# Patient Record
Sex: MALE | Race: WHITE | NOT HISPANIC OR LATINO | Employment: OTHER | URBAN - METROPOLITAN AREA
[De-identification: names, ages, dates, MRNs, and addresses within clinical notes are randomized per-mention and may not be internally consistent; named-entity substitution may affect disease eponyms.]

---

## 2015-05-29 LAB
CHOLEST SERPL-MSCNC: 245 MG/DL (ref 0–200)
HDLC SERPL-MCNC: 55 MG/DL
LDLC SERPL CALC-MCNC: 145 MG/DL
TRIGL SERPL-MCNC: 227 MG/DL

## 2017-01-09 ENCOUNTER — TELEPHONE (OUTPATIENT)
Dept: UROLOGY | Facility: CLINIC | Age: 77
End: 2017-01-09

## 2017-01-09 NOTE — TELEPHONE ENCOUNTER
----- Message from Jace Culver MD sent at 1/8/2017 10:13 PM CST -----  Records we received are just med list and list of medical problems - need clinical records and lab/urine results. (scheuled 1/11/17)    But - phone notes indicate he was going to be scheduled with Dr Constantino in [facility]. If he has been seen by Dr Constantino he should follow up with him.

## 2017-01-09 NOTE — TELEPHONE ENCOUNTER
Spoke with Estella with Dr Jose Nolan who states that there were no labs , or imaging only and office note from 2016. States she will fax it over . Fax number provided

## 2017-01-09 NOTE — TELEPHONE ENCOUNTER
----- Message from Jace Culver MD sent at 1/8/2017 10:13 PM CST -----  Records we received are just med list and list of medical problems - need clinical records and lab/urine results. (scheuled 1/11/17)    But - phone notes indicate he was going to be scheduled with Dr Constantion in [facility]. If he has been seen by Dr Constantino he should follow up with him.

## 2017-01-11 ENCOUNTER — OFFICE VISIT (OUTPATIENT)
Dept: UROLOGY | Facility: CLINIC | Age: 77
End: 2017-01-11
Payer: MEDICARE

## 2017-01-11 VITALS
HEIGHT: 67 IN | BODY MASS INDEX: 34.53 KG/M2 | SYSTOLIC BLOOD PRESSURE: 162 MMHG | HEART RATE: 56 BPM | WEIGHT: 220 LBS | DIASTOLIC BLOOD PRESSURE: 79 MMHG

## 2017-01-11 DIAGNOSIS — Z85.46 HISTORY OF PROSTATE CANCER: ICD-10-CM

## 2017-01-11 DIAGNOSIS — R35.0 URINARY FREQUENCY: Primary | ICD-10-CM

## 2017-01-11 LAB
BILIRUB SERPL-MCNC: NORMAL MG/DL
BLOOD URINE, POC: NORMAL
COLOR, POC UA: NORMAL
GLUCOSE UR QL STRIP: 50
KETONES UR QL STRIP: NORMAL
LEUKOCYTE ESTERASE URINE, POC: NORMAL
NITRITE, POC UA: NORMAL
PH, POC UA: 5
PROTEIN, POC: 30
SPECIFIC GRAVITY, POC UA: 1.01
UROBILINOGEN, POC UA: NORMAL

## 2017-01-11 PROCEDURE — 1160F RVW MEDS BY RX/DR IN RCRD: CPT | Mod: S$GLB,,, | Performed by: UROLOGY

## 2017-01-11 PROCEDURE — 81002 URINALYSIS NONAUTO W/O SCOPE: CPT | Mod: S$GLB,,, | Performed by: UROLOGY

## 2017-01-11 PROCEDURE — 1159F MED LIST DOCD IN RCRD: CPT | Mod: S$GLB,,, | Performed by: UROLOGY

## 2017-01-11 PROCEDURE — 1126F AMNT PAIN NOTED NONE PRSNT: CPT | Mod: S$GLB,,, | Performed by: UROLOGY

## 2017-01-11 PROCEDURE — 99203 OFFICE O/P NEW LOW 30 MIN: CPT | Mod: 25,S$GLB,, | Performed by: UROLOGY

## 2017-01-11 PROCEDURE — 1157F ADVNC CARE PLAN IN RCRD: CPT | Mod: S$GLB,,, | Performed by: UROLOGY

## 2017-01-11 PROCEDURE — 99999 PR PBB SHADOW E&M-EST. PATIENT-LVL III: CPT | Mod: PBBFAC,,, | Performed by: UROLOGY

## 2017-01-11 RX ORDER — TAMSULOSIN HYDROCHLORIDE 0.4 MG/1
CAPSULE ORAL
COMMUNITY
Start: 2016-11-30 | End: 2017-09-01

## 2017-01-11 RX ORDER — AMOXICILLIN AND CLAVULANATE POTASSIUM 875; 125 MG/1; MG/1
TABLET, FILM COATED ORAL
COMMUNITY
Start: 2017-01-03 | End: 2017-09-01

## 2017-01-11 RX ORDER — PANTOPRAZOLE SODIUM 20 MG/1
20 TABLET, DELAYED RELEASE ORAL DAILY
COMMUNITY
End: 2017-09-01

## 2017-01-11 RX ORDER — NEBIVOLOL 5 MG/1
10 TABLET ORAL DAILY
COMMUNITY
End: 2017-09-01

## 2017-01-11 NOTE — LETTER
January 11, 2017      Jose Nolan MD  [address] [place] [address]           [facility]  [address]  [place] [address]  Phone: [phone]          Patient: Lucho Delong   MR Number: [id number]   YOB: 1940   Date of Visit: 1/11/2017       Dear Dr. Jose Nolan:    Thank you for referring Lucho Delong to me for evaluation. Attached you will find relevant portions of my assessment and plan of care.    If you have questions, please do not hesitate to call me. I look forward to following Lucho Delong along with you.    Sincerely,    Jace Culver MD    Enclosure  CC:  No Recipients    If you would like to receive this communication electronically, please contact [email or web address] or [phone] to request more information on [employer] Link access.    For providers and/or their staff who would like to refer a patient to [facility], please contact us through our one-stop-shop provider referral line, [facility], at [phone].    If you feel you have received this communication in error or would no longer like to receive these types of communications, please e-mail [email or web address]

## 2017-01-11 NOTE — PROGRESS NOTES
[facility]:    Lucho Delong is a 76 y.o. male Referred by Jose Nolan MD for urinary frequency    Saw Dr Nolan 11/30/16 and complained of frequent urination and feeling of being unable to fully empty his bladder. He was started on Flomax.    DTF 3-4x. Q2hour NTF  No hesitancy. No intermittency. Mild PV dribble.  Often has to return right back to the bathroom within minutes.  No hematuria, no dysuria.  Notices increased frequency if he has a soda.  2 cups of coffe in the morning. Water and juice throughout the day. Holland Hansen 2-3 evenings per week.  Does drink all the way until bedtime - keeps a glass of water by the bed.    Prostate cancer 20 years ago.  Open RRP by Dr Mckenzie in [place], [place]. Has been continent since surgery, also had good erections following.  PSA <0.1 in 2005 and 2006.    Has noticed right testicle size changes from blueberry to golf ball. Noticed it within last year. Nonpainful.      HLD, HTN, gout, GERD, CKD3    Past Medical History   Diagnosis Date    Allergic reaction     Bursitis of hip     Chronic back pain     Chronic kidney disease, stage 3     Foot pain     Gastroesophageal reflux disease     Gout     Hyperlipemia     Hypertension     Joint pain     Pain in toe     Testicle pain        Past Surgical History   Procedure Laterality Date    Colonoscopy      Prostate surgery  1997       Family History   Problem Relation Age of Onset    Cancer Father 86     colon       Social History     Social History    Marital status:      Spouse name: N/A    Number of children: N/A    Years of education: N/A     Occupational History    Not on file.     Social History Main Topics    Smoking status: Never Smoker    Smokeless tobacco: Never Used    Alcohol use 2.0 oz/week     4 Standard drinks or equivalent per week      Comment: bourbon or beer    Drug use: No    Sexual activity: Not on file     Other Topics Concern    Not on  file     Social History Narrative       Review of patient's allergies indicates:   Allergen Reactions    Tramadol Itching and Swelling     Began medication, two doses into this treatment patient experiences tongue swelling and severe itching. Patient MD advised to not take this medication anymore       Medications Reviewed: see MAR    ROS:    As noted above in HPI otherwise negative x10 systems reviewed.    PHYSICAL EXAM:    Vitals:    01/11/17 0853   BP: (!) 162/79   Pulse: (!) 56     Body mass index is 34.46 kg/(m^2).    General: Alert, cooperative, no distress, appears stated age  Head: Normocephalic, without obvious abnormality, atraumatic  Neck: no masses, no thyromegaly, no lymphadenopathy  Eyes: PERRL, conjunctiva/corneas clear  Lungs: Respirations unlabored, normal effort, no accessory muscle use  CV: Warm and well perfused extremities  Abdomen: Soft, non-tender, no CVA tenderness, no hepatosplenomegaly, no hernia, well healed low midline surgical scar  Penis: phallus normal, well cared for, no plaques or lesions.   Scrotum: no cysts, no lesions, no rash, no clinical hydrocele, no hernia  Epididymes: normal, nontender, symmetrical, no masses,  prominent R epididymis with cys   Testes: normal, both descended, no masses, R testis with lower lie.   Urethra: palpably normal with orthotopic meatus of normal size    RAMÓN: normal sphincter tone, no masses, no hemorrhoids, empty fossa, no masses  Extremities: Extremities normal, atraumatic, no cyanosis or edema  Skin: Normal color, texture, and turgor, no rashes or lesions  Psych: Appropriate, well oriented, normal affect, normal mood  Neuro: Non-focal      Lab Results   Component Value Date    PSA <0.1 04/11/2006    PSA <0.1 07/27/2005       LABS:    Recent Results (from the past 336 hour(s))   POCT URINE DIPSTICK WITHOUT MICROSCOPE    Collection Time: 01/11/17  9:40 AM   Result Value Ref Range    Color, UA yellow clear     Spec Grav, UA 1.015     pH, UA 5     WBC,  UA nerg     Nitrite, UA neg     Protein, UA 30     Glucose, UA 50     Ketones, UA neg     Urobilinogen, UA neg     Bilirubin, UA neg     Blood, UA neg          Assessment/Diagnosis:    1. Urinary frequency  POCT URINE DIPSTICK WITHOUT MICROSCOPE   2. History of prostate cancer         Plans:  We primarily discussed behavioral modifications for his bothersome frequency, which is mostly a problem at night. Have advised him to limit fluid 2 hours before bedtime.  We also discussed limiting bladder irritants such as caffeine, soda, tea, alcohol, spicy food.  He has very minimal obstructive LUTS controlled with Flomax.  I will have him return to clinic in 6 months at which time we'll reassess his urinary frequency and nocturia.  If these are unchanged with behavioral modifications will consider cystoscopy given his history of prostate cancer and open prostatectomy at that time will evaluate for bladder neck contracture or other such pathology.  30 minutes spent in this encounter, over half in counseling.     If still in 6 mos with behav changes consider cysto

## 2017-01-11 NOTE — MR AVS SNAPSHOT
[place] [facility] - Urology   [address]  [place] [address]  Phone: [phone]                  Lucho Delong   2017 9:00 AM   Office Visit    Description:  Male : 1940   Provider:  Jace Culver MD   Department:  [place] [facility]           Reason for Visit     Urinary Frequency     Groin Swelling           Diagnoses this Visit        Comments    Urinary frequency    -  Primary            To Do List           Goals (5 Years of Data)     None      Follow-Up and Disposition     Return in about 6 months (around 2017).      [facility]     [facility] -  Assistance  Registered nurses in the [facility] provide clinical advisement, health education, appointment booking, and other advisory services.  Call for this free service at [phone].             Medications           Message regarding Medications     Verify the changes and/or additions to your medication regime listed below are the same as discussed with your clinician today.  If any of these changes or additions are incorrect, please notify your healthcare provider.        STOP taking these medications     allopurinol (ZYLOPRIM) 300 MG tablet     amlodipine (NORVASC) 10 MG tablet     multivitamin capsule Take 1 capsule by mouth once daily.           Verify that the below list of medications is an accurate representation of the medications you are currently taking.  If none reported, the list may be blank. If incorrect, please contact your healthcare provider. Carry this list with you in case of emergency.           Current Medications     amoxicillin-clavulanate 875-125mg (AUGMENTIN) 875-125 mg per tablet     CRESTOR 20 mg tablet     hydrocodone-acetaminophen 5-325mg (NORCO) 5-325 mg per tablet Take 1 tablet by mouth every 8 (eight) hours as needed for Pain.    losartan (COZAAR) 100 MG tablet     naproxen sodium (ANAPROX) 220 MG tablet Take 220 mg by mouth 3 (three)  "times daily with meals.    nebivolol (BYSTOLIC) 5 MG Tab Take 10 mg by mouth once daily.    omeprazole (PRILOSEC) 40 MG capsule     pantoprazole (PROTONIX) 20 MG tablet Take 20 mg by mouth once daily.    predniSONE (DELTASONE) 20 MG tablet     tamsulosin (FLOMAX) 0.4 mg Cp24            Clinical Reference Information           Vital Signs - Last Recorded  Most recent update: 1/11/2017  9:40 AM by Jeanne Sears MA    BP Pulse Ht Wt BMI    (!) 162/79 (BP Location: Right arm, Patient Position: Sitting, BP Method: Automatic) (!) 56 5' 7" (1.702 m) 99.8 kg (220 lb) 34.46 kg/m2      Blood Pressure          Most Recent Value    BP  (!)  162/79      Allergies as of 1/11/2017     Tramadol      Immunizations Administered on Date of Encounter - 1/11/2017     None      Orders Placed During Today's Visit      Normal Orders This Visit    POCT URINE DIPSTICK WITHOUT MICROSCOPE          1/11/2017  9:41 AM - Jeanne Sears MA      Component Results     Component    Color, UA    yellow clear    Spec Grav, UA    1.015    pH, UA    5    WBC, UA    nerg    Nitrite, UA    neg    Protein, UA    30    Glucose, UA    50    Ketones, UA    neg    Urobilinogen, UA    neg    Bilirubin, UA    neg    Blood, UA    neg      "

## 2017-03-15 ENCOUNTER — TELEPHONE (OUTPATIENT)
Dept: ORTHOPEDIC SURGERY | Facility: CLINIC | Age: 77
End: 2017-03-15

## 2017-03-15 NOTE — TELEPHONE ENCOUNTER
----- Message from Jami Laughlin sent at 3/15/2017  9:30 AM CDT -----  Patient is having trouble with his back. He stated that his lower and upper lumbar is problematic. He does not have a referral but will get one from his PCP. Please call with availability. Please call patient back at [phone].

## 2017-08-15 ENCOUNTER — TELEPHONE (OUTPATIENT)
Dept: ELECTROPHYSIOLOGY | Facility: CLINIC | Age: 77
End: 2017-08-15

## 2017-08-15 DIAGNOSIS — I48.0 PAF (PAROXYSMAL ATRIAL FIBRILLATION): Primary | ICD-10-CM

## 2017-08-15 NOTE — TELEPHONE ENCOUNTER
I called patient and left him V/Message ,  due to he has an appointment to see  9-1-17.   I need to request all His Medical Records for his a-Fib.

## 2017-08-22 ENCOUNTER — TELEPHONE (OUTPATIENT)
Dept: ELECTROPHYSIOLOGY | Facility: CLINIC | Age: 77
End: 2017-08-22

## 2017-08-22 NOTE — TELEPHONE ENCOUNTER
I returned pt call, and we spoke about getting more of his medical records from  [phone].  From  1 year ago today.

## 2017-09-01 ENCOUNTER — INITIAL CONSULT (OUTPATIENT)
Dept: ELECTROPHYSIOLOGY | Facility: CLINIC | Age: 77
End: 2017-09-01
Payer: MEDICARE

## 2017-09-01 ENCOUNTER — HOSPITAL ENCOUNTER (OUTPATIENT)
Dept: CARDIOLOGY | Facility: CLINIC | Age: 77
Discharge: HOME | End: 2017-09-01
Payer: MEDICARE

## 2017-09-01 VITALS
HEIGHT: 67 IN | WEIGHT: 202 LBS | HEART RATE: 83 BPM | DIASTOLIC BLOOD PRESSURE: 67 MMHG | SYSTOLIC BLOOD PRESSURE: 133 MMHG | BODY MASS INDEX: 31.71 KG/M2

## 2017-09-01 DIAGNOSIS — I48.19 PERSISTENT ATRIAL FIBRILLATION: ICD-10-CM

## 2017-09-01 DIAGNOSIS — I48.0 PAF (PAROXYSMAL ATRIAL FIBRILLATION): ICD-10-CM

## 2017-09-01 DIAGNOSIS — I48.19 PERSISTENT ATRIAL FIBRILLATION: Primary | ICD-10-CM

## 2017-09-01 DIAGNOSIS — E78.5 HYPERLIPIDEMIA, UNSPECIFIED HYPERLIPIDEMIA TYPE: ICD-10-CM

## 2017-09-01 DIAGNOSIS — I10 ESSENTIAL HYPERTENSION: ICD-10-CM

## 2017-09-01 PROCEDURE — 1159F MED LIST DOCD IN RCRD: CPT | Mod: S$GLB,,, | Performed by: INTERNAL MEDICINE

## 2017-09-01 PROCEDURE — 99205 OFFICE O/P NEW HI 60 MIN: CPT | Mod: S$GLB,,, | Performed by: INTERNAL MEDICINE

## 2017-09-01 PROCEDURE — 99999 PR PBB SHADOW E&M-EST. PATIENT-LVL III: CPT | Mod: PBBFAC,,, | Performed by: INTERNAL MEDICINE

## 2017-09-01 PROCEDURE — 3008F BODY MASS INDEX DOCD: CPT | Mod: S$GLB,,, | Performed by: INTERNAL MEDICINE

## 2017-09-01 PROCEDURE — 93000 ELECTROCARDIOGRAM COMPLETE: CPT | Mod: S$GLB,,, | Performed by: INTERNAL MEDICINE

## 2017-09-01 PROCEDURE — 1126F AMNT PAIN NOTED NONE PRSNT: CPT | Mod: S$GLB,,, | Performed by: INTERNAL MEDICINE

## 2017-09-01 RX ORDER — METOPROLOL SUCCINATE 50 MG/1
50 TABLET, EXTENDED RELEASE ORAL NIGHTLY
COMMUNITY
Start: 2017-08-24 | End: 2017-11-29

## 2017-09-01 RX ORDER — DILTIAZEM HYDROCHLORIDE 240 MG/1
240 CAPSULE, COATED, EXTENDED RELEASE ORAL DAILY
Status: ON HOLD | COMMUNITY
Start: 2017-07-01 | End: 2017-10-04

## 2017-09-01 NOTE — PROGRESS NOTES
Subjective:    Patient ID:  Lucho Delong is a 76 y.o. male who presents for evaluation of Atrial Fibrillation      HPI   76 y.o. M  CKD (ClCr 27)  HTN on meds  HL on meds    New AF dx 7/17, after pt developed COLON x 10 days.  Rx eliquis and dilt, which pt didn't tolerate. Changed to xarelto, which he tolerates but with bruising.    ECGs:  12/2016: SR, 7/17: AF    echo 12/16: 60%. mild LAE  ETT 12/16: no ischemia    My interpretation of today's ECG is AF with HR 83 bpm    Review of Systems   Constitution: Positive for malaise/fatigue. Negative for weakness.   HENT: Negative.  Negative for ear pain and tinnitus.    Eyes: Negative for blurred vision.   Cardiovascular: Positive for dyspnea on exertion. Negative for chest pain, near-syncope, palpitations and syncope.   Respiratory: Negative.  Negative for shortness of breath.    Endocrine: Negative.  Negative for polyuria.   Hematologic/Lymphatic: Does not bruise/bleed easily.   Skin: Negative.  Negative for rash.   Musculoskeletal: Negative.  Negative for joint pain and muscle weakness.   Gastrointestinal: Negative.  Negative for abdominal pain and change in bowel habit.   Genitourinary: Negative for frequency.   Neurological: Negative.  Negative for dizziness.   Psychiatric/Behavioral: Negative.  Negative for depression. The patient is not nervous/anxious.    Allergic/Immunologic: Negative for environmental allergies.        Objective:    Physical Exam   Constitutional: He is oriented to person, place, and time. He appears well-developed and well-nourished.   HENT:   Head: Normocephalic and atraumatic.   Eyes: Conjunctivae, EOM and lids are normal. No scleral icterus.   Neck: Normal range of motion. No JVD present. No tracheal deviation present. No thyromegaly present.   Cardiovascular: Normal rate, normal heart sounds and intact distal pulses.  An irregularly irregular rhythm present.  No extrasystoles are present. PMI is not displaced.  Exam reveals no gallop  and no friction rub.    No murmur heard.  Pulses:       Radial pulses are 2+ on the right side, and 2+ on the left side.   Pulmonary/Chest: Effort normal and breath sounds normal. No accessory muscle usage. No tachypnea. No respiratory distress. He has no wheezes. He has no rales.   Abdominal: Soft. Bowel sounds are normal. He exhibits no distension. There is no hepatosplenomegaly. There is no tenderness.   Musculoskeletal: Normal range of motion. He exhibits no edema.   Neurological: He is alert and oriented to person, place, and time. He has normal reflexes. He exhibits normal muscle tone.   Skin: Skin is warm and dry. No rash noted.   Psychiatric: He has a normal mood and affect. His behavior is normal.   Nursing note and vitals reviewed.        Assessment:       1. Persistent atrial fibrillation    2. Essential hypertension    3. Hyperlipidemia, unspecified hyperlipidemia type    Likely PUSHPA     Plan:       Discussed AF and its basic pathophysiology, including its health implications and treatment options (rate vs. rhythm control, meds vs. procedural/device treatment) as appropriate for the patient.  Discussed options for anticoagulation, including ASA vs coumadin vs dabigatran/rivaroxaban/apixaban (novel anticoagulants). Discussed risks and benefits of each, including dosing, side effects, risk (including no specific reversal agent for some NOACs), and cost. Answered all questions. This process took about 15 minutes.    Continue xarelto, but decrease dose to 15 given CKD  HYACINTH/DCCV next week, and place 30-day monitor with auto.    Pt's wife says he stops breathing while sleeping.  Discussed that in the presence of uncontrolled/incompletely treated obstructive sleep apnea, AF is very difficult to treat. Need to optimize PUSHPA treatment in order to optimize attempts at AF treatment.  Sleep consult re: PUSHPA; CPAP prn.

## 2017-09-01 NOTE — PROGRESS NOTES
CARDIOVERSION EDUCATION CHECK LIST    9-01-17  Blood work  PRE - PROCEDURE LABS HAVE BEEN ORDERED FOR YOU @ [facility]  BE SURE TO ARRIVE AT YOUR SCHEDULED TIME FOR THIS LAB WORK!   (YOU DO NOT HAVE TO FAST FOR THIS LABWORK!!!!)     9-06-17 @ 8 AM-   Cardioversion  REPORT TO CARDIOLOGY WAITING ROOM ON 3RD FLOOR OF HOSPITAL (DO NOT REPORT TO CLINIC)  Directions for Reporting to Cardiology Waiting Area in the Hospital  If you park in the Parking Garage:  Take elevators to the 2nd floor  Walk up ramp and turn right by Gold Elevators  Take elevator to the 3rd floor  Upon exiting the elevator, turn away from the clinic areas  Walk long cook around to front of hospital to area with windows overlooking [facility]  Check in at Reception Desk  OR  If family is dropping you off:  Have them drop you off at the front of the Hospital  (Near the ER, where all the flags are hung).  Take the E elevators to the 3rd floor.  Check in at the Reception Desk in the waiting room.        DO NOT EAT OR DRINK ANYTHING AFTER: 12 MN ON THE NIGHT BEFORE YOUR PROCEDURE    MEDICATIONS:  YOU MAY TAKE YOUR USUAL MORNING MEDICATIONS WITH A SIP OF WATER    YOU WILL BE GOING HOME THE SAME DAY AS YOUR PROCEDURE  YOU WILL NEED SOMEONE TO DRIVE YOU HOME AFTER YOUR PROCEDURE     YOUR PAIN DURING YOUR PROCEDURE WILL BE MANAGED BY THE ANESTHESIA TEAM      THE ABOVE INSTRUCTIONS WERE GIVEN TO THE PATIENT VERBALLY AND THEY VERBALIZED UNDERSTANDING. THEY DO NOT REQUIRE ANY SPECIAL NEEDS AND DO NOT HAVE ANY LEARNING BARRIERS.     Any need to reschedule or cancel procedures, or any questions regarding your procedure should be addressed directly with the Arrhythmia Department Nurses at the following phone number: [phone]

## 2017-09-05 ENCOUNTER — TELEPHONE (OUTPATIENT)
Dept: CARDIOLOGY | Facility: CLINIC | Age: 77
End: 2017-09-05

## 2017-09-05 NOTE — TELEPHONE ENCOUNTER
Patient called about HYACINTH scheduled on *9/6/17. Pre-procedural questions asked.  Denies swallowing issues and esophageal issues, other than gerd. Denies previous sedation/anesthesia problems. States does have sleep apnea. Has dentures.  Denies recent trauma/surgery/radiation therapy to head/neck/airway. States is able to move neck without difficulty. HYACINTH described to patient. Instructed NPO past midnight and to have a designated  to drive patient home after the procedures due to sedation being given. Instructed to report to  [facility] at 0800 am.   Verbalizes understanding. Questions answered.

## 2017-09-06 ENCOUNTER — HOSPITAL ENCOUNTER (OUTPATIENT)
Facility: HOSPITAL | Age: 77
Discharge: HOME | End: 2017-09-06
Attending: INTERNAL MEDICINE | Admitting: INTERNAL MEDICINE
Payer: MEDICARE

## 2017-09-06 ENCOUNTER — ANESTHESIA EVENT (OUTPATIENT)
Dept: MEDSURG UNIT | Facility: HOSPITAL | Age: 77
End: 2017-09-06
Payer: MEDICARE

## 2017-09-06 ENCOUNTER — HOSPITAL ENCOUNTER (OUTPATIENT)
Dept: CARDIOLOGY | Facility: CLINIC | Age: 77
Discharge: HOME | End: 2017-09-06
Attending: INTERNAL MEDICINE | Admitting: INTERNAL MEDICINE
Payer: MEDICARE

## 2017-09-06 ENCOUNTER — SURGERY (OUTPATIENT)
Age: 77
End: 2017-09-06

## 2017-09-06 ENCOUNTER — ANESTHESIA (OUTPATIENT)
Dept: MEDSURG UNIT | Facility: HOSPITAL | Age: 77
End: 2017-09-06
Payer: MEDICARE

## 2017-09-06 VITALS
RESPIRATION RATE: 18 BRPM | BODY MASS INDEX: 31.39 KG/M2 | WEIGHT: 200 LBS | DIASTOLIC BLOOD PRESSURE: 80 MMHG | SYSTOLIC BLOOD PRESSURE: 138 MMHG | TEMPERATURE: 97 F | HEART RATE: 68 BPM | HEIGHT: 67 IN | OXYGEN SATURATION: 98 %

## 2017-09-06 DIAGNOSIS — I48.91 ATRIAL FIBRILLATION: ICD-10-CM

## 2017-09-06 DIAGNOSIS — I48.19 PERSISTENT ATRIAL FIBRILLATION: Primary | ICD-10-CM

## 2017-09-06 LAB
DIASTOLIC DYSFUNCTION: NO
MITRAL VALVE MOBILITY: NORMAL
MITRAL VALVE REGURGITATION: NORMAL
RETIRED EF AND QEF - SEE NOTES: 55 (ref 55–65)

## 2017-09-06 PROCEDURE — 63600175 PHARM REV CODE 636 W HCPCS: Performed by: NURSE ANESTHETIST, CERTIFIED REGISTERED

## 2017-09-06 PROCEDURE — D9220A PRA ANESTHESIA: Mod: ANES,,, | Performed by: ANESTHESIOLOGY

## 2017-09-06 PROCEDURE — D9220A PRA ANESTHESIA: Mod: CRNA,,, | Performed by: NURSE ANESTHETIST, CERTIFIED REGISTERED

## 2017-09-06 PROCEDURE — 37000009 HC ANESTHESIA EA ADD 15 MINS: Performed by: INTERNAL MEDICINE

## 2017-09-06 PROCEDURE — 93320 DOPPLER ECHO COMPLETE: CPT | Mod: 26,,, | Performed by: INTERNAL MEDICINE

## 2017-09-06 PROCEDURE — 37000008 HC ANESTHESIA 1ST 15 MINUTES: Performed by: INTERNAL MEDICINE

## 2017-09-06 PROCEDURE — 93005 ELECTROCARDIOGRAM TRACING: CPT | Mod: 59

## 2017-09-06 PROCEDURE — 93325 DOPPLER ECHO COLOR FLOW MAPG: CPT | Mod: 26,,, | Performed by: INTERNAL MEDICINE

## 2017-09-06 PROCEDURE — 25000003 PHARM REV CODE 250

## 2017-09-06 PROCEDURE — 93320 DOPPLER ECHO COMPLETE: CPT

## 2017-09-06 PROCEDURE — 93010 ELECTROCARDIOGRAM REPORT: CPT | Mod: 76,,, | Performed by: INTERNAL MEDICINE

## 2017-09-06 PROCEDURE — 93010 ELECTROCARDIOGRAM REPORT: CPT | Mod: ,,, | Performed by: INTERNAL MEDICINE

## 2017-09-06 PROCEDURE — 25000003 PHARM REV CODE 250: Performed by: NURSE PRACTITIONER

## 2017-09-06 PROCEDURE — 93312 ECHO TRANSESOPHAGEAL: CPT | Mod: 26,,, | Performed by: INTERNAL MEDICINE

## 2017-09-06 PROCEDURE — 27100006 CARDIOVERSION (DCCV)

## 2017-09-06 PROCEDURE — 92960 CARDIOVERSION ELECTRIC EXT: CPT | Mod: ,,, | Performed by: INTERNAL MEDICINE

## 2017-09-06 PROCEDURE — 93325 DOPPLER ECHO COLOR FLOW MAPG: CPT

## 2017-09-06 RX ORDER — AMIODARONE HYDROCHLORIDE 200 MG/1
200 TABLET ORAL DAILY
Qty: 30 TABLET | Refills: 3 | Status: ON HOLD | OUTPATIENT
Start: 2017-10-07 | End: 2017-10-04

## 2017-09-06 RX ORDER — HYDROMORPHONE HYDROCHLORIDE 1 MG/ML
0.2 INJECTION, SOLUTION INTRAMUSCULAR; INTRAVENOUS; SUBCUTANEOUS EVERY 5 MIN PRN
Status: DISCONTINUED | OUTPATIENT
Start: 2017-09-06 | End: 2017-09-06 | Stop reason: HOSPADM

## 2017-09-06 RX ORDER — SODIUM CHLORIDE 9 MG/ML
INJECTION, SOLUTION INTRAVENOUS CONTINUOUS
Status: DISCONTINUED | OUTPATIENT
Start: 2017-09-06 | End: 2017-09-06 | Stop reason: HOSPADM

## 2017-09-06 RX ORDER — AMIODARONE HYDROCHLORIDE 200 MG/1
TABLET ORAL
Qty: 42 TABLET | Refills: 0 | Status: ON HOLD | OUTPATIENT
Start: 2017-09-06 | End: 2017-10-04 | Stop reason: HOSPADM

## 2017-09-06 RX ORDER — ONDANSETRON 2 MG/ML
4 INJECTION INTRAMUSCULAR; INTRAVENOUS DAILY PRN
Status: DISCONTINUED | OUTPATIENT
Start: 2017-09-06 | End: 2017-09-06 | Stop reason: HOSPADM

## 2017-09-06 RX ORDER — SODIUM CHLORIDE 0.9 % (FLUSH) 0.9 %
3 SYRINGE (ML) INJECTION
Status: DISCONTINUED | OUTPATIENT
Start: 2017-09-06 | End: 2017-09-06 | Stop reason: HOSPADM

## 2017-09-06 RX ORDER — PROPOFOL 10 MG/ML
VIAL (ML) INTRAVENOUS
Status: DISCONTINUED | OUTPATIENT
Start: 2017-09-06 | End: 2017-09-06

## 2017-09-06 RX ORDER — AMIODARONE HYDROCHLORIDE 200 MG/1
TABLET ORAL
Qty: 90 TABLET | Refills: 3 | Status: SHIPPED | OUTPATIENT
Start: 2017-09-06 | End: 2017-09-06 | Stop reason: DRUGHIGH

## 2017-09-06 RX ORDER — DIPHENHYDRAMINE HYDROCHLORIDE 50 MG/ML
25 INJECTION INTRAMUSCULAR; INTRAVENOUS EVERY 6 HOURS PRN
Status: DISCONTINUED | OUTPATIENT
Start: 2017-09-06 | End: 2017-09-06 | Stop reason: HOSPADM

## 2017-09-06 RX ADMIN — PROPOFOL 20 MG: 10 INJECTION, EMULSION INTRAVENOUS at 10:09

## 2017-09-06 RX ADMIN — PROPOFOL 50 MG: 10 INJECTION, EMULSION INTRAVENOUS at 10:09

## 2017-09-06 RX ADMIN — SODIUM CHLORIDE 1000 ML: 0.9 INJECTION, SOLUTION INTRAVENOUS at 07:09

## 2017-09-06 NOTE — PLAN OF CARE
Plan of care and periop routine discussed with patient. Pt verbalized understanding. All questions answered. VSS. Respirations even and unlabored. Pt reports pain is tolerable. Family has been updated. Will continue to monitor.

## 2017-09-06 NOTE — PLAN OF CARE
Problem: Patient Care Overview  Goal: Plan of Care Review  Outcome: Ongoing (interventions implemented as appropriate)  Received report from CRISTINA Montejo. Patient s/p HYACINTH and attempted cardioversion, AAOx3. VSS, no c/o pain or discomfort at this time, resp even and unlabored. Post procedure protocol reviewed with patient and patient's family. Understanding verbalized. Family members at bedside. Nurse call bell within reach. Will continue to monitor per post procedure protocol.

## 2017-09-06 NOTE — DISCHARGE SUMMARY
[facility]  Discharge Summary      Admit Date: 9/6/2017    Discharge Date and Time: 9/6/2017  1:25 PM    Attending Physician: Carlos Costello MD     Reason for Admission: Diagnosed with persistent Afib (on Xarelto) since 7/17 here today for HYACINTH+DCCV.    Procedures Performed: Procedure(s) (LRB):  CARDIOVERSION (N/A)  TRANSESOPHAGEAL ECHOCARDIOGRAM (HYACINTH) (N/A)    Hospital Course  Lucho Delong 76 y.o.  man with PMHx of CKD, HTN and Hyperlipidemia, diagnosed with persistent Afib (on Xarelto) since 7/17 s/p  HYACINTH+DCCV on 9/6/17.  Pt. Converted to Sinus rhythm following DCCV, but had early conversion back to AF post procedure. Pt. Responsive and answers questions. Denies pain.  Up out of bed.  No SOB, no CP, no syncope/presyncope. Given ERAF, plan for amiodarone po with loading dose as prescribed.  After 2 weeks of loading dose  Amiodarone, plan to bring patient back for  Repeat HYACINTH/Cardioversion.    Discharge plans/instructions discussed with patient and spouse  who verbalized understanding  and agreement of plans of care. No further questions or concerns voiced at this time.     Consults: none    Significant Diagnostic Studies: Cardiac Graphics: ECG 9/6/17 at 1107: AF. Ventricular rate 63 bpm    Final Diagnoses:    Principal Problem: Persistent atrial fibrillation   Secondary Diagnoses:   Active Hospital Problems    Diagnosis  POA    *Persistent atrial fibrillation [I48.1]  Yes      Resolved Hospital Problems    Diagnosis Date Resolved POA   No resolved problems to display.     Discharged Condition: good    Disposition: Home or Self Care    Follow Up/Patient Instructions:     Medications:  Reconciled Home Medications:   Discharge Medication List as of 9/6/2017 12:59 PM      START taking these medications    Details   amiodarone (PACERONE) 200 MG Tab Take 400 mg twice daily x 2 weeks; then take 400 mg daily x 2 weeks; then take 200 mg daily thereafter, Normal         CONTINUE these  medications which have NOT CHANGED    Details   CRESTOR 20 mg tablet 20 mg once daily. , Starting Wed 4/8/2015, Historical Med      diltiaZEM (CARDIZEM CD) 240 MG 24 hr capsule Take 240 mg by mouth once daily., Starting Sat 7/1/2017, Historical Med      losartan (COZAAR) 100 MG tablet 100 mg once daily. , Starting Wed 5/20/2015, Historical Med      metoprolol succinate (TOPROL-XL) 50 MG 24 hr tablet Take 50 mg by mouth once daily., Starting Thu 8/24/2017, Historical Med      mirabegron (MYRBETRIQ) 25 mg Tb24 ER tablet Take 25 mg by mouth once daily., Starting Mon 8/28/2017, Historical Med      predniSONE (DELTASONE) 20 MG tablet 20 mg once daily. , Starting Wed 5/20/2015, Historical Med      rivaroxaban (XARELTO) 15 mg Tab Take 1 tablet (15 mg total) by mouth daily with dinner or evening meal., Starting Fri 9/1/2017, Normal             Discharge Procedure Orders  Diet general   Scheduling Instructions: cardiac     Activity as tolerated     Call MD for:  persistent dizziness, light-headedness, or visual disturbances     Call MD for:  persistent nausea and vomiting or diarrhea     Call MD for:  temperature >100.4     Call MD for:  severe uncontrolled pain     Call MD for:  redness, tenderness, or signs of infection (pain, swelling, redness, odor or green/yellow discharge around incision site)     Call MD for:  difficulty breathing or increased cough     Call MD for:  severe persistent headache     Call MD for:  worsening rash     Call MD for:  increased confusion or weakness       Follow-up Information     Carlos Costello MD In 2 weeks.    Specialties:  Cardiology, Electrophysiology  Why:  EP clinic will coordinate with patient and family in 2 weeks for repeat HYACINTH/cardioversion.    Contact information:  [address] [facility] [id number] [phone]                   THO Alatorre, APRN, FNP-BC  Nurse Practitioner  Cardiology- EP

## 2017-09-06 NOTE — H&P
TRANSESOPHAGEAL ECHOCARDIOGRAPHY   PRE-PROCEDURE NOTE    09/06/2017    HPI:     Lucho Delong is a 76 y.o. man with PMHx of CKD, HTN and Hyperlipidemia diagnosed with persistent Afib (on Xarelto) since 7/17 here today for HYACINTH+DCCV.    Dysphagia or odynophagia:  No  Liver Disease, esophageal disease, or known varices:  No  Upper GI Bleeding: No  Snoring:  No  Sleep Apnea:  No  Prior neck surgery or radiation:  No  History of anesthetic difficulties:  No  Family history of anesthetic difficulties:  No  Last oral intake:  12 hours ago  Able to move neck in all directions:  Yes      Meds:     Scheduled Meds:   PRN Meds:  Continuous Infusions:   sodium chloride 0.9%         Physical Exam:     Vitals:          Constitutional: NAD, conversant  HEENT:   Sclera anicteric, Uvula midline, EOMI, OP clear  Neck:               No JVD, moves to all direction without any limitations  CV:               RRR, no murmurs / rubs / gallops, normal S1/S2  Pulm:               CTAB, no wheezes, rales, or ronchi  GI:               Abdomen soft, NTND, +BS  Extremities:              No LE edema, warm with palpable pulses  Neuro:   AAOX3, no focal motor deficits    Mallampati:II  ASA:II      Labs:     Recent Results (from the past 336 hour(s))   CBC auto differential    Collection Time: 09/01/17  2:45 PM   Result Value Ref Range    WBC 9.16 3.90 - 12.70 K/uL    Hemoglobin 11.4 (L) 14.0 - 18.0 g/dL    Hematocrit 33.6 (L) 40.0 - 54.0 %    Platelets 258 150 - 350 K/uL       Recent Results (from the past 336 hour(s))   Basic metabolic panel    Collection Time: 09/01/17  2:45 PM   Result Value Ref Range    Sodium 142 136 - 145 mmol/L    Potassium 4.8 3.5 - 5.1 mmol/L    Chloride 106 95 - 110 mmol/L    CO2 25 23 - 29 mmol/L    BUN, Bld 40 (H) 8 - 23 mg/dL    Creatinine 2.2 (H) 0.5 - 1.4 mg/dL    Calcium 9.5 8.7 - 10.5 mg/dL    Anion Gap 11 8 - 16 mmol/L       CrCl cannot be calculated (Unknown ideal weight.).    INR:  1.1  Imaging:      [id number]  Afrib          Assessment & Plan:     PLAN:  1. HYACINTH for evaluation of CANDELARIO thrombus followed by YVONNEV.    -The risks, benefits & alternatives of the procedure were explained to the patient.    -The risks of transesophageal echo include but are not limited to:  Dental trauma, esophageal trauma/perforation, bleeding, laryngospasm/brochospasm, aspiration, sore throat/hoarseness, & dislodgement of the endotracheal tube/nasogastric tube (where applicable).    -The risks of moderate sedation include hypotension, respiratory depression, arrhythmias, bronchospasm, & death.    -Informed consent was obtained & the patient is agreeable to proceed with the procedure.    I will discuss with the attending physician. Attending addendum is to follow.      Attending addendum to follow     Kelechi Carias MD  Cardiology Fellow  Pager: [phone]

## 2017-09-06 NOTE — PLAN OF CARE
Problem: Patient Care Overview  Goal: Plan of Care Review  Outcome: Outcome(s) achieved Date Met: 09/06/17  Patient discharged per MD orders. Instructions given on medications, wound care, activity, when to call MD, and follow up appointments. Pt verbalized understanding. Patient AAOx3, VSS, no c/o pain or discomfort at this time. Telemetry and PIV removed. Patient and family refused transport, ambulated off unit.

## 2017-09-06 NOTE — TRANSFER OF CARE
"Anesthesia Transfer of Care Note    Patient: Lucho Delong    Procedure(s) Performed: Procedure(s) (LRB):  CARDIOVERSION (N/A)  TRANSESOPHAGEAL ECHOCARDIOGRAM (HYACINTH) (N/A)    Patient location: PACU    Anesthesia Type: general    Transport from OR: Transported from OR on room air with adequate spontaneous ventilation    Post pain: adequate analgesia    Post assessment: no apparent anesthetic complications and tolerated procedure well    Post vital signs: stable    Level of consciousness: awake, alert and oriented    Nausea/Vomiting: no nausea/vomiting    Complications: none    Transfer of care protocol was followed      Last vitals:   Visit Vitals  /78 (BP Location: Left arm, Patient Position: Lying)   Pulse 62   Temp 36.2 °C (97.1 °F) (Oral)   Resp 18   Ht 5' 7" (1.702 m)   Wt 90.7 kg (200 lb)   SpO2 97%   BMI 31.32 kg/m²     "

## 2017-09-06 NOTE — ANESTHESIA PREPROCEDURE EVALUATION
09/06/2017  Lucho Delong is a 76 y.o., male.    Anesthesia Evaluation    I have reviewed the Patient Summary Reports.    I have reviewed the Nursing Notes.   I have reviewed the Medications.     Review of Systems  Anesthesia Hx:  No previous Anesthesia  History of prior surgery of interest to airway management or planning: Previous anesthesia: General Denies Family Hx of Anesthesia complications.   Denies Personal Hx of Anesthesia complications.   Cardiovascular:   Exercise tolerance: good Hypertension Dysrhythmias atrial fibrillation    Renal/:   Chronic Renal Disease, CRI    Hepatic/GI:   GERD        Physical Exam  General:  Well nourished    Airway/Jaw/Neck:  Airway Findings: Mouth Opening: Normal Tongue: Normal  General Airway Assessment: Adult  Mallampati: II  Improves to II with phonation.  TM Distance: Normal, at least 6 cm  Jaw/Neck Findings:  Neck ROM: Normal ROM      Dental:  Dental Findings: In tact   Chest/Lungs:  Chest/Lungs Findings: Clear to auscultation, Normal Respiratory Rate     Heart/Vascular:  Heart Findings: Rate: Normal  Rhythm: Regular Rhythm  Sounds: Normal        Mental Status:  Mental Status Findings:  Cooperative, Alert and Oriented         Anesthesia Plan  Type of Anesthesia, risks & benefits discussed:  Anesthesia Type:  general  Patient's Preference:   Intra-op Monitoring Plan: standard ASA monitors  Intra-op Monitoring Plan Comments:   Post Op Pain Control Plan: multimodal analgesia  Post Op Pain Control Plan Comments:   Induction:   IV  Beta Blocker:  Patient is on a Beta-Blocker and has received one dose within the past 24 hours (No further documentation required).       Informed Consent: Patient understands risks and agrees with Anesthesia plan.  Questions answered. Anesthesia consent signed with patient.  ASA Score: 3     Day of Surgery Review of History &  Physical:    H&P update referred to the provider.         Ready For Surgery From Anesthesia Perspective.

## 2017-09-15 ENCOUNTER — TELEPHONE (OUTPATIENT)
Dept: ELECTROPHYSIOLOGY | Facility: CLINIC | Age: 77
End: 2017-09-15

## 2017-09-15 NOTE — TELEPHONE ENCOUNTER
----- Message from Kaitlin Apple MA sent at 9/15/2017  2:16 PM CDT -----  Contact: patient   Pt stated on amiodrone. Had a DCCv  9-6-17.  C/o  Tiredness , Some weakness. B/P is ok. No SoB.  He thinks it may be due to the Amiodarone.   Please call at number provided.  Thank You.  ----- Message -----  From: Salome Rubio  Sent: 9/15/2017   8:05 AM  To: Kaitlin Apple MA    Please call pt at [phone]. Has a medical questions and asked for you only. Dr Costello pt    Thank you

## 2017-09-15 NOTE — TELEPHONE ENCOUNTER
Returned Pt's call. Pt stated that he is tired and SOB. He was started on amiodarone and is back in afib which is prob causing his symptoms. Advised I would call Monday to set up DCCV. Pt voiced udnerstanding.

## 2017-09-20 ENCOUNTER — CLINICAL SUPPORT (OUTPATIENT)
Dept: ELECTROPHYSIOLOGY | Facility: CLINIC | Age: 77
End: 2017-09-20
Payer: MEDICARE

## 2017-09-20 ENCOUNTER — TELEPHONE (OUTPATIENT)
Dept: ELECTROPHYSIOLOGY | Facility: CLINIC | Age: 77
End: 2017-09-20

## 2017-09-20 ENCOUNTER — HOSPITAL ENCOUNTER (OUTPATIENT)
Dept: CARDIOLOGY | Facility: CLINIC | Age: 77
Discharge: HOME | End: 2017-09-20
Payer: MEDICARE

## 2017-09-20 DIAGNOSIS — I48.0 PAF (PAROXYSMAL ATRIAL FIBRILLATION): ICD-10-CM

## 2017-09-20 DIAGNOSIS — I48.19 PERSISTENT ATRIAL FIBRILLATION: ICD-10-CM

## 2017-09-20 PROCEDURE — 93000 ELECTROCARDIOGRAM COMPLETE: CPT | Mod: S$GLB,,, | Performed by: INTERNAL MEDICINE

## 2017-09-20 NOTE — TELEPHONE ENCOUNTER
Pt came to the lobby today because of complaints of nausea and dizziness on amiodarone. Pt was on a loading dose at 400mg BID. He saw a PCP which adjusted it to 200 mg BID. He has been on that dose for 6 days and still complaining of dizziness.  Advised I would speak with Dr Griffith and see if we needed to push his DCCV back. Pt voiced understanding.     Spoke with Dr Costello. Updated on Pt's symptoms. Pt's amiodarone decreased to 200 mg/qd and DCCV pushed back 2 weeks.      Called Pt to inform of Dr Costello's recommendations. He will decrease dose to 200 mg/qd and procedure scheduled for 10/4/17.

## 2017-09-27 ENCOUNTER — TELEPHONE (OUTPATIENT)
Dept: CARDIOLOGY | Facility: CLINIC | Age: 77
End: 2017-09-27

## 2017-09-27 DIAGNOSIS — I48.19 PERSISTENT ATRIAL FIBRILLATION: Primary | ICD-10-CM

## 2017-09-27 NOTE — PROGRESS NOTES
CARDIOVERSION EDUCATION CHECK LIST    10-2-17 -Labs  PRE - PROCEDURE LABS HAVE BEEN ORDERED FOR YOU @ [facility]  Orders have been faxed and an extra copy of the orders is included in this packet.  (YOU DO NOT HAVE TO FAST FOR THIS LABWORK!!!!)    10-4-17 @ 8 AM  REPORT TO CARDIOLOGY WAITING ROOM ON 3RD FLOOR OF HOSPITAL (DO NOT REPORT TO CLINIC)  Directions for Reporting to Cardiology Waiting Area in the Hospital  If you park in the Parking Garage:  Take elevators to the 2nd floor  Walk up ramp and turn right by Gold Elevators  Take elevator to the 3rd floor  Upon exiting the elevator, turn away from the clinic areas  Walk long cook around to front of hospital to area with windows overlooking [facility]  Check in at Reception Desk  OR  If family is dropping you off:  Have them drop you off at the front of the Hospital  (Near the ER, where all the flags are hung).  Take the E elevators to the 3rd floor.  Check in at the Reception Desk in the waiting room.    DO NOT EAT OR DRINK ANYTHING AFTER: 12 MN ON THE NIGHT BEFORE YOUR PROCEDURE    MEDICATIONS:  YOU MAY TAKE YOUR USUAL MORNING MEDICATIONS WITH A SIP OF WATER    YOU WILL BE GOING HOME THE SAME DAY AS YOUR PROCEDURE  YOU WILL NEED SOMEONE TO DRIVE YOU HOME AFTER YOUR PROCEDURE     YOUR PAIN DURING YOUR PROCEDURE WILL BE MANAGED BY THE ANESTHESIA TEAM      THE ABOVE INSTRUCTIONS WERE GIVEN TO THE PATIENT VERBALLY AND THEY VERBALIZED UNDERSTANDING. THEY DO NOT REQUIRE ANY SPECIAL NEEDS AND DO NOT HAVE ANY LEARNING BARRIERS.     Any need to reschedule or cancel procedures, or any questions regarding your procedure should be addressed directly with the Arrhythmia Department Nurses at the following phone number: [phone]

## 2017-09-27 NOTE — TELEPHONE ENCOUNTER
Patient called about HYACINTH scheduled on 10/4/17 . Pre-procedural questions asked.  Denies swallowing issues and esophageal issues. Denies previous sedation/anesthesia problems. States does not  have sleep apnea. Has dentures.  Denies recent trauma/surgery/radiation therapy to head/neck/airway. States is able to move neck without difficulty. HYACINTH described to patient. Instructed NPO past midnight and to have a designated  to drive patient home after the procedures due to sedation being given. Instructed to report to  [facility] at 0800 am.   Verbalizes understanding. Questions answered.

## 2017-10-02 ENCOUNTER — TELEPHONE (OUTPATIENT)
Dept: ELECTROPHYSIOLOGY | Facility: CLINIC | Age: 77
End: 2017-10-02

## 2017-10-02 DIAGNOSIS — I48.19 PERSISTENT ATRIAL FIBRILLATION: Primary | ICD-10-CM

## 2017-10-02 NOTE — TELEPHONE ENCOUNTER
----- Message from Conchis Gary MA sent at 10/2/2017  9:15 AM CDT -----  Contact: patient called  Please call the patient at home he would like to talk to the nurse about schedule his lab at the [facility].. Thank you.

## 2017-10-02 NOTE — TELEPHONE ENCOUNTER
Returned Pt's call. Pt wanted his lab work switched to [facility]. Appt switched to have lab work done here.

## 2017-10-03 ENCOUNTER — TELEPHONE (OUTPATIENT)
Dept: ELECTROPHYSIOLOGY | Facility: CLINIC | Age: 77
End: 2017-10-03

## 2017-10-03 ENCOUNTER — LAB VISIT (OUTPATIENT)
Dept: LAB | Facility: HOSPITAL | Age: 77
End: 2017-10-03
Attending: INTERNAL MEDICINE
Payer: MEDICARE

## 2017-10-03 DIAGNOSIS — I48.19 PERSISTENT ATRIAL FIBRILLATION: ICD-10-CM

## 2017-10-03 LAB
ANION GAP SERPL CALC-SCNC: 13 MMOL/L
APTT BLDCRRT: 25 SEC
BASOPHILS # BLD AUTO: 0.01 K/UL
BASOPHILS NFR BLD: 0.1 %
BUN SERPL-MCNC: 42 MG/DL
CALCIUM SERPL-MCNC: 9.3 MG/DL
CHLORIDE SERPL-SCNC: 107 MMOL/L
CO2 SERPL-SCNC: 24 MMOL/L
CREAT SERPL-MCNC: 2.7 MG/DL
DIFFERENTIAL METHOD: ABNORMAL
EOSINOPHIL # BLD AUTO: 0 K/UL
EOSINOPHIL NFR BLD: 0.3 %
ERYTHROCYTE [DISTWIDTH] IN BLOOD BY AUTOMATED COUNT: 12.9 %
EST. GFR  (AFRICAN AMERICAN): 25.1 ML/MIN/1.73 M^2
EST. GFR  (NON AFRICAN AMERICAN): 21.8 ML/MIN/1.73 M^2
GLUCOSE SERPL-MCNC: 149 MG/DL
HCT VFR BLD AUTO: 32.5 %
HGB BLD-MCNC: 11 G/DL
INR PPP: 1.2
LYMPHOCYTES # BLD AUTO: 1.6 K/UL
LYMPHOCYTES NFR BLD: 15.9 %
MCH RBC QN AUTO: 29.3 PG
MCHC RBC AUTO-ENTMCNC: 33.8 G/DL
MCV RBC AUTO: 87 FL
MONOCYTES # BLD AUTO: 1 K/UL
MONOCYTES NFR BLD: 9.4 %
NEUTROPHILS # BLD AUTO: 7.6 K/UL
NEUTROPHILS NFR BLD: 74.1 %
PLATELET # BLD AUTO: 223 K/UL
PMV BLD AUTO: 9.7 FL
POTASSIUM SERPL-SCNC: 4.7 MMOL/L
PROTHROMBIN TIME: 12.5 SEC
RBC # BLD AUTO: 3.75 M/UL
SODIUM SERPL-SCNC: 144 MMOL/L
WBC # BLD AUTO: 10.29 K/UL

## 2017-10-03 PROCEDURE — 85025 COMPLETE CBC W/AUTO DIFF WBC: CPT

## 2017-10-03 PROCEDURE — 85610 PROTHROMBIN TIME: CPT

## 2017-10-03 PROCEDURE — 80048 BASIC METABOLIC PNL TOTAL CA: CPT

## 2017-10-03 PROCEDURE — 36415 COLL VENOUS BLD VENIPUNCTURE: CPT

## 2017-10-03 PROCEDURE — 85730 THROMBOPLASTIN TIME PARTIAL: CPT

## 2017-10-04 ENCOUNTER — CLINICAL SUPPORT (OUTPATIENT)
Dept: ELECTROPHYSIOLOGY | Facility: CLINIC | Age: 77
End: 2017-10-04
Payer: MEDICARE

## 2017-10-04 ENCOUNTER — HOSPITAL ENCOUNTER (OUTPATIENT)
Facility: HOSPITAL | Age: 77
Discharge: HOME | End: 2017-10-04
Attending: INTERNAL MEDICINE | Admitting: INTERNAL MEDICINE
Payer: MEDICARE

## 2017-10-04 VITALS
OXYGEN SATURATION: 96 % | WEIGHT: 197 LBS | HEART RATE: 76 BPM | DIASTOLIC BLOOD PRESSURE: 63 MMHG | RESPIRATION RATE: 18 BRPM | HEIGHT: 67 IN | TEMPERATURE: 98 F | SYSTOLIC BLOOD PRESSURE: 123 MMHG | BODY MASS INDEX: 30.92 KG/M2

## 2017-10-04 DIAGNOSIS — I48.92 ATRIAL FLUTTER, UNSPECIFIED TYPE: ICD-10-CM

## 2017-10-04 DIAGNOSIS — I48.3 TYPICAL ATRIAL FLUTTER: Primary | ICD-10-CM

## 2017-10-04 DIAGNOSIS — I48.19 PERSISTENT ATRIAL FIBRILLATION: ICD-10-CM

## 2017-10-04 DIAGNOSIS — I48.91 ATRIAL FIBRILLATION: ICD-10-CM

## 2017-10-04 DIAGNOSIS — I48.19 PERSISTENT ATRIAL FIBRILLATION: Primary | ICD-10-CM

## 2017-10-04 PROCEDURE — 25000003 PHARM REV CODE 250: Performed by: NURSE PRACTITIONER

## 2017-10-04 PROCEDURE — 99220 PR INITIAL OBSERVATION CARE,LEVL III: CPT | Mod: ,,, | Performed by: INTERNAL MEDICINE

## 2017-10-04 PROCEDURE — 93005 ELECTROCARDIOGRAM TRACING: CPT

## 2017-10-04 PROCEDURE — 93010 ELECTROCARDIOGRAM REPORT: CPT | Mod: ,,, | Performed by: INTERNAL MEDICINE

## 2017-10-04 RX ORDER — FUROSEMIDE 20 MG/1
20 TABLET ORAL 2 TIMES DAILY
Status: ON HOLD | COMMUNITY
End: 2017-10-18

## 2017-10-04 RX ORDER — AMIODARONE HYDROCHLORIDE 200 MG/1
200 TABLET ORAL DAILY
Qty: 30 TABLET | Refills: 6 | Status: SHIPPED | OUTPATIENT
Start: 2017-10-07 | End: 2017-10-04

## 2017-10-04 RX ORDER — AMIODARONE HYDROCHLORIDE 200 MG/1
200 TABLET ORAL DAILY
Qty: 30 TABLET | Refills: 6 | Status: SHIPPED | OUTPATIENT
Start: 2017-10-04 | End: 2018-01-10 | Stop reason: SDUPTHER

## 2017-10-04 RX ORDER — SODIUM CHLORIDE 9 MG/ML
INJECTION, SOLUTION INTRAVENOUS CONTINUOUS
Status: DISCONTINUED | OUTPATIENT
Start: 2017-10-04 | End: 2017-10-04 | Stop reason: HOSPADM

## 2017-10-04 RX ORDER — DILTIAZEM HYDROCHLORIDE 180 MG/1
180 CAPSULE, COATED, EXTENDED RELEASE ORAL DAILY
Qty: 30 CAPSULE | Refills: 6 | Status: SHIPPED | OUTPATIENT
Start: 2017-10-04 | End: 2017-11-29

## 2017-10-04 RX ADMIN — SODIUM CHLORIDE 1000 ML: 0.9 INJECTION, SOLUTION INTRAVENOUS at 07:10

## 2017-10-04 NOTE — PLAN OF CARE
Problem: Cardiac Rhythm Management Device (Adult)  Goal: Signs and Symptoms of Listed Potential Problems Will be Absent, Minimized or Managed (Cardiac Rhythm Management Device)  Signs and symptoms of listed potential problems will be absent, minimized or managed by discharge/transition of care (reference Cardiac Rhythm Management Device (Adult) CPG).  Outcome: Ongoing (interventions implemented as appropriate)  Admit assessment done. Plan of care and safety prec initiated. Will continue to monitor.

## 2017-10-04 NOTE — DISCHARGE SUMMARY
"[facility]  Cardiac Electrophysiology  Discharge Summary      Patient Name: Lucho Delong  MRN: [id number]  Admission Date: 10/4/2017  Hospital Length of Stay: 0 days  Discharge Date and Time:  10/04/2017 9:01 AM  Attending Physician: Carlos Costello MD    Discharging Provider: Yesika Nina NP  Primary Care Physician: Jose Nolan MD    HPI: Lucho Delong 76 y.o. presents today for HYACINTH/DCCV for AFL. He reports that he has not taken amiodarone x > 1 week.  He reports 1000% compliance with  xarelto as well as his other medications.     Procedure(s) (LRB):  CARDIOVERSION (N/A)  TRANSESOPHAGEAL ECHOCARDIOGRAM (HYACINTH) (N/A)     Hospital Course:  Lucho Delong 76 y.o. presents today for HYACINTH/DCCV for AFL. He reports that he has not taken amiodarone x > 1 week.  He is taking his xarelto as well as his other medications.  He reports that when taking his amiodarone, he felt badly and couldn't get out of bed. He was nauseous.  Discussed patients reports regarding amiodarone with Dr. Costello who canceled DCCV for today. Plan:  1. Pt. Will take amiodarone 200 mg daily starting today  2. Decrease diltiazem from 240 mg to 180 mg daily  3. Continue xarelto daily without fail  4. Call office and speak with [place] if there are any issues regarding adherence to medication regime.   5. Return in 2 weeks (if taking amiodarone) for HYACINTH/DCCV"      Significant Diagnostic Studies: Labs:   BMP:   Recent Labs  Lab 10/03/17  1225   *      K 4.7      CO2 24   BUN 42*   CREATININE 2.7*   CALCIUM 9.3       Pending Diagnostic Studies:     Procedure Component Value Units Date/Time    Transesophageal echo [[id number]]     Order Status:  Sent Lab Status:  No result           Final Active Diagnoses:    Diagnosis Date Noted POA    PRINCIPAL PROBLEM:  Atrial flutter [I48.92] 10/04/2017 Yes    Atrial fibrillation [I48.91] 09/06/2017 Yes      Problems Resolved During this Admission:    " Diagnosis Date Noted Date Resolved POA     No new Assessment & Plan notes have been filed under this hospital service since the last note was generated.  Service: Arrhythmia      Discharged Condition: good    Disposition: Home or Self Care    Follow Up:  Follow-up Information     Carlos Costello MD In 2 weeks.    Specialties:  Cardiology, Electrophysiology  Why:  EP clinic to coordinate HYACINTH/DCCV in 2 weeks..    Contact information:  [address]  [place] [id number] [phone]                 Patient Instructions:     Diet Cardiac     Call MD for:  temperature >100.4     Call MD for:  persistent nausea and vomiting or diarrhea     Call MD for:  severe uncontrolled pain     Call MD for:  redness, tenderness, or signs of infection (pain, swelling, redness, odor or green/yellow discharge around incision site)     Call MD for:  difficulty breathing or increased cough     Call MD for:  severe persistent headache     Call MD for:  worsening rash     Call MD for:  persistent dizziness, light-headedness, or visual disturbances     Call MD for:  increased confusion or weakness     Call MD for:   Order Comments: For any concerns     Activity as tolerated       Medications:  Reconciled Home Medications:   Current Discharge Medication List      CONTINUE these medications which have CHANGED    Details   amiodarone (PACERONE) 200 MG Tab Take 1 tablet (200 mg total) by mouth once daily.  Qty: 30 tablet, Refills: 6    Associated Diagnoses: Persistent atrial fibrillation      diltiaZEM (CARDIZEM CD) 180 MG 24 hr capsule Take 1 capsule (180 mg total) by mouth once daily.  Qty: 30 capsule, Refills: 6         CONTINUE these medications which have NOT CHANGED    Details   furosemide (LASIX) 20 MG tablet Take 20 mg by mouth 2 (two) times daily.      losartan (COZAAR) 100 MG tablet 100 mg once daily.       metoprolol succinate (TOPROL-XL) 50 MG 24 hr tablet Take 50 mg by mouth once daily.      rivaroxaban (XARELTO) 15 mg Tab  Take 1 tablet (15 mg total) by mouth daily with dinner or evening meal.  Qty: 90 tablet, Refills: 3      CRESTOR 20 mg tablet 20 mg once daily.       mirabegron (MYRBETRIQ) 25 mg Tb24 ER tablet Take 25 mg by mouth once daily.      predniSONE (DELTASONE) 20 MG tablet 20 mg once daily.              Time spent on the discharge of patient: 30 minutes discussing medication compliance, importance of taking all medications as prescribed.  Instructed patient to please call office if there are issues taking medications.     Yesika Nina NP  Cardiac Electrophysiology  [facility]

## 2017-10-04 NOTE — PROGRESS NOTES
Lucho Delong 76 y.o. presents today for HYACINTH/DCCV for AFL. He reports that he has not taken amiodarone x > 1 week.  He is taking his xarelto as well as his other medications.  He reports that when taking his amiodarone, he felt badly and couldn't get out of bed. He was nauseous.  Discussed patients reports regarding amiodarone with Dr. Costello who canceled DCCV for today.     Plan:  1. Pt. Will take amiodarone 200 mg daily starting today  2. Decrease diltiazem from 240 mg to 180 mg daily  3. Continue xarelto daily without fail  4. Call office and speak with [place] if there are any issues regarding adherence to medication regime.   5. Return in 2 weeks (if taking amiodarone) for HYACINTH/DCCV      THO Alatorre, APRN, FNP-BC  Nurse Practitioner  Cardiac Electrophysiology    Attending:  CORETTA Costello MD

## 2017-10-04 NOTE — PLAN OF CARE
Patient discharged per MD orders. Instructions given on medications, wound care, activity, signs of infection, when to call MD, and follow up appointments. Pt verbalized understanding. Patient and family refused transport, ambulated off unit.

## 2017-10-04 NOTE — PROGRESS NOTES
Spoke to Freddy Monzon via telephone regarding bump in SCr  2.7 ( 10/2017)  from 2.2 ( 09/2017). Pt to hold lasix for 1 day, and then  resumed lasix 20 mg once daily instead of BID. Pt with close nephrology follow up in 1 week. Continue other plans per DC summary.   The patient voices understanding and all questions have been answered. No further questions/concerns voiced at this time.          MIRYAM Medina-BC  Cardiology Electrophysiology  NP   [facility]    Attending: MD Carlos Costello

## 2017-10-06 ENCOUNTER — TELEPHONE (OUTPATIENT)
Dept: ELECTROPHYSIOLOGY | Facility: CLINIC | Age: 77
End: 2017-10-06

## 2017-10-06 NOTE — TELEPHONE ENCOUNTER
I called patient ,and no answer, I left V/M   Letting him know that most recent lab was faxed to [facility] [phone].

## 2017-10-06 NOTE — TELEPHONE ENCOUNTER
----- Message from Bita Hermosillo RN sent at 10/6/2017  2:36 PM CDT -----  Contact: pt      ----- Message -----  From: Connie Pineda  Sent: 10/6/2017  10:00 AM  To: Trang DUNHAM Staff    Dr. Costello requested pt to see his Kidney doctor at [facility] and he needs you to fax his recent lab results to [phone].  Pls call pt at [phone] when this has been done.    Thank you

## 2017-10-09 ENCOUNTER — TELEPHONE (OUTPATIENT)
Dept: ELECTROPHYSIOLOGY | Facility: CLINIC | Age: 77
End: 2017-10-09

## 2017-10-09 ENCOUNTER — DOCUMENTATION ONLY (OUTPATIENT)
Dept: ELECTROPHYSIOLOGY | Facility: CLINIC | Age: 77
End: 2017-10-09

## 2017-10-09 NOTE — TELEPHONE ENCOUNTER
Spoke with Pt this morning. He is tolerating amiodarone at 200 mg/qd. Scheduled his DCCV for 10/18/17.

## 2017-10-09 NOTE — PROGRESS NOTES
CARDIOVERSION EDUCATION CHECK LIST     10-17-17 -Labs  PRE - PROCEDURE LABS HAVE BEEN ORDERED FOR YOU @ [facility]  (YOU DO NOT HAVE TO FAST FOR THIS LABWORK!!!!)     10-18-17 arrive @ 8 AM  REPORT TO CARDIOLOGY WAITING ROOM ON 3RD FLOOR OF HOSPITAL (DO NOT REPORT TO CLINIC)  Directions for Reporting to Cardiology Waiting Area in the Hospital  If you park in the Parking Garage:  Take elevators to the 2nd floor  Walk up ramp and turn right by Gold Elevators  Take elevator to the 3rd floor  Upon exiting the elevator, turn away from the clinic areas  Walk long cook around to front of hospital to area with windows overlooking [facility]  Check in at Reception Desk  OR  If family is dropping you off:  Have them drop you off at the front of the Hospital  (Near the ER, where all the flags are hung).  Take the E elevators to the 3rd floor.  Check in at the Reception Desk in the waiting room.     DO NOT EAT OR DRINK ANYTHING AFTER: 12 MIDNIGHT ON THE NIGHT BEFORE YOUR PROCEDURE     MEDICATIONS:  YOU MAY TAKE YOUR USUAL MORNING MEDICATIONS WITH A SIP OF WATER     YOU WILL BE GOING HOME THE SAME DAY AS YOUR PROCEDURE  YOU WILL NEED SOMEONE TO DRIVE YOU HOME AFTER YOUR PROCEDURE      YOUR PAIN DURING YOUR PROCEDURE WILL BE MANAGED BY THE ANESTHESIA TEAM        THE ABOVE INSTRUCTIONS WERE GIVEN TO THE PATIENT VERBALLY AND THEY VERBALIZED UNDERSTANDING. THEY DO NOT REQUIRE ANY SPECIAL NEEDS AND DO NOT HAVE ANY LEARNING BARRIERS.      Any need to reschedule or cancel procedures, or any questions regarding your procedure should be addressed directly with the Arrhythmia Department Nurses at the following phone number: [phone]

## 2017-10-13 ENCOUNTER — DOCUMENTATION ONLY (OUTPATIENT)
Dept: ELECTROPHYSIOLOGY | Facility: CLINIC | Age: 77
End: 2017-10-13

## 2017-10-13 NOTE — PROGRESS NOTES
Received a call from Marie with [employer] monitoring company. Patient was unable to be reached to assist in set up cardiac event monitor. Patient will be given 3 full business days before cancelling patients cardiac monitor.

## 2017-10-17 ENCOUNTER — LAB VISIT (OUTPATIENT)
Dept: LAB | Facility: HOSPITAL | Age: 77
End: 2017-10-17
Attending: INTERNAL MEDICINE
Payer: MEDICARE

## 2017-10-17 DIAGNOSIS — I48.19 PERSISTENT ATRIAL FIBRILLATION: Primary | ICD-10-CM

## 2017-10-17 DIAGNOSIS — I48.19 PERSISTENT ATRIAL FIBRILLATION: ICD-10-CM

## 2017-10-17 LAB
ANION GAP SERPL CALC-SCNC: 11 MMOL/L
APTT BLDCRRT: 29.5 SEC
BASOPHILS # BLD AUTO: 0.03 K/UL
BASOPHILS NFR BLD: 0.3 %
BUN SERPL-MCNC: 41 MG/DL
CALCIUM SERPL-MCNC: 9.4 MG/DL
CHLORIDE SERPL-SCNC: 104 MMOL/L
CO2 SERPL-SCNC: 24 MMOL/L
CREAT SERPL-MCNC: 3 MG/DL
DIFFERENTIAL METHOD: ABNORMAL
EOSINOPHIL # BLD AUTO: 0.1 K/UL
EOSINOPHIL NFR BLD: 1.1 %
ERYTHROCYTE [DISTWIDTH] IN BLOOD BY AUTOMATED COUNT: 12.7 %
EST. GFR  (AFRICAN AMERICAN): 22.1 ML/MIN/1.73 M^2
EST. GFR  (NON AFRICAN AMERICAN): 19.1 ML/MIN/1.73 M^2
GLUCOSE SERPL-MCNC: 164 MG/DL
HCT VFR BLD AUTO: 33.4 %
HGB BLD-MCNC: 11.2 G/DL
IMM GRANULOCYTES # BLD AUTO: 0.04 K/UL
IMM GRANULOCYTES NFR BLD AUTO: 0.4 %
INR PPP: 1.2
LYMPHOCYTES # BLD AUTO: 1.7 K/UL
LYMPHOCYTES NFR BLD: 18.3 %
MCH RBC QN AUTO: 29.2 PG
MCHC RBC AUTO-ENTMCNC: 33.5 G/DL
MCV RBC AUTO: 87 FL
MONOCYTES # BLD AUTO: 1.2 K/UL
MONOCYTES NFR BLD: 13.2 %
NEUTROPHILS # BLD AUTO: 6.2 K/UL
NEUTROPHILS NFR BLD: 66.7 %
NRBC BLD-RTO: 0 /100 WBC
PLATELET # BLD AUTO: 214 K/UL
PMV BLD AUTO: 10.6 FL
POTASSIUM SERPL-SCNC: 4.5 MMOL/L
PROTHROMBIN TIME: 12.6 SEC
RBC # BLD AUTO: 3.83 M/UL
SODIUM SERPL-SCNC: 139 MMOL/L
WBC # BLD AUTO: 9.34 K/UL

## 2017-10-17 PROCEDURE — 80048 BASIC METABOLIC PNL TOTAL CA: CPT

## 2017-10-17 PROCEDURE — 85730 THROMBOPLASTIN TIME PARTIAL: CPT

## 2017-10-17 PROCEDURE — 85025 COMPLETE CBC W/AUTO DIFF WBC: CPT

## 2017-10-17 PROCEDURE — 36415 COLL VENOUS BLD VENIPUNCTURE: CPT

## 2017-10-17 PROCEDURE — 85610 PROTHROMBIN TIME: CPT

## 2017-10-18 ENCOUNTER — HOSPITAL ENCOUNTER (OUTPATIENT)
Facility: HOSPITAL | Age: 77
Discharge: HOME | End: 2017-10-18
Attending: INTERNAL MEDICINE | Admitting: INTERNAL MEDICINE
Payer: MEDICARE

## 2017-10-18 ENCOUNTER — ANESTHESIA EVENT (OUTPATIENT)
Dept: MEDSURG UNIT | Facility: HOSPITAL | Age: 77
End: 2017-10-18
Payer: MEDICARE

## 2017-10-18 ENCOUNTER — ANESTHESIA (OUTPATIENT)
Dept: MEDSURG UNIT | Facility: HOSPITAL | Age: 77
End: 2017-10-18
Payer: MEDICARE

## 2017-10-18 ENCOUNTER — HOSPITAL ENCOUNTER (OUTPATIENT)
Dept: CARDIOLOGY | Facility: CLINIC | Age: 77
Discharge: HOME | End: 2017-10-18
Attending: INTERNAL MEDICINE | Admitting: INTERNAL MEDICINE
Payer: MEDICARE

## 2017-10-18 VITALS
HEART RATE: 70 BPM | RESPIRATION RATE: 16 BRPM | OXYGEN SATURATION: 100 % | WEIGHT: 187 LBS | SYSTOLIC BLOOD PRESSURE: 128 MMHG | DIASTOLIC BLOOD PRESSURE: 62 MMHG | TEMPERATURE: 97 F | BODY MASS INDEX: 29.35 KG/M2 | HEIGHT: 67 IN

## 2017-10-18 DIAGNOSIS — I10 ESSENTIAL HYPERTENSION: ICD-10-CM

## 2017-10-18 DIAGNOSIS — I48.92 ATRIAL FLUTTER: Primary | ICD-10-CM

## 2017-10-18 DIAGNOSIS — I48.91 ATRIAL FIBRILLATION: ICD-10-CM

## 2017-10-18 DIAGNOSIS — I48.19 PERSISTENT ATRIAL FIBRILLATION: ICD-10-CM

## 2017-10-18 LAB
AORTIC ATHEROMA: YES
DIASTOLIC DYSFUNCTION: NO
MITRAL VALVE MOBILITY: NORMAL
MITRAL VALVE REGURGITATION: ABNORMAL
RETIRED EF AND QEF - SEE NOTES: 55 (ref 55–65)
TRICUSPID VALVE REGURGITATION: ABNORMAL

## 2017-10-18 PROCEDURE — 93005 ELECTROCARDIOGRAM TRACING: CPT

## 2017-10-18 PROCEDURE — 93320 DOPPLER ECHO COMPLETE: CPT | Mod: 26,,, | Performed by: INTERNAL MEDICINE

## 2017-10-18 PROCEDURE — D9220A PRA ANESTHESIA: Mod: ANES,,, | Performed by: ANESTHESIOLOGY

## 2017-10-18 PROCEDURE — 25000003 PHARM REV CODE 250: Performed by: NURSE PRACTITIONER

## 2017-10-18 PROCEDURE — 25000003 PHARM REV CODE 250: Performed by: NURSE ANESTHETIST, CERTIFIED REGISTERED

## 2017-10-18 PROCEDURE — 93010 ELECTROCARDIOGRAM REPORT: CPT | Mod: 59,,, | Performed by: INTERNAL MEDICINE

## 2017-10-18 PROCEDURE — 27100006 CARDIOVERSION (DCCV)

## 2017-10-18 PROCEDURE — 93312 ECHO TRANSESOPHAGEAL: CPT | Mod: 26,,, | Performed by: INTERNAL MEDICINE

## 2017-10-18 PROCEDURE — 37000009 HC ANESTHESIA EA ADD 15 MINS: Performed by: INTERNAL MEDICINE

## 2017-10-18 PROCEDURE — 93320 DOPPLER ECHO COMPLETE: CPT

## 2017-10-18 PROCEDURE — 63600175 PHARM REV CODE 636 W HCPCS: Performed by: NURSE ANESTHETIST, CERTIFIED REGISTERED

## 2017-10-18 PROCEDURE — 92960 CARDIOVERSION ELECTRIC EXT: CPT | Mod: ,,, | Performed by: INTERNAL MEDICINE

## 2017-10-18 PROCEDURE — 37000008 HC ANESTHESIA 1ST 15 MINUTES: Performed by: INTERNAL MEDICINE

## 2017-10-18 PROCEDURE — D9220A PRA ANESTHESIA: Mod: CRNA,,, | Performed by: NURSE ANESTHETIST, CERTIFIED REGISTERED

## 2017-10-18 PROCEDURE — 25000003 PHARM REV CODE 250

## 2017-10-18 PROCEDURE — 93325 DOPPLER ECHO COLOR FLOW MAPG: CPT

## 2017-10-18 PROCEDURE — 93010 ELECTROCARDIOGRAM REPORT: CPT | Mod: 76,,, | Performed by: INTERNAL MEDICINE

## 2017-10-18 PROCEDURE — 93325 DOPPLER ECHO COLOR FLOW MAPG: CPT | Mod: 26,,, | Performed by: INTERNAL MEDICINE

## 2017-10-18 RX ORDER — LIDOCAINE HYDROCHLORIDE 20 MG/ML
SOLUTION OROPHARYNGEAL
Status: DISCONTINUED | OUTPATIENT
Start: 2017-10-18 | End: 2017-10-18

## 2017-10-18 RX ORDER — HYDROMORPHONE HYDROCHLORIDE 1 MG/ML
0.2 INJECTION, SOLUTION INTRAMUSCULAR; INTRAVENOUS; SUBCUTANEOUS EVERY 5 MIN PRN
Status: DISCONTINUED | OUTPATIENT
Start: 2017-10-18 | End: 2017-10-18 | Stop reason: HOSPADM

## 2017-10-18 RX ORDER — PROPOFOL 10 MG/ML
VIAL (ML) INTRAVENOUS CONTINUOUS PRN
Status: DISCONTINUED | OUTPATIENT
Start: 2017-10-18 | End: 2017-10-18

## 2017-10-18 RX ORDER — FUROSEMIDE 20 MG/1
20 TABLET ORAL 2 TIMES DAILY
Start: 2017-10-18 | End: 2019-04-30

## 2017-10-18 RX ORDER — SODIUM CHLORIDE 9 MG/ML
INJECTION, SOLUTION INTRAVENOUS CONTINUOUS
Status: DISCONTINUED | OUTPATIENT
Start: 2017-10-18 | End: 2017-10-18 | Stop reason: HOSPADM

## 2017-10-18 RX ORDER — SODIUM CHLORIDE 0.9 % (FLUSH) 0.9 %
3 SYRINGE (ML) INJECTION
Status: DISCONTINUED | OUTPATIENT
Start: 2017-10-18 | End: 2017-10-18 | Stop reason: HOSPADM

## 2017-10-18 RX ORDER — LIDOCAINE HCL/PF 100 MG/5ML
SYRINGE (ML) INTRAVENOUS
Status: DISCONTINUED | OUTPATIENT
Start: 2017-10-18 | End: 2017-10-18

## 2017-10-18 RX ORDER — PROPOFOL 10 MG/ML
VIAL (ML) INTRAVENOUS
Status: DISCONTINUED | OUTPATIENT
Start: 2017-10-18 | End: 2017-10-18

## 2017-10-18 RX ORDER — SILVER SULFADIAZINE 10 G/1000G
CREAM TOPICAL 2 TIMES DAILY PRN
Status: DISCONTINUED | OUTPATIENT
Start: 2017-10-18 | End: 2017-10-18 | Stop reason: HOSPADM

## 2017-10-18 RX ADMIN — PROPOFOL 50 MG: 10 INJECTION, EMULSION INTRAVENOUS at 10:10

## 2017-10-18 RX ADMIN — LIDOCAINE HYDROCHLORIDE 75 MG: 20 INJECTION, SOLUTION INTRAVENOUS at 10:10

## 2017-10-18 RX ADMIN — PROPOFOL 150 MCG/KG/MIN: 10 INJECTION, EMULSION INTRAVENOUS at 10:10

## 2017-10-18 RX ADMIN — SODIUM CHLORIDE 1000 ML: 0.9 INJECTION, SOLUTION INTRAVENOUS at 07:10

## 2017-10-18 RX ADMIN — LIDOCAINE HYDROCHLORIDE 15 ML: 20 SOLUTION OROPHARYNGEAL at 10:10

## 2017-10-18 NOTE — ANESTHESIA PREPROCEDURE EVALUATION
10/18/2017  Lucho Delong is a 76 y.o., male with afib here for HYACINTH/cardioversion    Anesthesia Evaluation    I have reviewed the Patient Summary Reports.    I have reviewed the Nursing Notes.   I have reviewed the Medications.     Review of Systems  Anesthesia Hx:  No problems with previous Anesthesia    Cardiovascular:   Hypertension    Pulmonary:  Pulmonary Normal    Renal/:   Chronic Renal Disease, CRI    Hepatic/GI:   GERD    Neurological:  Neurology Normal    Endocrine:  Endocrine Normal        Physical Exam  General:  Obesity    Airway/Jaw/Neck:  Airway Findings: Mouth Opening: Normal Tongue: Normal  General Airway Assessment: Adult  Mallampati: II       Chest/Lungs:  Chest/Lungs Findings: Clear to auscultation, Normal Respiratory Rate     Heart/Vascular:  Heart Findings: Rate: Normal             Anesthesia Plan  Type of Anesthesia, risks & benefits discussed:  Anesthesia Type:  general, MAC  Patient's Preference:   Intra-op Monitoring Plan: standard ASA monitors  Intra-op Monitoring Plan Comments:   Post Op Pain Control Plan: IV/PO Opioids PRN  Post Op Pain Control Plan Comments:   Induction:    Beta Blocker:  Patient is on a Beta-Blocker and has received one dose within the past 24 hours (No further documentation required).       Informed Consent: Patient understands risks and agrees with Anesthesia plan.  Questions answered. Anesthesia consent signed with patient.  ASA Score: 3     Day of Surgery Review of History & Physical:            Ready For Surgery From Anesthesia Perspective.     Patient Active Problem List   Diagnosis    Persistent atrial fibrillation    HTN (hypertension)    HLD (hyperlipidemia)    Atrial fibrillation    Atrial flutter

## 2017-10-18 NOTE — TRANSFER OF CARE
"Anesthesia Transfer of Care Note    Patient: Lucho Delong    Procedure(s) Performed: Procedure(s) (LRB):  CARDIOVERSION (N/A)  TRANSESOPHAGEAL ECHOCARDIOGRAM (HYACINTH) (N/A)    Patient location: Cath Lab    Anesthesia Type: general and MAC    Transport from OR: Transported from OR on room air with adequate spontaneous ventilation    Post pain: adequate analgesia    Post assessment: no apparent anesthetic complications and tolerated procedure well    Post vital signs: stable    Level of consciousness: awake    Nausea/Vomiting: no nausea/vomiting    Complications: none    Transfer of care protocol was followed      Last vitals:   Visit Vitals  BP (!) 88/52   Pulse 63   Temp 36.3 °C (97.3 °F) (Axillary)   Resp 16   Ht 5' 7" (1.702 m)   Wt 84.8 kg (187 lb)   SpO2 96%   BMI 29.29 kg/m²     "

## 2017-10-18 NOTE — DISCHARGE SUMMARY
[facility]  Cardiac Electrophysiology  Discharge Summary      Patient Name: Lucho Delong  MRN: [id number]  Admission Date: 10/18/2017  Hospital Length of Stay: 0 days  Discharge Date and Time:  10/18/2017 12:06 PM  Attending Physician: Lance Alejandro MD   Discharging Provider: Yesika Nina NP  Primary Care Physician: Jose Nolan MD    HPI: Lucho Delong is a 76 y.o. 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 He reports significant COLON and decreased exercise tolerance. He presented here on 10/4/17 for elective HYACINTH/DCCV however he had not taken his amiodarone so the HYACINTH/DCCV was cancelled at the time and rescheduled for today. Denies any missed doses of xarelto in the last month.     Procedure(s) (LRB):  CARDIOVERSION (N/A)  TRANSESOPHAGEAL ECHOCARDIOGRAM (HYACINTH) (N/A)     Hospital Course: s/p HYACINTH/DCCV. HYACINTH: No CANDELARIO thrombus; successful cardioversion. Of note, patient's BUN/Creatinine has been increasing in the last month per our labs. His weight has also decreased from 90 kgs to 84.8 kgs in the last month as well.  He has been prescribed lasix 20 mg BID by his nephrologist, Dr. Lyudmila Jeffery for LE swelling.  Labs and clinical exam point to a fluid volume deficit.  Instructed to hold lasix x 5 days at which time we will repeat a BMP. We will send a letter to Dr. Jeffery informing her of same and requesting her input. Instructions provided to patient and wife who verbalized understanding.     Consults: Anesthesia    Significant Diagnostic Studies: Labs:   BMP:   Recent Labs  Lab 10/17/17  1318   *      K 4.5      CO2 24   BUN 41*   CREATININE 3.0*   CALCIUM 9.4       Final Active Diagnoses:    Diagnosis Date Noted POA    PRINCIPAL PROBLEM:  Atrial flutter [I48.92] 10/04/2017 Yes    Atrial fibrillation [I48.91] 09/06/2017 Yes      Problems Resolved  During this Admission:    Diagnosis Date Noted Date Resolved POA     Discharged Condition: good    Disposition: Home or Self Care    Follow Up:  Follow-up Information     Lance Alejandro MD In 4 weeks.    Specialties:  Electrophysiology, Cardiovascular Disease  Why:  Return on Monday 10/23/17 for EKG and BMP; F/u in 4 weeks with Flavia  Contact information:  [id number]  [facility] [id number]  [phone]             Schedule an appointment as soon as possible for a visit with Nephrology.    Why:  f/u for decreased kidney function               Patient Instructions:     Basic metabolic panel   Standing Status: Future  Standing Exp. Date: 10/26/17     Diet general   Order Comments: Cardiac     Activity as tolerated     Call MD for:  temperature >100.4     Call MD for:  persistent nausea and vomiting     Call MD for:  severe uncontrolled pain     Call MD for:  difficulty breathing, headache or visual disturbances     Call MD for:  redness, tenderness, or signs of infection (pain, swelling, redness, odor or green/yellow discharge around incision site)     Call MD for:  hives     Call MD for:  persistent dizziness or light-headedness     Call MD for:  extreme fatigue     Call MD for:   Order Comments: Any concerns regarding procedure     No dressing needed     Medications:  Reconciled Home Medications:   Current Discharge Medication List      CONTINUE these medications which have CHANGED    Details   furosemide (LASIX) 20 MG tablet Take 1 tablet (20 mg total) by mouth 2 (two) times daily. Hold x 5 days post cardioversion.         CONTINUE these medications which have NOT CHANGED    Details   amiodarone (PACERONE) 200 MG Tab Take 1 tablet (200 mg total) by mouth once daily.  Qty: 30 tablet, Refills: 6    Associated Diagnoses: Persistent atrial fibrillation      CRESTOR 20 mg tablet 20 mg once daily.       diltiaZEM (CARDIZEM CD) 180 MG 24 hr capsule Take 1 capsule (180 mg total) by mouth once daily.  Qty: 30  capsule, Refills: 6      losartan (COZAAR) 100 MG tablet 100 mg once daily.       metoprolol succinate (TOPROL-XL) 50 MG 24 hr tablet Take 50 mg by mouth every evening.       rivaroxaban (XARELTO) 15 mg Tab Take 1 tablet (15 mg total) by mouth daily with dinner or evening meal.  Qty: 90 tablet, Refills: 3      mirabegron (MYRBETRIQ) 25 mg Tb24 ER tablet Take 25 mg by mouth once daily.      predniSONE (DELTASONE) 20 MG tablet 20 mg daily as needed.            Yesika Nina NP  Cardiac Electrophysiology     Attending: CINDY Alejandro MD    cc: Lyudmila Jeffery, Nephrology ([place])

## 2017-10-18 NOTE — NURSING TRANSFER
Nursing Transfer Note      10/18/2017     Transfer To: short stay    Transfer via stretcher    Transfer with nurse    Transported by rn    Medicines sent: see epic    Chart send with patient: Yes    Notified: nurse    Patient reassessed at: see epic (date, time)

## 2017-10-18 NOTE — ANESTHESIA POSTPROCEDURE EVALUATION
"Anesthesia Post Evaluation    Patient: Lucho Delong    Procedure(s) Performed: Procedure(s) (LRB):  CARDIOVERSION (N/A)  TRANSESOPHAGEAL ECHOCARDIOGRAM (HYACINTH) (N/A)    Final Anesthesia Type: general  Patient location during evaluation: PACU  Patient participation: Yes- Able to Participate  Level of consciousness: awake and alert  Post-procedure vital signs: reviewed and stable  Pain management: adequate  Airway patency: patent  PONV status at discharge: No PONV  Anesthetic complications: no      Cardiovascular status: blood pressure returned to baseline  Respiratory status: unassisted  Hydration status: euvolemic  Follow-up not needed.        Visit Vitals  /60 (BP Location: Right arm, Patient Position: Lying)   Pulse 66   Temp 36.3 °C (97.3 °F) (Axillary)   Resp 16   Ht 5' 7" (1.702 m)   Wt 84.8 kg (187 lb)   SpO2 100%   BMI 29.29 kg/m²       Pain/Ck Score: Pain Assessment Performed: Yes (10/18/2017 11:23 AM)  Presence of Pain: denies (10/18/2017 11:23 AM)  Ck Score: 10 (10/18/2017 11:01 AM)      "

## 2017-10-18 NOTE — H&P
TRANSESOPHAGEAL ECHOCARDIOGRAPHY   PRE-PROCEDURE NOTE    10/18/2017    HPI:     Lucho Delong is a 76 y.o. 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 He reports significant COLON and decrease exercise tolerance. He presented here on 10/4/17 for elective HYACINTH/DCCV however he had not taken his amiodarone so the HYACINTH/DCCV was cancelled at the time and rescheduled for today. Denies any doses of xarelto in the last month.       Dysphagia or odynophagia:  No  Liver Disease, esophageal disease, or known varices:  No  Upper GI Bleeding: No  Snoring:  Yes  Sleep Apnea:  possible  Prior neck surgery or radiation:  No  History of anesthetic difficulties:  No  Family history of anesthetic difficulties:  No  Last oral intake:  12 hours ago  Able to move neck in all directions:  Yes      Meds:   Amiodarone 200 mg, crestor 20 mg, diltiazem 180 mg QD, lasix 20 BID, losartan 100 mg QD, toprol XL 50 mg QD, mirabegron 25 mg QD, prednisone 20 mg QD, xarelto 15 mg with dinner       Scheduled Meds:   PRN Meds:  Continuous Infusions:   sodium chloride 0.9%         Physical Exam:     Vitals:  Temp:  [98.5 °F (36.9 °C)]   Pulse:  [70]   Resp:  [18]   BP: (115)/(60-62)   SpO2:  [98 %]        Constitutional: NAD, conversant  HEENT:   Sclera anicteric, Uvula midline, EOMI, OP clear  Neck:               No JVD, moves to all direction without any limitations  CV:               RRR, no murmurs / rubs / gallops, normal S1/S2  Pulm:               CTAB, no wheezes, rales, or ronchi  GI:               Abdomen soft, NTND, +BS  Extremities:              No LE edema, warm with palpable pulses  Neuro:   AAOX3, no focal motor deficits    Mallampati: 2  ASA: 2      Labs:     Recent Results (from the past 336 hour(s))   CBC auto differential    Collection Time: 10/17/17  1:18 PM   Result Value Ref Range    WBC 9.34 3.90 - 12.70 K/uL     Hemoglobin 11.2 (L) 14.0 - 18.0 g/dL    Hematocrit 33.4 (L) 40.0 - 54.0 %    Platelets 214 150 - 350 K/uL       Recent Results (from the past 336 hour(s))   Basic metabolic panel    Collection Time: 10/17/17  1:18 PM   Result Value Ref Range    Sodium 139 136 - 145 mmol/L    Potassium 4.5 3.5 - 5.1 mmol/L    Chloride 104 95 - 110 mmol/L    CO2 24 23 - 29 mmol/L    BUN, Bld 41 (H) 8 - 23 mg/dL    Creatinine 3.0 (H) 0.5 - 1.4 mg/dL    Calcium 9.4 8.7 - 10.5 mg/dL    Anion Gap 11 8 - 16 mmol/L       Estimated Creatinine Clearance: 21.5 mL/min (based on SCr of 3 mg/dL (H)).    INR: N/A. On xarelto.    Imaging:  HYACINTH (9/6/17):  1 - Normal left ventricular systolic function (EF 55-60%).     2 - No visualized thrombus in the left atrium.     3 - Left atrial appendage is bilobed with no visualized thrombus.     4 - Normal left ventricular diastolic function.     5 - Trivial mitral regurgitation.   Left Atrial Appendage:  CANDELARIO is bilobed measuring 1.4 cm at the mouth and 1.7 cm in length.  There is no visualized thrombus.  CANDELARIO emptying velocity is 0.2m/sec.        EKG: 10/18/2017  AFL, rate 69, 4:1 AV conduction        Assessment & Plan:     PLAN:  1. HYACINTH for evaluation of CANDELARIO thrombus prior to DCCV for AFL    -The risks, benefits & alternatives of the procedure were explained to the patient.    -The risks of transesophageal echo include but are not limited to:  Dental trauma, esophageal trauma/perforation, bleeding, laryngospasm/brochospasm, aspiration, sore throat/hoarseness, & dislodgement of the endotracheal tube/nasogastric tube (where applicable).    -The risks of moderate sedation include hypotension, respiratory depression, arrhythmias, bronchospasm, & death.    -Informed consent was obtained & the patient is agreeable to proceed with the procedure.    I will discuss with the attending physician. Attending addendum is to follow.      Attending addendum to follow     Yariel Beltran MD  Cardiology Fellow  Pager:  [phone]  10/18/2017 8:40 AM

## 2017-10-18 NOTE — PROGRESS NOTES
Patient admitted to recovery see [facility] for complete assessment pacu bcg's maintained safety measures verified patient instructed on pain scale and patient verbalized understanding. ekg done and in patient's chart. Called patient's wife and updated on patient location with the permission of patient.

## 2017-10-18 NOTE — NURSING
Pt is AAOx3 and in no apparent distress.  Provided a copy of discharge instructions.  Teaching performed.  Pt verbalized understanding and denied any questions.  PIV d/c catheter tip intact.  2x2 applied and no active bleeding noted.  Pt refused wheelchair and is walking out with wife for discharge home.

## 2017-10-20 NOTE — TELEPHONE ENCOUNTER
"Subjective   Vince Olivera is a 54 y.o. male.   Chief Complaint   Patient presents with   • Cough     yellow sputum   • Chills   • Fever     Cough   This is a new problem. The current episode started yesterday. The problem has been rapidly worsening. The cough is productive of sputum. Associated symptoms include chills, a fever, nasal congestion and wheezing. Pertinent negatives include no chest pain, ear pain, headaches, hemoptysis, myalgias, rash, rhinorrhea, sore throat, shortness of breath or sweats. The symptoms are aggravated by lying down. He has tried nothing for the symptoms. The treatment provided no relief.    Had left carotid endarterectomy on 10/17. Also states he has not yet taken his BP medications this afternoon.    The following portions of the patient's history were reviewed and updated as appropriate: allergies, current medications, past family history, past medical history, past social history, past surgical history and problem list.  /87 (BP Location: Left arm, Patient Position: Sitting)  Pulse 77  Temp 99.4 °F (37.4 °C) (Oral)   Resp 20  Ht 67.5\" (171.5 cm)  Wt 191 lb 6.4 oz (86.8 kg)  SpO2 96%  BMI 29.54 kg/m2  Review of Systems   Constitutional: Positive for chills and fever. Negative for fatigue.   HENT: Negative for congestion, ear pain, rhinorrhea and sore throat.    Respiratory: Positive for cough and wheezing. Negative for hemoptysis and shortness of breath.    Cardiovascular: Negative for chest pain, palpitations and leg swelling.   Gastrointestinal: Negative for abdominal pain, diarrhea, nausea and vomiting.   Genitourinary: Negative for dysuria.   Musculoskeletal: Positive for neck pain. Negative for back pain and myalgias.   Skin: Negative for rash and wound.   Neurological: Negative for dizziness, light-headedness and headaches.   Psychiatric/Behavioral: Negative for sleep disturbance. The patient is not nervous/anxious.        Objective   Physical Exam " ----- Message from Conchis Gary MA sent at 8/22/2017  2:16 PM CDT -----  Contact: patient called  Sheela the patient would like to talk to you about his appointment. Please call the patient at home. Thank you.     Constitutional: He is oriented to person, place, and time. He appears well-developed and well-nourished.   HENT:   Head: Normocephalic and atraumatic.   Mouth/Throat: Oropharynx is clear and moist.   Bilateral cerumen noted   Cardiovascular: Normal rate, regular rhythm and normal heart sounds.    Pulmonary/Chest: Effort normal.   Wheezing and rhonchi noted throughout   Abdominal: Soft. Bowel sounds are normal.   Musculoskeletal: Normal range of motion.   Neurological: He is alert and oriented to person, place, and time.   Skin: Skin is warm and dry.   Psychiatric: He has a normal mood and affect. His behavior is normal.       Assessment/Plan   Vince was seen today for cough, chills and fever.    Diagnoses and all orders for this visit:    Status post left carotid endarterectomy    Acute bronchitis, unspecified organism  -     doxycycline (MONODOX) 100 MG capsule; Take 1 capsule by mouth 2 (Two) Times a Day for 10 days.      Will treat with doxycycline. Administration and SE discussed.  Continue supportive measures. Rest. Let us know if symptoms do not rapidly improve. Instructed to take Medications as soon as he returns home. He also thinks he got Pneumovax and influenza vaccine while hospitalized, but is unsure. Will check on this. Follow up if needed.

## 2017-10-23 ENCOUNTER — TELEPHONE (OUTPATIENT)
Dept: ELECTROPHYSIOLOGY | Facility: CLINIC | Age: 77
End: 2017-10-23

## 2017-10-23 NOTE — TELEPHONE ENCOUNTER
Returned Pt's call. Rheumatologist would like to put Pt on prednisone 5mg/qd. Wants an okay to start?? Also he is wanting your opinion of a Nephrologist he can see at [facility]??  Advised Pt I would speak with Dr Costello and return a call to him in the morning. Pt voiced understanding.

## 2017-10-23 NOTE — TELEPHONE ENCOUNTER
----- Message from Danielle Almaraz sent at 10/23/2017  4:11 PM CDT -----  Contact: pt  Pt would like to talk to you about medicine that another doctor wants to put him on.    Thanks

## 2017-10-24 NOTE — TELEPHONE ENCOUNTER
Spoke with Dr Costello and it is okay for Pt to start prednisone. He also stated he could see any nephrologist at the [facility].    Called Pt to gibe Dr Costello's recommendations. No answer. Left detailed message.

## 2017-10-27 ENCOUNTER — TELEPHONE (OUTPATIENT)
Dept: ELECTROPHYSIOLOGY | Facility: CLINIC | Age: 77
End: 2017-10-27

## 2017-10-27 NOTE — TELEPHONE ENCOUNTER
I spoke to patient , he stated he was going to do post DCCv- EKG last week but got busy, he stated he will follow though with scheduling ekg with his primary care Doctor in  [place] , [place]. And will have it fax to [phone] .

## 2017-10-30 ENCOUNTER — TELEPHONE (OUTPATIENT)
Dept: ELECTROPHYSIOLOGY | Facility: CLINIC | Age: 77
End: 2017-10-30

## 2017-10-30 DIAGNOSIS — I48.0 PAF (PAROXYSMAL ATRIAL FIBRILLATION): Primary | ICD-10-CM

## 2017-10-30 NOTE — TELEPHONE ENCOUNTER
EKG order was faxed today to [facility] [phone],  Patient was also notified, and patient stated will have EKG Done , either today Monday  Or latest Tuesday.                  ----- Message from Conchis Gary MA sent at 10/30/2017  8:22 AM CDT -----  Contact: patient called  The patient would like you to fax hie referral to have his EKG done at [facility] at [phone]. Thank you.

## 2017-11-29 ENCOUNTER — OFFICE VISIT (OUTPATIENT)
Dept: CARDIOLOGY | Facility: CLINIC | Age: 77
End: 2017-11-29
Payer: MEDICARE

## 2017-11-29 VITALS
WEIGHT: 198.88 LBS | HEIGHT: 67 IN | BODY MASS INDEX: 31.21 KG/M2 | HEART RATE: 40 BPM | SYSTOLIC BLOOD PRESSURE: 152 MMHG | DIASTOLIC BLOOD PRESSURE: 70 MMHG

## 2017-11-29 DIAGNOSIS — Z92.89 HISTORY OF CARDIOVERSION: ICD-10-CM

## 2017-11-29 DIAGNOSIS — I48.0 PAF (PAROXYSMAL ATRIAL FIBRILLATION): ICD-10-CM

## 2017-11-29 DIAGNOSIS — E78.2 MIXED HYPERLIPIDEMIA: ICD-10-CM

## 2017-11-29 DIAGNOSIS — Z79.01 CURRENT USE OF LONG TERM ANTICOAGULATION: ICD-10-CM

## 2017-11-29 DIAGNOSIS — I48.3 TYPICAL ATRIAL FLUTTER: ICD-10-CM

## 2017-11-29 DIAGNOSIS — Z79.899 LONG TERM CURRENT USE OF AMIODARONE: ICD-10-CM

## 2017-11-29 DIAGNOSIS — I10 ESSENTIAL HYPERTENSION: ICD-10-CM

## 2017-11-29 DIAGNOSIS — I48.19 PERSISTENT ATRIAL FIBRILLATION: Primary | ICD-10-CM

## 2017-11-29 PROBLEM — I48.91 ATRIAL FIBRILLATION: Status: RESOLVED | Noted: 2017-09-06 | Resolved: 2017-11-29

## 2017-11-29 PROCEDURE — 93000 ELECTROCARDIOGRAM COMPLETE: CPT | Mod: S$GLB,,, | Performed by: INTERNAL MEDICINE

## 2017-11-29 PROCEDURE — 99999 PR PBB SHADOW E&M-EST. PATIENT-LVL III: CPT | Mod: PBBFAC,,, | Performed by: INTERNAL MEDICINE

## 2017-11-29 PROCEDURE — 99214 OFFICE O/P EST MOD 30 MIN: CPT | Mod: S$GLB,,, | Performed by: INTERNAL MEDICINE

## 2017-11-29 RX ORDER — AMLODIPINE BESYLATE 10 MG/1
10 TABLET ORAL DAILY
Qty: 90 TABLET | Refills: 3 | Status: SHIPPED | OUTPATIENT
Start: 2017-11-29 | End: 2019-04-30

## 2017-11-29 NOTE — PROGRESS NOTES
"Subjective:     HPI    PCP: Jose Nolan MD    I had the pleasure of seeing Lucho Delong in follow-up today for his history of atrial arrhythmias and recent DCCV. He is a former patient of Dr. Costello, and wants to be seen closer to his home. He is a 76 year old male with a history of HTN, HLD, and CKD (Cr 3.0 in 10/2017), whose history of AF likely dates back to 5/2017 when he began to note worsening fatigue and COLON. He was ultimately diagnosed with rate controlled AF, and started on Eliquis (more recently changed to Xarelto due to intolerance) and Diltiazem. In 9/2017, Mr. Delong underwent DCCV, but had ERAF within a few minutes. He was started on Amiodarone, and had repeat cardioversion in 10/2017, which restored sinus rhythm. Within a week or two he began to note resolution of his symptoms.    Recent cardiac studies include TEEs performed in 9/2017 and 10/2017 which show an EF of 55-60%, "enlarged" LA size, and no significant valvular disease.    I reviewed today's ECG tracing, which shows sinus bradycardia at 40 bpm with 1st degree AV block.    Review of Systems   Constitution: Positive for malaise/fatigue. Negative for decreased appetite, weight gain and weight loss.   HENT: Negative for sore throat.    Eyes: Negative for blurred vision.   Cardiovascular: Negative for chest pain, dyspnea on exertion, irregular heartbeat, leg swelling, near-syncope, orthopnea, palpitations, paroxysmal nocturnal dyspnea and syncope.   Respiratory: Negative for shortness of breath.    Skin: Negative for rash.   Musculoskeletal: Negative for arthritis.   Gastrointestinal: Negative for abdominal pain.   Neurological: Negative for focal weakness.   Psychiatric/Behavioral: Negative for altered mental status.        Objective:    Physical Exam   Constitutional: He is oriented to person, place, and time. He appears well-developed and well-nourished. No distress.   HENT:   Head: Normocephalic and atraumatic.   Mouth/Throat: " Oropharynx is clear and moist.   Eyes: Pupils are equal, round, and reactive to light. No scleral icterus.   Neck: Neck supple. No thyromegaly present.   Cardiovascular: Regular rhythm, normal heart sounds and normal pulses.  Bradycardia present.  Exam reveals no gallop and no friction rub.    No murmur heard.  Pulmonary/Chest: Effort normal and breath sounds normal.   Abdominal: Soft. Bowel sounds are normal. He exhibits no distension. There is no tenderness.   Musculoskeletal: He exhibits no edema.   Neurological: He is alert and oriented to person, place, and time.   Skin: Skin is warm and dry. No rash noted.   Psychiatric: He has a normal mood and affect. His behavior is normal.   Vitals reviewed.        Assessment:       1. Persistent atrial fibrillation    2. Typical atrial flutter    3. Essential hypertension    4. Mixed hyperlipidemia         Plan:     In summary, Lucho Delong is a 76 year old male with a history of symptomatic persistent AF. His [id number]-CONu Score is 3 (age, HTN) which corresponds to a yearly risk of stroke without warfarin treatment estimated at 3.2%. He should continue Xarelto at current dosing indefinitely.    With regard to his AF, he is maintaining sinus rhythm on Amiodarone, but is profoundly bradycardic. The plan is to stop Cardizem and Toprol XL, and continue Amiodarone. As he is hypertensive in the office today, I have started Norvasc 10 mg daily.    He will see me in 6 weeks or sooner if needed.    Thank you for allowing me to participate in the care of this patient. Please do not hesitate to call me with any questions or concerns.

## 2018-01-08 DIAGNOSIS — I48.19 PERSISTENT ATRIAL FIBRILLATION: Primary | ICD-10-CM

## 2018-01-10 ENCOUNTER — OFFICE VISIT (OUTPATIENT)
Dept: CARDIOLOGY | Facility: CLINIC | Age: 78
End: 2018-01-10
Payer: MEDICARE

## 2018-01-10 VITALS
BODY MASS INDEX: 32.08 KG/M2 | HEART RATE: 75 BPM | WEIGHT: 204.38 LBS | DIASTOLIC BLOOD PRESSURE: 68 MMHG | SYSTOLIC BLOOD PRESSURE: 160 MMHG | HEIGHT: 67 IN

## 2018-01-10 DIAGNOSIS — Z79.899 LONG TERM CURRENT USE OF AMIODARONE: Primary | ICD-10-CM

## 2018-01-10 DIAGNOSIS — E78.2 MIXED HYPERLIPIDEMIA: ICD-10-CM

## 2018-01-10 DIAGNOSIS — Z92.89 HISTORY OF CARDIOVERSION: ICD-10-CM

## 2018-01-10 DIAGNOSIS — I48.92 ATRIAL FLUTTER, UNSPECIFIED TYPE: ICD-10-CM

## 2018-01-10 DIAGNOSIS — I48.19 PERSISTENT ATRIAL FIBRILLATION: ICD-10-CM

## 2018-01-10 DIAGNOSIS — I10 ESSENTIAL HYPERTENSION: ICD-10-CM

## 2018-01-10 DIAGNOSIS — Z79.01 CURRENT USE OF LONG TERM ANTICOAGULATION: ICD-10-CM

## 2018-01-10 PROCEDURE — 93000 ELECTROCARDIOGRAM COMPLETE: CPT | Mod: S$GLB,,, | Performed by: INTERNAL MEDICINE

## 2018-01-10 PROCEDURE — 99999 PR PBB SHADOW E&M-EST. PATIENT-LVL III: CPT | Mod: PBBFAC,,, | Performed by: INTERNAL MEDICINE

## 2018-01-10 PROCEDURE — 99214 OFFICE O/P EST MOD 30 MIN: CPT | Mod: S$GLB,,, | Performed by: INTERNAL MEDICINE

## 2018-01-10 RX ORDER — AMIODARONE HYDROCHLORIDE 100 MG/1
100 TABLET ORAL DAILY
Qty: 90 TABLET | Refills: 3 | Status: SHIPPED | OUTPATIENT
Start: 2018-01-10 | End: 2018-01-16

## 2018-01-10 RX ORDER — METOPROLOL SUCCINATE 25 MG/1
25 TABLET, EXTENDED RELEASE ORAL DAILY
Qty: 90 TABLET | Refills: 3 | Status: SHIPPED | OUTPATIENT
Start: 2018-01-10 | End: 2018-12-31

## 2018-01-10 NOTE — PROGRESS NOTES
"Subjective:     Atrial Fibrillation   Symptoms are negative for chest pain, palpitations, shortness of breath and syncope. Past medical history includes atrial fibrillation.     PCP: Jose Nolan MD    I had the pleasure of seeing Lucho Delong in follow-up today for his history of atrial arrhythmias and DCCV. He is a former patient of Dr. Costello, and wants to be seen closer to his home. He is a 77 year old male with a history of HTN, HLD, and CKD (Cr 3.0 in 10/2017), whose history of AF likely dates back to 5/2017 when he began to note worsening fatigue and COLON. He was ultimately diagnosed with rate controlled AF, and started on Eliquis (more recently changed to Xarelto due to intolerance) and Diltiazem. In 9/2017, Mr. Delong underwent DCCV, but had ERAF within a few minutes. He was started on Amiodarone, and had repeat cardioversion in 10/2017, which restored sinus rhythm. Within a week or two he began to note resolution of his symptoms.    Recent cardiac studies include TEEs performed in 9/2017 and 10/2017 which show an EF of 55-60%, "enlarged" LA size, and no significant valvular disease.    When I saw Mr. Delong in the office in 11/2017 he was feeling well, but was bradycardic at 40 bpm. Toprol XL and Verapamil SR were stopped.    Today in the office Mr. Delong overall feels well. He does complain of continued fatigue, but is complaining of of ongoing balance issues he has had for the past 9 months.     I reviewed today's ECG tracing, which shows sinus rhythm at 75 bpm.     Review of Systems   Constitution: Positive for malaise/fatigue. Negative for decreased appetite, weight gain and weight loss.   HENT: Negative for sore throat.    Eyes: Negative for blurred vision.   Cardiovascular: Negative for chest pain, dyspnea on exertion, irregular heartbeat, leg swelling, near-syncope, orthopnea, palpitations, paroxysmal nocturnal dyspnea and syncope.   Respiratory: Negative for shortness of breath.  "   Skin: Negative for rash.   Musculoskeletal: Negative for arthritis.   Gastrointestinal: Negative for abdominal pain.   Neurological: Negative for focal weakness.   Psychiatric/Behavioral: Negative for altered mental status.        Objective:    Physical Exam   Constitutional: He is oriented to person, place, and time. He appears well-developed and well-nourished. No distress.   HENT:   Head: Normocephalic and atraumatic.   Mouth/Throat: Oropharynx is clear and moist.   Eyes: Pupils are equal, round, and reactive to light. No scleral icterus.   Neck: Neck supple. No thyromegaly present.   Cardiovascular: Normal rate, regular rhythm, normal heart sounds and normal pulses.  Exam reveals no gallop and no friction rub.    No murmur heard.  Pulmonary/Chest: Effort normal and breath sounds normal.   Abdominal: Soft. Bowel sounds are normal. He exhibits no distension. There is no tenderness.   Musculoskeletal: He exhibits no edema.   Neurological: He is alert and oriented to person, place, and time.   Skin: Skin is warm and dry. No rash noted.   Psychiatric: He has a normal mood and affect. His behavior is normal.   Vitals reviewed.        Assessment:       1. Long term current use of amiodarone    2. Persistent atrial fibrillation    3. Essential hypertension    4. Mixed hyperlipidemia    5. History of cardioversion    6. Atrial flutter, unspecified type    7. Current use of long term anticoagulation         Plan:     In summary, Lucho Delong is a 77 year old male with a history of symptomatic persistent AF. His [id number]-LOXc Score is 3 (age, HTN) which corresponds to a yearly risk of stroke without anticoagulation treatment estimated at 3.2%. At the patient's request, I have switched him from Xarelto to Eliquis.    With regard to his AF, he is maintaining sinus rhythm on Amiodarone. He does admit to some new balance issues. The plan is to drop Amiodarone to 100 mg daily. I have also ordered PFTs, LFTs,  TSH.    Finally, Mr. Delong is hypertensive. I have restarted Toprol XL 25 mg daily.    Thank you for allowing me to participate in the care of this patient. Please do not hesitate to call me with any questions or concerns.

## 2018-01-16 ENCOUNTER — OFFICE VISIT (OUTPATIENT)
Dept: DERMATOLOGY | Facility: CLINIC | Age: 78
End: 2018-01-16
Payer: MEDICARE

## 2018-01-16 VITALS — BODY MASS INDEX: 32.02 KG/M2 | WEIGHT: 204 LBS | HEIGHT: 67 IN

## 2018-01-16 DIAGNOSIS — Z85.828 HISTORY OF SKIN CANCER: ICD-10-CM

## 2018-01-16 DIAGNOSIS — L57.0 ACTINIC KERATOSES: Primary | ICD-10-CM

## 2018-01-16 DIAGNOSIS — D18.01 CHERRY ANGIOMA: ICD-10-CM

## 2018-01-16 DIAGNOSIS — L81.4 SOLAR LENTIGO: ICD-10-CM

## 2018-01-16 DIAGNOSIS — L82.1 SEBORRHEIC KERATOSES: ICD-10-CM

## 2018-01-16 PROCEDURE — 17003 DESTRUCT PREMALG LES 2-14: CPT | Mod: S$GLB,,, | Performed by: DERMATOLOGY

## 2018-01-16 PROCEDURE — 99999 PR PBB SHADOW E&M-EST. PATIENT-LVL III: CPT | Mod: PBBFAC,,, | Performed by: DERMATOLOGY

## 2018-01-16 PROCEDURE — 99202 OFFICE O/P NEW SF 15 MIN: CPT | Mod: 25,S$GLB,, | Performed by: DERMATOLOGY

## 2018-01-16 PROCEDURE — 17000 DESTRUCT PREMALG LESION: CPT | Mod: S$GLB,,, | Performed by: DERMATOLOGY

## 2018-01-16 NOTE — PROGRESS NOTES
Subjective:       Patient ID:  Lucho Delong is a 77 y.o. male who presents for   Chief Complaint   Patient presents with    Skin Check     UBSE    Spot     back      Initial visit  H/o NMSC [place]a, [facility] approx 2005  Lifelong intense sun exposure, golfing  Requests UBSE for cancer screening, complaint below      Spot  - Initial  Affected locations: back  Duration: years.  Signs / symptoms: scaling  Severity: mild  Timing: constant  Aggravated by: nothing  Relieving factors/Treatments tried: nothing        Review of Systems   Constitutional: Negative for fever, chills, weight loss, weight gain, fatigue, night sweats and malaise.   Skin: Positive for activity-related sunscreen use and wears hat. Negative for daily sunscreen use.   Hematologic/Lymphatic: Bruises/bleeds easily.        Objective:    Physical Exam   Constitutional: He appears well-developed and well-nourished. No distress.   Neurological: He is alert and oriented to person, place, and time. He is not disoriented.   Psychiatric: He has a normal mood and affect.   Skin:   Areas Examined (abnormalities noted in diagram):   Scalp / Hair Palpated and Inspected  Head / Face Inspection Performed  Neck Inspection Performed  Chest / Axilla Inspection Performed  Abdomen Inspection Performed  Back Inspection Performed  RUE Inspected  LUE Inspection Performed  Nails and Digits Inspection Performed                   Diagram Legend     Erythematous scaling macule/papule c/w actinic keratosis       Vascular papule c/w angioma      Pigmented verrucoid papule/plaque c/w seborrheic keratosis      Yellow umbilicated papule c/w sebaceous hyperplasia      Irregularly shaped tan macule c/w lentigo     1-2 mm smooth white papules consistent with Milia      Movable subcutaneous cyst with punctum c/w epidermal inclusion cyst      Subcutaneous movable cyst c/w pilar cyst      Firm pink to brown papule c/w dermatofibroma      Pedunculated fleshy papule(s) c/w  skin tag(s)      Evenly pigmented macule c/w junctional nevus     Mildly variegated pigmented, slightly irregular-bordered macule c/w mildly atypical nevus      Flesh colored to evenly pigmented papule c/w intradermal nevus       Pink pearly papule/plaque c/w basal cell carcinoma      Erythematous hyperkeratotic cursted plaque c/w SCC      Surgical scar with no sign of skin cancer recurrence      Open and closed comedones      Inflammatory papules and pustules      Verrucoid papule consistent consistent with wart     Erythematous eczematous patches and plaques     Dystrophic onycholytic nail with subungual debris c/w onychomycosis     Umbilicated papule    Erythematous-base heme-crusted tan verrucoid plaque consistent with inflamed seborrheic keratosis     Erythematous Silvery Scaling Plaque c/w Psoriasis     See annotation      Assessment / Plan:        Actinic keratoses  Cryosurgery Procedure Note    Verbal consent from the patient is obtained and the patient is aware of the precancerous quality and need for treatment of these lesions. Liquid nitrogen cryosurgery is applied to the 11 actinic keratoses, as detailed in the physical exam, to produce a freeze injury. The patient is aware that blisters may form and is instructed on wound care with gentle cleansing and use of vaseline ointment to keep moist until healed. The patient is supplied a handout on cryosurgery and is instructed to call if lesions do not completely resolve.    Seborrheic keratoses  These are benign inherited growths without a malignant potential. Reassurance given to patient. No treatment is necessary.     Solar lentigo  This is a benign hyperpigmented sun induced lesion. Daily sun protection will reduce the number of new lesions. Treatment of these benign lesions are considered cosmetic.    History of skin cancer  Area of previous NMSC (glabella) examined. Site well healed with no signs of recurrence.  Upper body skin examination performed  today including at least 9 points as noted in physical examination. No lesions suspicious for malignancy noted.    Cherry angioma  This is a benign vascular lesion. Reassurance given. No treatment required.     Patient instructed in importance in daily sun protection of at least spf 30. Sun avoidance and topical protection discussed.   Recommend Elta MD (physician office) COTZ sensitive (available online) for daily use on face and neck.  Patient encouraged to wear hat for all outdoor exposure.   Also discussed sun protective clothing.           Follow-up in about 1 year (around 1/16/2019).

## 2018-01-16 NOTE — PATIENT INSTRUCTIONS
Shave Biopsy Wound Care    Your doctor has performed a shave biopsy today.  A band aid and vaseline ointment has been placed over the site.  This should remain in place for 24 hours.  It is recommended that you keep the area dry for the first 24 hours.  After 24 hours, you may remove the band aid and wash the area with warm soap and water and apply Vaseline jelly.  Many patients prefer to use Neosporin or Bacitracin ointment.  This is acceptable; however, know that you can develop an allergy to this medication even if you have used it safely for years.  It is important to keep the area moist.  Letting it dry out and get air slows healing time, and will worsen the scar.  Band aid is optional after first 24 hours.      If you notice increasing redness, tenderness, pain, or yellow drainage at the biopsy site, please notify your doctor.  These are signs of an infection.    If your biopsy site is bleeding, apply firm pressure for 15 minutes straight.  Repeat for another 15 minutes, if it is still bleeding.   If the surgical site continues to bleed, then please contact your doctor.       [facility]  [facility]ER[facility]  [address] [place] [address]  Dept: [phone]

## 2018-02-15 ENCOUNTER — HOSPITAL ENCOUNTER (OUTPATIENT)
Dept: RESPIRATORY THERAPY | Facility: HOSPITAL | Age: 78
Discharge: HOME | End: 2018-02-15
Attending: INTERNAL MEDICINE
Payer: MEDICARE

## 2018-02-15 DIAGNOSIS — I48.19 PERSISTENT ATRIAL FIBRILLATION: ICD-10-CM

## 2018-02-15 DIAGNOSIS — Z79.899 LONG TERM CURRENT USE OF AMIODARONE: ICD-10-CM

## 2018-02-15 PROCEDURE — 94727 GAS DIL/WSHOT DETER LNG VOL: CPT | Mod: 26,,, | Performed by: INTERNAL MEDICINE

## 2018-02-15 PROCEDURE — 94729 DIFFUSING CAPACITY: CPT | Mod: 26,,, | Performed by: INTERNAL MEDICINE

## 2018-02-15 PROCEDURE — 94010 BREATHING CAPACITY TEST: CPT | Mod: 26,,, | Performed by: INTERNAL MEDICINE

## 2018-02-16 NOTE — PROCEDURES
Complete pulmonary function without bronchodilator was done February 15, 2018.  Spirometry is within normal range.  Total lung capacity is normal.  Diffusion is normal when corrected for lung volume.  Diffusion measures 76% of predicted and goes to 81% of predicted when corrected for lung volume.  Diffusion is not corrected for anemia if present.    Pulmonary functions follow with the normal range.  Clinical correlation recommended.    Red Castro M.D., pulmonology

## 2018-02-21 ENCOUNTER — TELEPHONE (OUTPATIENT)
Dept: ELECTROPHYSIOLOGY | Facility: CLINIC | Age: 78
End: 2018-02-21

## 2018-02-21 NOTE — TELEPHONE ENCOUNTER
----- Message from Lance Alejandro MD sent at 2/20/2018  4:40 PM CST -----  Please let him know that his lung function tests were within normal range.    GP    ----- Message -----  From: Kaitlin Apple MA  Sent: 2/19/2018   1:11 PM  To: Lance Alejandro MD    Patient Requesting PFTs Results 2-.  ----- Message -----  From: Kadie España  Sent: 2/19/2018  10:55 AM  To: Flavia Lucas Staff    Please call patient in regards to result for raspatory test [phone] (home)

## 2018-02-21 NOTE — TELEPHONE ENCOUNTER
Advised pt  reviewed his Pulmonary results and they were normal. Patient advised to call with further questions or concerns, if needed. Patient verbalized understanding. No further questions or concerns at this time.

## 2018-04-16 ENCOUNTER — OFFICE VISIT (OUTPATIENT)
Dept: FAMILY MEDICINE | Facility: CLINIC | Age: 78
End: 2018-04-16
Payer: MEDICARE

## 2018-04-16 VITALS
RESPIRATION RATE: 16 BRPM | HEART RATE: 74 BPM | OXYGEN SATURATION: 100 % | BODY MASS INDEX: 31.23 KG/M2 | HEIGHT: 67 IN | DIASTOLIC BLOOD PRESSURE: 74 MMHG | WEIGHT: 199 LBS | SYSTOLIC BLOOD PRESSURE: 139 MMHG

## 2018-04-16 DIAGNOSIS — L29.9 ITCHING: ICD-10-CM

## 2018-04-16 DIAGNOSIS — I10 ESSENTIAL HYPERTENSION: Primary | ICD-10-CM

## 2018-04-16 PROCEDURE — 99999 PR PBB SHADOW E&M-EST. PATIENT-LVL III: CPT | Mod: PBBFAC,,, | Performed by: FAMILY MEDICINE

## 2018-04-16 PROCEDURE — 3075F SYST BP GE 130 - 139MM HG: CPT | Mod: CPTII,S$GLB,, | Performed by: FAMILY MEDICINE

## 2018-04-16 PROCEDURE — 99213 OFFICE O/P EST LOW 20 MIN: CPT | Mod: S$GLB,,, | Performed by: FAMILY MEDICINE

## 2018-04-16 PROCEDURE — 3078F DIAST BP <80 MM HG: CPT | Mod: CPTII,S$GLB,, | Performed by: FAMILY MEDICINE

## 2018-04-16 RX ORDER — FAMOTIDINE 20 MG/1
TABLET, FILM COATED ORAL
COMMUNITY
Start: 2018-01-17 | End: 2019-03-28

## 2018-04-16 RX ORDER — ROSUVASTATIN CALCIUM 10 MG/1
TABLET, COATED ORAL
COMMUNITY
Start: 2018-01-17 | End: 2018-10-30 | Stop reason: SDUPTHER

## 2018-04-16 RX ORDER — LOSARTAN POTASSIUM 100 MG/1
100 TABLET ORAL DAILY
Qty: 90 TABLET | Refills: 3 | Status: SHIPPED | OUTPATIENT
Start: 2018-04-16 | End: 2018-12-31 | Stop reason: SDUPTHER

## 2018-04-16 RX ORDER — PREDNISONE 1 MG/1
4 TABLET ORAL DAILY
COMMUNITY
Start: 2018-03-08 | End: 2019-09-13 | Stop reason: SDUPTHER

## 2018-04-16 RX ORDER — LOSARTAN POTASSIUM 100 MG/1
TABLET ORAL
COMMUNITY
End: 2018-04-16

## 2018-04-16 RX ORDER — MONTELUKAST SODIUM 10 MG/1
10 TABLET ORAL NIGHTLY
Qty: 90 TABLET | Refills: 3 | Status: SHIPPED | OUTPATIENT
Start: 2018-04-16 | End: 2018-05-16

## 2018-04-16 RX ORDER — AMLODIPINE BESYLATE 10 MG/1
TABLET ORAL
COMMUNITY
End: 2018-04-16

## 2018-04-16 RX ORDER — OMEPRAZOLE 20 MG/1
CAPSULE, DELAYED RELEASE ORAL
COMMUNITY
Start: 2018-03-09 | End: 2018-08-08

## 2018-04-16 RX ORDER — PANTOPRAZOLE SODIUM 20 MG/1
TABLET, DELAYED RELEASE ORAL
COMMUNITY
End: 2018-08-08 | Stop reason: SDUPTHER

## 2018-04-16 RX ORDER — ASPIRIN 81 MG/1
TABLET ORAL
COMMUNITY
End: 2019-03-28

## 2018-04-16 NOTE — PROGRESS NOTES
"Subjective:       Patient ID: Lucho Delong is a 77 y.o. male.    Chief Complaint: Medication Problem    Patient complains of pruritis, shoulders and arms, responsive to benadryl.    In regards to the patient's hypertension, patient denies chest pain/sob, and has been compliant with the medication regimen.         Review of Systems   Constitutional: Negative for activity change, appetite change, fatigue and fever.   HENT: Negative for congestion, dental problem, ear discharge, hearing loss, postnasal drip, sinus pain, sneezing and trouble swallowing.    Eyes: Negative for photophobia and discharge.   Respiratory: Negative for apnea, cough and shortness of breath.    Cardiovascular: Negative for chest pain.   Gastrointestinal: Negative for abdominal distention, abdominal pain, blood in stool, diarrhea, nausea and vomiting.   Endocrine: Negative for cold intolerance.   Genitourinary: Negative for difficulty urinating, flank pain, frequency, hematuria and testicular pain.   Musculoskeletal: Negative for arthralgias and myalgias.   Skin: Positive for rash. Negative for color change.   Allergic/Immunologic: Negative for environmental allergies, food allergies and immunocompromised state.   Neurological: Positive for dizziness. Negative for syncope, numbness and headaches.   Hematological: Negative for adenopathy. Does not bruise/bleed easily.   Psychiatric/Behavioral: Negative for agitation, confusion, decreased concentration, hallucinations, self-injury and sleep disturbance.   All other systems reviewed and are negative.        Reviewed family, medical, surgical, and social history.    Objective:      /74 (BP Location: Left arm, Patient Position: Sitting)   Pulse 74   Resp 16   Ht 5' 7" (1.702 m)   Wt 90.3 kg (199 lb)   SpO2 100%   BMI 31.17 kg/m²   Physical Exam   Constitutional: He is oriented to person, place, and time. He appears well-developed and well-nourished. No distress.   HENT:   Head: " Normocephalic and atraumatic.   Nose: Nose normal.   Mouth/Throat: Oropharynx is clear and moist. No oropharyngeal exudate.   Eyes: Conjunctivae and EOM are normal. Pupils are equal, round, and reactive to light.   Neck: Normal range of motion. No thyromegaly present.   Cardiovascular: Normal rate, regular rhythm, normal heart sounds and intact distal pulses.  Exam reveals no gallop and no friction rub.    No murmur heard.  Pulmonary/Chest: Effort normal and breath sounds normal. No respiratory distress. He has no wheezes. He has no rales.   Abdominal: Soft. He exhibits no distension. There is no tenderness. There is no guarding.   Musculoskeletal: Normal range of motion. He exhibits tenderness and deformity. He exhibits no edema.   Neurological: He is alert and oriented to person, place, and time. He displays normal reflexes. No cranial nerve deficit or sensory deficit. He exhibits normal muscle tone. Coordination normal.   Skin: Skin is warm and dry. Rash noted. He is not diaphoretic. No erythema. No pallor.   Psychiatric: He has a normal mood and affect. His behavior is normal. Judgment and thought content normal.   Nursing note and vitals reviewed.      Assessment:       1. Essential hypertension    2. Itching        Plan:       Essential hypertension  -     losartan (COZAAR) 100 MG tablet; Take 1 tablet (100 mg total) by mouth once daily.  Dispense: 90 tablet; Refill: 3    Itching  -     montelukast (SINGULAIR) 10 mg tablet; Take 1 tablet (10 mg total) by mouth every evening.  Dispense: 90 tablet; Refill: 3            Risks, benefits, and side effects were discussed with the patient. All questions were answered to the fullest satisfaction of the patient, and pt verbalized understanding and agreement to treatment plan. Pt was to call with any new or worsening symptoms, or present to the ER.

## 2018-05-14 ENCOUNTER — TELEPHONE (OUTPATIENT)
Dept: FAMILY MEDICINE | Facility: CLINIC | Age: 78
End: 2018-05-14

## 2018-05-14 NOTE — TELEPHONE ENCOUNTER
I have spoken with pt.  He stated that he had labs done by Dr. Hidalgo in [place] and has received a B-12 injection per his office but is requesting to have them done here.  He was told to have the labs faxed to our office for Dr. Nolan to review, our fax # given to him.  Nancy

## 2018-05-14 NOTE — TELEPHONE ENCOUNTER
----- Message from Brenda Beltran sent at 5/11/2018  2:30 PM CDT -----  needs to knw if b12 shots are done..[phone] (home)

## 2018-05-15 ENCOUNTER — TELEPHONE (OUTPATIENT)
Dept: FAMILY MEDICINE | Facility: CLINIC | Age: 78
End: 2018-05-15

## 2018-05-15 NOTE — TELEPHONE ENCOUNTER
It would be nice to have the result, but I'm happy to place the order in the meantime until we receive the result.

## 2018-05-15 NOTE — TELEPHONE ENCOUNTER
As per the previous message, Mr. Lowery would like to get his B-12 injections here.  We have received a fax with Dr. Jeffery's order for B-12 injections, of 1000 mg/mL, however, not the actual lab result.  Will you place the order or would you like the lab results?  Thank you, Nancy

## 2018-05-16 NOTE — TELEPHONE ENCOUNTER
Spoke with pt.  Informed him that the order for the B-12 injections has been placed.  He will call Dr. Jeffery's office and ask them to fax us the lab results.    Nancy

## 2018-05-23 ENCOUNTER — TELEPHONE (OUTPATIENT)
Dept: FAMILY MEDICINE | Facility: CLINIC | Age: 78
End: 2018-05-23

## 2018-05-23 NOTE — TELEPHONE ENCOUNTER
Per our previous notes, will you please place an order for B-12 injections to be done here in the office?  Thanks, Nancy

## 2018-05-29 ENCOUNTER — TELEPHONE (OUTPATIENT)
Dept: FAMILY MEDICINE | Facility: CLINIC | Age: 78
End: 2018-05-29

## 2018-05-29 ENCOUNTER — CLINICAL SUPPORT (OUTPATIENT)
Dept: FAMILY MEDICINE | Facility: CLINIC | Age: 78
End: 2018-05-29
Payer: MEDICARE

## 2018-05-29 DIAGNOSIS — E53.8 B12 DEFICIENCY: Primary | ICD-10-CM

## 2018-05-29 PROCEDURE — 96372 THER/PROPH/DIAG INJ SC/IM: CPT | Mod: S$GLB,,, | Performed by: FAMILY MEDICINE

## 2018-05-29 RX ORDER — CYANOCOBALAMIN 1000 UG/ML
100 INJECTION, SOLUTION INTRAMUSCULAR; SUBCUTANEOUS ONCE
Status: COMPLETED | OUTPATIENT
Start: 2018-05-29 | End: 2018-05-29

## 2018-05-29 RX ADMIN — CYANOCOBALAMIN 100 MCG: 1000 INJECTION, SOLUTION INTRAMUSCULAR; SUBCUTANEOUS at 10:05

## 2018-05-29 NOTE — TELEPHONE ENCOUNTER
Mr. Delong has a nurse visit today for his B-12; will you please place the order for this? Thank you, Nancy

## 2018-05-29 NOTE — PROGRESS NOTES
Injection given without side effect or adverse reaction, patient tolerated well, patient instructed to contact clinic or go to ER for any new or worsening symptoms.

## 2018-06-18 ENCOUNTER — TELEPHONE (OUTPATIENT)
Dept: FAMILY MEDICINE | Facility: CLINIC | Age: 78
End: 2018-06-18

## 2018-06-18 NOTE — TELEPHONE ENCOUNTER
----- Message from Siobhan Villalpando sent at 6/15/2018  4:34 PM CDT -----  Contact: self  Patient wanting to speak with nurse to cancel EKG appointments he do not want to reschedule at the moment      Please call [phone]

## 2018-06-21 ENCOUNTER — OFFICE VISIT (OUTPATIENT)
Dept: FAMILY MEDICINE | Facility: CLINIC | Age: 78
End: 2018-06-21
Payer: MEDICARE

## 2018-06-21 VITALS
SYSTOLIC BLOOD PRESSURE: 130 MMHG | OXYGEN SATURATION: 99 % | BODY MASS INDEX: 30.76 KG/M2 | HEART RATE: 57 BPM | WEIGHT: 196 LBS | DIASTOLIC BLOOD PRESSURE: 58 MMHG | RESPIRATION RATE: 16 BRPM | HEIGHT: 67 IN

## 2018-06-21 DIAGNOSIS — E53.8 B12 DEFICIENCY: ICD-10-CM

## 2018-06-21 DIAGNOSIS — I10 ESSENTIAL HYPERTENSION: Primary | ICD-10-CM

## 2018-06-21 PROCEDURE — 99213 OFFICE O/P EST LOW 20 MIN: CPT | Mod: 25,S$GLB,, | Performed by: FAMILY MEDICINE

## 2018-06-21 PROCEDURE — 99499 UNLISTED E&M SERVICE: CPT | Mod: S$GLB,,, | Performed by: FAMILY MEDICINE

## 2018-06-21 PROCEDURE — 96372 THER/PROPH/DIAG INJ SC/IM: CPT | Mod: S$GLB,,, | Performed by: FAMILY MEDICINE

## 2018-06-21 PROCEDURE — 3075F SYST BP GE 130 - 139MM HG: CPT | Mod: CPTII,S$GLB,, | Performed by: FAMILY MEDICINE

## 2018-06-21 PROCEDURE — 3078F DIAST BP <80 MM HG: CPT | Mod: CPTII,S$GLB,, | Performed by: FAMILY MEDICINE

## 2018-06-21 PROCEDURE — 99999 PR PBB SHADOW E&M-EST. PATIENT-LVL III: CPT | Mod: PBBFAC,,, | Performed by: FAMILY MEDICINE

## 2018-06-21 RX ORDER — CYANOCOBALAMIN 1000 UG/ML
100 INJECTION, SOLUTION INTRAMUSCULAR; SUBCUTANEOUS ONCE
Status: COMPLETED | OUTPATIENT
Start: 2018-06-21 | End: 2018-06-21

## 2018-06-21 RX ADMIN — CYANOCOBALAMIN 100 MCG: 1000 INJECTION, SOLUTION INTRAMUSCULAR; SUBCUTANEOUS at 11:06

## 2018-06-21 NOTE — PROGRESS NOTES
Patient, Lucho Delong (MRN #[id number]), presented with a recent Estimated Glumerular Filtration Rate (EGFR) between 15 and 29 consistent with the definition of chronic kidney disease stage 4 (ICD10 - N18.4).    eGFR if non    Date Value Ref Range Status   10/17/2017 19.1 (A) >60 mL/min/1.73 m^2 Final     Comment:     Calculation used to obtain the estimated glomerular filtration  rate (eGFR) is the CKD-EPI equation. Since race is unknown   in our information system, the eGFR values for   -American and Non--American patients are given   for each creatinine result.         The patient's chronic kidney disease stage 4 was monitored, evaluated, addressed and/or treated. This addendum to the medical record is made on 06/21/2018.

## 2018-06-21 NOTE — PROGRESS NOTES
"Subjective:       Patient ID: Lucho Delong is a 77 y.o. male.    Chief Complaint: Follow-up    In regards to the patient's hypertension, patient denies chest pain/sob, and has been compliant with the medication regimen.         Review of Systems   Constitutional: Positive for fatigue. Negative for activity change, appetite change and fever.   HENT: Negative for congestion, dental problem, ear discharge, hearing loss, postnasal drip, sinus pain, sneezing and trouble swallowing.    Eyes: Negative for photophobia and discharge.   Respiratory: Negative for apnea, cough and shortness of breath.    Cardiovascular: Negative for chest pain.   Gastrointestinal: Negative for abdominal distention, abdominal pain, blood in stool, diarrhea, nausea and vomiting.   Endocrine: Negative for cold intolerance.   Genitourinary: Negative for difficulty urinating, flank pain, frequency, hematuria and testicular pain.   Musculoskeletal: Negative for arthralgias and myalgias.   Skin: Negative for color change.   Allergic/Immunologic: Negative for environmental allergies, food allergies and immunocompromised state.   Neurological: Negative for dizziness, syncope, numbness and headaches.   Hematological: Negative for adenopathy. Does not bruise/bleed easily.   Psychiatric/Behavioral: Negative for agitation, confusion, decreased concentration, hallucinations, self-injury and sleep disturbance.   All other systems reviewed and are negative.        Reviewed family, medical, surgical, and social history.    Objective:      BP (!) 130/58 (BP Location: Left arm, Patient Position: Sitting, BP Method: Medium (Automatic))   Pulse (!) 57   Resp 16   Ht 5' 7" (1.702 m)   Wt 88.9 kg (196 lb)   SpO2 99%   BMI 30.70 kg/m²   Physical Exam   Constitutional: He is oriented to person, place, and time. He appears well-developed and well-nourished. No distress.   HENT:   Head: Normocephalic and atraumatic.   Nose: Nose normal.   Mouth/Throat: " Oropharynx is clear and moist. No oropharyngeal exudate.   Eyes: Conjunctivae and EOM are normal. Pupils are equal, round, and reactive to light.   Neck: Normal range of motion. No thyromegaly present.   Cardiovascular: Normal rate, regular rhythm, normal heart sounds and intact distal pulses.  Exam reveals no gallop and no friction rub.    No murmur heard.  Pulmonary/Chest: Effort normal and breath sounds normal. No respiratory distress. He has no wheezes. He has no rales.   Abdominal: Soft. He exhibits no distension. There is no tenderness. There is no guarding.   Musculoskeletal: Normal range of motion. He exhibits no edema, tenderness or deformity.   Neurological: He is alert and oriented to person, place, and time. He displays normal reflexes. No cranial nerve deficit or sensory deficit. He exhibits normal muscle tone. Coordination normal.   Skin: Skin is warm and dry. No rash noted. He is not diaphoretic. No erythema. No pallor.   Psychiatric: He has a normal mood and affect. His behavior is normal. Judgment and thought content normal.   Nursing note and vitals reviewed.      Assessment:       1. Essential hypertension    2. B12 deficiency        Plan:       Essential hypertension    B12 deficiency  -     cyanocobalamin injection 100 mcg; Inject 0.1 mLs (100 mcg total) into the muscle once.    HTN stable, will continue with B12 replenishment.        Risks, benefits, and side effects were discussed with the patient. All questions were answered to the fullest satisfaction of the patient, and pt verbalized understanding and agreement to treatment plan. Pt was to call with any new or worsening symptoms, or present to the ER.

## 2018-07-12 ENCOUNTER — OFFICE VISIT (OUTPATIENT)
Dept: SLEEP MEDICINE | Facility: CLINIC | Age: 78
End: 2018-07-12
Payer: MEDICARE

## 2018-07-12 VITALS
HEART RATE: 69 BPM | HEIGHT: 67 IN | SYSTOLIC BLOOD PRESSURE: 96 MMHG | WEIGHT: 193.56 LBS | BODY MASS INDEX: 30.38 KG/M2 | DIASTOLIC BLOOD PRESSURE: 57 MMHG

## 2018-07-12 DIAGNOSIS — J34.3 HYPERTROPHY OF NASAL TURBINATES: ICD-10-CM

## 2018-07-12 DIAGNOSIS — Z90.89 HISTORY OF TONSILLECTOMY: ICD-10-CM

## 2018-07-12 DIAGNOSIS — G47.30 SLEEP APNEA, UNSPECIFIED TYPE: Primary | ICD-10-CM

## 2018-07-12 PROCEDURE — 3078F DIAST BP <80 MM HG: CPT | Mod: CPTII,S$GLB,, | Performed by: NURSE PRACTITIONER

## 2018-07-12 PROCEDURE — 99999 PR PBB SHADOW E&M-EST. PATIENT-LVL IV: CPT | Mod: PBBFAC,,, | Performed by: NURSE PRACTITIONER

## 2018-07-12 PROCEDURE — 3074F SYST BP LT 130 MM HG: CPT | Mod: CPTII,S$GLB,, | Performed by: NURSE PRACTITIONER

## 2018-07-12 PROCEDURE — 99203 OFFICE O/P NEW LOW 30 MIN: CPT | Mod: S$GLB,,, | Performed by: NURSE PRACTITIONER

## 2018-07-12 RX ORDER — DILTIAZEM HYDROCHLORIDE 120 MG/1
120 CAPSULE, COATED, EXTENDED RELEASE ORAL DAILY
COMMUNITY
End: 2018-12-31 | Stop reason: SDUPTHER

## 2018-07-12 RX ORDER — ALLOPURINOL 100 MG/1
TABLET ORAL
COMMUNITY
Start: 2018-07-06 | End: 2018-10-30 | Stop reason: SDUPTHER

## 2018-07-12 NOTE — PROGRESS NOTES
"Lucho Delong  was seen as a new patient at the request of  Carlos Costello MD for the evaluation of obstructive sleep apnea.    CHIEF COMPLAINT:    Chief Complaint   Patient presents with    Sleep Apnea       07/12/2018 VALDO Ball NP: Initial HISTORY OF PRESENT ILLNESS: Lucho Delong is a 77 y.o. male is here for sleep evaluation.       Per cardiologist: "Pt's wife says he stops breathing while sleeping.  Discussed that in the presence of uncontrolled/incompletely treated obstructive sleep apnea, AF is very difficult to treat. Need to optimize PUSHPA treatment in order to optimize attempts at AF treatment.  Sleep consult re: PUSHPA; CPAP prn"      Patient complaints include: witnessed apneas and air gasping. Sleep is refreshing.     Denies insomnia.     Denies symptoms of restless legs or kicking during sleep.    Occupation: retired since 1997     EPWORTH SLEEPINESS SCALE 7/12/2018   Sitting and reading 3   Watching TV 3   Sitting, inactive in a public place (e.g. a theatre or a meeting) 2   As a passenger in a car for an hour without a break 2   Lying down to rest in the afternoon when circumstances permit 3   Sitting and talking to someone 0   Sitting quietly after a lunch without alcohol 3   In a car, while stopped for a few minutes in traffic 0   Total score 16       SLEEP ROUTINE:    Bed partner: wife   Sleep Clinic New Patient 7/12/2018   What time do you go to bed on a week day? (Give a range) 9 10 ppm   What time do you go to bed on a day off? (Give a range) y9 10   How long does it take you to fall asleep? (Give a range) 10min   On average, how many times per night do you wake up?  2   How long does it take you to fall back into sleep? (Give a range) 2 3 min   What time do you wake up to start your day on a week day? (Give a range) 7 8 am   What time do you wake up to start your day on a day off? (Give a range) 8 9   What time do you get out of bed? (Give a range) 8   On average, how many hours do " you sleep? 7 8   On average, how many naps do you take per day? h2   Rate your sleep quality from 0 to 5 (0-poor, 5-great). 2   Have you experienced:  Weight loss?   How much weight have you lost or gained (in lbs.) in the last year? v10 lbs   On average, how many times per night do you go to the bathroom?  2   Have you ever had a sleep study/CPAP machine/surgery for sleep apnea? No   Have you ever had a CPAP machine for sleep apnea? No   Have you ever had surgery for sleep apnea? No         PAST MEDICAL HISTORY:    Active Ambulatory Problems     Diagnosis Date Noted    Persistent atrial fibrillation 09/01/2017    HTN (hypertension) 09/01/2017    HLD (hyperlipidemia) 09/01/2017    Atrial flutter 10/04/2017    History of cardioversion 11/29/2017    Long term current use of amiodarone 11/29/2017    Current use of long term anticoagulation 11/29/2017     Resolved Ambulatory Problems     Diagnosis Date Noted    Atrial fibrillation 09/06/2017     Past Medical History:   Diagnosis Date    Allergic reaction     Anticoagulant long-term use     Atrial fibrillation     Bursitis of hip     Cancer     Chronic back pain     Chronic kidney disease, stage 3     Foot pain     Gastroesophageal reflux disease     Gout     Hyperlipemia     Hypertension     Joint pain     Pain in toe     Skin cancer     Testicle pain                 PAST SURGICAL HISTORY:    Past Surgical History:   Procedure Laterality Date    COLONOSCOPY      PROSTATE SURGERY  1997    TONSILLECTOMY           FAMILY HISTORY:                Family History   Problem Relation Age of Onset    Cancer Father 86        colon    Melanoma Neg Hx     Psoriasis Neg Hx     Lupus Neg Hx     Eczema Neg Hx        SOCIAL HISTORY:          Tobacco:   History   Smoking Status    Never Smoker   Smokeless Tobacco    Never Used       Alcohol use:    History   Alcohol Use    2.0 oz/week    4 Standard drinks or equivalent per week     Comment: joby or  beer                 ALLERGIES:    Review of patient's allergies indicates:   Allergen Reactions    Tramadol Itching and Swelling     Began medication, two doses into this treatment patient experiences tongue swelling and severe itching. Patient MD advised to not take this medication anymore       CURRENT MEDICATIONS:    Current Outpatient Prescriptions   Medication Sig Dispense Refill    diltiaZEM (CARDIZEM CD) 120 MG Cp24 Take 120 mg by mouth once daily.      allopurinol (ZYLOPRIM) 100 MG tablet       amLODIPine (NORVASC) 10 MG tablet Take 1 tablet (10 mg total) by mouth once daily. 90 tablet 3    apixaban (ELIQUIS) 5 mg Tab Take 5 mg by mouth 2 (two) times daily.      aspirin (ECOTRIN) 81 MG EC tablet aspirin 81 mg tablet,delayed release   Take 1 tablet every day by oral route.      CRESTOR 20 mg tablet 20 mg once daily.       famotidine (PEPCID) 20 MG tablet       furosemide (LASIX) 20 MG tablet Take 1 tablet (20 mg total) by mouth 2 (two) times daily. Hold x 5 days post cardioversion.      losartan (COZAAR) 100 MG tablet Take 1 tablet (100 mg total) by mouth once daily. 90 tablet 3    LUTEIN ORAL Take by mouth.      metoprolol succinate (TOPROL-XL) 25 MG 24 hr tablet Take 1 tablet (25 mg total) by mouth once daily. 90 tablet 3    MV-MIN/FA/D3/OM-3/DHA/EPA/FISH (ADULT MULTI PLUS OMEGA-3 ORAL) Adult Multi plus Omega-3      omeprazole (PRILOSEC) 20 MG capsule       pantoprazole (PROTONIX) 20 MG tablet pantoprazole 20 mg tablet,delayed release      predniSONE (DELTASONE) 1 MG tablet       rosuvastatin (CRESTOR) 10 MG tablet        No current facility-administered medications for this visit.                   REVIEW OF SYSTEMS:     Sleep related symptoms as per HPI.  Sleep Clinic ROS  7/12/2018   Difficulty breathing through the nose?  Yes   Sore throat or dry mouth in the morning? Yes   Irregular or very fast heart beat?  Yes   Shortness of breath?  Sometimes   Acid reflux? Yes   Body aches and  "pains?  Yes   Morning headaches? No   Dizziness? Yes   Mood changes?  Yes   Do you exercise?  Yes   Do you feel like moving your legs a lot?  No     Otherwise, a balance of systems reviewed is negative.          PHYSICAL EXAM:  Vitals:    07/12/18 0844   BP: (!) 96/57   Pulse: 69   Weight: 87.8 kg (193 lb 9 oz)   Height: 5' 7" (1.702 m)   PainSc: 0-No pain     Body mass index is 30.32 kg/m².     GENERAL: Overweight development with central obesity, well groomed  HEENT:  Conjunctivae are non-erythematous; Pupils equal, round, and reactive to light; Nose is symmetrical; Nasal mucosa is pink and moist; Septum is midline; Inferior turbinates are enlarged; Nasal airflow is normal; Posterior pharynx is pink; Modified Mallampati: IV; Posterior palate is normal; Tonsils surgically absent; Uvula is normal and pink;Tongue is normal; Dentition is fair; No TMJ tenderness; Jaw opening and protrusion without click and without discomfort.  NECK: Supple. Neck circumference is 16 inches. No thyromegaly. No palpable nodes.    SKIN: On face and neck: No abrasions, no rashes, no lesions.  No subcutaneous nodules are palpable.  RESPIRATORY: Chest is clear to auscultation.  Normal chest expansion and non-labored breathing at rest.  CARDIOVASCULAR: Normal S1, S2.  No murmurs, gallops or rubs. No carotid bruits bilaterally.  EXTREMITIES: No edema. No clubbing. No cyanosis. Station normal. Gait normal.        NEURO/PSYCH: Oriented to time, place and person. Normal attention span and concentration. Affect is full. Mood is normal.                                              ASSESSMENT:    Sleep apnea, unspecified. The patient symptomatically has witnessed apneas and air gasping with findings of crowded oral airway and elevated body mass index. Medical co-morbidities: systemic HTN, persistent atrial fibrillation on long-term anticoagulation, and obesity.  This warrants further investigation for possible obstructive sleep apnea.      Enlarged " turbinates, from chronic environmental allergies, takes Claritin prn; could make acclimating to PAP therapy more difficult     Hx tonsillectomy, in high school     PLAN:    Diagnostic: Polysomnogram. The nature of this procedure and its indication was discussed with the patient. Patient will be contacted after sleep study is done. Pt lives an hour away, personally discuss results over phone, RTC prn.     Start Flonase and OTC antihistamine of choice daily.     Education: During our discussion today, we talked about the etiology of obstructive sleep apnea as well as the potential ramifications of untreated sleep apnea, which could include daytime sleepiness, hypertension, heart disease and/or stroke. We discussed potential treatment options, which could include weight loss, body positioning, continuous positive airway pressure (CPAP), OA, EPAP, or referral for surgical consideration.     Precautions: The patient was advised to abstain from driving should they feel sleepy  or drowsy.     Thank you for allowing me the opportunity to participate in the care of your patient.

## 2018-07-12 NOTE — PATIENT INSTRUCTIONS
Mya or Krish will contact you to schedule your sleep study. Their number is [phone] (ext 2). The [facility] is located on 7th floor of the [facility].    We will call you when the sleep study results are ready - if you have not heard from us by 2 weeks from the date of the study, please call [phone] (ext 1).    You are advised to abstain from driving should you feel sleepy or drowsy.

## 2018-07-12 NOTE — LETTER
July 12, 2018      Carlos Costello MD  [address]  [place] [address]           [facility]  [address]  [place] [address]  Phone: [phone]          Patient: Lucho Delong   MR Number: [id number]   YOB: 1940   Date of Visit: 7/12/2018       Dear Dr. Carlos Costello:    Thank you for referring Lucho Delong to me for evaluation. Attached you will find relevant portions of my assessment and plan of care.    If you have questions, please do not hesitate to call me. I look forward to following Lucho Delong along with you.    Sincerely,    Eliu Ball, RC    Enclosure  CC:  No Recipients    If you would like to receive this communication electronically, please contact [email or web address] or [phone] to request more information on [employer] access.    For providers and/or their staff who would like to refer a patient to [facility], please contact us through our one-stop-shop provider referral line, [facility], at [phone].    If you feel you have received this communication in error or would no longer like to receive these types of communications, please e-mail [email or web address]

## 2018-07-13 ENCOUNTER — OFFICE VISIT (OUTPATIENT)
Dept: FAMILY MEDICINE | Facility: CLINIC | Age: 78
End: 2018-07-13
Payer: MEDICARE

## 2018-07-13 VITALS
HEART RATE: 76 BPM | OXYGEN SATURATION: 98 % | WEIGHT: 190 LBS | HEIGHT: 67 IN | BODY MASS INDEX: 29.82 KG/M2 | DIASTOLIC BLOOD PRESSURE: 64 MMHG | TEMPERATURE: 99 F | SYSTOLIC BLOOD PRESSURE: 131 MMHG | RESPIRATION RATE: 20 BRPM

## 2018-07-13 DIAGNOSIS — J32.9 SINUSITIS, UNSPECIFIED CHRONICITY, UNSPECIFIED LOCATION: Primary | ICD-10-CM

## 2018-07-13 PROCEDURE — 3078F DIAST BP <80 MM HG: CPT | Mod: CPTII,S$GLB,, | Performed by: FAMILY MEDICINE

## 2018-07-13 PROCEDURE — 99214 OFFICE O/P EST MOD 30 MIN: CPT | Mod: 25,S$GLB,, | Performed by: FAMILY MEDICINE

## 2018-07-13 PROCEDURE — 99999 PR PBB SHADOW E&M-EST. PATIENT-LVL III: CPT | Mod: PBBFAC,,, | Performed by: FAMILY MEDICINE

## 2018-07-13 PROCEDURE — 96372 THER/PROPH/DIAG INJ SC/IM: CPT | Mod: S$GLB,,, | Performed by: FAMILY MEDICINE

## 2018-07-13 PROCEDURE — 3075F SYST BP GE 130 - 139MM HG: CPT | Mod: CPTII,S$GLB,, | Performed by: FAMILY MEDICINE

## 2018-07-13 RX ORDER — DEXAMETHASONE SODIUM PHOSPHATE 4 MG/ML
8 INJECTION, SOLUTION INTRA-ARTICULAR; INTRALESIONAL; INTRAMUSCULAR; INTRAVENOUS; SOFT TISSUE
Status: COMPLETED | OUTPATIENT
Start: 2018-07-13 | End: 2018-07-13

## 2018-07-13 RX ORDER — CEFTRIAXONE 1 G/1
1 INJECTION, POWDER, FOR SOLUTION INTRAMUSCULAR; INTRAVENOUS
Status: COMPLETED | OUTPATIENT
Start: 2018-07-13 | End: 2018-07-13

## 2018-07-13 RX ORDER — AZITHROMYCIN 250 MG/1
TABLET, FILM COATED ORAL
Qty: 6 TABLET | Refills: 0 | Status: SHIPPED | OUTPATIENT
Start: 2018-07-13 | End: 2018-09-27

## 2018-07-13 RX ADMIN — CEFTRIAXONE 1 G: 1 INJECTION, POWDER, FOR SOLUTION INTRAMUSCULAR; INTRAVENOUS at 11:07

## 2018-07-13 RX ADMIN — DEXAMETHASONE SODIUM PHOSPHATE 8 MG: 4 INJECTION, SOLUTION INTRA-ARTICULAR; INTRALESIONAL; INTRAMUSCULAR; INTRAVENOUS; SOFT TISSUE at 11:07

## 2018-07-13 NOTE — PROGRESS NOTES
Subjective:       Patient ID: Lucho Delong is a 77 y.o. male.    Chief Complaint: Cough and Sinus Problem    Cough   This is a new problem. The current episode started in the past 7 days. The problem has been gradually worsening. The problem occurs constantly. The cough is non-productive. Associated symptoms include ear congestion, ear pain, nasal congestion and postnasal drip. Pertinent negatives include no chest pain, chills, fever, headaches, heartburn, hemoptysis, myalgias, rash, rhinorrhea, sore throat, shortness of breath, sweats, weight loss or wheezing. Nothing aggravates the symptoms. Risk factors for lung disease include smoking/tobacco exposure. He has tried nothing for the symptoms. The treatment provided no relief. His past medical history is significant for bronchitis. There is no history of asthma, bronchiectasis, COPD, emphysema, environmental allergies or pneumonia.     Review of Systems   Constitutional: Negative for activity change, appetite change, chills, fatigue, fever and weight loss.   HENT: Positive for ear pain and postnasal drip. Negative for congestion, dental problem, ear discharge, hearing loss, rhinorrhea, sinus pain, sneezing, sore throat and trouble swallowing.    Eyes: Negative for photophobia and discharge.   Respiratory: Positive for cough. Negative for apnea, hemoptysis, shortness of breath and wheezing.    Cardiovascular: Negative for chest pain.   Gastrointestinal: Negative for abdominal distention, abdominal pain, blood in stool, diarrhea, heartburn, nausea and vomiting.   Endocrine: Negative for cold intolerance.   Genitourinary: Negative for difficulty urinating, flank pain, frequency, hematuria and testicular pain.   Musculoskeletal: Negative for arthralgias and myalgias.   Skin: Negative for color change and rash.   Allergic/Immunologic: Negative for environmental allergies, food allergies and immunocompromised state.   Neurological: Negative for dizziness,  "syncope, numbness and headaches.   Hematological: Negative for adenopathy. Does not bruise/bleed easily.   Psychiatric/Behavioral: Negative for agitation, confusion, decreased concentration, hallucinations, self-injury and sleep disturbance.   All other systems reviewed and are negative.        Reviewed family, medical, surgical, and social history.    Objective:      /64 (BP Location: Left arm, Patient Position: Sitting, BP Method: Medium (Automatic))   Pulse 76   Temp 98.6 °F (37 °C) (Tympanic)   Resp 20   Ht 5' 7" (1.702 m)   Wt 86.2 kg (190 lb)   SpO2 98%   BMI 29.76 kg/m²   Physical Exam   Constitutional: He is oriented to person, place, and time. He appears well-developed and well-nourished. No distress.   HENT:   Head: Normocephalic and atraumatic.   Nose: Nose normal.   Mouth/Throat: Oropharynx is clear and moist. No oropharyngeal exudate.   Eyes: Conjunctivae and EOM are normal. Pupils are equal, round, and reactive to light.   Neck: Normal range of motion. No thyromegaly present.   Cardiovascular: Normal rate, regular rhythm, normal heart sounds and intact distal pulses.  Exam reveals no gallop and no friction rub.    No murmur heard.  Pulmonary/Chest: Effort normal. No respiratory distress. He has wheezes. He has no rales.   Abdominal: Soft. He exhibits no distension. There is no tenderness. There is no guarding.   Musculoskeletal: Normal range of motion. He exhibits no edema, tenderness or deformity.   Neurological: He is alert and oriented to person, place, and time. He displays normal reflexes. No cranial nerve deficit or sensory deficit. He exhibits normal muscle tone. Coordination normal.   Skin: Skin is warm and dry. No rash noted. He is not diaphoretic. No erythema. No pallor.   Psychiatric: He has a normal mood and affect. His behavior is normal. Judgment and thought content normal.   Nursing note and vitals reviewed.      Assessment:       1. Sinusitis, unspecified chronicity, " unspecified location        Plan:       Sinusitis, unspecified chronicity, unspecified location  -     cefTRIAXone injection 1 g; Inject 1 g into the muscle one time.  -     dexamethasone injection 8 mg; Inject 2 mLs (8 mg total) into the muscle one time.  -     azithromycin (Z-JULIANA) 250 MG tablet; Take two tablets on day 1, then 1 tablet on days 2-5.  Dispense: 6 tablet; Refill: 0            Risks, benefits, and side effects were discussed with the patient. All questions were answered to the fullest satisfaction of the patient, and pt verbalized understanding and agreement to treatment plan. Pt was to call with any new or worsening symptoms, or present to the ER.

## 2018-07-16 ENCOUNTER — TELEPHONE (OUTPATIENT)
Dept: FAMILY MEDICINE | Facility: CLINIC | Age: 78
End: 2018-07-16

## 2018-07-16 RX ORDER — PREDNISONE 20 MG/1
20 TABLET ORAL DAILY
Qty: 5 TABLET | Refills: 0 | Status: SHIPPED | OUTPATIENT
Start: 2018-07-16 | End: 2018-07-21

## 2018-07-16 RX ORDER — LEVOFLOXACIN 500 MG/1
500 TABLET, FILM COATED ORAL DAILY
Qty: 5 TABLET | Refills: 0 | Status: SHIPPED | OUTPATIENT
Start: 2018-07-16 | End: 2018-09-27

## 2018-07-16 RX ORDER — CEFDINIR 300 MG/1
300 CAPSULE ORAL 2 TIMES DAILY
Qty: 20 CAPSULE | Refills: 0 | Status: SHIPPED | OUTPATIENT
Start: 2018-07-16 | End: 2018-07-26

## 2018-07-16 NOTE — TELEPHONE ENCOUNTER
----- Message from Emil Sandoval sent at 7/16/2018  2:56 PM CDT -----  Contact: Stephanie with [facility]   Stephanie with [facility] need to speak with a nurse regarding rx levoFLOXacin (LEVAQUIN) 500 MG tablet drug interaction. Please call back at [phone].

## 2018-07-16 NOTE — TELEPHONE ENCOUNTER
Spoke with patient's wife, informed her that a stronger antibiotic and steroids have been sent in.  Cassandra

## 2018-07-16 NOTE — TELEPHONE ENCOUNTER
----- Message from Columba Sosa sent at 7/16/2018  1:47 PM CDT -----  Contact: wife  Wife -  Asia Delong - [phone] (cell) is calling/patient was seen last week for a lot of congestion/today is last day of antibiotics and he had two shots at his last visit/patient is running a fever of 100.7 and his having chills/he has lots of congestion/wife feels patient is getting worse instead of better/she is asking to speak with the nurse

## 2018-07-20 ENCOUNTER — TELEPHONE (OUTPATIENT)
Dept: SLEEP MEDICINE | Facility: CLINIC | Age: 78
End: 2018-07-20

## 2018-07-20 DIAGNOSIS — G47.30 SLEEP APNEA, UNSPECIFIED TYPE: Primary | ICD-10-CM

## 2018-07-20 NOTE — TELEPHONE ENCOUNTER
Please notify pt that his insurance will not pay for in lab sleep study. HST ordered instead.     Pending insurance approval, Krish will contact pt to schedule sleep study. Their number is [phone] (ext 2). The [facility] is located on 7th floor of the [facility].    We will call when the sleep study results are ready - if pt has not heard from us by 2 weeks from the date of the study, please call [phone] (ext 1).

## 2018-07-26 ENCOUNTER — TELEPHONE (OUTPATIENT)
Dept: SLEEP MEDICINE | Facility: CLINIC | Age: 78
End: 2018-07-26

## 2018-07-26 NOTE — TELEPHONE ENCOUNTER
----- Message from Renetta Hatch sent at 7/26/2018 10:06 AM CDT -----  Name of Who is Calling: MERY CLEMENTS [[id number]]      What is the request in detail: Pt states any did not receive an order for his at home sleep study. Please re-submit       Can the clinic reply by [facility]:  No       What Number to Call Back if not in [facility]: [phone]

## 2018-07-26 NOTE — TELEPHONE ENCOUNTER
Patient needs to schedule for sleep study. Status of sleep study has been approved. Please give the patient a call to schedule.

## 2018-08-03 ENCOUNTER — TELEPHONE (OUTPATIENT)
Dept: SLEEP MEDICINE | Facility: OTHER | Age: 78
End: 2018-08-03

## 2018-08-06 ENCOUNTER — HOSPITAL ENCOUNTER (OUTPATIENT)
Dept: SLEEP MEDICINE | Facility: OTHER | Age: 78
Discharge: HOME | End: 2018-08-06
Attending: NURSE PRACTITIONER
Payer: MEDICARE

## 2018-08-06 DIAGNOSIS — G47.30 SLEEP APNEA, UNSPECIFIED TYPE: ICD-10-CM

## 2018-08-06 DIAGNOSIS — G47.33 OSA (OBSTRUCTIVE SLEEP APNEA): ICD-10-CM

## 2018-08-06 PROCEDURE — 95800 SLP STDY UNATTENDED: CPT

## 2018-08-06 PROCEDURE — 95800 SLP STDY UNATTENDED: CPT | Mod: 26,,, | Performed by: PSYCHIATRY & NEUROLOGY

## 2018-08-08 RX ORDER — PANTOPRAZOLE SODIUM 20 MG/1
TABLET, DELAYED RELEASE ORAL
Qty: 30 TABLET | Refills: 8 | Status: SHIPPED | OUTPATIENT
Start: 2018-08-08 | End: 2019-07-01

## 2018-08-13 ENCOUNTER — TELEPHONE (OUTPATIENT)
Dept: SLEEP MEDICINE | Facility: CLINIC | Age: 78
End: 2018-08-13

## 2018-08-13 NOTE — TELEPHONE ENCOUNTER
----- Message from Mary Jo Jairo sent at 8/13/2018 12:52 PM CDT -----  Contact: Pt   Name of Who is Calling: MERY CLEMENTS [[id number]]      What is the request in detail: Patient is requesting a callback from staff in regards to sleep study results. Please contact to further discuss and advise      Can the clinic reply by [facility]: No       What Number to Call Back if not in [facility]: [phone]

## 2018-08-13 NOTE — TELEPHONE ENCOUNTER
Called patient back and informed her that sleep study report hasn't been analyzed yet.    Patient understood.    Informed pt that we will call him when the results are release.

## 2018-08-15 ENCOUNTER — TELEPHONE (OUTPATIENT)
Dept: SLEEP MEDICINE | Facility: CLINIC | Age: 78
End: 2018-08-15

## 2018-08-15 ENCOUNTER — TELEPHONE (OUTPATIENT)
Dept: FAMILY MEDICINE | Facility: CLINIC | Age: 78
End: 2018-08-15

## 2018-08-15 DIAGNOSIS — G47.33 OSA (OBSTRUCTIVE SLEEP APNEA): Primary | ICD-10-CM

## 2018-08-15 NOTE — TELEPHONE ENCOUNTER
08/06/2018  lb. The overall AHI was 10 and overall RDI was 18. The oxygen chelle was 84.1 % and % time < 90% SpO2 was 0.6%.

## 2018-08-15 NOTE — TELEPHONE ENCOUNTER
"----- Message from Sonya Mary sent at 8/15/2018  2:17 PM CDT -----  Contact: MERY CLEMENTS [[id number]]      Name of Who is Calling: MERY CLEMENTS [[id number]]      What is the request in detail:   Patient called requesting to have the sleep study results discussed over the phone only. Says, "he lives in [place]."  I did offer an appointment to come in to discuss his results but he refused.  Please give a call back at your earliest convenience.      THANKS!        Can the clinic reply by [facility]:  No      What Number to Call Back: MERY CLEMENTS / # [phone]                                    "

## 2018-08-15 NOTE — TELEPHONE ENCOUNTER
----- Message from Alex Montejo sent at 8/15/2018  3:11 PM CDT -----  Contact: Lucho Delong            Name of Who is Calling: Lucho Delong      What is the request in detail: Patient called for sleep study results      Can the clinic reply by [facility]: No      [facility]: [phone]

## 2018-09-27 ENCOUNTER — OFFICE VISIT (OUTPATIENT)
Dept: FAMILY MEDICINE | Facility: CLINIC | Age: 78
End: 2018-09-27
Payer: MEDICARE

## 2018-09-27 VITALS
SYSTOLIC BLOOD PRESSURE: 109 MMHG | WEIGHT: 192 LBS | TEMPERATURE: 98 F | RESPIRATION RATE: 18 BRPM | HEIGHT: 67 IN | OXYGEN SATURATION: 98 % | DIASTOLIC BLOOD PRESSURE: 67 MMHG | HEART RATE: 72 BPM | BODY MASS INDEX: 30.13 KG/M2

## 2018-09-27 DIAGNOSIS — I10 ESSENTIAL HYPERTENSION: Primary | ICD-10-CM

## 2018-09-27 PROCEDURE — 3078F DIAST BP <80 MM HG: CPT | Mod: CPTII,,, | Performed by: FAMILY MEDICINE

## 2018-09-27 PROCEDURE — 1101F PT FALLS ASSESS-DOCD LE1/YR: CPT | Mod: CPTII,,, | Performed by: FAMILY MEDICINE

## 2018-09-27 PROCEDURE — 99213 OFFICE O/P EST LOW 20 MIN: CPT | Mod: PBBFAC,PN | Performed by: FAMILY MEDICINE

## 2018-09-27 PROCEDURE — 99999 PR PBB SHADOW E&M-EST. PATIENT-LVL III: CPT | Mod: PBBFAC,,, | Performed by: FAMILY MEDICINE

## 2018-09-27 PROCEDURE — 99213 OFFICE O/P EST LOW 20 MIN: CPT | Mod: S$PBB,,, | Performed by: FAMILY MEDICINE

## 2018-09-27 PROCEDURE — 3074F SYST BP LT 130 MM HG: CPT | Mod: CPTII,,, | Performed by: FAMILY MEDICINE

## 2018-09-27 NOTE — PROGRESS NOTES
"Subjective:       Patient ID: Lucho Delong is a 77 y.o. male.    Chief Complaint: Follow-up    Follow up    In regards to the patient's hypertension, patient denies chest pain, and has been compliant with the medication regimen.         Review of Systems   Constitutional: Negative for activity change, appetite change, chills, fatigue and fever.   HENT: Negative for congestion, dental problem, ear discharge, ear pain, hearing loss, postnasal drip, rhinorrhea, sinus pain, sneezing, sore throat and trouble swallowing.    Eyes: Negative for photophobia and discharge.   Respiratory: Positive for shortness of breath. Negative for apnea, cough and wheezing.    Cardiovascular: Negative for chest pain.   Gastrointestinal: Negative for abdominal distention, abdominal pain, blood in stool, diarrhea, nausea and vomiting.   Endocrine: Negative for cold intolerance.   Genitourinary: Negative for difficulty urinating, flank pain, frequency, hematuria and testicular pain.   Musculoskeletal: Negative for arthralgias and myalgias.   Skin: Negative for color change and rash.   Allergic/Immunologic: Negative for environmental allergies, food allergies and immunocompromised state.   Neurological: Negative for dizziness, syncope, numbness and headaches.   Hematological: Negative for adenopathy. Does not bruise/bleed easily.   Psychiatric/Behavioral: Negative for agitation, confusion, decreased concentration, hallucinations, self-injury and sleep disturbance.   All other systems reviewed and are negative.        Reviewed family, medical, surgical, and social history.    Objective:      /67 (BP Location: Left arm, Patient Position: Sitting)   Pulse 72   Temp 98 °F (36.7 °C)   Resp 18   Ht 5' 7" (1.702 m)   Wt 87.1 kg (192 lb)   SpO2 98%   BMI 30.07 kg/m²   Physical Exam    Assessment:       1. Essential hypertension        Plan:       Essential hypertension    HTN controlled  Seeing pulm  F/u sleep study        Risks, " benefits, and side effects were discussed with the patient. All questions were answered to the fullest satisfaction of the patient, and pt verbalized understanding and agreement to treatment plan. Pt was to call with any new or worsening symptoms, or present to the ER.

## 2018-10-30 RX ORDER — PREDNISONE 5 MG/1
TABLET ORAL
COMMUNITY
Start: 2018-08-17 | End: 2018-10-30 | Stop reason: SDUPTHER

## 2018-10-30 RX ORDER — ROSUVASTATIN CALCIUM 10 MG/1
10 TABLET, COATED ORAL DAILY
Qty: 30 TABLET | Refills: 11 | Status: SHIPPED | OUTPATIENT
Start: 2018-10-30 | End: 2018-12-31 | Stop reason: SDUPTHER

## 2018-10-30 RX ORDER — PREDNISONE 5 MG/1
5 TABLET ORAL DAILY
Qty: 30 TABLET | Refills: 11 | Status: SHIPPED | OUTPATIENT
Start: 2018-10-30 | End: 2018-12-31 | Stop reason: SDUPTHER

## 2018-10-30 RX ORDER — ALLOPURINOL 100 MG/1
100 TABLET ORAL DAILY
Qty: 30 TABLET | Refills: 4 | Status: SHIPPED | OUTPATIENT
Start: 2018-10-30 | End: 2018-12-31 | Stop reason: SDUPTHER

## 2018-10-30 NOTE — TELEPHONE ENCOUNTER
----- Message from Martha Jennings sent at 10/30/2018  4:31 PM CDT -----  Contact: [facility] [phone]  States that they have not received pt rxs. States that it was new rxs for pt. Please call back at [phone]//thank you acc

## 2018-10-30 NOTE — TELEPHONE ENCOUNTER
[facility] states no rx received- no rx have been signed. Informed pharmacy that clinic is in progress during the day, and refill requests are approved as the providers have time. Voiced understanding.

## 2018-12-31 ENCOUNTER — OFFICE VISIT (OUTPATIENT)
Dept: FAMILY MEDICINE | Facility: CLINIC | Age: 78
End: 2018-12-31
Payer: MEDICARE

## 2018-12-31 VITALS
OXYGEN SATURATION: 97 % | BODY MASS INDEX: 30.45 KG/M2 | HEIGHT: 67 IN | WEIGHT: 194 LBS | SYSTOLIC BLOOD PRESSURE: 120 MMHG | DIASTOLIC BLOOD PRESSURE: 66 MMHG | RESPIRATION RATE: 18 BRPM | HEART RATE: 79 BPM

## 2018-12-31 DIAGNOSIS — M10.9 GOUT, UNSPECIFIED CAUSE, UNSPECIFIED CHRONICITY, UNSPECIFIED SITE: ICD-10-CM

## 2018-12-31 DIAGNOSIS — E78.5 HYPERLIPIDEMIA, UNSPECIFIED HYPERLIPIDEMIA TYPE: ICD-10-CM

## 2018-12-31 DIAGNOSIS — I10 ESSENTIAL HYPERTENSION: ICD-10-CM

## 2018-12-31 DIAGNOSIS — J32.9 SINUSITIS, UNSPECIFIED CHRONICITY, UNSPECIFIED LOCATION: Primary | ICD-10-CM

## 2018-12-31 DIAGNOSIS — N18.30 STAGE 3 CHRONIC KIDNEY DISEASE: ICD-10-CM

## 2018-12-31 PROCEDURE — 1101F PT FALLS ASSESS-DOCD LE1/YR: CPT | Mod: CPTII,S$GLB,, | Performed by: FAMILY MEDICINE

## 2018-12-31 PROCEDURE — 99214 OFFICE O/P EST MOD 30 MIN: CPT | Mod: S$GLB,,, | Performed by: FAMILY MEDICINE

## 2018-12-31 PROCEDURE — 3078F DIAST BP <80 MM HG: CPT | Mod: CPTII,S$GLB,, | Performed by: FAMILY MEDICINE

## 2018-12-31 PROCEDURE — 99999 PR PBB SHADOW E&M-EST. PATIENT-LVL III: CPT | Mod: PBBFAC,,, | Performed by: FAMILY MEDICINE

## 2018-12-31 PROCEDURE — 3074F SYST BP LT 130 MM HG: CPT | Mod: CPTII,S$GLB,, | Performed by: FAMILY MEDICINE

## 2018-12-31 RX ORDER — AMOXICILLIN 875 MG/1
875 TABLET, FILM COATED ORAL EVERY 12 HOURS
Qty: 20 TABLET | Refills: 0 | Status: SHIPPED | OUTPATIENT
Start: 2018-12-31 | End: 2019-03-28

## 2018-12-31 RX ORDER — ALLOPURINOL 100 MG/1
100 TABLET ORAL DAILY
Qty: 90 TABLET | Refills: 3 | Status: SHIPPED | OUTPATIENT
Start: 2018-12-31 | End: 2019-09-13 | Stop reason: SDUPTHER

## 2018-12-31 RX ORDER — METOPROLOL SUCCINATE 100 MG/1
100 TABLET, EXTENDED RELEASE ORAL DAILY
Qty: 90 TABLET | Refills: 3 | Status: SHIPPED | OUTPATIENT
Start: 2018-12-31 | End: 2020-01-29 | Stop reason: SDUPTHER

## 2018-12-31 RX ORDER — PREDNISONE 5 MG/1
5 TABLET ORAL DAILY
Qty: 90 TABLET | Refills: 3 | Status: SHIPPED | OUTPATIENT
Start: 2018-12-31 | End: 2019-04-30

## 2018-12-31 RX ORDER — DILTIAZEM HYDROCHLORIDE 120 MG/1
120 CAPSULE, COATED, EXTENDED RELEASE ORAL DAILY
Qty: 90 CAPSULE | Refills: 3 | Status: SHIPPED | OUTPATIENT
Start: 2018-12-31 | End: 2019-04-30

## 2018-12-31 RX ORDER — METOPROLOL SUCCINATE 100 MG/1
100 TABLET, EXTENDED RELEASE ORAL DAILY
COMMUNITY
End: 2018-12-31 | Stop reason: SDUPTHER

## 2018-12-31 RX ORDER — LOSARTAN POTASSIUM 100 MG/1
100 TABLET ORAL DAILY
Qty: 90 TABLET | Refills: 3 | Status: SHIPPED | OUTPATIENT
Start: 2018-12-31 | End: 2019-03-28

## 2018-12-31 RX ORDER — ROSUVASTATIN CALCIUM 10 MG/1
10 TABLET, COATED ORAL DAILY
Qty: 90 TABLET | Refills: 3 | Status: SHIPPED | OUTPATIENT
Start: 2018-12-31 | End: 2019-03-28

## 2018-12-31 NOTE — PROGRESS NOTES
"Subjective:       Patient ID: Lucho Delong is a 78 y.o. male.    Chief Complaint: Medication Refill and Sinus Problem    Follow up    In regards to the patient's hypertension, patient denies chest pain/sob, and has been compliant with the medication regimen.     Pharyngitis  Last few days  No fever  Sinus pressure    Gout  Controlled  Needs refill        Review of Systems   Constitutional: Negative for activity change, appetite change, chills, fatigue and fever.   HENT: Positive for postnasal drip and sore throat. Negative for congestion, dental problem, ear discharge, ear pain, hearing loss, rhinorrhea, sinus pain, sneezing and trouble swallowing.    Eyes: Negative for photophobia and discharge.   Respiratory: Negative for apnea, cough, shortness of breath and wheezing.    Cardiovascular: Negative for chest pain.   Gastrointestinal: Negative for abdominal distention, abdominal pain, blood in stool, diarrhea, nausea and vomiting.   Endocrine: Negative for cold intolerance.   Genitourinary: Negative for difficulty urinating, flank pain, frequency, hematuria and testicular pain.   Musculoskeletal: Negative for arthralgias and myalgias.   Skin: Negative for color change and rash.   Allergic/Immunologic: Negative for environmental allergies, food allergies and immunocompromised state.   Neurological: Negative for dizziness, syncope, numbness and headaches.   Hematological: Negative for adenopathy. Does not bruise/bleed easily.   Psychiatric/Behavioral: Negative for agitation, confusion, decreased concentration, hallucinations, self-injury and sleep disturbance.   All other systems reviewed and are negative.        Reviewed family, medical, surgical, and social history.    Objective:      /66 (BP Location: Left arm, Patient Position: Sitting, BP Method: Medium (Automatic))   Pulse 79   Resp 18   Ht 5' 7" (1.702 m)   Wt 88 kg (194 lb)   SpO2 97%   BMI 30.38 kg/m²   Physical Exam   Constitutional: He is " oriented to person, place, and time. He appears well-developed and well-nourished. No distress.   HENT:   Head: Normocephalic and atraumatic.   Nose: Nose normal.   Mouth/Throat: Oropharynx is clear and moist. No oropharyngeal exudate.   Eyes: Conjunctivae and EOM are normal. Pupils are equal, round, and reactive to light.   Neck: Normal range of motion. No thyromegaly present.   Cardiovascular: Normal rate, regular rhythm, normal heart sounds and intact distal pulses. Exam reveals no gallop and no friction rub.   No murmur heard.  Pulmonary/Chest: Effort normal. No respiratory distress. He has no wheezes. He has no rales.   Abdominal: Soft. He exhibits no distension. There is no tenderness. There is no guarding.   Musculoskeletal: Normal range of motion. He exhibits no edema, tenderness or deformity.   Neurological: He is alert and oriented to person, place, and time. He displays normal reflexes. No cranial nerve deficit or sensory deficit. He exhibits normal muscle tone. Coordination normal.   Skin: Skin is warm and dry. No rash noted. He is not diaphoretic. No erythema. No pallor.   Psychiatric: He has a normal mood and affect. His behavior is normal. Judgment and thought content normal.   Nursing note and vitals reviewed.      Assessment:       1. Sinusitis, unspecified chronicity, unspecified location    2. Essential hypertension    3. Gout, unspecified cause, unspecified chronicity, unspecified site    4. Hyperlipidemia, unspecified hyperlipidemia type    5. Stage 3 chronic kidney disease        Plan:       Sinusitis, unspecified chronicity, unspecified location  -     amoxicillin (AMOXIL) 875 MG tablet; Take 1 tablet (875 mg total) by mouth every 12 (twelve) hours.  Dispense: 20 tablet; Refill: 0    Essential hypertension  -     losartan (COZAAR) 100 MG tablet; Take 1 tablet (100 mg total) by mouth once daily.  Dispense: 90 tablet; Refill: 3  -     diltiaZEM (CARDIZEM CD) 120 MG Cp24; Take 1 capsule (120 mg  total) by mouth once daily.  Dispense: 90 capsule; Refill: 3  -     metoprolol succinate (TOPROL-XL) 100 MG 24 hr tablet; Take 1 tablet (100 mg total) by mouth once daily.  Dispense: 90 tablet; Refill: 3    Gout, unspecified cause, unspecified chronicity, unspecified site  -     predniSONE (DELTASONE) 5 MG tablet; Take 1 tablet (5 mg total) by mouth once daily.  Dispense: 90 tablet; Refill: 3  -     allopurinol (ZYLOPRIM) 100 MG tablet; Take 1 tablet (100 mg total) by mouth once daily.  Dispense: 90 tablet; Refill: 3    Hyperlipidemia, unspecified hyperlipidemia type  -     rosuvastatin (CRESTOR) 10 MG tablet; Take 1 tablet (10 mg total) by mouth once daily.  Dispense: 90 tablet; Refill: 3    Stage 3 chronic kidney disease  -     Ambulatory referral to Nephrology            Risks, benefits, and side effects were discussed with the patient. All questions were answered to the fullest satisfaction of the patient, and pt verbalized understanding and agreement to treatment plan. Pt was to call with any new or worsening symptoms, or present to the ER.

## 2019-01-14 ENCOUNTER — TELEPHONE (OUTPATIENT)
Dept: FAMILY MEDICINE | Facility: CLINIC | Age: 79
End: 2019-01-14

## 2019-01-14 NOTE — TELEPHONE ENCOUNTER
DEBORAHM for pt to please return call.      ----- Message from Carlos Sargent sent at 1/14/2019  1:30 PM CST -----  Contact: Patient  Type: Needs Medical Advice    Who Called:  Patient  Best Call Back Number: [phone]  Additional Information: Patient would like a call back. Thanks!

## 2019-02-04 ENCOUNTER — TELEPHONE (OUTPATIENT)
Dept: FAMILY MEDICINE | Facility: CLINIC | Age: 79
End: 2019-02-04

## 2019-02-04 NOTE — TELEPHONE ENCOUNTER
----- Message from Harvinder Burt sent at 2/4/2019  9:58 AM CST -----  Contact: Sana with [facility]  Sana is requesting a call back regarding the most recent blood work to be sent over. Please call to advise.     Fax: [phone]  Call Back#: [phone]  Thanks

## 2019-02-12 ENCOUNTER — OFFICE VISIT (OUTPATIENT)
Dept: FAMILY MEDICINE | Facility: CLINIC | Age: 79
End: 2019-02-12
Payer: MEDICARE

## 2019-02-12 VITALS
SYSTOLIC BLOOD PRESSURE: 122 MMHG | DIASTOLIC BLOOD PRESSURE: 72 MMHG | OXYGEN SATURATION: 99 % | WEIGHT: 196.13 LBS | TEMPERATURE: 98 F | BODY MASS INDEX: 30.72 KG/M2 | HEART RATE: 58 BPM

## 2019-02-12 DIAGNOSIS — R21 RASH: Primary | ICD-10-CM

## 2019-02-12 PROCEDURE — 99213 OFFICE O/P EST LOW 20 MIN: CPT | Mod: 25,S$GLB,, | Performed by: NURSE PRACTITIONER

## 2019-02-12 PROCEDURE — 3078F PR MOST RECENT DIASTOLIC BLOOD PRESSURE < 80 MM HG: ICD-10-PCS | Mod: CPTII,S$GLB,, | Performed by: NURSE PRACTITIONER

## 2019-02-12 PROCEDURE — 96372 THER/PROPH/DIAG INJ SC/IM: CPT | Mod: S$GLB,,, | Performed by: NURSE PRACTITIONER

## 2019-02-12 PROCEDURE — 99999 PR PBB SHADOW E&M-EST. PATIENT-LVL III: CPT | Mod: PBBFAC,,, | Performed by: NURSE PRACTITIONER

## 2019-02-12 PROCEDURE — 3078F DIAST BP <80 MM HG: CPT | Mod: CPTII,S$GLB,, | Performed by: NURSE PRACTITIONER

## 2019-02-12 PROCEDURE — 96372 PR INJECTION,THERAP/PROPH/DIAG2ST, IM OR SUBCUT: ICD-10-PCS | Mod: S$GLB,,, | Performed by: NURSE PRACTITIONER

## 2019-02-12 PROCEDURE — 3074F PR MOST RECENT SYSTOLIC BLOOD PRESSURE < 130 MM HG: ICD-10-PCS | Mod: CPTII,S$GLB,, | Performed by: NURSE PRACTITIONER

## 2019-02-12 PROCEDURE — 99999 PR PBB SHADOW E&M-EST. PATIENT-LVL III: ICD-10-PCS | Mod: PBBFAC,,, | Performed by: NURSE PRACTITIONER

## 2019-02-12 PROCEDURE — 1101F PT FALLS ASSESS-DOCD LE1/YR: CPT | Mod: CPTII,S$GLB,, | Performed by: NURSE PRACTITIONER

## 2019-02-12 PROCEDURE — 99213 PR OFFICE/OUTPT VISIT, EST, LEVL III, 20-29 MIN: ICD-10-PCS | Mod: 25,S$GLB,, | Performed by: NURSE PRACTITIONER

## 2019-02-12 PROCEDURE — 3074F SYST BP LT 130 MM HG: CPT | Mod: CPTII,S$GLB,, | Performed by: NURSE PRACTITIONER

## 2019-02-12 PROCEDURE — 1101F PR PT FALLS ASSESS DOC 0-1 FALLS W/OUT INJ PAST YR: ICD-10-PCS | Mod: CPTII,S$GLB,, | Performed by: NURSE PRACTITIONER

## 2019-02-12 RX ORDER — DEXAMETHASONE SODIUM PHOSPHATE 4 MG/ML
8 INJECTION, SOLUTION INTRA-ARTICULAR; INTRALESIONAL; INTRAMUSCULAR; INTRAVENOUS; SOFT TISSUE
Status: COMPLETED | OUTPATIENT
Start: 2019-02-12 | End: 2019-02-12

## 2019-02-12 RX ORDER — KETOCONAZOLE 20 MG/G
CREAM TOPICAL DAILY
Qty: 60 G | Refills: 1 | Status: SHIPPED | OUTPATIENT
Start: 2019-02-12 | End: 2019-04-30

## 2019-02-12 RX ORDER — METHYLPREDNISOLONE 4 MG/1
TABLET ORAL
Qty: 1 PACKAGE | Refills: 0 | Status: SHIPPED | OUTPATIENT
Start: 2019-02-12 | End: 2019-03-05

## 2019-02-12 RX ADMIN — DEXAMETHASONE SODIUM PHOSPHATE 8 MG: 4 INJECTION, SOLUTION INTRA-ARTICULAR; INTRALESIONAL; INTRAMUSCULAR; INTRAVENOUS; SOFT TISSUE at 04:02

## 2019-02-12 NOTE — PROGRESS NOTES
Chief Complaint  Chief Complaint   Patient presents with    Rash       HPI:  Lucho Delong is a 78 y.o. male with medical diagnoses as listed and reviewed within the medical history and problem list that presents for evaluation of rash to his torso around his waist line. Small red raised areas that are itching. No pain. No blistering. Pt does have a small area to the right rib cage that he thinks may be a ring worm. He has not been exposed to anything new. Denies SOB or swelling.    PAST MEDICAL HISTORY:  Past Medical History:   Diagnosis Date    Allergic reaction     Anticoagulant long-term use     Atrial fibrillation     Bursitis of hip     Cancer     Chronic back pain     Chronic kidney disease, stage 3     Foot pain     Gastroesophageal reflux disease     Gout     Hyperlipemia     Hypertension     Joint pain     Pain in toe     Skin cancer     between both eyes, 12 + years ago    Testicle pain        PAST SURGICAL HISTORY:  Past Surgical History:   Procedure Laterality Date    CARDIOVERSION N/A 10/18/2017    [facility] C[facility]    CARDIOVERSION N/A 9/6/2017    Per[facility] CATH [facility]    COLONOSCOPY      PROSTATE SURGERY  1997    TONSILLECTOMY      TRANSESOPHAGEAL ECHOCARDIOGRAM (HYACINTH) N/A 10/18/2017    [facility] CATH LAB    TRANSESOPHAGEAL ECHOCARDIOGRAM (HYACINTH) N/A 9/6/2017    Performed by [facility] CATH LAB       SOCIAL HISTORY:  Social History     Socioeconomic History    Marital status:      Spouse name: Not on file    Number of children: Not on file    Years of education: Not on file    Highest education level: Not on file   Social Needs    Financial resource strain: Not on file    Food insecurity - worry: Not on file    Food insecurity - inability: Not on file    Transportation needs - medical: Not on file    Transportation needs - non-medical: Not on file    Occupational History    Not on file   Tobacco Use    Smoking status: Never Smoker    Smokeless tobacco: Never Used   Substance and Sexual Activity    Alcohol use: Yes     Alcohol/week: 2.0 oz     Types: 4 Standard drinks or equivalent per week     Comment: bourbon or beer    Drug use: No    Sexual activity: Not on file   Other Topics Concern    Not on file   Social History Narrative    Not on file       FAMILY HISTORY:  Family History   Problem Relation Age of Onset    Cancer Father 86        colon    Melanoma Neg Hx     Psoriasis Neg Hx     Lupus Neg Hx     Eczema Neg Hx        ALLERGIES AND MEDICATIONS: updated and reviewed.  Review of patient's allergies indicates:   Allergen Reactions    Tramadol Itching and Swelling     Began medication, two doses into this treatment patient experiences tongue swelling and severe itching. Patient MD advised to not take this medication anymore     Current Outpatient Medications   Medication Sig Dispense Refill    allopurinol (ZYLOPRIM) 100 MG tablet Take 1 tablet (100 mg total) by mouth once daily. 90 tablet 3    amLODIPine (NORVASC) 10 MG tablet Take 1 tablet (10 mg total) by mouth once daily. 90 tablet 3    amoxicillin (AMOXIL) 875 MG tablet Take 1 tablet (875 mg total) by mouth every 12 (twelve) hours. 20 tablet 0    apixaban (ELIQUIS) 5 mg Tab Take 5 mg by mouth 2 (two) times daily.      aspirin (ECOTRIN) 81 MG EC tablet aspirin 81 mg tablet,delayed release   Take 1 tablet every day by oral route.      diltiaZEM (CARDIZEM CD) 120 MG Cp24 Take 1 capsule (120 mg total) by mouth once daily. 90 capsule 3    famotidine (PEPCID) 20 MG tablet       furosemide (LASIX) 20 MG tablet Take 1 tablet (20 mg total) by mouth 2 (two) times daily. Hold x 5 days post cardioversion.      losartan (COZAAR) 100 MG tablet Take 1 tablet (100 mg total) by mouth once daily. 90 tablet 3    LUTEIN ORAL Take by mouth.      metoprolol succinate (TOPROL-XL) 100 MG 24 hr tablet  Take 1 tablet (100 mg total) by mouth once daily. 90 tablet 3    MV-MIN/FA/D3/OM-3/DHA/EPA/FISH (ADULT MULTI PLUS OMEGA-3 ORAL) Adult Multi plus Omega-3      pantoprazole (PROTONIX) 20 MG tablet TAKE ONE TABLET BY MOUTH ONCE DAILY 30 tablet 8    predniSONE (DELTASONE) 1 MG tablet       predniSONE (DELTASONE) 5 MG tablet Take 1 tablet (5 mg total) by mouth once daily. 90 tablet 3    rosuvastatin (CRESTOR) 10 MG tablet Take 1 tablet (10 mg total) by mouth once daily. 90 tablet 3    ketoconazole (NIZORAL) 2 % cream Apply topically once daily. 60 g 1    methylPREDNISolone (MEDROL DOSEPACK) 4 mg tablet use as directed 1 Package 0     No current facility-administered medications for this visit.          ROS  Review of Systems   Constitutional: Negative for appetite change, chills, fatigue and fever.   HENT: Negative for congestion, postnasal drip, rhinorrhea and sore throat.    Respiratory: Negative for cough, chest tightness, shortness of breath and wheezing.    Cardiovascular: Negative for chest pain and palpitations.   Musculoskeletal: Negative for myalgias.   Skin: Positive for rash. Negative for color change.   Neurological: Negative for dizziness.           PHYSICAL EXAM  Vitals:    02/12/19 1544   BP: 122/72   BP Location: Left arm   Patient Position: Sitting   BP Method: Medium (Automatic)   Pulse: (!) 58   Temp: 97.5 °F (36.4 °C)   TempSrc: Tympanic   SpO2: 99%   Weight: 89 kg (196 lb 2.2 oz)    Body mass index is 30.72 kg/m².  Weight: 89 kg (196 lb 2.2 oz)           Physical Exam   Constitutional: He is oriented to person, place, and time. He appears well-developed and well-nourished.   HENT:   Head: Normocephalic.   Eyes: Pupils are equal, round, and reactive to light.   Neck: Normal range of motion.   Cardiovascular: Normal rate, S1 normal and S2 normal.   Pulmonary/Chest: Effort normal.   Abdominal: Normal appearance.   Musculoskeletal: Normal range of motion.   Neurological: He is alert and oriented  to person, place, and time.   Skin: Skin is warm and dry. Capillary refill takes less than 2 seconds. Rash noted. Rash is papular. There is erythema.        Psychiatric: His speech is normal. Cognition and memory are normal.   Vitals reviewed.        Health Maintenance       Date Due Completion Date    TETANUS VACCINE 12/27/1957 ---    Zoster Vaccine 12/27/1999 ---    Pneumococcal Vaccine (65+ Low/Medium Risk) (1 of 2 - PCV13) 12/27/2004 ---    Lipid Panel 04/11/2011 4/11/2006    Influenza Vaccine 08/01/2018 ---               Assessment & Plan    Lucho was seen today for rash.    Diagnoses and all orders for this visit:    Rash  -     ketoconazole (NIZORAL) 2 % cream; Apply topically once daily.  -     methylPREDNISolone (MEDROL DOSEPACK) 4 mg tablet; use as directed  -     dexamethasone injection 8 mg        Follow-up: Follow-up if symptoms worsen or fail to improve.      Risks, benefits, and side effects were discussed with the patient. All questions were answered to the fullest satisfaction of the patient, and pt verbalized understanding and agreement to treatment plan. Pt was to call with any new or worsening symptoms, or present to the ER.

## 2019-03-22 RX ORDER — CYANOCOBALAMIN 1000 UG/ML
1000 INJECTION, SOLUTION INTRAMUSCULAR; SUBCUTANEOUS
Qty: 10 ML | Refills: 1 | Status: SHIPPED | OUTPATIENT
Start: 2019-03-22 | End: 2019-04-30

## 2019-03-22 NOTE — TELEPHONE ENCOUNTER
----- Message from Pedrito Hoffman sent at 3/22/2019 12:13 PM CDT -----  Contact: Tenisha with [facility]  Type: Needs Medical Advice    Who Called:  Tenisha with [facility]  Best Call Back Number: [phone]   Additional Information: Patient has not had B12 injection in one month and nurse is asking if they can get prescription to give injection at patient's home. Please advise    [id number] [place] - [address]  [address] [place] [address]  Phone: [phone] Fax: [phone]

## 2019-03-28 ENCOUNTER — OFFICE VISIT (OUTPATIENT)
Dept: FAMILY MEDICINE | Facility: CLINIC | Age: 79
End: 2019-03-28
Payer: MEDICARE

## 2019-03-28 VITALS
HEIGHT: 67 IN | OXYGEN SATURATION: 97 % | RESPIRATION RATE: 16 BRPM | DIASTOLIC BLOOD PRESSURE: 67 MMHG | SYSTOLIC BLOOD PRESSURE: 138 MMHG | HEART RATE: 71 BPM | BODY MASS INDEX: 29.82 KG/M2 | WEIGHT: 190 LBS

## 2019-03-28 DIAGNOSIS — I10 ESSENTIAL HYPERTENSION: Primary | ICD-10-CM

## 2019-03-28 PROCEDURE — 3075F PR MOST RECENT SYSTOLIC BLOOD PRESS GE 130-139MM HG: ICD-10-PCS | Mod: CPTII,S$GLB,, | Performed by: FAMILY MEDICINE

## 2019-03-28 PROCEDURE — 99213 OFFICE O/P EST LOW 20 MIN: CPT | Mod: S$GLB,,, | Performed by: FAMILY MEDICINE

## 2019-03-28 PROCEDURE — 1101F PR PT FALLS ASSESS DOC 0-1 FALLS W/OUT INJ PAST YR: ICD-10-PCS | Mod: CPTII,S$GLB,, | Performed by: FAMILY MEDICINE

## 2019-03-28 PROCEDURE — 99999 PR PBB SHADOW E&M-EST. PATIENT-LVL III: CPT | Mod: PBBFAC,,, | Performed by: FAMILY MEDICINE

## 2019-03-28 PROCEDURE — 99213 PR OFFICE/OUTPT VISIT, EST, LEVL III, 20-29 MIN: ICD-10-PCS | Mod: S$GLB,,, | Performed by: FAMILY MEDICINE

## 2019-03-28 PROCEDURE — 3078F DIAST BP <80 MM HG: CPT | Mod: CPTII,S$GLB,, | Performed by: FAMILY MEDICINE

## 2019-03-28 PROCEDURE — 99999 PR PBB SHADOW E&M-EST. PATIENT-LVL III: ICD-10-PCS | Mod: PBBFAC,,, | Performed by: FAMILY MEDICINE

## 2019-03-28 PROCEDURE — 3078F PR MOST RECENT DIASTOLIC BLOOD PRESSURE < 80 MM HG: ICD-10-PCS | Mod: CPTII,S$GLB,, | Performed by: FAMILY MEDICINE

## 2019-03-28 PROCEDURE — 3075F SYST BP GE 130 - 139MM HG: CPT | Mod: CPTII,S$GLB,, | Performed by: FAMILY MEDICINE

## 2019-03-28 PROCEDURE — 1101F PT FALLS ASSESS-DOCD LE1/YR: CPT | Mod: CPTII,S$GLB,, | Performed by: FAMILY MEDICINE

## 2019-03-28 RX ORDER — AMOXICILLIN AND CLAVULANATE POTASSIUM 875; 125 MG/1; MG/1
TABLET, FILM COATED ORAL
Refills: 0 | COMMUNITY
Start: 2019-03-25 | End: 2019-04-30

## 2019-03-28 RX ORDER — LEVOCETIRIZINE DIHYDROCHLORIDE 5 MG/1
5 TABLET, FILM COATED ORAL NIGHTLY
COMMUNITY
End: 2019-04-30

## 2019-03-28 NOTE — PROGRESS NOTES
"Subjective:       Patient ID: Lucho Delong is a 78 y.o. male.    Chief Complaint: Follow-up ([facility] follow up)    Follow up    In regards to the patient's hypertension, patient denies chest pain/sob, and has been compliant with the medication regimen.       Review of Systems   Constitutional: Negative for activity change, appetite change, fatigue and fever.   HENT: Negative for congestion, dental problem, ear discharge, hearing loss, postnasal drip, sinus pain, sneezing and trouble swallowing.    Eyes: Negative for photophobia and discharge.   Respiratory: Negative for apnea, cough and shortness of breath.    Cardiovascular: Negative for chest pain.   Gastrointestinal: Negative for abdominal distention, abdominal pain, blood in stool, diarrhea, nausea and vomiting.   Endocrine: Negative for cold intolerance.   Genitourinary: Negative for difficulty urinating, flank pain, frequency, hematuria and testicular pain.   Musculoskeletal: Positive for arthralgias, back pain, gait problem and myalgias.   Skin: Negative for color change.   Allergic/Immunologic: Negative for environmental allergies, food allergies and immunocompromised state.   Neurological: Negative for dizziness, syncope, numbness and headaches.   Hematological: Negative for adenopathy. Does not bruise/bleed easily.   Psychiatric/Behavioral: Negative for agitation, confusion, decreased concentration, hallucinations, self-injury and sleep disturbance.   All other systems reviewed and are negative.        Reviewed family, medical, surgical, and social history.    Objective:      /67 (BP Location: Right arm, Patient Position: Sitting, BP Method: Medium (Automatic))   Pulse 71   Resp 16   Ht 5' 7" (1.702 m)   Wt 86.2 kg (190 lb)   SpO2 97%   BMI 29.76 kg/m²   Physical Exam   Constitutional: He is oriented to person, place, and time. He appears well-developed and well-nourished. No distress.   HENT:   Head: Normocephalic and " atraumatic.   Nose: Nose normal.   Mouth/Throat: Oropharynx is clear and moist. No oropharyngeal exudate.   Eyes: Pupils are equal, round, and reactive to light. Conjunctivae and EOM are normal.   Neck: Normal range of motion. No thyromegaly present.   Cardiovascular: Normal rate, regular rhythm, normal heart sounds and intact distal pulses. Exam reveals no gallop and no friction rub.   No murmur heard.  Pulmonary/Chest: Effort normal and breath sounds normal. No respiratory distress. He has no wheezes. He has no rales.   Abdominal: Soft. He exhibits no distension. There is no tenderness. There is no guarding.   Musculoskeletal: Normal range of motion. He exhibits tenderness and deformity. He exhibits no edema.   Neurological: He is alert and oriented to person, place, and time. He displays normal reflexes. No cranial nerve deficit or sensory deficit. He exhibits normal muscle tone. Coordination normal.   Skin: Skin is warm and dry. No rash noted. He is not diaphoretic. No erythema. No pallor.   Psychiatric: He has a normal mood and affect. His behavior is normal. Judgment and thought content normal.   Nursing note and vitals reviewed.      Assessment:       1. Essential hypertension        Plan:       Essential hypertension            Risks, benefits, and side effects were discussed with the patient. All questions were answered to the fullest satisfaction of the patient, and pt verbalized understanding and agreement to treatment plan. Pt was to call with any new or worsening symptoms, or present to the ER.

## 2019-04-12 ENCOUNTER — TELEPHONE (OUTPATIENT)
Dept: FAMILY MEDICINE | Facility: CLINIC | Age: 79
End: 2019-04-12

## 2019-04-12 DIAGNOSIS — R09.02 HYPOXIA: Primary | ICD-10-CM

## 2019-04-12 NOTE — TELEPHONE ENCOUNTER
[facility] nurse is requesting order for overnight oxygen test.   Please advise, thank you.

## 2019-04-25 ENCOUNTER — TELEPHONE (OUTPATIENT)
Dept: FAMILY MEDICINE | Facility: CLINIC | Age: 79
End: 2019-04-25

## 2019-04-25 NOTE — TELEPHONE ENCOUNTER
Informed [facility] nurse that pt has appt on 04.30.19.  No other concerns at this time.         ----- Message from Cathy Kowalski sent at 4/25/2019  3:58 PM CDT -----  Contact: mandi castro/ [facility] [phone]  mandi [facility]ro[facility] [phone] requesting a call back from the nurse stated he have to be seen 1 wk w/ MD per hospitalist .

## 2019-04-25 NOTE — TELEPHONE ENCOUNTER
----- Message from Halie Molina sent at 4/25/2019  2:18 PM CDT -----  Contact: pt  Calling to schedule appointment to have sutures removed and please advise [phone] (home)

## 2019-04-29 ENCOUNTER — TELEPHONE (OUTPATIENT)
Dept: FAMILY MEDICINE | Facility: CLINIC | Age: 79
End: 2019-04-29

## 2019-04-29 NOTE — TELEPHONE ENCOUNTER
----- Message from RT Bertha sent at 4/29/2019  3:57 PM CDT -----  Contact: CRISTINA Salvador, [phone] [facility]  CRISTINA Salvador, [phone] [facility], requesting to do wound care from  the pt falling: Skin tears, thanks.

## 2019-04-29 NOTE — TELEPHONE ENCOUNTER
I spoke with [place], who was calling to report that pt has one skin tear on each arm from a fall in the shower yesterday.  She performed wound care to each.  Pt has appt tomorrow with [facility].   Cassandra

## 2019-04-30 ENCOUNTER — OFFICE VISIT (OUTPATIENT)
Dept: FAMILY MEDICINE | Facility: CLINIC | Age: 79
End: 2019-04-30
Payer: MEDICARE

## 2019-04-30 VITALS
WEIGHT: 185 LBS | HEART RATE: 66 BPM | SYSTOLIC BLOOD PRESSURE: 131 MMHG | BODY MASS INDEX: 29.03 KG/M2 | DIASTOLIC BLOOD PRESSURE: 66 MMHG | TEMPERATURE: 98 F | HEIGHT: 67 IN

## 2019-04-30 DIAGNOSIS — I63.9 CEREBROVASCULAR ACCIDENT (CVA), UNSPECIFIED MECHANISM: Primary | ICD-10-CM

## 2019-04-30 DIAGNOSIS — I10 ESSENTIAL HYPERTENSION: ICD-10-CM

## 2019-04-30 DIAGNOSIS — I48.3 TYPICAL ATRIAL FLUTTER: ICD-10-CM

## 2019-04-30 DIAGNOSIS — R09.A2 FOREIGN BODY SENSATION IN THROAT: ICD-10-CM

## 2019-04-30 PROCEDURE — 1100F PTFALLS ASSESS-DOCD GE2>/YR: CPT | Mod: CPTII,S$GLB,, | Performed by: NURSE PRACTITIONER

## 2019-04-30 PROCEDURE — 99213 OFFICE O/P EST LOW 20 MIN: CPT | Mod: S$GLB,,, | Performed by: NURSE PRACTITIONER

## 2019-04-30 PROCEDURE — 3288F PR FALLS RISK ASSESSMENT DOCUMENTED: ICD-10-PCS | Mod: CPTII,S$GLB,, | Performed by: NURSE PRACTITIONER

## 2019-04-30 PROCEDURE — 99499 RISK ADDL DX/OHS AUDIT: ICD-10-PCS | Mod: S$GLB,,, | Performed by: NURSE PRACTITIONER

## 2019-04-30 PROCEDURE — 3288F FALL RISK ASSESSMENT DOCD: CPT | Mod: CPTII,S$GLB,, | Performed by: NURSE PRACTITIONER

## 2019-04-30 PROCEDURE — 99213 PR OFFICE/OUTPT VISIT, EST, LEVL III, 20-29 MIN: ICD-10-PCS | Mod: S$GLB,,, | Performed by: NURSE PRACTITIONER

## 2019-04-30 PROCEDURE — 1100F PR PT FALLS ASSESS DOC 2+ FALLS/FALL W/INJURY/YR: ICD-10-PCS | Mod: CPTII,S$GLB,, | Performed by: NURSE PRACTITIONER

## 2019-04-30 PROCEDURE — 3075F PR MOST RECENT SYSTOLIC BLOOD PRESS GE 130-139MM HG: ICD-10-PCS | Mod: CPTII,S$GLB,, | Performed by: NURSE PRACTITIONER

## 2019-04-30 PROCEDURE — 3078F PR MOST RECENT DIASTOLIC BLOOD PRESSURE < 80 MM HG: ICD-10-PCS | Mod: CPTII,S$GLB,, | Performed by: NURSE PRACTITIONER

## 2019-04-30 PROCEDURE — 99499 UNLISTED E&M SERVICE: CPT | Mod: S$GLB,,, | Performed by: NURSE PRACTITIONER

## 2019-04-30 PROCEDURE — 3078F DIAST BP <80 MM HG: CPT | Mod: CPTII,S$GLB,, | Performed by: NURSE PRACTITIONER

## 2019-04-30 PROCEDURE — 3075F SYST BP GE 130 - 139MM HG: CPT | Mod: CPTII,S$GLB,, | Performed by: NURSE PRACTITIONER

## 2019-04-30 RX ORDER — ASPIRIN 81 MG/1
81 TABLET ORAL DAILY
COMMUNITY
End: 2019-05-15 | Stop reason: ALTCHOICE

## 2019-04-30 RX ORDER — ATORVASTATIN CALCIUM 40 MG/1
1 TABLET, FILM COATED ORAL DAILY
Refills: 0 | COMMUNITY
Start: 2019-04-24 | End: 2020-04-13 | Stop reason: SDUPTHER

## 2019-04-30 NOTE — PROGRESS NOTES
Subjective:       Patient ID: Lucho Delong is a 78 y.o. male.    Chief Complaint: Suture / Staple Removal    77 y/o male new to this provider presents for hospital f/u [facility] unwitnessed CVA that occurred on Easter 2019 some residual expressive aphasia noted. Reports right sided weakness noted upon awakening and fell to the ground when getting out of bed, endured a right arm skin tear and right scalp laceration.  H/o a-fib on eliquis stopped while in hospital taking asa pt reports to be resumed next week. (spouse reports bleeding noted on brain CT??)He is under the care of Dr. Nolan and Dr. Castañeda. Was on home health prior to CVA requesting new order to continue.  History updated by NP.  C/o prior to stroke reports feeling like a fish bone is in his throat and intended follow up but did not. Reports being seen by and told Dr. Jace Altamirano, no diagnostics done.       Past Medical History:   Diagnosis Date    Anticoagulant long-term use     Atrial fibrillation     Bursitis of hip     Chronic back pain     Chronic kidney disease, stage 3     Gastroesophageal reflux disease     Gout     Hyperlipemia     Hypertension     Prostate cancer 1997    Stroke 04/2019       Past Surgical History:   Procedure Laterality Date    CARDIOVERSION N/A 10/18/2017    [facility] CATH [facility]    CARDIOVERSION N/A 9/6/2017    [facility]ello [facility] CATH [facility]    COLONOSCOPY  1997    PROSTATE SURGERY  1997    TONSILLECTOMY      TRANSESOPHAGEAL ECHOCARDIOGRAM (HYACINTH) N/A 10/18/2017    [facility] CATH LAB    TRANSESOPHAGEAL ECHOCARDIOGRAM (HYACINTH) N/A 9/6/2017    Performed by [facility] CATH LAB        Social History     Socioeconomic History    Marital status:      Spouse name: Not on file    Number of children: 3    Years of education: commisioner for jr college    Highest education level: Doctorate   Occupational  History    Not on file   Social Needs    Financial resource strain: Not on file    Food insecurity:     Worry: Not on file     Inability: Not on file    Transportation needs:     Medical: Not on file     Non-medical: Not on file   Tobacco Use    Smoking status: Never Smoker    Smokeless tobacco: Never Used   Substance and Sexual Activity    Alcohol use: Yes     Alcohol/week: 2.0 oz     Types: 4 Standard drinks or equivalent per week     Frequency: 2-4 times a month     Comment: bourbon or beer    Drug use: No    Sexual activity: Not on file   Lifestyle    Physical activity:     Days per week: Not on file     Minutes per session: Not on file    Stress: Not on file   Relationships    Social connections:     Talks on phone: Not on file     Gets together: Not on file     Attends Yazidi service: Not on file     Active member of club or organization: Not on file     Attends meetings of clubs or organizations: Not on file     Relationship status: Not on file   Other Topics Concern    Not on file   Social History Narrative    Not on file       Review of patient's allergies indicates:   Allergen Reactions    Tramadol Itching and Swelling     Began medication, two doses into this treatment patient experiences tongue swelling and severe itching. Patient MD advised to not take this medication anymore          Current Outpatient Medications:     allopurinol (ZYLOPRIM) 100 MG tablet, Take 1 tablet (100 mg total) by mouth once daily., Disp: 90 tablet, Rfl: 3    aspirin (ECOTRIN) 81 MG EC tablet, Take 81 mg by mouth once daily., Disp: , Rfl:     atorvastatin (LIPITOR) 40 MG tablet, Take 1 tablet by mouth Daily., Disp: , Rfl: 0    metoprolol succinate (TOPROL-XL) 100 MG 24 hr tablet, Take 1 tablet (100 mg total) by mouth once daily., Disp: 90 tablet, Rfl: 3    pantoprazole (PROTONIX) 20 MG tablet, TAKE ONE TABLET BY MOUTH ONCE DAILY, Disp: 30 tablet, Rfl: 8    predniSONE (DELTASONE) 1 MG tablet, Take 4 mg by  mouth once daily., Disp: , Rfl:      HPI  Review of Systems   Constitutional: Negative for chills, diaphoresis, fever and unexpected weight change.   HENT: Negative for dental problem and trouble swallowing.         Feeling like a fish bone is in his throat   Eyes: Negative for visual disturbance.   Respiratory: Negative for shortness of breath and wheezing.    Cardiovascular: Negative for chest pain and palpitations.   Gastrointestinal: Negative for abdominal pain, constipation, diarrhea, nausea and vomiting.   Endocrine: Negative for polydipsia and polyuria.   Genitourinary: Negative for dysuria.   Musculoskeletal: Negative for joint swelling.   Skin: Negative for rash.        Right forearm skin tear   Neurological: Negative for syncope and headaches.   Psychiatric/Behavioral: Negative for agitation and confusion.       Objective:      Physical Exam   Constitutional: He is oriented to person, place, and time. He appears well-developed and well-nourished.   HENT:   Head: Normocephalic.   Eyes: Pupils are equal, round, and reactive to light.   Neck: Normal range of motion. Neck supple.   Cardiovascular: Normal rate, regular rhythm and normal heart sounds.   Pulmonary/Chest: Effort normal and breath sounds normal.   Abdominal: Soft. Bowel sounds are normal.   Musculoskeletal: Normal range of motion.   Neurological: He is alert and oriented to person, place, and time.   Skin: Skin is warm and dry. Capillary refill takes less than 2 seconds.   -Right are skin tear size of kiwi- no infection noted  -2 sutures removed from right side of head- site approximated without evidence of infection.   Psychiatric: He has a normal mood and affect. Judgment and thought content normal.   Vitals reviewed.      Assessment:       1. Cerebrovascular accident (CVA), unspecified mechanism    2. Typical atrial flutter    3. Essential hypertension    4. Foreign body sensation in throat        Plan:     1- stress test on Thursday  2- resume  home health and spouse request home environmental safty assessment.   3- pt to call Dr. Castañeda to ask when Eliquis is to be restarted.  4- follow up with Neurology next week  5- ST ordered  6- pt to follow up Dr. Jace Altamirano for abnormal throat sensation and evaluation.   7- right forearm skin tear re-dressed and 2 sutures removed from scalp without difficulty.          Risks, benefits, and side effects were discussed with the patient. All questions were answered to the fullest satisfaction of the patient, and pt verbalized understanding and agreement to treatment plan. Pt was to call with any new or worsening symptoms, or present to the ER.

## 2019-05-04 NOTE — PROGRESS NOTES
Patient, Lucho Delong (MRN #[id number]), presented with a recent Estimated Glumerular Filtration Rate (EGFR) between 15 and 29 consistent with the definition of chronic kidney disease stage 4 (ICD10 - N18.4).    eGFR if non    Date Value Ref Range Status   10/17/2017 19.1 (A) >60 mL/min/1.73 m^2 Final     Comment:     Calculation used to obtain the estimated glomerular filtration  rate (eGFR) is the CKD-EPI equation. Since race is unknown   in our information system, the eGFR values for   -American and Non--American patients are given   for each creatinine result.         The patient's chronic kidney disease stage 4 was monitored, evaluated, addressed and/or treated. This addendum to the medical record is made on 05/04/2019.

## 2019-05-10 ENCOUNTER — TELEPHONE (OUTPATIENT)
Dept: FAMILY MEDICINE | Facility: CLINIC | Age: 79
End: 2019-05-10

## 2019-05-10 ENCOUNTER — HOSPITAL ENCOUNTER (OUTPATIENT)
Dept: RADIOLOGY | Facility: HOSPITAL | Age: 79
Discharge: HOME | End: 2019-05-10
Attending: FAMILY MEDICINE
Payer: MEDICARE

## 2019-05-10 DIAGNOSIS — M54.50 LUMBAR BACK PAIN: Primary | ICD-10-CM

## 2019-05-10 DIAGNOSIS — M54.50 LUMBAR BACK PAIN: ICD-10-CM

## 2019-05-10 PROCEDURE — 72110 X-RAY EXAM L-2 SPINE 4/>VWS: CPT | Mod: TC,PN

## 2019-05-10 PROCEDURE — 72110 X-RAY EXAM L-2 SPINE 4/>VWS: CPT | Mod: 26,,, | Performed by: RADIOLOGY

## 2019-05-10 PROCEDURE — 72110 XR LUMBAR SPINE 5 VIEW WITH FLEX AND EXT: ICD-10-PCS | Mod: 26,,, | Performed by: RADIOLOGY

## 2019-05-10 NOTE — TELEPHONE ENCOUNTER
----- Message from Jose Nolan MD sent at 5/10/2019 11:35 AM CDT -----  Xray did not show any fractures.

## 2019-05-10 NOTE — TELEPHONE ENCOUNTER
----- Message from Columba Zaldivar sent at 5/10/2019  8:47 AM CDT -----  Contact: Tenisha from [facility]  Type: Needs Medical Advice    Who Called:  Tenisha Weiner Call Back Number: please call patient when order is entered   Additional Information:  Calling as patient had fall on 5/5/19. Initially okay but now for last two to three days having a lot of right hip pain and asking for order to be done today if possible. Thanks!

## 2019-05-10 NOTE — TELEPHONE ENCOUNTER
I have returned Tenisha's call @ [phone].  She assessed pt on Sunday after the fall and has been treating a couple of skin tears, which are improving. However, per patient's wife report, pt is experiencing pain in his lower back, right hip, and down the right leg.  Pt's wife is asking if Dr. Nolan will order x-rays.  Please advise and thank you, Cassandra

## 2019-05-14 ENCOUNTER — TELEPHONE (OUTPATIENT)
Dept: FAMILY MEDICINE | Facility: CLINIC | Age: 79
End: 2019-05-14

## 2019-05-14 DIAGNOSIS — M54.50 LUMBAR BACK PAIN: Primary | ICD-10-CM

## 2019-05-14 NOTE — TELEPHONE ENCOUNTER
----- Message from Kasandra Mistry sent at 5/14/2019 11:38 AM CDT -----  Contact: Martín/homehealth  Type: Needs Medical Advice    Who Called:  martín  Symptoms (please be specific):  na  How long has patient had these symptoms:  [facility]  Pharmacy name and phone #:  [facility]  Best Call Back Number: [phone]  Additional Information: Patient requesting order for out patient therapy. Please call to advise. Thanks!

## 2019-05-15 ENCOUNTER — CLINICAL SUPPORT (OUTPATIENT)
Dept: FAMILY MEDICINE | Facility: CLINIC | Age: 79
End: 2019-05-15
Payer: MEDICARE

## 2019-05-15 ENCOUNTER — TELEPHONE (OUTPATIENT)
Dept: FAMILY MEDICINE | Facility: CLINIC | Age: 79
End: 2019-05-15

## 2019-05-15 RX ORDER — DABIGATRAN ETEXILATE 75 MG/1
75 CAPSULE ORAL DAILY
COMMUNITY
End: 2020-03-31

## 2019-05-15 RX ORDER — PROMETHAZINE HYDROCHLORIDE AND DEXTROMETHORPHAN HYDROBROMIDE 6.25; 15 MG/5ML; MG/5ML
5 SYRUP ORAL 4 TIMES DAILY PRN
Qty: 180 ML | Refills: 1 | Status: SHIPPED | OUTPATIENT
Start: 2019-05-15 | End: 2019-05-25

## 2019-05-20 ENCOUNTER — TELEPHONE (OUTPATIENT)
Dept: FAMILY MEDICINE | Facility: CLINIC | Age: 79
End: 2019-05-20

## 2019-05-20 RX ORDER — DOXYCYCLINE HYCLATE 100 MG
100 TABLET ORAL 2 TIMES DAILY
Qty: 20 TABLET | Refills: 0 | Status: SHIPPED | OUTPATIENT
Start: 2019-05-20 | End: 2019-07-01

## 2019-05-20 NOTE — TELEPHONE ENCOUNTER
"Informed pts wife of abx and we will schedule appt if no improvement by Wednesday.   Pt wife states "Antibiotics are not going to help this, he needs to be seen, and it needs to be today. Someone needs to listen to his chest. These hiccups are bad."    Pts wife is adamant that pt be seen today- refusing the ER- and requesting another message sent to you.     Please advise, thank you.   "

## 2019-05-20 NOTE — TELEPHONE ENCOUNTER
Pts wife is requesting same day appt, with Dr. Nolan only, for cough, recurring hiccups, and fever over the weekend.   Pt had steroid injection last week and cough syrup, but symptoms worsened over the past few days.  Pts wife states she is concerned that he may develop pneumonia r/t pt having a stroke last month.   Please advise, thank you.           ----- Message from Siobhan May sent at 5/20/2019  8:45 AM CDT -----  Contact: wife Pat   Type:  Same Day Appointment Request    Caller is requesting a same day appointment.  Caller declined first available appointment listed below.      Name of Caller:  Wife Pat  When is the first available appointment? 07/17  Symptoms:  Bad hiccups,and persistent painful cough ,fever over the weekend   Best Call Back Number:  [phone] Wife Pat   Additional Information:  Wife just want to see Dr. Nolan if possible

## 2019-05-20 NOTE — TELEPHONE ENCOUNTER
Pts wife is refusing to see anyone but Dr. Nolan, and is taking the pt to [facility].

## 2019-05-30 ENCOUNTER — TELEPHONE (OUTPATIENT)
Dept: FAMILY MEDICINE | Facility: CLINIC | Age: 79
End: 2019-05-30

## 2019-05-30 NOTE — TELEPHONE ENCOUNTER
I have spoken with pt, who stated that he has not received a call from physical therapy dept.  I have spoken with [facility] PT and they will give the patient a call to schedule. Cassandra

## 2019-05-30 NOTE — TELEPHONE ENCOUNTER
----- Message from Oxana Hook sent at 5/30/2019 11:03 AM CDT -----  Contact: Patient spouse Pat  Calling in to see what are the steps for outpatient therapy and was told to speak with the doctor.    Please contact to advise [phone] or [phone] (spouse cell)

## 2019-06-04 ENCOUNTER — CLINICAL SUPPORT (OUTPATIENT)
Dept: REHABILITATION | Facility: HOSPITAL | Age: 79
End: 2019-06-04
Attending: FAMILY MEDICINE
Payer: MEDICARE

## 2019-06-04 DIAGNOSIS — G89.29 CHRONIC RIGHT-SIDED LOW BACK PAIN WITHOUT SCIATICA: ICD-10-CM

## 2019-06-04 DIAGNOSIS — R26.2 DIFFICULTY WALKING: ICD-10-CM

## 2019-06-04 DIAGNOSIS — M54.50 CHRONIC RIGHT-SIDED LOW BACK PAIN WITHOUT SCIATICA: ICD-10-CM

## 2019-06-04 DIAGNOSIS — M62.81 RIGHT-SIDED MUSCLE WEAKNESS: Primary | ICD-10-CM

## 2019-06-04 PROCEDURE — G8979 MOBILITY GOAL STATUS: HCPCS | Mod: CJ,PN

## 2019-06-04 PROCEDURE — 97161 PT EVAL LOW COMPLEX 20 MIN: CPT | Mod: PN

## 2019-06-04 PROCEDURE — G8978 MOBILITY CURRENT STATUS: HCPCS | Mod: CK,PN

## 2019-06-04 NOTE — PLAN OF CARE
OUTPATIENT THERAPY AND WELLNESS  PHYSICAL THERAPY INITIAL EVALUATION    Name: Lucho Delong  Clinic Number: [id number]    Evaluation Date: 6/4/2019  Visit #: 1 / 12  Authorization period Expiration: 12/31/2019  Plan of Care Expiration: 8/31/2019    Diagnosis:   Encounter Diagnoses   Name Primary?    Right-sided muscle weakness Yes    Difficulty walking     Chronic right-sided low back pain without sciatica      Physician: Jose Nolan MD  Treatment Orders: PT Eval and Treat  Past Medical History:   Diagnosis Date    Anticoagulant long-term use     Atrial fibrillation     Bursitis of hip     Chronic back pain     Chronic kidney disease, stage 3     Gastroesophageal reflux disease     Gout     Hyperlipemia     Hypertension     Prostate cancer 1997    Stroke 04/2019     has a current medication list which includes the following prescription(s): allopurinol, atorvastatin, dabigatran etexilate, doxycycline, metoprolol succinate, pantoprazole, and prednisone.  Review of patient's allergies indicates:   Allergen Reactions    Tramadol Itching and Swelling     Began medication, two doses into this treatment patient experiences tongue swelling and severe itching. Patient MD advised to not take this medication anymore       History   Prior Therapy: Home health PT/OT/ST following CVA in [place]   Social History: Lives with wife; lives in single story home with 4 steps to enter; Patient reports that he is Independent at home; driving; Independ[employer] with ADl's   Previous functional status: Independent; able to walk 1 mile without difficulty   Current functional status: notes decreased ability to walk any distance secondary to SOB and generalized weakness; has had two falls - last one first part of May.   Work: retired     Subjective   History of Present Illness: Lucho reports a CVA in April 2019 - spent several days in hospital [facility]; went home with HHPT/OT/ST. Lucho has multiple co-morbidities that  impact his mobility including bilateral LE neuropathy.  He reports that he has bounced back like he thought he would from his stroke.  He has decreased endurance and stamina for activities that he used to be able to perform.  He continues to have some expressive aphasia - word finding difficulties; but stated that this is slowly improving. Lucho noted no pain.   DOI: April 2019  Pain: current - reports no pain  Precautions: fall  Pts goals: To improve my endurance and strength     Objective   Mental status: alert, interactive, oriented x3, demonstrates word finding problems - expressive aphasia as result of CVA in April   Posture/ Alignment: Good    GAIT DEVIATIONS: Lucho amb with decreased ira and decreased toe-to-floor clearance.    ROM: Bilateral UE/LE AROM is WFL  Strength: Bilateral UE/LE Strength is WFL   Special Tests:  30 Sec Sit <>Stand with arms crossed at chest:  8 reps   4 square step test:  17 sec, 17 sec, 14 sec, - LOB noted with backwards stepping   Walk Test: able to ambulate in outside environment x 9 mins and cover about 1/4 mile - including 2 inclines.   Rhomberg: negative in firm surface; Foam surface x 30 secs with EO; EC - 10 secs  Tandem Stance: LOB noted - need for UE assist  Toe Taps: 5 steps in 20 secs - difficulty with toe clearance noted.     Pt/family was provided educational information, including: role of PT, goals for PT, scheduling - pt verbalized understanding. Discussed insurance plan with pt.     Functional Limitations Reporting     Category: mobility  Tool:  4 Square Step Test   Score: 53% Limitation   Current/ : CK = at least 40% but < 60% impaired, limited or restricted  Goal/ : CJ = at least 20% but < 40% impaired, limited or restricted       Modifier  Impairment Limitation Restriction    CH  0 % impaired, limited or restricted    CI  @ least 1% but less than 20% impaired, limited or restricted    CJ  @ least 20%<40% impaired, limited or restricted    CK  @  least 40%<60% impaired, limited or restricted    CL  @ least 60% <80% impaired, limited or restricted    CM  @ least 80%<100% impaired limited or restricted    CN  100% impaired, limited or restricted     TREATMENT   Time In: 8:13 AM  Time Out: 9:00 AM     Discussed Plan of Care with patient: Yes        Assessment   Lucho is a 78 y.o. male referred to outpatient physical therapy with a medical diagnosis of difficulty walking  due to recent CVA . Demonstrates impairments including limitations as described in the problem list. Pt prognosis is Excellent. Positive prognostic factors include motivated . Negative prognostic factors include none. Pt will benefit from skilled outpatient physical therapy to address the above stated deficits, provide pt/family education, and to maximize pt's level of independence.         Anticipated Barriers for therapy: none  Pt's spiritual, cultural and educational needs considered and pt agreeable to plan of care and goals as stated below:     Long Term GOALS:  In 6 weeks, pt. Will:  1. Demonstrate ability to perform 4 step Square test in 13 secs or less  2. Demonstrate ability to ambulate at least 1/2 mile without fatigue   3. Demonstrate ability to manage his daily activities without c/o fatigue   4. Be independent with HEP and SX management     Plan   Certification Period: 6/4/2019 to 8/31/2019.    Outpatient physical therapy 2 times weekly to include: Gait Training, Neuromuscular Re-ed, Patient Education and Therapeutic Exercise. Cont PT for 2 months.   Pt may be seen by PTA as part of the rehabilitation team.     I certify the need for these services furnished under this plan of treatment and while under my care.    May Bronson, PT

## 2019-06-05 NOTE — PLAN OF CARE
PT met face to face with Jonathan Favre, PTA to discuss patient's treatment plan and progress towards established goals.  Treatment will be continued as described in initial report/eval and progress notes.  Patient will be seen by physical therapist every sixth visit and minimally once per month.

## 2019-06-06 ENCOUNTER — CLINICAL SUPPORT (OUTPATIENT)
Dept: REHABILITATION | Facility: HOSPITAL | Age: 79
End: 2019-06-06
Attending: FAMILY MEDICINE
Payer: MEDICARE

## 2019-06-06 DIAGNOSIS — R26.2 DIFFICULTY WALKING: Primary | ICD-10-CM

## 2019-06-06 PROCEDURE — 97110 THERAPEUTIC EXERCISES: CPT | Mod: PN

## 2019-06-06 NOTE — PROGRESS NOTES
Physical Therapy Daily Note     Name: Lucho Delong  Clinic Number: [id number]  Diagnosis:   Encounter Diagnosis   Name Primary?    Difficulty walking Yes     Physician: Jose Nolan MD  Precautions: Standard   Visit #: 2 of 12  PTA Visit #: 1  Time In: 8:55 AM   Time Out: 10:00    Subjective     Pt reports: No new c/o's.   Pain Scale: Lucho rates pain on a scale of 0-10 to be 0 currently.    Objective     Lucho received individual therapeutic exercises to develop strength and endurance for 45 minutes including:  Nustep level 5 x 20 mins  Toe Taps x 3 mins   Side Steps x 3 mins  Step-Ups x 15  Sit to Stand x 10  Bridges x 15  Ball Squeezes x 3 mins  SLR 10 x 2  Hip Abd 10 x 2  SAQ's x 4 mins  Vigor Gym level 6 x 8 mins    Lucho received the following manual therapy techniques:  were applied to the:  for  minutes including:       The patient received the following direct contact modalities after being cleared for contraindications:     The patient received the following supervised modalities after being cleared for contradictions:     Written Home Exercises Provided:   Pt demo good understanding of the education provided. Lucho demonstrated good return demonstration of activities.     Education provided re:  Lucho verbalized good understanding of education provided.   No spiritual or educational barriers to learning provided    Assessment     Patient with fair tolerance of exercise; limited endurance; will progress with activities as patient is able to improve strength, endurance and stability.   This is a 78 y.o. male referred to outpatient physical therapy and presents with a medical diagnosis of difficulty walking secondary to recent CVA and demonstrates limitations as described in the problem list. Pt prognosis is Good. Pt will continue to benefit from skilled outpatient physical therapy to address the deficits listed in the problem list, provide  pt/family education and to maximize pt's level of independence in the home and community environment.     LONG TERM GOALS:  In 6 weeks, pt. Will:  1. Demonstrate ability to perform 4 step Square test in 13 secs or less  2. Demonstrate ability to ambulate at least 1/2 mile without fatigue   3. Demonstrate ability to manage his daily activities without c/o fatigue   4. Be independent with HEP and SX management        Plan     Continue with established Plan of Care towards PT goals.    Therapist: Jonathan Favre, PTA  6/6/2019

## 2019-06-11 ENCOUNTER — CLINICAL SUPPORT (OUTPATIENT)
Dept: REHABILITATION | Facility: HOSPITAL | Age: 79
End: 2019-06-11
Attending: FAMILY MEDICINE
Payer: MEDICARE

## 2019-06-11 DIAGNOSIS — R26.2 DIFFICULTY WALKING: Primary | ICD-10-CM

## 2019-06-11 PROCEDURE — 97110 THERAPEUTIC EXERCISES: CPT | Mod: PN

## 2019-06-11 NOTE — PROGRESS NOTES
Physical Therapy Daily Note     Name: Lucho Delong  Clinic Number: [id number]  Diagnosis:   Encounter Diagnosis   Name Primary?    Difficulty walking Yes     Physician: Jose Nolan MD  Precautions: Standard   Visit #: 3 of 12  PTA Visit #: 2  Time In: 8:55 AM   Time Out: 9:55 AM     Subjective     Pt reports: No new c/o's.   Pain Scale: Lucho rates pain on a scale of 0-10 to be 0 currently.    Objective     Lucho received individual therapeutic exercises to develop strength and endurance for 45 minutes including:  Nustep level 5 x 25 mins  Toe Taps x 3 mins   Heel Raises x 20  Side Steps x 3 mins  Step-Ups x 15  Sit to Stand x 10  Bridges x 15  Ball Squeezes x 3 mins  SLR 10 x 2  Hip Abd 10 x 2  SAQ's x 4 mins  Vigor Gym level 6 x 8 mins    Lucho received the following manual therapy techniques:  were applied to the:  for  minutes including:       The patient received the following direct contact modalities after being cleared for contraindications:     The patient received the following supervised modalities after being cleared for contradictions:     Written Home Exercises Provided:   Pt demo good understanding of the education provided. Lucho demonstrated good return demonstration of activities.     Education provided re:  Lucho verbalized good understanding of education provided.   No spiritual or educational barriers to learning provided    Assessment     Patient with fair tolerance of exercise; limited endurance; will progress with activities as patient is able to improve strength, endurance and stability.   This is a 78 y.o. male referred to outpatient physical therapy and presents with a medical diagnosis of difficulty walking secondary to recent CVA and demonstrates limitations as described in the problem list. Pt prognosis is Good. Pt will continue to benefit from skilled outpatient physical therapy to address the deficits listed in the  problem list, provide pt/family education and to maximize pt's level of independence in the home and community environment.     LONG TERM GOALS:  In 6 weeks, pt. Will:  1. Demonstrate ability to perform 4 step Square test in 13 secs or less  2. Demonstrate ability to ambulate at least 1/2 mile without fatigue   3. Demonstrate ability to manage his daily activities without c/o fatigue   4. Be independent with HEP and SX management        Plan     Continue with established Plan of Care towards PT goals.    Therapist: Jonathan Favre, PTA  6/11/2019

## 2019-06-13 ENCOUNTER — TELEPHONE (OUTPATIENT)
Dept: FAMILY MEDICINE | Facility: CLINIC | Age: 79
End: 2019-06-13

## 2019-06-13 NOTE — TELEPHONE ENCOUNTER
----- Message from Meagan Carvajal sent at 6/13/2019  9:56 AM CDT -----  Contact: pt  ..Type: Needs Medical Advice    Who Called:  pt  Best Call Back Number: [phone]  Additional Information: pt would like to speak with a nurse to schedule an appt to get a B-12 injection  Please call to advise  Thanks

## 2019-06-18 ENCOUNTER — PATIENT OUTREACH (OUTPATIENT)
Dept: ADMINISTRATIVE | Facility: HOSPITAL | Age: 79
End: 2019-06-18

## 2019-06-18 NOTE — LETTER
June 18, 2019    Lucho Salvador Sindi  [address] [place] [address]             [facility]  [address]  [facility] [address]  Phone: [phone] Dear Robert [facility] is committed to your overall health and would like to ensure that you are up to date on your recommended test and/or procedures.   Jose Nolan MD  has found that your chart shows you may be due for the following:    ANNUAL LABS      If you have had any of the above done at another facility, please let us know so that we may obtain copies from that facility.  If you have a copy of these records, please provide a copy for us to scan into your chart.  You are welcome to request that the report be faxed to us at  ([phone]).     Otherwise, please schedule these appointments at your earliest convenience by calling [phone] or going to [email or web address].    If you have an upcoming scheduled appointment for the item above, please disregard this letter.    Sincerely,  Your [facility] Team  MD Digna Craig L.P.N. Clinical Care Coordinator  [address] [place] [address], [place] [address] [phone]

## 2019-07-01 ENCOUNTER — OFFICE VISIT (OUTPATIENT)
Dept: FAMILY MEDICINE | Facility: CLINIC | Age: 79
End: 2019-07-01
Payer: MEDICARE

## 2019-07-01 VITALS
RESPIRATION RATE: 18 BRPM | BODY MASS INDEX: 28.19 KG/M2 | DIASTOLIC BLOOD PRESSURE: 59 MMHG | HEART RATE: 75 BPM | WEIGHT: 179.63 LBS | SYSTOLIC BLOOD PRESSURE: 108 MMHG | OXYGEN SATURATION: 98 % | HEIGHT: 67 IN

## 2019-07-01 DIAGNOSIS — I48.19 PERSISTENT ATRIAL FIBRILLATION: ICD-10-CM

## 2019-07-01 DIAGNOSIS — I10 ESSENTIAL HYPERTENSION: ICD-10-CM

## 2019-07-01 DIAGNOSIS — E78.5 HYPERLIPIDEMIA, UNSPECIFIED HYPERLIPIDEMIA TYPE: Primary | ICD-10-CM

## 2019-07-01 PROCEDURE — 3288F FALL RISK ASSESSMENT DOCD: CPT | Mod: CPTII,S$GLB,, | Performed by: FAMILY MEDICINE

## 2019-07-01 PROCEDURE — 3074F SYST BP LT 130 MM HG: CPT | Mod: CPTII,S$GLB,, | Performed by: FAMILY MEDICINE

## 2019-07-01 PROCEDURE — 3078F DIAST BP <80 MM HG: CPT | Mod: CPTII,S$GLB,, | Performed by: FAMILY MEDICINE

## 2019-07-01 PROCEDURE — 3288F PR FALLS RISK ASSESSMENT DOCUMENTED: ICD-10-PCS | Mod: CPTII,S$GLB,, | Performed by: FAMILY MEDICINE

## 2019-07-01 PROCEDURE — 1100F PTFALLS ASSESS-DOCD GE2>/YR: CPT | Mod: CPTII,S$GLB,, | Performed by: FAMILY MEDICINE

## 2019-07-01 PROCEDURE — 96372 THER/PROPH/DIAG INJ SC/IM: CPT | Mod: S$GLB,,, | Performed by: FAMILY MEDICINE

## 2019-07-01 PROCEDURE — 99213 PR OFFICE/OUTPT VISIT, EST, LEVL III, 20-29 MIN: ICD-10-PCS | Mod: 25,S$GLB,, | Performed by: FAMILY MEDICINE

## 2019-07-01 PROCEDURE — 99999 PR PBB SHADOW E&M-EST. PATIENT-LVL III: CPT | Mod: PBBFAC,,, | Performed by: FAMILY MEDICINE

## 2019-07-01 PROCEDURE — 1100F PR PT FALLS ASSESS DOC 2+ FALLS/FALL W/INJURY/YR: ICD-10-PCS | Mod: CPTII,S$GLB,, | Performed by: FAMILY MEDICINE

## 2019-07-01 PROCEDURE — 99213 OFFICE O/P EST LOW 20 MIN: CPT | Mod: 25,S$GLB,, | Performed by: FAMILY MEDICINE

## 2019-07-01 PROCEDURE — 3074F PR MOST RECENT SYSTOLIC BLOOD PRESSURE < 130 MM HG: ICD-10-PCS | Mod: CPTII,S$GLB,, | Performed by: FAMILY MEDICINE

## 2019-07-01 PROCEDURE — 99999 PR PBB SHADOW E&M-EST. PATIENT-LVL III: ICD-10-PCS | Mod: PBBFAC,,, | Performed by: FAMILY MEDICINE

## 2019-07-01 PROCEDURE — 96372 PR INJECTION,THERAP/PROPH/DIAG2ST, IM OR SUBCUT: ICD-10-PCS | Mod: S$GLB,,, | Performed by: FAMILY MEDICINE

## 2019-07-01 PROCEDURE — 3078F PR MOST RECENT DIASTOLIC BLOOD PRESSURE < 80 MM HG: ICD-10-PCS | Mod: CPTII,S$GLB,, | Performed by: FAMILY MEDICINE

## 2019-07-01 RX ORDER — LOSARTAN POTASSIUM 50 MG/1
50 TABLET ORAL DAILY
Refills: 6 | COMMUNITY
Start: 2019-06-12 | End: 2020-05-19

## 2019-07-01 RX ORDER — NIFEDIPINE 60 MG/1
60 TABLET, EXTENDED RELEASE ORAL DAILY
Refills: 6 | COMMUNITY
Start: 2019-06-21 | End: 2020-01-29 | Stop reason: SDUPTHER

## 2019-07-01 RX ORDER — CYANOCOBALAMIN 1000 UG/ML
INJECTION, SOLUTION INTRAMUSCULAR; SUBCUTANEOUS
Refills: 1 | Status: ON HOLD | COMMUNITY
Start: 2019-06-08 | End: 2022-06-15 | Stop reason: HOSPADM

## 2019-07-01 RX ORDER — APIXABAN 2.5 MG/1
2.5 TABLET, FILM COATED ORAL
Refills: 3 | COMMUNITY
Start: 2019-05-02 | End: 2019-08-02

## 2019-07-01 RX ORDER — PANTOPRAZOLE SODIUM 40 MG/1
TABLET, DELAYED RELEASE ORAL
Refills: 6 | COMMUNITY
Start: 2019-06-08 | End: 2019-12-27 | Stop reason: SDUPTHER

## 2019-07-01 RX ORDER — CYANOCOBALAMIN 1000 UG/ML
1000 INJECTION, SOLUTION INTRAMUSCULAR; SUBCUTANEOUS ONCE
Status: COMPLETED | OUTPATIENT
Start: 2019-07-01 | End: 2019-07-01

## 2019-07-01 RX ADMIN — CYANOCOBALAMIN 1000 MCG: 1000 INJECTION, SOLUTION INTRAMUSCULAR; SUBCUTANEOUS at 11:07

## 2019-07-01 NOTE — PROGRESS NOTES
"Subjective:       Patient ID: Lucho Delong is a 78 y.o. male.    Chief Complaint: Follow-up and B12 Injection    Follow up    CVA last month  Attending PT  Improving  Some memory issues  At [facility]    Review of Systems   Constitutional: Negative for chills, diaphoresis, fever and unexpected weight change.   HENT: Negative for dental problem and trouble swallowing.         Feeling like a fish bone is in his throat   Eyes: Negative for visual disturbance.   Respiratory: Negative for shortness of breath and wheezing.    Cardiovascular: Negative for chest pain and palpitations.   Gastrointestinal: Negative for abdominal pain, constipation, diarrhea, nausea and vomiting.   Endocrine: Negative for polydipsia and polyuria.   Genitourinary: Negative for dysuria.   Musculoskeletal: Negative for joint swelling.   Skin: Negative for rash.        Right forearm skin tear   Neurological: Negative for syncope and headaches.   Psychiatric/Behavioral: Negative for agitation and confusion.         Reviewed family, medical, surgical, and social history.    Objective:      BP (!) 108/59 (BP Location: Left arm, Patient Position: Sitting, BP Method: Medium (Automatic))   Pulse 75   Resp 18   Ht 5' 7" (1.702 m)   Wt 81.5 kg (179 lb 9.6 oz)   SpO2 98%   BMI 28.13 kg/m²   Physical Exam   Constitutional: He is oriented to person, place, and time. He appears well-developed and well-nourished.   HENT:   Head: Normocephalic.   Eyes: Pupils are equal, round, and reactive to light.   Neck: Normal range of motion. Neck supple.   Cardiovascular: Normal rate, regular rhythm and normal heart sounds.   Pulmonary/Chest: Effort normal and breath sounds normal.   Abdominal: Soft. Bowel sounds are normal.   Musculoskeletal: Normal range of motion.   Neurological: He is alert and oriented to person, place, and time.   Skin: Skin is warm and dry. Capillary refill takes less than 2 seconds.   -Right are skin tear size of kiwi- no infection " noted  -2 sutures removed from right side of head- site approximated without evidence of infection.   Psychiatric: He has a normal mood and affect. Judgment and thought content normal.   Vitals reviewed.      Assessment:       1. Hyperlipidemia, unspecified hyperlipidemia type    2. Persistent atrial fibrillation    3. Essential hypertension        Plan:       Hyperlipidemia, unspecified hyperlipidemia type  -     cyanocobalamin injection 1,000 mcg    Persistent atrial fibrillation  -     cyanocobalamin injection 1,000 mcg    Essential hypertension  -     cyanocobalamin injection 1,000 mcg    Obtain records  Continue pt  Re-eval in one month        Risks, benefits, and side effects were discussed with the patient. All questions were answered to the fullest satisfaction of the patient, and pt verbalized understanding and agreement to treatment plan. Pt was to call with any new or worsening symptoms, or present to the ER.

## 2019-08-02 ENCOUNTER — OFFICE VISIT (OUTPATIENT)
Dept: FAMILY MEDICINE | Facility: CLINIC | Age: 79
End: 2019-08-02
Payer: MEDICARE

## 2019-08-02 ENCOUNTER — HOSPITAL ENCOUNTER (OUTPATIENT)
Dept: RADIOLOGY | Facility: HOSPITAL | Age: 79
Discharge: HOME | End: 2019-08-02
Attending: FAMILY MEDICINE
Payer: MEDICARE

## 2019-08-02 VITALS
SYSTOLIC BLOOD PRESSURE: 133 MMHG | BODY MASS INDEX: 28.39 KG/M2 | HEART RATE: 61 BPM | DIASTOLIC BLOOD PRESSURE: 63 MMHG | WEIGHT: 180.88 LBS | OXYGEN SATURATION: 99 % | RESPIRATION RATE: 18 BRPM | HEIGHT: 67 IN

## 2019-08-02 DIAGNOSIS — I73.9 CLAUDICATION: ICD-10-CM

## 2019-08-02 DIAGNOSIS — E78.5 HYPERLIPIDEMIA, UNSPECIFIED HYPERLIPIDEMIA TYPE: Primary | ICD-10-CM

## 2019-08-02 DIAGNOSIS — E53.8 B12 DEFICIENCY: ICD-10-CM

## 2019-08-02 PROCEDURE — 99214 PR OFFICE/OUTPT VISIT, EST, LEVL IV, 30-39 MIN: ICD-10-PCS | Mod: 25,S$GLB,, | Performed by: FAMILY MEDICINE

## 2019-08-02 PROCEDURE — 3288F FALL RISK ASSESSMENT DOCD: CPT | Mod: CPTII,S$GLB,, | Performed by: FAMILY MEDICINE

## 2019-08-02 PROCEDURE — 3078F DIAST BP <80 MM HG: CPT | Mod: CPTII,S$GLB,, | Performed by: FAMILY MEDICINE

## 2019-08-02 PROCEDURE — 1100F PR PT FALLS ASSESS DOC 2+ FALLS/FALL W/INJURY/YR: ICD-10-PCS | Mod: CPTII,S$GLB,, | Performed by: FAMILY MEDICINE

## 2019-08-02 PROCEDURE — 93925 US LOWER EXTREMITY ARTERIES BILATERAL: ICD-10-PCS | Mod: 26,,, | Performed by: RADIOLOGY

## 2019-08-02 PROCEDURE — 96372 THER/PROPH/DIAG INJ SC/IM: CPT | Mod: S$GLB,,, | Performed by: FAMILY MEDICINE

## 2019-08-02 PROCEDURE — 3075F PR MOST RECENT SYSTOLIC BLOOD PRESS GE 130-139MM HG: ICD-10-PCS | Mod: CPTII,S$GLB,, | Performed by: FAMILY MEDICINE

## 2019-08-02 PROCEDURE — 99999 PR PBB SHADOW E&M-EST. PATIENT-LVL III: CPT | Mod: PBBFAC,,, | Performed by: FAMILY MEDICINE

## 2019-08-02 PROCEDURE — 3078F PR MOST RECENT DIASTOLIC BLOOD PRESSURE < 80 MM HG: ICD-10-PCS | Mod: CPTII,S$GLB,, | Performed by: FAMILY MEDICINE

## 2019-08-02 PROCEDURE — 93925 LOWER EXTREMITY STUDY: CPT | Mod: TC,PN

## 2019-08-02 PROCEDURE — 96372 PR INJECTION,THERAP/PROPH/DIAG2ST, IM OR SUBCUT: ICD-10-PCS | Mod: S$GLB,,, | Performed by: FAMILY MEDICINE

## 2019-08-02 PROCEDURE — 3075F SYST BP GE 130 - 139MM HG: CPT | Mod: CPTII,S$GLB,, | Performed by: FAMILY MEDICINE

## 2019-08-02 PROCEDURE — 99214 OFFICE O/P EST MOD 30 MIN: CPT | Mod: 25,S$GLB,, | Performed by: FAMILY MEDICINE

## 2019-08-02 PROCEDURE — 3288F PR FALLS RISK ASSESSMENT DOCUMENTED: ICD-10-PCS | Mod: CPTII,S$GLB,, | Performed by: FAMILY MEDICINE

## 2019-08-02 PROCEDURE — 99999 PR PBB SHADOW E&M-EST. PATIENT-LVL III: ICD-10-PCS | Mod: PBBFAC,,, | Performed by: FAMILY MEDICINE

## 2019-08-02 PROCEDURE — 1100F PTFALLS ASSESS-DOCD GE2>/YR: CPT | Mod: CPTII,S$GLB,, | Performed by: FAMILY MEDICINE

## 2019-08-02 PROCEDURE — 93925 LOWER EXTREMITY STUDY: CPT | Mod: 26,,, | Performed by: RADIOLOGY

## 2019-08-02 RX ORDER — CYANOCOBALAMIN 1000 UG/ML
1000 INJECTION, SOLUTION INTRAMUSCULAR; SUBCUTANEOUS ONCE
Status: COMPLETED | OUTPATIENT
Start: 2019-08-02 | End: 2019-08-02

## 2019-08-02 RX ADMIN — CYANOCOBALAMIN 1000 MCG: 1000 INJECTION, SOLUTION INTRAMUSCULAR; SUBCUTANEOUS at 10:08

## 2019-08-02 NOTE — PROGRESS NOTES
"Subjective:       Patient ID: Lucho Delong is a 78 y.o. male.    Chief Complaint: Follow-up (pneumonia & tetanus vaccination 6 years ago, colonoscopy at [facility] in 2018) and B12 Injection    Follow up    In regards to the patient's dyslipidemia, patient reports has been compliant with the medication regimen  without side effects.    Some claudication symptoms  Worsening  Cannot walk more than 20 feet without stopping      Review of Systems   Constitutional: Negative for chills, diaphoresis, fever and unexpected weight change.   HENT: Negative for dental problem and trouble swallowing.         Feeling like a fish bone is in his throat   Eyes: Negative for visual disturbance.   Respiratory: Negative for shortness of breath and wheezing.    Cardiovascular: Negative for chest pain and palpitations.   Gastrointestinal: Negative for abdominal pain, constipation, diarrhea, nausea and vomiting.   Endocrine: Negative for polydipsia and polyuria.   Genitourinary: Negative for dysuria.   Musculoskeletal: Negative for joint swelling.   Skin: Negative for rash.        Right forearm skin tear   Neurological: Negative for syncope and headaches.   Psychiatric/Behavioral: Negative for agitation and confusion.         Reviewed family, medical, surgical, and social history.    Objective:      /63 (BP Location: Left arm, Patient Position: Sitting, BP Method: Medium (Automatic))   Pulse 61   Resp 18   Ht 5' 7" (1.702 m)   Wt 82.1 kg (180 lb 14.4 oz)   SpO2 99%   BMI 28.33 kg/m²   Physical Exam   Constitutional: He is oriented to person, place, and time. He appears well-developed and well-nourished.   HENT:   Head: Normocephalic.   Eyes: Pupils are equal, round, and reactive to light.   Neck: Normal range of motion. Neck supple.   Cardiovascular: Normal rate, regular rhythm and normal heart sounds.   Pulmonary/Chest: Effort normal and breath sounds normal.   Abdominal: Soft. Bowel sounds are normal. "   Musculoskeletal: Normal range of motion.   Neurological: He is alert and oriented to person, place, and time.   Skin: Skin is warm and dry. Capillary refill takes less than 2 seconds.   -Right are skin tear size of kiwi- no infection noted  -2 sutures removed from right side of head- site approximated without evidence of infection.   Psychiatric: He has a normal mood and affect. Judgment and thought content normal.   Vitals reviewed.      Assessment:       1. Hyperlipidemia, unspecified hyperlipidemia type    2. B12 deficiency    3. Claudication        Plan:       Hyperlipidemia, unspecified hyperlipidemia type  -     Lipid panel; Future; Expected date: 01/19/2020    B12 deficiency  -     cyanocobalamin injection 1,000 mcg    Claudication  -     Cancel: US Lower Extremity Arteries Bilateral; Future; Expected date: 08/02/2019  -     US Lower Extrem Arteries Bilat with BOB; Future; Expected date: 08/02/2019            Risks, benefits, and side effects were discussed with the patient. All questions were answered to the fullest satisfaction of the patient, and pt verbalized understanding and agreement to treatment plan. Pt was to call with any new or worsening symptoms, or present to the ER.

## 2019-08-05 ENCOUNTER — PATIENT OUTREACH (OUTPATIENT)
Dept: ADMINISTRATIVE | Facility: HOSPITAL | Age: 79
End: 2019-08-05

## 2019-08-05 ENCOUNTER — TELEPHONE (OUTPATIENT)
Dept: FAMILY MEDICINE | Facility: CLINIC | Age: 79
End: 2019-08-05

## 2019-08-05 DIAGNOSIS — I73.9 CLAUDICATION: Primary | ICD-10-CM

## 2019-08-05 NOTE — TELEPHONE ENCOUNTER
Pt notified of US results and verbalized understanding, he has agreed to be referred to a vascular surgeon.

## 2019-08-05 NOTE — TELEPHONE ENCOUNTER
----- Message from Jose Nolan MD sent at 8/5/2019  7:32 AM CDT -----  Ultrasound shows that he has some blockages in his legs, which most likely is causing his leg pain, and I would like to set him up with a vascular surgeon to discuss treatment options, with his permission.

## 2019-08-05 NOTE — TELEPHONE ENCOUNTER
Referral faxed, pt notified and advised that the office receiving the referral will call him to schedule an appointment.

## 2019-08-15 ENCOUNTER — OFFICE VISIT (OUTPATIENT)
Dept: VASCULAR SURGERY | Facility: CLINIC | Age: 79
End: 2019-08-15
Payer: MEDICARE

## 2019-08-15 VITALS
HEIGHT: 67 IN | BODY MASS INDEX: 28.79 KG/M2 | SYSTOLIC BLOOD PRESSURE: 143 MMHG | DIASTOLIC BLOOD PRESSURE: 75 MMHG | WEIGHT: 183.44 LBS | HEART RATE: 67 BPM

## 2019-08-15 DIAGNOSIS — M79.605 PAIN IN BOTH LOWER EXTREMITIES: Primary | ICD-10-CM

## 2019-08-15 DIAGNOSIS — M79.604 PAIN IN BOTH LOWER EXTREMITIES: Primary | ICD-10-CM

## 2019-08-15 PROCEDURE — 99999 PR PBB SHADOW E&M-EST. PATIENT-LVL III: CPT | Mod: PBBFAC,,, | Performed by: THORACIC SURGERY (CARDIOTHORACIC VASCULAR SURGERY)

## 2019-08-15 PROCEDURE — 3077F SYST BP >= 140 MM HG: CPT | Mod: CPTII,S$GLB,, | Performed by: THORACIC SURGERY (CARDIOTHORACIC VASCULAR SURGERY)

## 2019-08-15 PROCEDURE — 3078F PR MOST RECENT DIASTOLIC BLOOD PRESSURE < 80 MM HG: ICD-10-PCS | Mod: CPTII,S$GLB,, | Performed by: THORACIC SURGERY (CARDIOTHORACIC VASCULAR SURGERY)

## 2019-08-15 PROCEDURE — 1101F PR PT FALLS ASSESS DOC 0-1 FALLS W/OUT INJ PAST YR: ICD-10-PCS | Mod: CPTII,S$GLB,, | Performed by: THORACIC SURGERY (CARDIOTHORACIC VASCULAR SURGERY)

## 2019-08-15 PROCEDURE — 3077F PR MOST RECENT SYSTOLIC BLOOD PRESSURE >= 140 MM HG: ICD-10-PCS | Mod: CPTII,S$GLB,, | Performed by: THORACIC SURGERY (CARDIOTHORACIC VASCULAR SURGERY)

## 2019-08-15 PROCEDURE — 3078F DIAST BP <80 MM HG: CPT | Mod: CPTII,S$GLB,, | Performed by: THORACIC SURGERY (CARDIOTHORACIC VASCULAR SURGERY)

## 2019-08-15 PROCEDURE — 99203 PR OFFICE/OUTPT VISIT, NEW, LEVL III, 30-44 MIN: ICD-10-PCS | Mod: S$GLB,,, | Performed by: THORACIC SURGERY (CARDIOTHORACIC VASCULAR SURGERY)

## 2019-08-15 PROCEDURE — 1101F PT FALLS ASSESS-DOCD LE1/YR: CPT | Mod: CPTII,S$GLB,, | Performed by: THORACIC SURGERY (CARDIOTHORACIC VASCULAR SURGERY)

## 2019-08-15 PROCEDURE — 99999 PR PBB SHADOW E&M-EST. PATIENT-LVL III: ICD-10-PCS | Mod: PBBFAC,,, | Performed by: THORACIC SURGERY (CARDIOTHORACIC VASCULAR SURGERY)

## 2019-08-15 PROCEDURE — 99203 OFFICE O/P NEW LOW 30 MIN: CPT | Mod: S$GLB,,, | Performed by: THORACIC SURGERY (CARDIOTHORACIC VASCULAR SURGERY)

## 2019-08-15 NOTE — PROGRESS NOTES
This patient was referred for peripheral arterial disease.  He has disabling extremity pain primarily below the knees bilaterally.  This is somewhat worse on the right compared to left.  He had an ultrasound showing mild-to-moderate peripheral arterial disease.  He has longstanding history of gout.  He likely has an element of osteoarthritis as well.  His main complaint however shortness of breath with minimal exertion.  He also has some chest discomfort that is on the left side below the axilla with little to no radiation.  This is not necessarily related to exercise.  His family history is unremarkable otherwise.  He has chronic episodic atrial fibrillation.      His medicines are noted and are part of the epic record.  His problem list was reviewed.  On physical exam his vital signs are stable.  His neck is supple.  His chest is clear to auscultation.  His heart is in irregular rate and rhythm.  His abdomen is benign.  Peripheral pulses are intact.  He has palpable pedal pulses.  The ultrasound is noted.  At this time he has symptoms more consistent with coronary disease and possibly congestive heart failure and with his palpable pedal pulses I think it is likely that he should undergo heart catheterization with coronary angiography.  The cardiology service will be consulted.

## 2019-08-29 ENCOUNTER — PATIENT OUTREACH (OUTPATIENT)
Dept: ADMINISTRATIVE | Facility: HOSPITAL | Age: 79
End: 2019-08-29

## 2019-09-13 ENCOUNTER — OFFICE VISIT (OUTPATIENT)
Dept: FAMILY MEDICINE | Facility: CLINIC | Age: 79
End: 2019-09-13
Payer: MEDICARE

## 2019-09-13 VITALS
WEIGHT: 178.81 LBS | DIASTOLIC BLOOD PRESSURE: 62 MMHG | HEIGHT: 67 IN | RESPIRATION RATE: 19 BRPM | SYSTOLIC BLOOD PRESSURE: 114 MMHG | BODY MASS INDEX: 28.07 KG/M2 | OXYGEN SATURATION: 97 % | HEART RATE: 64 BPM | TEMPERATURE: 98 F

## 2019-09-13 DIAGNOSIS — M10.9 GOUT, UNSPECIFIED CAUSE, UNSPECIFIED CHRONICITY, UNSPECIFIED SITE: ICD-10-CM

## 2019-09-13 PROCEDURE — 99213 OFFICE O/P EST LOW 20 MIN: CPT | Mod: S$GLB,,, | Performed by: FAMILY MEDICINE

## 2019-09-13 PROCEDURE — 1101F PR PT FALLS ASSESS DOC 0-1 FALLS W/OUT INJ PAST YR: ICD-10-PCS | Mod: CPTII,S$GLB,, | Performed by: FAMILY MEDICINE

## 2019-09-13 PROCEDURE — 99213 PR OFFICE/OUTPT VISIT, EST, LEVL III, 20-29 MIN: ICD-10-PCS | Mod: S$GLB,,, | Performed by: FAMILY MEDICINE

## 2019-09-13 PROCEDURE — 3074F PR MOST RECENT SYSTOLIC BLOOD PRESSURE < 130 MM HG: ICD-10-PCS | Mod: CPTII,S$GLB,, | Performed by: FAMILY MEDICINE

## 2019-09-13 PROCEDURE — 1101F PT FALLS ASSESS-DOCD LE1/YR: CPT | Mod: CPTII,S$GLB,, | Performed by: FAMILY MEDICINE

## 2019-09-13 PROCEDURE — 99999 PR PBB SHADOW E&M-EST. PATIENT-LVL III: ICD-10-PCS | Mod: PBBFAC,,, | Performed by: FAMILY MEDICINE

## 2019-09-13 PROCEDURE — 3078F DIAST BP <80 MM HG: CPT | Mod: CPTII,S$GLB,, | Performed by: FAMILY MEDICINE

## 2019-09-13 PROCEDURE — 3074F SYST BP LT 130 MM HG: CPT | Mod: CPTII,S$GLB,, | Performed by: FAMILY MEDICINE

## 2019-09-13 PROCEDURE — 99999 PR PBB SHADOW E&M-EST. PATIENT-LVL III: CPT | Mod: PBBFAC,,, | Performed by: FAMILY MEDICINE

## 2019-09-13 PROCEDURE — 3078F PR MOST RECENT DIASTOLIC BLOOD PRESSURE < 80 MM HG: ICD-10-PCS | Mod: CPTII,S$GLB,, | Performed by: FAMILY MEDICINE

## 2019-09-13 RX ORDER — PREDNISONE 5 MG/1
5 TABLET ORAL DAILY
Qty: 90 TABLET | Refills: 3 | Status: SHIPPED | OUTPATIENT
Start: 2019-09-13 | End: 2020-09-30

## 2019-09-13 RX ORDER — PREDNISONE 5 MG/1
5 TABLET ORAL DAILY
COMMUNITY
End: 2019-09-13 | Stop reason: SDUPTHER

## 2019-09-13 RX ORDER — ALLOPURINOL 100 MG/1
100 TABLET ORAL DAILY
Qty: 90 TABLET | Refills: 3 | Status: SHIPPED | OUTPATIENT
Start: 2019-09-13 | End: 2020-10-05

## 2019-09-13 NOTE — PROGRESS NOTES
"Subjective:       Patient ID: Lucho Delong is a 78 y.o. male.    Chief Complaint: Follow-up    Follow up    Gout well controlled  No issues  Compliant with meds    Review of Systems   Constitutional: Negative for chills, diaphoresis, fever and unexpected weight change.   HENT: Negative for dental problem and trouble swallowing.         Feeling like a fish bone is in his throat   Eyes: Negative for visual disturbance.   Respiratory: Negative for shortness of breath and wheezing.    Cardiovascular: Negative for chest pain and palpitations.   Gastrointestinal: Negative for abdominal pain, constipation, diarrhea, nausea and vomiting.   Endocrine: Negative for polydipsia and polyuria.   Genitourinary: Negative for dysuria.   Musculoskeletal: Negative for joint swelling.   Skin: Negative for rash.        Right forearm skin tear   Neurological: Negative for syncope and headaches.   Psychiatric/Behavioral: Negative for agitation and confusion.         Reviewed family, medical, surgical, and social history.    Objective:      /62 (BP Location: Left arm, Patient Position: Sitting, BP Method: Large (Automatic))   Pulse 64   Temp 97.9 °F (36.6 °C) (Oral)   Resp 19   Ht 5' 7" (1.702 m)   Wt 81.1 kg (178 lb 12.8 oz)   SpO2 97%   BMI 28.00 kg/m²   Physical Exam   Constitutional: He is oriented to person, place, and time. He appears well-developed and well-nourished.   HENT:   Head: Normocephalic.   Eyes: Pupils are equal, round, and reactive to light.   Neck: Normal range of motion. Neck supple.   Cardiovascular: Normal rate, regular rhythm and normal heart sounds.   Pulmonary/Chest: Effort normal and breath sounds normal.   Abdominal: Soft. Bowel sounds are normal.   Musculoskeletal: Normal range of motion.   Neurological: He is alert and oriented to person, place, and time.   Skin: Skin is warm and dry. Capillary refill takes less than 2 seconds.   -Right are skin tear size of kiwi- no infection noted  -2 " sutures removed from right side of head- site approximated without evidence of infection.   Psychiatric: He has a normal mood and affect. Judgment and thought content normal.   Vitals reviewed.      Assessment:       1. Gout, unspecified cause, unspecified chronicity, unspecified site        Plan:       Gout, unspecified cause, unspecified chronicity, unspecified site  -     allopurinol (ZYLOPRIM) 100 MG tablet; Take 1 tablet (100 mg total) by mouth once daily.  Dispense: 90 tablet; Refill: 3    Other orders  -     predniSONE (DELTASONE) 5 MG tablet; Take 1 tablet (5 mg total) by mouth once daily.  Dispense: 90 tablet; Refill: 3            Risks, benefits, and side effects were discussed with the patient. All questions were answered to the fullest satisfaction of the patient, and pt verbalized understanding and agreement to treatment plan. Pt was to call with any new or worsening symptoms, or present to the ER.

## 2019-09-18 ENCOUNTER — OFFICE VISIT (OUTPATIENT)
Dept: FAMILY MEDICINE | Facility: CLINIC | Age: 79
End: 2019-09-18
Payer: MEDICARE

## 2019-09-18 VITALS
DIASTOLIC BLOOD PRESSURE: 58 MMHG | RESPIRATION RATE: 16 BRPM | HEART RATE: 67 BPM | HEIGHT: 67 IN | TEMPERATURE: 98 F | BODY MASS INDEX: 28.16 KG/M2 | SYSTOLIC BLOOD PRESSURE: 109 MMHG | WEIGHT: 179.38 LBS | OXYGEN SATURATION: 98 %

## 2019-09-18 DIAGNOSIS — H92.01 RIGHT EAR PAIN: Primary | ICD-10-CM

## 2019-09-18 PROCEDURE — 3078F DIAST BP <80 MM HG: CPT | Mod: CPTII,S$GLB,, | Performed by: FAMILY MEDICINE

## 2019-09-18 PROCEDURE — 99213 PR OFFICE/OUTPT VISIT, EST, LEVL III, 20-29 MIN: ICD-10-PCS | Mod: S$GLB,,, | Performed by: FAMILY MEDICINE

## 2019-09-18 PROCEDURE — 1101F PR PT FALLS ASSESS DOC 0-1 FALLS W/OUT INJ PAST YR: ICD-10-PCS | Mod: CPTII,S$GLB,, | Performed by: FAMILY MEDICINE

## 2019-09-18 PROCEDURE — 1101F PT FALLS ASSESS-DOCD LE1/YR: CPT | Mod: CPTII,S$GLB,, | Performed by: FAMILY MEDICINE

## 2019-09-18 PROCEDURE — 3078F PR MOST RECENT DIASTOLIC BLOOD PRESSURE < 80 MM HG: ICD-10-PCS | Mod: CPTII,S$GLB,, | Performed by: FAMILY MEDICINE

## 2019-09-18 PROCEDURE — 99213 OFFICE O/P EST LOW 20 MIN: CPT | Mod: S$GLB,,, | Performed by: FAMILY MEDICINE

## 2019-09-18 PROCEDURE — 3074F PR MOST RECENT SYSTOLIC BLOOD PRESSURE < 130 MM HG: ICD-10-PCS | Mod: CPTII,S$GLB,, | Performed by: FAMILY MEDICINE

## 2019-09-18 PROCEDURE — 3074F SYST BP LT 130 MM HG: CPT | Mod: CPTII,S$GLB,, | Performed by: FAMILY MEDICINE

## 2019-09-18 NOTE — PROGRESS NOTES
"  [facility] - Clinic Note    Subjective      Mr. Delong is a 78 y.o. male who presents to clinic for right ear pain.    Right ear pain that has been going on for 3 days.  Dull.  No drainage from the ear.  Has had some nasal drainage.  Has known sinus problems.  Mild pain.  About a 3/10.  No fevers or headaches.  No tooth pain. Has not tried anything for pain relief.  No illnesses or sick contacts.  Has never had this problem before.    [facility] Lucho has a past medical history of Anticoagulant long-term use, Atrial fibrillation, Bursitis of hip, Chronic back pain, Chronic kidney disease, stage 3, Gastroesophageal reflux disease, Gout, Hyperlipemia, Hypertension, Prostate cancer (1997), and Stroke (04/2019).   [facility] Lucho has a past surgical history that includes Colonoscopy (1997); Prostate surgery (1997); and Tonsillectomy.    Lucho's family history includes Alzheimer's disease in his sister; Alzheimer's disease (age of onset: 86) in his mother; Cancer (age of onset: 86) in his father.    Lucho reports that he has never smoked. He has never used smokeless tobacco. He reports that he drinks about 2.0 oz of alcohol per week. He reports that he does not use drugs.   ALG Lucho is allergic to tramadol.   [facility] Lucho has a current medication list which includes the following prescription(s): allopurinol, atorvastatin, cyanocobalamin, dabigatran etexilate, losartan, metoprolol succinate, nifedipine, pantoprazole, and prednisone.     Review of Systems   Constitutional: Negative for fever.   HENT: Positive for congestion, ear pain and rhinorrhea. Negative for ear discharge, facial swelling, sinus pressure and sore throat.      Objective     BP (!) 109/58 (BP Location: Right arm, Patient Position: Sitting, BP Method: Medium (Automatic))   Pulse 67   Temp 98.4 °F (36.9 °C) (Oral)   Resp 16   Ht 5' 7" (1.702 m)   Wt 81.4 kg (179 lb 6 oz)   SpO2 98%   BMI 28.09 kg/m²     Physical Exam   Constitutional: He is " well-developed, well-nourished, and in no distress. No distress.   HENT:   Head: Normocephalic and atraumatic.   Nose: Nose normal.   Mouth/Throat: Oropharynx is clear and moist.   Right ear with small amount of blood in the external canal.  No damage to the TM.  No purulent fluid or swelling of the TM noted. No erythema.  Left TM with scarring, no drainage.     Assessment/Plan     1. Right ear pain         Avoid the use of Q-tips.  May use Tylenol for pain relief.  No indication for antibiotics or drops at this time.  If pain worsens or persist, patient instructed to call the clinic.  Advised the use of Flonase and Zyrtec for relief of nasal congestion  Additionally talked about patient's stroke risk and I encouraged him to use Pradaxa as indicated.            Audelia Mabry MD  Family Medicine  [facility]  [phone]

## 2019-10-04 ENCOUNTER — TELEPHONE (OUTPATIENT)
Dept: FAMILY MEDICINE | Facility: CLINIC | Age: 79
End: 2019-10-04

## 2019-10-04 DIAGNOSIS — I48.20 CHRONIC ATRIAL FIBRILLATION: Primary | ICD-10-CM

## 2019-10-04 NOTE — TELEPHONE ENCOUNTER
Informed of referred provider. Verbalized an understanding.     ----- Message from Kasandra Mistry sent at 10/4/2019  4:52 PM CDT -----  Contact: patient  Type: Needs Medical Advice    Who Called:  wife  Symptoms (please be specific):  na  How long has patient had these symptoms:  [facility]  Pharmacy name and phone #:  [facility]  Best Call Back Number: [phone] wife  Additional Information: Patient would like the name of doctor referred to last apt. Please call to advise. Thanks!

## 2019-10-04 NOTE — TELEPHONE ENCOUNTER
They were suppose to see a cardiologist somewhere else and they cancelled. They were going to ask about the medication at the time of the visit. The patient is unsure about the medication. They have requested a young cardiologist. Nelsy told them about Dr. Arana but they did not want to see him. They are requesting someone young.

## 2019-10-04 NOTE — TELEPHONE ENCOUNTER
Patient came in needs a new referral to a cardiologist, wife (Pat)  wants Dr. Nolan about pradaxa 75mg [phone]

## 2019-11-26 ENCOUNTER — HOSPITAL ENCOUNTER (OUTPATIENT)
Dept: RADIOLOGY | Facility: HOSPITAL | Age: 79
Discharge: HOME | End: 2019-11-26
Attending: SPECIALIST
Payer: MEDICARE

## 2019-11-26 ENCOUNTER — HOSPITAL ENCOUNTER (OUTPATIENT)
Dept: PREADMISSION TESTING | Facility: HOSPITAL | Age: 79
Discharge: HOME | End: 2019-11-26
Attending: SPECIALIST
Payer: MEDICARE

## 2019-11-26 VITALS — BODY MASS INDEX: 27.41 KG/M2 | WEIGHT: 175 LBS

## 2019-11-26 DIAGNOSIS — I35.0 NODULAR CALCIFIC AORTIC VALVE STENOSIS: ICD-10-CM

## 2019-11-26 DIAGNOSIS — Z01.810 PRE-PROCEDURAL CARDIOVASCULAR EXAMINATION: Primary | ICD-10-CM

## 2019-11-26 LAB
ALBUMIN SERPL BCP-MCNC: 4.3 G/DL (ref 3.5–5.2)
ALP SERPL-CCNC: 55 U/L (ref 55–135)
ALT SERPL W/O P-5'-P-CCNC: 17 U/L (ref 10–44)
ANION GAP SERPL CALC-SCNC: 12 MMOL/L (ref 8–16)
APTT PPP: 54 SEC (ref 23.6–33.3)
AST SERPL-CCNC: 21 U/L (ref 10–40)
BILIRUB SERPL-MCNC: 1 MG/DL (ref 0.1–1)
BUN SERPL-MCNC: 46 MG/DL (ref 8–23)
CALCIUM SERPL-MCNC: 9.5 MG/DL (ref 8.7–10.5)
CHLORIDE SERPL-SCNC: 109 MMOL/L (ref 95–110)
CO2 SERPL-SCNC: 22 MMOL/L (ref 23–29)
CREAT SERPL-MCNC: 2.8 MG/DL (ref 0.5–1.4)
ERYTHROCYTE [DISTWIDTH] IN BLOOD BY AUTOMATED COUNT: 12.6 % (ref 11.5–14.5)
EST. GFR  (AFRICAN AMERICAN): 23.7 ML/MIN/1.73 M^2
EST. GFR  (NON AFRICAN AMERICAN): 20.5 ML/MIN/1.73 M^2
GLUCOSE SERPL-MCNC: 109 MG/DL (ref 70–110)
HCT VFR BLD AUTO: 38.6 % (ref 40–54)
HGB BLD-MCNC: 12.7 G/DL (ref 14–18)
INR PPP: 1.6
MAGNESIUM SERPL-MCNC: 1.6 MG/DL (ref 1.6–2.6)
MCH RBC QN AUTO: 32.1 PG (ref 27–31)
MCHC RBC AUTO-ENTMCNC: 32.9 G/DL (ref 32–36)
MCV RBC AUTO: 98 FL (ref 82–98)
PLATELET # BLD AUTO: 205 K/UL (ref 150–350)
PMV BLD AUTO: 11.3 FL (ref 9.2–12.9)
POTASSIUM SERPL-SCNC: 5.4 MMOL/L (ref 3.5–5.1)
PROT SERPL-MCNC: 7.3 G/DL (ref 6–8.4)
PROTHROMBIN TIME: 18.2 SEC (ref 10.6–14.8)
RBC # BLD AUTO: 3.96 M/UL (ref 4.6–6.2)
SODIUM SERPL-SCNC: 143 MMOL/L (ref 136–145)
WBC # BLD AUTO: 10.13 K/UL (ref 3.9–12.7)

## 2019-11-26 PROCEDURE — 85610 PROTHROMBIN TIME: CPT

## 2019-11-26 PROCEDURE — 83735 ASSAY OF MAGNESIUM: CPT

## 2019-11-26 PROCEDURE — 80053 COMPREHEN METABOLIC PANEL: CPT

## 2019-11-26 PROCEDURE — 93005 ELECTROCARDIOGRAM TRACING: CPT

## 2019-11-26 PROCEDURE — 85730 THROMBOPLASTIN TIME PARTIAL: CPT

## 2019-11-26 PROCEDURE — 85027 COMPLETE CBC AUTOMATED: CPT

## 2019-11-26 PROCEDURE — 36415 COLL VENOUS BLD VENIPUNCTURE: CPT

## 2019-11-26 PROCEDURE — 71046 X-RAY EXAM CHEST 2 VIEWS: CPT | Mod: TC

## 2019-12-02 ENCOUNTER — HOSPITAL ENCOUNTER (OUTPATIENT)
Facility: HOSPITAL | Age: 79
Discharge: HOME | End: 2019-12-03
Attending: SPECIALIST | Admitting: SPECIALIST
Payer: MEDICARE

## 2019-12-02 DIAGNOSIS — I35.0 AORTIC VALVE STENOSIS, ETIOLOGY OF CARDIAC VALVE DISEASE UNSPECIFIED: ICD-10-CM

## 2019-12-02 DIAGNOSIS — R06.00 DYSPNEA, UNSPECIFIED TYPE: ICD-10-CM

## 2019-12-02 DIAGNOSIS — I35.0 NONRHEUMATIC AORTIC VALVE STENOSIS: Primary | ICD-10-CM

## 2019-12-02 DIAGNOSIS — N18.9 CHRONIC KIDNEY DISEASE, UNSPECIFIED CKD STAGE: ICD-10-CM

## 2019-12-02 PROCEDURE — 63600175 PHARM REV CODE 636 W HCPCS: Mod: GZ | Performed by: INTERNAL MEDICINE

## 2019-12-02 PROCEDURE — 63600175 PHARM REV CODE 636 W HCPCS: Mod: GZ | Performed by: SPECIALIST

## 2019-12-02 PROCEDURE — C1729 CATH, DRAINAGE: HCPCS | Performed by: SPECIALIST

## 2019-12-02 PROCEDURE — 25000003 PHARM REV CODE 250: Performed by: SPECIALIST

## 2019-12-02 PROCEDURE — C1894 INTRO/SHEATH, NON-LASER: HCPCS | Performed by: SPECIALIST

## 2019-12-02 PROCEDURE — C1725 CATH, TRANSLUMIN NON-LASER: HCPCS | Performed by: SPECIALIST

## 2019-12-02 PROCEDURE — C1769 GUIDE WIRE: HCPCS | Performed by: SPECIALIST

## 2019-12-02 PROCEDURE — 93460 R&L HRT ART/VENTRICLE ANGIO: CPT

## 2019-12-02 PROCEDURE — 99152 MOD SED SAME PHYS/QHP 5/>YRS: CPT | Performed by: SPECIALIST

## 2019-12-02 PROCEDURE — 63600175 PHARM REV CODE 636 W HCPCS: Performed by: SPECIALIST

## 2019-12-02 PROCEDURE — 25000242 PHARM REV CODE 250 ALT 637 W/ HCPCS: Performed by: SPECIALIST

## 2019-12-02 PROCEDURE — 25000242 PHARM REV CODE 250 ALT 637 W/ HCPCS: Performed by: INTERNAL MEDICINE

## 2019-12-02 PROCEDURE — 63600175 PHARM REV CODE 636 W HCPCS: Performed by: INTERNAL MEDICINE

## 2019-12-02 PROCEDURE — C1751 CATH, INF, PER/CENT/MIDLINE: HCPCS | Performed by: SPECIALIST

## 2019-12-02 PROCEDURE — 27201423 OPTIME MED/SURG SUP & DEVICES STERILE SUPPLY: Performed by: SPECIALIST

## 2019-12-02 PROCEDURE — 94761 N-INVAS EAR/PLS OXIMETRY MLT: CPT | Mod: 59

## 2019-12-02 PROCEDURE — C1760 CLOSURE DEV, VASC: HCPCS | Performed by: SPECIALIST

## 2019-12-02 PROCEDURE — C1887 CATHETER, GUIDING: HCPCS | Performed by: SPECIALIST

## 2019-12-02 PROCEDURE — 25500020 PHARM REV CODE 255: Performed by: SPECIALIST

## 2019-12-02 PROCEDURE — 99153 MOD SED SAME PHYS/QHP EA: CPT | Performed by: SPECIALIST

## 2019-12-02 DEVICE — IMPLANTABLE DEVICE: Type: IMPLANTABLE DEVICE | Site: GROIN | Status: FUNCTIONAL

## 2019-12-02 DEVICE — DEVICE CLOSURE  ANGIO-SEAL 6FR 610130: Type: IMPLANTABLE DEVICE | Site: GROIN | Status: FUNCTIONAL

## 2019-12-02 RX ORDER — PROMETHAZINE HYDROCHLORIDE 25 MG/1
25 TABLET ORAL EVERY 6 HOURS PRN
Status: DISCONTINUED | OUTPATIENT
Start: 2019-12-02 | End: 2019-12-03 | Stop reason: HOSPADM

## 2019-12-02 RX ORDER — DABIGATRAN ETEXILATE 75 MG/1
75 CAPSULE ORAL DAILY
Status: DISCONTINUED | OUTPATIENT
Start: 2019-12-02 | End: 2019-12-03 | Stop reason: HOSPADM

## 2019-12-02 RX ORDER — NIFEDIPINE 30 MG/1
60 TABLET, EXTENDED RELEASE ORAL DAILY
Status: DISCONTINUED | OUTPATIENT
Start: 2019-12-02 | End: 2019-12-03 | Stop reason: HOSPADM

## 2019-12-02 RX ORDER — ACETAMINOPHEN 325 MG/1
650 TABLET ORAL EVERY 4 HOURS PRN
Status: DISCONTINUED | OUTPATIENT
Start: 2019-12-02 | End: 2019-12-03 | Stop reason: HOSPADM

## 2019-12-02 RX ORDER — SODIUM CHLORIDE 9 MG/ML
INJECTION, SOLUTION INTRAVENOUS CONTINUOUS
Status: ACTIVE | OUTPATIENT
Start: 2019-12-02 | End: 2019-12-03

## 2019-12-02 RX ORDER — ATORVASTATIN CALCIUM 40 MG/1
40 TABLET, FILM COATED ORAL DAILY
Status: DISCONTINUED | OUTPATIENT
Start: 2019-12-02 | End: 2019-12-03 | Stop reason: HOSPADM

## 2019-12-02 RX ORDER — MUPIROCIN 20 MG/G
OINTMENT TOPICAL
Status: DISCONTINUED | OUTPATIENT
Start: 2019-12-02 | End: 2019-12-03 | Stop reason: HOSPADM

## 2019-12-02 RX ORDER — LANOLIN ALCOHOL/MO/W.PET/CERES
400 CREAM (GRAM) TOPICAL ONCE
Status: COMPLETED | OUTPATIENT
Start: 2019-12-02 | End: 2019-12-02

## 2019-12-02 RX ORDER — HEPARIN SODIUM 10000 [USP'U]/ML
INJECTION, SOLUTION INTRAVENOUS; SUBCUTANEOUS
Status: DISCONTINUED | OUTPATIENT
Start: 2019-12-02 | End: 2019-12-03 | Stop reason: HOSPADM

## 2019-12-02 RX ORDER — LIDOCAINE HYDROCHLORIDE 10 MG/ML
INJECTION, SOLUTION EPIDURAL; INFILTRATION; INTRACAUDAL; PERINEURAL
Status: DISCONTINUED | OUTPATIENT
Start: 2019-12-02 | End: 2019-12-03 | Stop reason: HOSPADM

## 2019-12-02 RX ORDER — MIDAZOLAM HYDROCHLORIDE 1 MG/ML
INJECTION INTRAMUSCULAR; INTRAVENOUS
Status: DISCONTINUED | OUTPATIENT
Start: 2019-12-02 | End: 2019-12-03 | Stop reason: HOSPADM

## 2019-12-02 RX ORDER — ACETYLCYSTEINE 200 MG/ML
1200 SOLUTION ORAL; RESPIRATORY (INHALATION) 3 TIMES DAILY
Status: DISCONTINUED | OUTPATIENT
Start: 2019-12-02 | End: 2019-12-03 | Stop reason: HOSPADM

## 2019-12-02 RX ORDER — FENTANYL CITRATE 50 UG/ML
INJECTION, SOLUTION INTRAMUSCULAR; INTRAVENOUS
Status: DISCONTINUED | OUTPATIENT
Start: 2019-12-02 | End: 2019-12-03 | Stop reason: HOSPADM

## 2019-12-02 RX ORDER — PREDNISONE 5 MG/1
5 TABLET ORAL DAILY
Status: DISCONTINUED | OUTPATIENT
Start: 2019-12-02 | End: 2019-12-03 | Stop reason: HOSPADM

## 2019-12-02 RX ORDER — ALLOPURINOL 100 MG/1
100 TABLET ORAL DAILY
Status: DISCONTINUED | OUTPATIENT
Start: 2019-12-02 | End: 2019-12-03 | Stop reason: HOSPADM

## 2019-12-02 RX ORDER — PANTOPRAZOLE SODIUM 40 MG/1
40 TABLET, DELAYED RELEASE ORAL DAILY
Status: DISCONTINUED | OUTPATIENT
Start: 2019-12-02 | End: 2019-12-03 | Stop reason: HOSPADM

## 2019-12-02 RX ORDER — METOPROLOL SUCCINATE 50 MG/1
100 TABLET, EXTENDED RELEASE ORAL DAILY
Status: DISCONTINUED | OUTPATIENT
Start: 2019-12-02 | End: 2019-12-03 | Stop reason: HOSPADM

## 2019-12-02 RX ORDER — ACETYLCYSTEINE 200 MG/ML
1200 SOLUTION ORAL; RESPIRATORY (INHALATION) EVERY 12 HOURS
Status: DISCONTINUED | OUTPATIENT
Start: 2019-12-02 | End: 2019-12-02

## 2019-12-02 RX ADMIN — ACETYLCYSTEINE 1200 MG: 200 SOLUTION ORAL; RESPIRATORY (INHALATION) at 06:12

## 2019-12-02 RX ADMIN — ACETYLCYSTEINE 1200 MG: 200 SOLUTION ORAL; RESPIRATORY (INHALATION) at 10:12

## 2019-12-02 RX ADMIN — MAGNESIUM OXIDE 400 MG: 400 TABLET ORAL at 06:12

## 2019-12-02 RX ADMIN — SODIUM BICARBONATE: 84 INJECTION, SOLUTION INTRAVENOUS at 06:12

## 2019-12-02 RX ADMIN — SODIUM CHLORIDE: 0.9 INJECTION, SOLUTION INTRAVENOUS at 10:12

## 2019-12-02 RX ADMIN — SODIUM CHLORIDE: 0.9 INJECTION, SOLUTION INTRAVENOUS at 08:12

## 2019-12-02 RX ADMIN — ACETYLCYSTEINE 1200 MG: 200 SOLUTION ORAL; RESPIRATORY (INHALATION) at 02:12

## 2019-12-02 NOTE — Clinical Note
Catheter is removed from the ostium   right coronary artery. Angiography performed of the right coronary arteries. Angiography performed via power injection with 6 mL contrast at 3 mL/s. Used JR4 Catheter

## 2019-12-02 NOTE — Clinical Note
Catheter is repositioned to the ostial  left coronary artery. Angiography performed of the left coronary arteries in multiple views. Angiography performed via power injection with 6 mL contrast at 3 mL/s. Used JL4 Catheter

## 2019-12-02 NOTE — NURSING
Skin tear noted to R upper arm. Present upon arrival to cath lab. Pt stated skin tear  from blood pressure cuff in heart center.

## 2019-12-02 NOTE — Clinical Note
32 ml injected throughout the case. 118 mL total wasted during the case. 150 mL total used in the case.

## 2019-12-03 VITALS
TEMPERATURE: 98 F | SYSTOLIC BLOOD PRESSURE: 166 MMHG | DIASTOLIC BLOOD PRESSURE: 83 MMHG | HEART RATE: 78 BPM | BODY MASS INDEX: 27.61 KG/M2 | OXYGEN SATURATION: 98 % | WEIGHT: 175.94 LBS | RESPIRATION RATE: 16 BRPM | HEIGHT: 67 IN

## 2019-12-03 PROBLEM — I25.10 CAD (CORONARY ARTERY DISEASE): Status: ACTIVE | Noted: 2019-12-03

## 2019-12-03 PROBLEM — I35.0 AORTIC VALVE STENOSIS: Status: ACTIVE | Noted: 2019-12-03

## 2019-12-03 LAB
ANION GAP SERPL CALC-SCNC: 7 MMOL/L (ref 8–16)
BUN SERPL-MCNC: 35 MG/DL (ref 8–23)
CALCIUM SERPL-MCNC: 8.4 MG/DL (ref 8.7–10.5)
CHLORIDE SERPL-SCNC: 108 MMOL/L (ref 95–110)
CO2 SERPL-SCNC: 25 MMOL/L (ref 23–29)
CREAT SERPL-MCNC: 1.9 MG/DL (ref 0.5–1.4)
EST. GFR  (AFRICAN AMERICAN): 37.9 ML/MIN/1.73 M^2
EST. GFR  (NON AFRICAN AMERICAN): 32.8 ML/MIN/1.73 M^2
GLUCOSE SERPL-MCNC: 82 MG/DL (ref 70–110)
POTASSIUM SERPL-SCNC: 4 MMOL/L (ref 3.5–5.1)
SODIUM SERPL-SCNC: 140 MMOL/L (ref 136–145)

## 2019-12-03 PROCEDURE — 80048 BASIC METABOLIC PNL TOTAL CA: CPT

## 2019-12-03 PROCEDURE — 25000003 PHARM REV CODE 250: Performed by: HOSPITALIST

## 2019-12-03 PROCEDURE — 94761 N-INVAS EAR/PLS OXIMETRY MLT: CPT

## 2019-12-03 PROCEDURE — 36415 COLL VENOUS BLD VENIPUNCTURE: CPT

## 2019-12-03 RX ORDER — DIPHENHYDRAMINE HCL 25 MG
25 CAPSULE ORAL ONCE
Status: COMPLETED | OUTPATIENT
Start: 2019-12-03 | End: 2019-12-03

## 2019-12-03 RX ADMIN — DIPHENHYDRAMINE HYDROCHLORIDE 25 MG: 25 CAPSULE ORAL at 02:12

## 2019-12-03 NOTE — DISCHARGE SUMMARY
[facility]  Cardiology  Discharge Summary      Patient Name: Lucho Delong  MRN: [id number]  Admission Date: 12/2/2019  Hospital Length of Stay: 0 days  Discharge Date and Time: No discharge date for patient encounter.  Attending Physician: Everett Sosa MD    Discharging Provider: TEQUILA Renee  Primary Care Physician: Jose Nolan MD    HPI:   No notes on file   See hospital course    Procedure(s) (LRB):  CATHETERIZATION, HEART, BOTH LEFT AND RIGHT (N/A)     Indwelling Lines/Drains at time of discharge:  Lines/Drains/Airways     None                 Hospital Course:  78 year old male brought in as an outpatient to undergo L/RHC- aortic valve study due to echocardiogram revealing severe AS as an outpatient. LHC revealed two vessel coronary disease and severe AS w/ valve area of 0.6. No interventions were done at the time of the procedure. He was kept overnight for observation due to h/o CKD.   Pt this am has refused labs multiple times. He is requesting to go home. He understands this risk. He is willing to have labs checked as an outpatient.   We have given the patient the option of having CTS evaluate him while he is here and he refuses this as well.   He currently feels overall well. Resting comfortably in bed. Denies any CP or SOB. No pain or tenderness to groin access site. VSS.     Consults: none    Significant Diagnostic Studies: Labs: All labs within the past 24 hours have been reviewed    Pending Diagnostic Studies:     Procedure Component Value Units Date/Time    Basic metabolic panel [[id number]]     Order Status:  Sent Lab Status:  No result     Specimen:  Blood           Final Active Diagnoses:    Diagnosis Date Noted POA    PRINCIPAL PROBLEM:  Aortic valve stenosis [I35.0] 12/03/2019 Yes    CAD (coronary artery disease) [I25.10] 12/03/2019 Yes      Problems Resolved During this Admission:     No new Assessment & Plan notes have been filed under this hospital service since  the last note was generated.  Service: Cardiology      Discharged Condition: stable    Disposition: Home or Self Care    Follow Up:  Follow-up Information     Everett Sosa MD In 2 weeks.    Specialty:  Cardiology  Contact information:  [address]  [facility]  [place] [address]  [phone]             Everett Sosa MD In 1 week.    Specialty:  Cardiology  Contact information:  [address]  [facility]  [place] [address]  [phone]                 Patient Instructions:      Diet Cardiac     Call MD for:  redness, tenderness, or signs of infection (pain, swelling, redness, odor or green/yellow discharge around incision site)     Call MD for:  severe uncontrolled pain     Remove dressing in 24 hours     Other restrictions (specify):   Order Comments: No lifting > 10 lbs x 1 week  No soaking in water, showers only x 1 week     Call MD for:  temperature >100.4     Call MD for:  persistent nausea and vomiting     Call MD for:  severe uncontrolled pain     Call MD for:  difficulty breathing, headache or visual disturbances     Call MD for:  redness, tenderness, or signs of infection (pain, swelling, redness, odor or green/yellow discharge around incision site)     Call MD for:  hives     Call MD for:  persistent dizziness or light-headedness     Call MD for:  extreme fatigue     Remove dressing in 24 hours     Activity as tolerated     Shower on day dressing removed (No bath)     Medications:  Reconciled Home Medications:      Medication List      CONTINUE taking these medications    allopurinol 100 MG tablet  Commonly known as:  ZYLOPRIM  Take 1 tablet (100 mg total) by mouth once daily.     atorvastatin 40 MG tablet  Commonly known as:  LIPITOR  Take 1 tablet by mouth Daily.     cyanocobalamin 1,000 mcg/mL injection  INJECT 1 ML INTRAMUSCULARLY ONCE EVERY 30 DAYS     losartan 50 MG tablet  Commonly known as:  COZAAR  Take 50 mg by mouth 2 (two) times daily.     metoprolol  "succinate 100 MG 24 hr tablet  Commonly known as:  TOPROL-XL  Take 1 tablet (100 mg total) by mouth once daily.     NIFEdipine 60 MG (OSM) 24 hr tablet  Commonly known as:  PROCARDIA-XL  Take 60 mg by mouth once daily.     pantoprazole 40 MG tablet  Commonly known as:  PROTONIX  TAKE 1 TABLET BY MOUTH ONCE DAILY FOR 30 DAYS     Pradaxa 75 mg Cap  Generic drug:  dabigatran etexilate  Take 75 mg by mouth once daily. "Do NOT break, chew, or open capsules."     predniSONE 5 MG tablet  Commonly known as:  DELTASONE  Take 1 tablet (5 mg total) by mouth once daily.            Time spent on the discharge of patient: 30 minutes    TEQUILA Renee  Cardiology  [facility]  "

## 2019-12-03 NOTE — PLAN OF CARE
12/03/19 1447   LALA Message   Medicare Outpatient and Observation Notification regarding financial responsibility Given to patient/caregiver;Explained to patient/caregiver;Signed/date by patient/caregiver  (By Phone and sending copy ceritified mail)   Date LALA was signed 12/03/19   Time LALA was signed 3522       Introduced self and asked pt if ok to take few min to discuss Medicare form, and ok with pt. Explained that pt in observation status and Medicare wants to inform them of the MOON form, pt verbalized an understanding to form, form signed by pt and form scanned into CM notes. Copy made of signed form for pt and copy delivered to pt.

## 2019-12-03 NOTE — NURSING
"Lab staff attempted to draw bloodwork, pt refused. Nurse attempted to convince pt to have labs drawn, Pt stated, "there is no need for them to keep sticking me, they have stuck me enough"  "

## 2019-12-03 NOTE — PROGRESS NOTES
12/02/19 2200   PRE-TX-O2   O2 Device (Oxygen Therapy) room air   SpO2 98 %   Pulse Oximetry Type Continuous   $ Pulse Oximetry - Multiple Charge Pulse Oximetry - Multiple

## 2019-12-03 NOTE — NURSING
"Attempted x2 to retrieve vitals, limited assessement.  Pt refused and states " You don't have to do that because I'm leaving." Wife at bedside explains that at 3:30am phlebotomist  tired to stick him in the left arm where his iv infiltrated "so he is just mad right now." When asked his name pt rolled eyes and states "It's Tang Ontiveros."  "

## 2019-12-03 NOTE — NURSING
"Pt continues to refuse labs, meds, and vitals but otherwise appears comfortable. States " I'm just waiting on Dr. Sosa." Spouse remains at bedside. Will continue to monitor.  "

## 2019-12-03 NOTE — NURSING
Discharge instructions reviewed with pt, verbalized understanding. Pt aaox4. resp even and unlabored. In no acute distress. Labs collected prior to discharge per Dr. Sosa pt does not have to wait on results.

## 2019-12-13 ENCOUNTER — TELEPHONE (OUTPATIENT)
Dept: CARDIOTHORACIC SURGERY | Facility: CLINIC | Age: 79
End: 2019-12-13

## 2019-12-13 DIAGNOSIS — I35.0 AORTIC VALVE STENOSIS, ETIOLOGY OF CARDIAC VALVE DISEASE UNSPECIFIED: Primary | ICD-10-CM

## 2019-12-13 DIAGNOSIS — I25.10 CORONARY ARTERY DISEASE, ANGINA PRESENCE UNSPECIFIED, UNSPECIFIED VESSEL OR LESION TYPE, UNSPECIFIED WHETHER NATIVE OR TRANSPLANTED HEART: Primary | ICD-10-CM

## 2019-12-13 RX ORDER — SODIUM CHLORIDE 9 MG/ML
INJECTION, SOLUTION INTRAVENOUS CONTINUOUS
Status: CANCELLED | OUTPATIENT
Start: 2019-12-13

## 2019-12-13 RX ORDER — DIPHENHYDRAMINE HCL 25 MG
50 CAPSULE ORAL ONCE
Status: CANCELLED | OUTPATIENT
Start: 2019-12-13 | End: 2019-12-13

## 2019-12-13 NOTE — TELEPHONE ENCOUNTER
Spoke with pt's wife, wife stated she wanted to see Dr. Cm because Dr. Zhang did not have any sooner appts. I explained to Mrs. Jean-Baptiste providers are are different departments but, that I would check with NP, Danette to see if pt can be seen by Dr. Cm.    After speaking with Danette I called Mrs. Jean-Baptiste back and explained to her that although pt can be seen by CTS, pt would more than likely still need to see Dr. Zhang. Pt's wife stated that Dr. Zhang's office called to let her know pt can come on Monday 12/16. She stated she will go to that appt and is she feels its necessary she will call back to schedule with CTS.            ----- Message from Donald Villanueva sent at 12/13/2019  1:52 PM CST -----  Contact: [place] ( spouse ) @ [phone]  Caller is requesting a return phone call to discuss a NP appt pls call to discuss further

## 2019-12-15 ENCOUNTER — PATIENT OUTREACH (OUTPATIENT)
Dept: ADMINISTRATIVE | Facility: OTHER | Age: 79
End: 2019-12-15

## 2019-12-16 ENCOUNTER — LAB VISIT (OUTPATIENT)
Dept: LAB | Facility: HOSPITAL | Age: 79
End: 2019-12-16
Attending: INTERNAL MEDICINE
Payer: MEDICARE

## 2019-12-16 ENCOUNTER — EDUCATION (OUTPATIENT)
Dept: CARDIOLOGY | Facility: CLINIC | Age: 79
End: 2019-12-16

## 2019-12-16 ENCOUNTER — OFFICE VISIT (OUTPATIENT)
Dept: CARDIOLOGY | Facility: CLINIC | Age: 79
End: 2019-12-16
Payer: MEDICARE

## 2019-12-16 VITALS
HEART RATE: 68 BPM | WEIGHT: 173.31 LBS | BODY MASS INDEX: 27.2 KG/M2 | DIASTOLIC BLOOD PRESSURE: 58 MMHG | SYSTOLIC BLOOD PRESSURE: 110 MMHG | HEIGHT: 67 IN | OXYGEN SATURATION: 99 %

## 2019-12-16 DIAGNOSIS — I10 ESSENTIAL HYPERTENSION: ICD-10-CM

## 2019-12-16 DIAGNOSIS — I35.0 NONRHEUMATIC AORTIC VALVE STENOSIS: ICD-10-CM

## 2019-12-16 DIAGNOSIS — N18.30 STAGE 3 CHRONIC KIDNEY DISEASE: ICD-10-CM

## 2019-12-16 DIAGNOSIS — E78.2 MIXED HYPERLIPIDEMIA: ICD-10-CM

## 2019-12-16 DIAGNOSIS — I25.10 CORONARY ARTERY DISEASE, ANGINA PRESENCE UNSPECIFIED, UNSPECIFIED VESSEL OR LESION TYPE, UNSPECIFIED WHETHER NATIVE OR TRANSPLANTED HEART: Primary | ICD-10-CM

## 2019-12-16 DIAGNOSIS — I35.0 AORTIC VALVE STENOSIS, ETIOLOGY OF CARDIAC VALVE DISEASE UNSPECIFIED: ICD-10-CM

## 2019-12-16 DIAGNOSIS — I48.19 PERSISTENT ATRIAL FIBRILLATION: ICD-10-CM

## 2019-12-16 LAB
ANION GAP SERPL CALC-SCNC: 12 MMOL/L (ref 8–16)
BUN SERPL-MCNC: 39 MG/DL (ref 8–23)
CALCIUM SERPL-MCNC: 9.2 MG/DL (ref 8.7–10.5)
CHLORIDE SERPL-SCNC: 107 MMOL/L (ref 95–110)
CO2 SERPL-SCNC: 21 MMOL/L (ref 23–29)
CREAT SERPL-MCNC: 2.9 MG/DL (ref 0.5–1.4)
ERYTHROCYTE [DISTWIDTH] IN BLOOD BY AUTOMATED COUNT: 12.1 % (ref 11.5–14.5)
EST. GFR  (AFRICAN AMERICAN): 22.7 ML/MIN/1.73 M^2
EST. GFR  (NON AFRICAN AMERICAN): 19.7 ML/MIN/1.73 M^2
GLUCOSE SERPL-MCNC: 127 MG/DL (ref 70–110)
HCT VFR BLD AUTO: 35.7 % (ref 40–54)
HGB BLD-MCNC: 11.6 G/DL (ref 14–18)
INR PPP: 1.1 (ref 0.8–1.2)
MCH RBC QN AUTO: 31.4 PG (ref 27–31)
MCHC RBC AUTO-ENTMCNC: 32.5 G/DL (ref 32–36)
MCV RBC AUTO: 97 FL (ref 82–98)
PLATELET # BLD AUTO: 249 K/UL (ref 150–350)
PMV BLD AUTO: 10.5 FL (ref 9.2–12.9)
POTASSIUM SERPL-SCNC: 4.2 MMOL/L (ref 3.5–5.1)
PROTHROMBIN TIME: 11.4 SEC (ref 9–12.5)
RBC # BLD AUTO: 3.69 M/UL (ref 4.6–6.2)
SODIUM SERPL-SCNC: 140 MMOL/L (ref 136–145)
WBC # BLD AUTO: 8.54 K/UL (ref 3.9–12.7)

## 2019-12-16 PROCEDURE — 1159F MED LIST DOCD IN RCRD: CPT | Mod: S$GLB,,, | Performed by: INTERNAL MEDICINE

## 2019-12-16 PROCEDURE — 1101F PR PT FALLS ASSESS DOC 0-1 FALLS W/OUT INJ PAST YR: ICD-10-PCS | Mod: CPTII,S$GLB,, | Performed by: INTERNAL MEDICINE

## 2019-12-16 PROCEDURE — 85610 PROTHROMBIN TIME: CPT

## 2019-12-16 PROCEDURE — 80048 BASIC METABOLIC PNL TOTAL CA: CPT

## 2019-12-16 PROCEDURE — 3078F DIAST BP <80 MM HG: CPT | Mod: CPTII,S$GLB,, | Performed by: INTERNAL MEDICINE

## 2019-12-16 PROCEDURE — 85027 COMPLETE CBC AUTOMATED: CPT

## 2019-12-16 PROCEDURE — 36415 COLL VENOUS BLD VENIPUNCTURE: CPT

## 2019-12-16 PROCEDURE — 3078F PR MOST RECENT DIASTOLIC BLOOD PRESSURE < 80 MM HG: ICD-10-PCS | Mod: CPTII,S$GLB,, | Performed by: INTERNAL MEDICINE

## 2019-12-16 PROCEDURE — 1126F PR PAIN SEVERITY QUANTIFIED, NO PAIN PRESENT: ICD-10-PCS | Mod: S$GLB,,, | Performed by: INTERNAL MEDICINE

## 2019-12-16 PROCEDURE — 1126F AMNT PAIN NOTED NONE PRSNT: CPT | Mod: S$GLB,,, | Performed by: INTERNAL MEDICINE

## 2019-12-16 PROCEDURE — 99999 PR PBB SHADOW E&M-EST. PATIENT-LVL III: ICD-10-PCS | Mod: PBBFAC,,, | Performed by: INTERNAL MEDICINE

## 2019-12-16 PROCEDURE — 99204 PR OFFICE/OUTPT VISIT, NEW, LEVL IV, 45-59 MIN: ICD-10-PCS | Mod: S$GLB,,, | Performed by: INTERNAL MEDICINE

## 2019-12-16 PROCEDURE — 1101F PT FALLS ASSESS-DOCD LE1/YR: CPT | Mod: CPTII,S$GLB,, | Performed by: INTERNAL MEDICINE

## 2019-12-16 PROCEDURE — 99999 PR PBB SHADOW E&M-EST. PATIENT-LVL III: CPT | Mod: PBBFAC,,, | Performed by: INTERNAL MEDICINE

## 2019-12-16 PROCEDURE — 1159F PR MEDICATION LIST DOCUMENTED IN MEDICAL RECORD: ICD-10-PCS | Mod: S$GLB,,, | Performed by: INTERNAL MEDICINE

## 2019-12-16 PROCEDURE — 3074F PR MOST RECENT SYSTOLIC BLOOD PRESSURE < 130 MM HG: ICD-10-PCS | Mod: CPTII,S$GLB,, | Performed by: INTERNAL MEDICINE

## 2019-12-16 PROCEDURE — 99204 OFFICE O/P NEW MOD 45 MIN: CPT | Mod: S$GLB,,, | Performed by: INTERNAL MEDICINE

## 2019-12-16 PROCEDURE — 3074F SYST BP LT 130 MM HG: CPT | Mod: CPTII,S$GLB,, | Performed by: INTERNAL MEDICINE

## 2019-12-16 RX ORDER — ASPIRIN 81 MG/1
81 TABLET ORAL DAILY
Qty: 30 TABLET | Refills: 10 | Status: ON HOLD | OUTPATIENT
Start: 2019-12-16 | End: 2020-01-23 | Stop reason: HOSPADM

## 2019-12-16 RX ORDER — CLOPIDOGREL BISULFATE 75 MG/1
75 TABLET ORAL DAILY
Qty: 30 TABLET | Refills: 11 | Status: ON HOLD | OUTPATIENT
Start: 2019-12-16 | End: 2020-01-08 | Stop reason: HOSPADM

## 2019-12-16 NOTE — ASSESSMENT & PLAN NOTE
-Coronary angiogram and PCI via R CFA access  -Start ASA 81 mg po daily and plavix load with 300 mg  -Check PRU at the day of the procedure   -IVF at 3 ml/hour   -Patient is a SARAI candidate  -The risks, benefits & alternatives of the procedure were explained to the patient.    -The risks of coronary angiography include but are not limited to:  Bleeding, infection, heart rhythm abnormalities, allergic reactions, kidney injury, stroke and death.    -Should stenting be indicated, the patient has agreed to dual anti-platelet therapy for 1-consecutive year with a drug-eluting stent and a minimum of 1-month with the use of a bare metal stent.    -The risks of moderate sedation include hypotension, respiratory depression, arrhythmias, bronchospasm, & death.    -Informed consent was obtained & the patient is agreeable to proceed with the procedure.

## 2019-12-16 NOTE — H&P (VIEW-ONLY)
"Subjective:    Patient ID:  Lucho Delong is a 78 y.o. male who presents for evaluation of CAD and CKD3     Referring Physician: Dr Sosa    HPI    Mr Delong is a 79 yo man with medical history significant for HTN, DLPD, atrial fibrillation, CVA (March 2019), CKD 3, CAD and aortic stenosis who is referred for coronary PCI and severe AS evaluation. Diagnostic angiogram showed significant RCA stenosis. Hemodynamic valve study showed MG 28 mmHg. TTE o on file.     Upon interview, patient reported COLON upon mild exertion. He is only able to walk less than one block until SOB symptoms appear. Symptoms have been present for over one year. He used to be active by playing golf but no longer he does it due to SOB.  He does not take nitro SL. Denies palpitations, diaphoresis or syncopal events. His renal function has declined over the past year. Cr was 3.0 but the most recent one is 1.9. He is followed by nephrology.    Body mass index is 27.14 kg/m².  Vitals:    12/16/19 0817   BP: (!) 110/58   Pulse: 68     Current Outpatient Medications on File Prior to Visit   Medication Sig Dispense Refill    allopurinol (ZYLOPRIM) 100 MG tablet Take 1 tablet (100 mg total) by mouth once daily. 90 tablet 3    atorvastatin (LIPITOR) 40 MG tablet Take 1 tablet by mouth Daily.  0    cyanocobalamin 1,000 mcg/mL injection INJECT 1 ML INTRAMUSCULARLY ONCE EVERY 30 DAYS  1    dabigatran etexilate (PRADAXA) 75 mg Cap Take 75 mg by mouth once daily. "Do NOT break, chew, or open capsules."      losartan (COZAAR) 50 MG tablet Take 50 mg by mouth 2 (two) times daily.  6    metoprolol succinate (TOPROL-XL) 100 MG 24 hr tablet Take 1 tablet (100 mg total) by mouth once daily. 90 tablet 3    NIFEdipine (PROCARDIA-XL) 60 MG (OSM) 24 hr tablet Take 60 mg by mouth once daily.  6    pantoprazole (PROTONIX) 40 MG tablet TAKE 1 TABLET BY MOUTH ONCE DAILY FOR 30 DAYS  6    predniSONE (DELTASONE) 5 MG tablet Take 1 tablet (5 mg total) by " mouth once daily. 90 tablet 3     No current facility-administered medications on file prior to visit.        Review of Systems   Constitution: Negative.   HENT: Negative.    Eyes: Negative.    Cardiovascular: Positive for chest pain, dyspnea on exertion and orthopnea. Negative for claudication, cyanosis, irregular heartbeat, leg swelling, near-syncope, palpitations, paroxysmal nocturnal dyspnea and syncope.   Respiratory: Positive for shortness of breath. Negative for cough, hemoptysis, sleep disturbances due to breathing, snoring, sputum production and wheezing.    Endocrine: Negative.    Hematologic/Lymphatic: Negative.    Gastrointestinal: Negative.    Genitourinary: Negative.         Objective:    Physical Exam   Constitutional: He is oriented to person, place, and time. He appears well-developed and well-nourished.   HENT:   Head: Normocephalic and atraumatic.   Eyes: Conjunctivae are normal.   Neck: Neck supple. No JVD present. No thyromegaly present.   Cardiovascular: Normal rate and regular rhythm.   Murmur heard.   Harsh midsystolic murmur is present with a grade of 3/6 at the upper right sternal border radiating to the neck.  Pulses:       Carotid pulses are 2+ on the right side, and 2+ on the left side.       Radial pulses are 2+ on the right side, and 2+ on the left side.        Femoral pulses are 2+ on the right side, and 2+ on the left side.       Popliteal pulses are 2+ on the right side, and 2+ on the left side.        Dorsalis pedis pulses are 2+ on the right side, and 2+ on the left side.        Posterior tibial pulses are 2+ on the right side, and 2+ on the left side.   Pulmonary/Chest: Effort normal and breath sounds normal. No respiratory distress. He has no wheezes. He has no rales.   Abdominal: Soft. Bowel sounds are normal. He exhibits no distension. There is no tenderness. There is no rebound.   Musculoskeletal: Normal range of motion.   Neurological: He is oriented to person, place, and  time.   Skin: Skin is warm.   Nursing note and vitals reviewed.        Assessment:       1. Coronary artery disease, angina presence unspecified, unspecified vessel or lesion type, unspecified whether native or transplanted heart    2. Mixed hyperlipidemia    3. Essential hypertension    4. Persistent atrial fibrillation    5. Stage 3 chronic kidney disease         Plan:       Coronary artery disease  -If no evidence of URBANO requiring stenting  (See below) will do coronary intervention on the LAD (long lesion involving diagonal branch) and RCA (Two lesions involving a small PDA).    -Start ASA 81 mg po daily and plavix load with 300 mg the day prior to PCI.  -Check PRU at the day of the procedure  -Instructed to drink plenty of fluid day before the angiogram   -IVF at 3 ml/hour   -Patient is a SARAI candidate  -The risks, benefits & alternatives of the procedure were explained to the patient.    -The risks of coronary angiography include but are not limited to:  Bleeding, infection, heart rhythm abnormalities, allergic reactions, kidney injury, stroke and death.    -Should stenting be indicated, the patient has agreed to dual anti-platelet therapy for 1-consecutive year with a drug-eluting stent and a minimum of 1-month with the use of a bare metal stent.    -The risks of moderate sedation include hypotension, respiratory depression, arrhythmias, bronchospasm, & death.    -Informed consent was obtained & the patient is agreeable to proceed with the procedure.      Stage 3 chronic kidney disease  -We will do bilateral renal angiogram and possible stenting first.    -Staged coronary intervention after renal stenting if URBANO is found.    Nonrheumatic aortic valve stenosis, not severe on physical exam  -Will do 2D echo   -Continue medical treatment     Permanent Atrial Fibrillation, CHADS VASC 5 (8%/year) and HAS BLED 4 (7%/year)  -Continue NOAC  -He is a Watchman candidate; will discuss the procedure with him after his  angiogram    Carlos Patino MD  Structural/Valve Interventional Fellow  12/16/2019 9:20 AM        Staff:  I have personally taken the history and examined this patient and agree with the fellow's note as stated above and amended it accordingly :-) This patient has severe hypertension and severe CKD and will undergo renal angiography and possible intervention 1st.  If renal artery stenosis was found in stents are placed then we will defer coronary intervention to the a stage procedure later.

## 2019-12-16 NOTE — PROGRESS NOTES
"Subjective:    Patient ID:  Lucho Delong is a 78 y.o. male who presents for evaluation of CAD and CKD3     Referring Physician: Dr Sosa    HPI    Mr Delong is a 77 yo man with medical history significant for HTN, DLPD, atrial fibrillation, CVA (March 2019), CKD 3, CAD and aortic stenosis who is referred for coronary PCI and severe AS evaluation. Diagnostic angiogram showed significant RCA stenosis. Hemodynamic valve study showed MG 28 mmHg. TTE o on file.     Upon interview, patient reported COLON upon mild exertion. He is only able to walk less than one block until SOB symptoms appear. Symptoms have been present for over one year. He used to be active by playing golf but no longer he does it due to SOB.  He does not take nitro SL. Denies palpitations, diaphoresis or syncopal events. His renal function has declined over the past year. Cr was 3.0 but the most recent one is 1.9. He is followed by nephrology.    Body mass index is 27.14 kg/m².  Vitals:    12/16/19 0817   BP: (!) 110/58   Pulse: 68     Current Outpatient Medications on File Prior to Visit   Medication Sig Dispense Refill    allopurinol (ZYLOPRIM) 100 MG tablet Take 1 tablet (100 mg total) by mouth once daily. 90 tablet 3    atorvastatin (LIPITOR) 40 MG tablet Take 1 tablet by mouth Daily.  0    cyanocobalamin 1,000 mcg/mL injection INJECT 1 ML INTRAMUSCULARLY ONCE EVERY 30 DAYS  1    dabigatran etexilate (PRADAXA) 75 mg Cap Take 75 mg by mouth once daily. "Do NOT break, chew, or open capsules."      losartan (COZAAR) 50 MG tablet Take 50 mg by mouth 2 (two) times daily.  6    metoprolol succinate (TOPROL-XL) 100 MG 24 hr tablet Take 1 tablet (100 mg total) by mouth once daily. 90 tablet 3    NIFEdipine (PROCARDIA-XL) 60 MG (OSM) 24 hr tablet Take 60 mg by mouth once daily.  6    pantoprazole (PROTONIX) 40 MG tablet TAKE 1 TABLET BY MOUTH ONCE DAILY FOR 30 DAYS  6    predniSONE (DELTASONE) 5 MG tablet Take 1 tablet (5 mg total) by " mouth once daily. 90 tablet 3     No current facility-administered medications on file prior to visit.        Review of Systems   Constitution: Negative.   HENT: Negative.    Eyes: Negative.    Cardiovascular: Positive for chest pain, dyspnea on exertion and orthopnea. Negative for claudication, cyanosis, irregular heartbeat, leg swelling, near-syncope, palpitations, paroxysmal nocturnal dyspnea and syncope.   Respiratory: Positive for shortness of breath. Negative for cough, hemoptysis, sleep disturbances due to breathing, snoring, sputum production and wheezing.    Endocrine: Negative.    Hematologic/Lymphatic: Negative.    Gastrointestinal: Negative.    Genitourinary: Negative.         Objective:    Physical Exam   Constitutional: He is oriented to person, place, and time. He appears well-developed and well-nourished.   HENT:   Head: Normocephalic and atraumatic.   Eyes: Conjunctivae are normal.   Neck: Neck supple. No JVD present. No thyromegaly present.   Cardiovascular: Normal rate and regular rhythm.   Murmur heard.   Harsh midsystolic murmur is present with a grade of 3/6 at the upper right sternal border radiating to the neck.  Pulses:       Carotid pulses are 2+ on the right side, and 2+ on the left side.       Radial pulses are 2+ on the right side, and 2+ on the left side.        Femoral pulses are 2+ on the right side, and 2+ on the left side.       Popliteal pulses are 2+ on the right side, and 2+ on the left side.        Dorsalis pedis pulses are 2+ on the right side, and 2+ on the left side.        Posterior tibial pulses are 2+ on the right side, and 2+ on the left side.   Pulmonary/Chest: Effort normal and breath sounds normal. No respiratory distress. He has no wheezes. He has no rales.   Abdominal: Soft. Bowel sounds are normal. He exhibits no distension. There is no tenderness. There is no rebound.   Musculoskeletal: Normal range of motion.   Neurological: He is oriented to person, place, and  time.   Skin: Skin is warm.   Nursing note and vitals reviewed.        Assessment:       1. Coronary artery disease, angina presence unspecified, unspecified vessel or lesion type, unspecified whether native or transplanted heart    2. Mixed hyperlipidemia    3. Essential hypertension    4. Persistent atrial fibrillation    5. Stage 3 chronic kidney disease         Plan:       Coronary artery disease  -If no evidence of URBANO requiring stenting  (See below) will do coronary intervention on the LAD (long lesion involving diagonal branch) and RCA (Two lesions involving a small PDA).    -Start ASA 81 mg po daily and plavix load with 300 mg the day prior to PCI.  -Check PRU at the day of the procedure  -Instructed to drink plenty of fluid day before the angiogram   -IVF at 3 ml/hour   -Patient is a SARAI candidate  -The risks, benefits & alternatives of the procedure were explained to the patient.    -The risks of coronary angiography include but are not limited to:  Bleeding, infection, heart rhythm abnormalities, allergic reactions, kidney injury, stroke and death.    -Should stenting be indicated, the patient has agreed to dual anti-platelet therapy for 1-consecutive year with a drug-eluting stent and a minimum of 1-month with the use of a bare metal stent.    -The risks of moderate sedation include hypotension, respiratory depression, arrhythmias, bronchospasm, & death.    -Informed consent was obtained & the patient is agreeable to proceed with the procedure.      Stage 3 chronic kidney disease  -We will do bilateral renal angiogram and possible stenting first.    -Staged coronary intervention after renal stenting if URBANO is found.    Nonrheumatic aortic valve stenosis, not severe on physical exam  -Will do 2D echo   -Continue medical treatment     Permanent Atrial Fibrillation, CHADS VASC 5 (8%/year) and HAS BLED 4 (7%/year)  -Continue NOAC  -He is a Watchman candidate; will discuss the procedure with him after his  angiogram    Carlos Patino MD  Structural/Valve Interventional Fellow  12/16/2019 9:20 AM        Staff:  I have personally taken the history and examined this patient and agree with the fellow's note as stated above and amended it accordingly :-) This patient has severe hypertension and severe CKD and will undergo renal angiography and possible intervention 1st.  If renal artery stenosis was found in stents are placed then we will defer coronary intervention to the a stage procedure later.

## 2019-12-16 NOTE — LETTER
December 16, 2019      Everett Sosa MD  [address] [facility] [address]  [place] [address]           [facility]  [address]  [facility] [address]  Phone: [phone]          Patient: Lucho Delong   MR Number: [id number]   YOB: 1940   Date of Visit: 12/16/2019       Dear Dr. Everett Sosa:    Thank you for referring Lucho Delong to me for evaluation. Attached you will find relevant portions of my assessment and plan of care.    If you have questions, please do not hesitate to call me. I look forward to following Lucho Delong along with you.    Sincerely,    Bryan Zhang MD    Enclosure  CC:  No Recipients    If you would like to receive this communication electronically, please contact [email or web address] or [phone] to request more information on [employer] access.    For providers and/or their staff who would like to refer a patient to [facility], please contact us through our one-stop-shop provider referral line, [facility] , at [phone].    If you feel you have received this communication in error or would no longer like to receive these types of communications, please e-mail [email or web address]

## 2019-12-16 NOTE — PROGRESS NOTES
OUTPATIENT CATHETERIZATION INSTRUCTIONS    You have been scheduled for a procedure in the catheterization lab on Wednesday, January 8, 2020.     Please report to the Cardiology Waiting Area on the Third floor of the hospital and check in at 9 AM.   You will then be taken to the SSCU (Short Stay Cardiac Unit) and prepared for your procedure. Please be aware that this is not the time of your procedure but the time you are to arrive. The procedures are scheduled on an hourly basis; however, emergency cases take precedence over all other cases.       You may not have anything to eat or drink after midnight the night before your test. You may take your regular morning medications with water. If there are any medications that you should not take you will be instructed to hold them that morning. If you are diabetic and on Metformin (Glucophage) do not take it the day before, the day of, and for 2 days after your procedure.      The procedure will take 1-2 hours to perform. After the procedure, you will return to SSCU on the third floor of the hospital. You will need to lie still (or keep your arm still) for the next 4 to 6 hours to minimize bleeding from the puncture site. Your family may remain in the room with you during this time.       You may be able to be discharged home that same afternoon if there is someone to drive you home and there were no complications. If you have one of the balloon, stent, or device procedures you may spend the night in the hospital. Your doctor will determine, based on your progress, the date and time of your discharge. The results of your procedure will be discussed with you before you are discharged. Any further testing or procedures will be scheduled for you either before you leave or you will be called with these appointments.       If you should have any questions, concerns, or need to change the date of your procedure, please call CRISTINA Womack @ [phone]    Special  Instructions:  Hold Pradaxa 3 days before.    Plavix 75m tablets night before and 1 tablet morning of procedure          THE ABOVE INSTRUCTIONS WERE GIVEN TO THE PATIENT VERBALLY AND THEY VERBALIZED UNDERSTANDING.  THEY DO NOT REQUIRE ANY SPECIAL NEEDS AND DO NOT HAVE ANY LEARNING BARRIERS.          Directions for Reporting to Cardiology Waiting Area in the Hospital  If you park in the Parking Garage:  Take elevators to the1st floor of the parking garage.  Continue past the gift shop, coffee shop, and piano.  Take a right and go to the gold elevators. (Elevator B)  Take the elevator to the 3rd floor.  Follow the arrow on the sign on the wall that says Cath Lab Registration/EP Lab Registration.  Follow the long hallway all the way around until you come to a big open area.  This is the registration area.  Check in at Reception Desk.    OR    If family is dropping you off:  Have them drop you off at the front of the Hospital under the green overhang.  Enter through the doors and take a right.  Take the E elevators to the 3rd floor Cardiology Waiting Area.  Check in at the Reception Desk in the waiting room.

## 2019-12-16 NOTE — ASSESSMENT & PLAN NOTE
-We will do bilateral renal angiogram to evaluate atherosclerosis as underlying cause of renal dysfunction  -ASA and plavix load

## 2019-12-26 DIAGNOSIS — N18.9 CHRONIC KIDNEY DISEASE, UNSPECIFIED CKD STAGE: ICD-10-CM

## 2019-12-27 ENCOUNTER — TELEPHONE (OUTPATIENT)
Dept: FAMILY MEDICINE | Facility: CLINIC | Age: 79
End: 2019-12-27

## 2019-12-27 ENCOUNTER — CLINICAL SUPPORT (OUTPATIENT)
Dept: FAMILY MEDICINE | Facility: CLINIC | Age: 79
End: 2019-12-27
Payer: MEDICARE

## 2019-12-27 DIAGNOSIS — E53.8 B12 DEFICIENCY: Primary | ICD-10-CM

## 2019-12-27 PROCEDURE — 96372 THER/PROPH/DIAG INJ SC/IM: CPT | Mod: S$GLB,,, | Performed by: FAMILY MEDICINE

## 2019-12-27 PROCEDURE — 96372 PR INJECTION,THERAP/PROPH/DIAG2ST, IM OR SUBCUT: ICD-10-PCS | Mod: S$GLB,,, | Performed by: FAMILY MEDICINE

## 2019-12-27 RX ORDER — PANTOPRAZOLE SODIUM 40 MG/1
40 TABLET, DELAYED RELEASE ORAL DAILY
Qty: 90 TABLET | Refills: 3 | Status: SHIPPED | OUTPATIENT
Start: 2019-12-27 | End: 2020-06-02

## 2019-12-27 RX ORDER — CYANOCOBALAMIN 1000 UG/ML
1000 INJECTION, SOLUTION INTRAMUSCULAR; SUBCUTANEOUS
Status: CANCELLED | OUTPATIENT
Start: 2019-12-27 | End: 2019-12-27

## 2019-12-27 RX ORDER — LACTASE 9000 UNIT
1 TABLET ORAL
Qty: 90 TABLET | Refills: 3 | Status: ON HOLD | OUTPATIENT
Start: 2019-12-27 | End: 2020-02-19

## 2019-12-27 RX ADMIN — CYANOCOBALAMIN 1000 MCG: 1000 INJECTION, SOLUTION INTRAMUSCULAR; SUBCUTANEOUS at 10:12

## 2019-12-27 NOTE — TELEPHONE ENCOUNTER
Complaints of gas & some diarrhea that is causing problems with daily life. Also needs handicap placard.   Pharmacy [facility] in [place].

## 2019-12-27 NOTE — PROGRESS NOTES
Here for B12 injection, given in Lt deltoid after 2 pt identifier's used, no redness/swelling noted, denies complaints of pain. Requested Handicap  Placard for vehicle since previous stroke finds it hard to ambulate in public, will fill out and give to patient & spouse. Also states having some gas issues with some diarrhea, but appetite has been decreased also since stroke.

## 2020-01-08 ENCOUNTER — HOSPITAL ENCOUNTER (OUTPATIENT)
Facility: HOSPITAL | Age: 80
Discharge: STILL ADMITTED OR ADMINISTRATIVE | End: 2020-01-08
Attending: INTERNAL MEDICINE | Admitting: INTERNAL MEDICINE
Payer: MEDICARE

## 2020-01-08 ENCOUNTER — HOSPITAL ENCOUNTER (OUTPATIENT)
Dept: CARDIOLOGY | Facility: CLINIC | Age: 80
Discharge: HOME | End: 2020-01-08
Attending: STUDENT IN AN ORGANIZED HEALTH CARE EDUCATION/TRAINING PROGRAM
Payer: MEDICARE

## 2020-01-08 VITALS
BODY MASS INDEX: 26.68 KG/M2 | WEIGHT: 170 LBS | HEART RATE: 72 BPM | SYSTOLIC BLOOD PRESSURE: 100 MMHG | HEIGHT: 67 IN | DIASTOLIC BLOOD PRESSURE: 60 MMHG

## 2020-01-08 VITALS
BODY MASS INDEX: 26.68 KG/M2 | DIASTOLIC BLOOD PRESSURE: 65 MMHG | HEART RATE: 78 BPM | WEIGHT: 170 LBS | RESPIRATION RATE: 18 BRPM | HEIGHT: 67 IN | OXYGEN SATURATION: 100 % | SYSTOLIC BLOOD PRESSURE: 137 MMHG | TEMPERATURE: 97 F

## 2020-01-08 DIAGNOSIS — I48.19 PERSISTENT ATRIAL FIBRILLATION: ICD-10-CM

## 2020-01-08 DIAGNOSIS — I48.19 PERSISTENT ATRIAL FIBRILLATION: Primary | ICD-10-CM

## 2020-01-08 DIAGNOSIS — I25.10 CORONARY ARTERY DISEASE, ANGINA PRESENCE UNSPECIFIED, UNSPECIFIED VESSEL OR LESION TYPE, UNSPECIFIED WHETHER NATIVE OR TRANSPLANTED HEART: ICD-10-CM

## 2020-01-08 DIAGNOSIS — E53.8 B12 DEFICIENCY: Primary | ICD-10-CM

## 2020-01-08 DIAGNOSIS — I35.0 NONRHEUMATIC AORTIC VALVE STENOSIS: Primary | ICD-10-CM

## 2020-01-08 DIAGNOSIS — N18.30 STAGE 3 CHRONIC KIDNEY DISEASE: ICD-10-CM

## 2020-01-08 LAB
ABO + RH BLD: NORMAL
ANION GAP SERPL CALC-SCNC: 10 MMOL/L (ref 8–16)
ASCENDING AORTA: 3.15 CM
AV INDEX (PROSTH): 0.23
AV MEAN GRADIENT: 29 MMHG
AV PEAK GRADIENT: 51 MMHG
AV VALVE AREA: 0.86 CM2
AV VELOCITY RATIO: 0.23
BLD GP AB SCN CELLS X3 SERPL QL: NORMAL
BSA FOR ECHO PROCEDURE: 1.91 M2
BUN SERPL-MCNC: 47 MG/DL (ref 8–23)
CALCIUM SERPL-MCNC: 9.2 MG/DL (ref 8.7–10.5)
CHLORIDE SERPL-SCNC: 111 MMOL/L (ref 95–110)
CO2 SERPL-SCNC: 21 MMOL/L (ref 23–29)
CREAT SERPL-MCNC: 2.7 MG/DL (ref 0.5–1.4)
CV ECHO LV RWT: 0.43 CM
DOP CALC AO PEAK VEL: 3.56 M/S
DOP CALC AO VTI: 76.04 CM
DOP CALC LVOT AREA: 3.7 CM2
DOP CALC LVOT DIAMETER: 2.17 CM
DOP CALC LVOT PEAK VEL: 0.81 M/S
DOP CALC LVOT STROKE VOLUME: 65.02 CM3
DOP CALCLVOT PEAK VEL VTI: 17.59 CM
E WAVE DECELERATION TIME: 210.68 MSEC
E/A RATIO: 2.51
ECHO LV POSTERIOR WALL: 1.03 CM (ref 0.6–1.1)
ERYTHROCYTE [DISTWIDTH] IN BLOOD BY AUTOMATED COUNT: 12.4 % (ref 11.5–14.5)
EST. GFR  (AFRICAN AMERICAN): 24.6 ML/MIN/1.73 M^2
EST. GFR  (NON AFRICAN AMERICAN): 21.3 ML/MIN/1.73 M^2
FRACTIONAL SHORTENING: 23 % (ref 28–44)
GLUCOSE SERPL-MCNC: 101 MG/DL (ref 70–110)
HCT VFR BLD AUTO: 34.8 % (ref 40–54)
HGB BLD-MCNC: 11.4 G/DL (ref 14–18)
INR PPP: 1.1 (ref 0.8–1.2)
INTERVENTRICULAR SEPTUM: 1 CM (ref 0.6–1.1)
LA MAJOR: 6.43 CM
LA MINOR: 6.38 CM
LA WIDTH: 3.29 CM
LEFT ATRIUM SIZE: 4.41 CM
LEFT ATRIUM VOLUME INDEX: 41.9 ML/M2
LEFT ATRIUM VOLUME: 78.99 CM3
LEFT INTERNAL DIMENSION IN SYSTOLE: 3.65 CM (ref 2.1–4)
LEFT VENTRICLE DIASTOLIC VOLUME INDEX: 55.61 ML/M2
LEFT VENTRICLE DIASTOLIC VOLUME: 104.94 ML
LEFT VENTRICLE MASS INDEX: 90 G/M2
LEFT VENTRICLE SYSTOLIC VOLUME INDEX: 29.8 ML/M2
LEFT VENTRICLE SYSTOLIC VOLUME: 56.24 ML
LEFT VENTRICULAR INTERNAL DIMENSION IN DIASTOLE: 4.75 CM (ref 3.5–6)
LEFT VENTRICULAR MASS: 170.74 G
MCH RBC QN AUTO: 31.7 PG (ref 27–31)
MCHC RBC AUTO-ENTMCNC: 32.8 G/DL (ref 32–36)
MCV RBC AUTO: 97 FL (ref 82–98)
MV PEAK A VEL: 0.45 M/S
MV PEAK E VEL: 1.13 M/S
PA AA PRP-ACNC: 414 ARU (ref 620–672)
PISA TR MAX VEL: 2.75 M/S
PLATELET # BLD AUTO: 204 K/UL (ref 150–350)
PMV BLD AUTO: 10.7 FL (ref 9.2–12.9)
POTASSIUM SERPL-SCNC: 4.2 MMOL/L (ref 3.5–5.1)
PROTHROMBIN TIME: 10.9 SEC (ref 9–12.5)
RA MAJOR: 5.19 CM
RA WIDTH: 3.48 CM
RBC # BLD AUTO: 3.6 M/UL (ref 4.6–6.2)
RIGHT VENTRICULAR END-DIASTOLIC DIMENSION: 2.93 CM
SINUS: 3.25 CM
SODIUM SERPL-SCNC: 142 MMOL/L (ref 136–145)
STJ: 2.6 CM
TR MAX PG: 30 MMHG
TRICUSPID ANNULAR PLANE SYSTOLIC EXCURSION: 2.03 CM
WBC # BLD AUTO: 7.65 K/UL (ref 3.9–12.7)

## 2020-01-08 PROCEDURE — 25000003 PHARM REV CODE 250: Performed by: INTERNAL MEDICINE

## 2020-01-08 PROCEDURE — 93306 ECHO (CUPID ONLY): ICD-10-PCS | Mod: S$GLB,,, | Performed by: INTERNAL MEDICINE

## 2020-01-08 PROCEDURE — 93306 TTE W/DOPPLER COMPLETE: CPT | Mod: S$GLB,,, | Performed by: INTERNAL MEDICINE

## 2020-01-08 PROCEDURE — 93005 ELECTROCARDIOGRAM TRACING: CPT | Mod: 59

## 2020-01-08 PROCEDURE — 92928 PR STENT: ICD-10-PCS | Mod: LD,,, | Performed by: INTERNAL MEDICINE

## 2020-01-08 PROCEDURE — 85610 PROTHROMBIN TIME: CPT

## 2020-01-08 PROCEDURE — 63600175 PHARM REV CODE 636 W HCPCS: Performed by: INTERNAL MEDICINE

## 2020-01-08 PROCEDURE — 99152 PR MOD CONSCIOUS SEDATION, SAME PHYS, 5+ YRS, FIRST 15 MIN: ICD-10-PCS | Mod: ,,, | Performed by: INTERNAL MEDICINE

## 2020-01-08 PROCEDURE — 36254: ICD-10-PCS | Mod: 51,,, | Performed by: INTERNAL MEDICINE

## 2020-01-08 PROCEDURE — C1874 STENT, COATED/COV W/DEL SYS: HCPCS | Performed by: INTERNAL MEDICINE

## 2020-01-08 PROCEDURE — C1725 CATH, TRANSLUMIN NON-LASER: HCPCS | Performed by: INTERNAL MEDICINE

## 2020-01-08 PROCEDURE — 93454 CORONARY ARTERY ANGIO S&I: CPT | Mod: 59 | Performed by: INTERNAL MEDICINE

## 2020-01-08 PROCEDURE — 93010 EKG 12-LEAD: ICD-10-PCS | Mod: ,,, | Performed by: INTERNAL MEDICINE

## 2020-01-08 PROCEDURE — 80048 BASIC METABOLIC PNL TOTAL CA: CPT

## 2020-01-08 PROCEDURE — 93454 CORONARY ARTERY ANGIO S&I: CPT | Mod: 26,59,, | Performed by: INTERNAL MEDICINE

## 2020-01-08 PROCEDURE — 93454 PR CATH PLACE/CORONARY ANGIO, IMG SUPER/INTERP: ICD-10-PCS | Mod: 26,59,, | Performed by: INTERNAL MEDICINE

## 2020-01-08 PROCEDURE — 99152 MOD SED SAME PHYS/QHP 5/>YRS: CPT | Mod: ,,, | Performed by: INTERNAL MEDICINE

## 2020-01-08 PROCEDURE — 99153 MOD SED SAME PHYS/QHP EA: CPT | Performed by: INTERNAL MEDICINE

## 2020-01-08 PROCEDURE — 85027 COMPLETE CBC AUTOMATED: CPT

## 2020-01-08 PROCEDURE — 99152 MOD SED SAME PHYS/QHP 5/>YRS: CPT | Performed by: INTERNAL MEDICINE

## 2020-01-08 PROCEDURE — C1760 CLOSURE DEV, VASC: HCPCS | Performed by: INTERNAL MEDICINE

## 2020-01-08 PROCEDURE — C9600 PERC DRUG-EL COR STENT SING: HCPCS | Mod: LD | Performed by: INTERNAL MEDICINE

## 2020-01-08 PROCEDURE — C1769 GUIDE WIRE: HCPCS | Performed by: INTERNAL MEDICINE

## 2020-01-08 PROCEDURE — C1887 CATHETER, GUIDING: HCPCS | Performed by: INTERNAL MEDICINE

## 2020-01-08 PROCEDURE — C1894 INTRO/SHEATH, NON-LASER: HCPCS | Performed by: INTERNAL MEDICINE

## 2020-01-08 PROCEDURE — 93010 ELECTROCARDIOGRAM REPORT: CPT | Mod: 76,,, | Performed by: INTERNAL MEDICINE

## 2020-01-08 PROCEDURE — 27201423 OPTIME MED/SURG SUP & DEVICES STERILE SUPPLY: Performed by: INTERNAL MEDICINE

## 2020-01-08 PROCEDURE — 36254 INS CATH REN ART 2ND+ BILAT: CPT | Mod: 51,,, | Performed by: INTERNAL MEDICINE

## 2020-01-08 PROCEDURE — 85576 BLOOD PLATELET AGGREGATION: CPT

## 2020-01-08 PROCEDURE — 92928 PRQ TCAT PLMT NTRAC ST 1 LES: CPT | Mod: LD,,, | Performed by: INTERNAL MEDICINE

## 2020-01-08 PROCEDURE — 93010 ELECTROCARDIOGRAM REPORT: CPT | Mod: ,,, | Performed by: INTERNAL MEDICINE

## 2020-01-08 PROCEDURE — 25500020 PHARM REV CODE 255: Performed by: INTERNAL MEDICINE

## 2020-01-08 PROCEDURE — 86850 RBC ANTIBODY SCREEN: CPT

## 2020-01-08 PROCEDURE — 36254 INS CATH REN ART 2ND+ BILAT: CPT | Mod: 51 | Performed by: INTERNAL MEDICINE

## 2020-01-08 DEVICE — STENT RONYX35022UX RESOLUTE ONYX 3.50X22
Type: IMPLANTABLE DEVICE | Site: CORONARY | Status: FUNCTIONAL
Brand: RESOLUTE ONYX™

## 2020-01-08 DEVICE — STENT RONYX25030UX RESOLUTE ONYX 2.50X30
Type: IMPLANTABLE DEVICE | Site: CORONARY | Status: FUNCTIONAL
Brand: RESOLUTE ONYX™

## 2020-01-08 DEVICE — STENT RONYX25012UX RESOLUTE ONYX 2.50X12
Type: IMPLANTABLE DEVICE | Site: CORONARY | Status: FUNCTIONAL
Brand: RESOLUTE ONYX™

## 2020-01-08 DEVICE — STENT RONYX25026UX RESOLUTE ONYX 2.50X26
Type: IMPLANTABLE DEVICE | Site: CORONARY | Status: FUNCTIONAL
Brand: RESOLUTE ONYX™

## 2020-01-08 DEVICE — STENT RONYX35008UX RESOLUTE ONYX 3.50X08
Type: IMPLANTABLE DEVICE | Site: CORONARY | Status: FUNCTIONAL
Brand: RESOLUTE ONYX™

## 2020-01-08 DEVICE — STENT RONYX22512UX RESOLUTE ONYX 2.25X12
Type: IMPLANTABLE DEVICE | Site: CORONARY | Status: FUNCTIONAL
Brand: RESOLUTE ONYX™

## 2020-01-08 RX ORDER — CYANOCOBALAMIN 1000 UG/ML
1000 INJECTION, SOLUTION INTRAMUSCULAR; SUBCUTANEOUS
Status: COMPLETED | OUTPATIENT
Start: 2019-10-27 | End: 2019-12-27

## 2020-01-08 RX ORDER — DIPHENHYDRAMINE HCL 50 MG
50 CAPSULE ORAL ONCE
Status: COMPLETED | OUTPATIENT
Start: 2020-01-08 | End: 2020-01-08

## 2020-01-08 RX ORDER — SODIUM CHLORIDE 9 MG/ML
INJECTION, SOLUTION INTRAVENOUS CONTINUOUS
Status: DISCONTINUED | OUTPATIENT
Start: 2020-01-08 | End: 2020-01-08 | Stop reason: HOSPADM

## 2020-01-08 RX ORDER — IODIXANOL 320 MG/ML
INJECTION, SOLUTION INTRAVASCULAR
Status: DISCONTINUED | OUTPATIENT
Start: 2020-01-08 | End: 2020-01-08 | Stop reason: HOSPADM

## 2020-01-08 RX ORDER — LIDOCAINE HYDROCHLORIDE 20 MG/ML
INJECTION, SOLUTION INFILTRATION; PERINEURAL
Status: DISCONTINUED | OUTPATIENT
Start: 2020-01-08 | End: 2020-01-08 | Stop reason: HOSPADM

## 2020-01-08 RX ORDER — CEFAZOLIN SODIUM 1 G/3ML
INJECTION, POWDER, FOR SOLUTION INTRAMUSCULAR; INTRAVENOUS
Status: DISCONTINUED | OUTPATIENT
Start: 2020-01-08 | End: 2020-01-08 | Stop reason: HOSPADM

## 2020-01-08 RX ORDER — HEPARIN SODIUM 200 [USP'U]/100ML
INJECTION, SOLUTION INTRAVENOUS
Status: DISCONTINUED | OUTPATIENT
Start: 2020-01-08 | End: 2020-01-08 | Stop reason: HOSPADM

## 2020-01-08 RX ORDER — MIDAZOLAM HYDROCHLORIDE 1 MG/ML
INJECTION, SOLUTION INTRAMUSCULAR; INTRAVENOUS
Status: DISCONTINUED | OUTPATIENT
Start: 2020-01-08 | End: 2020-01-08 | Stop reason: HOSPADM

## 2020-01-08 RX ORDER — HEPARIN SODIUM 1000 [USP'U]/ML
INJECTION, SOLUTION INTRAVENOUS; SUBCUTANEOUS
Status: DISCONTINUED | OUTPATIENT
Start: 2020-01-08 | End: 2020-01-08 | Stop reason: HOSPADM

## 2020-01-08 RX ORDER — PRASUGREL 10 MG/1
TABLET, FILM COATED ORAL
Status: DISCONTINUED | OUTPATIENT
Start: 2020-01-08 | End: 2020-01-08 | Stop reason: HOSPADM

## 2020-01-08 RX ORDER — FENTANYL CITRATE 50 UG/ML
INJECTION, SOLUTION INTRAMUSCULAR; INTRAVENOUS
Status: DISCONTINUED | OUTPATIENT
Start: 2020-01-08 | End: 2020-01-08 | Stop reason: HOSPADM

## 2020-01-08 RX ORDER — PRASUGREL 10 MG/1
10 TABLET, FILM COATED ORAL DAILY
Qty: 30 TABLET | Refills: 11 | Status: SHIPPED | OUTPATIENT
Start: 2020-01-08 | End: 2020-06-02

## 2020-01-08 RX ADMIN — SODIUM CHLORIDE: 0.9 INJECTION, SOLUTION INTRAVENOUS at 09:01

## 2020-01-08 RX ADMIN — DIPHENHYDRAMINE HYDROCHLORIDE 50 MG: 50 CAPSULE ORAL at 09:01

## 2020-01-08 NOTE — NURSING
Ambulated in hallway, tolerated well without bleed or hematoma.  Discharge instructions and medlist given.  Patient and spouse verbalized understanding.  Off unit via wheelchair with escort to [facility].

## 2020-01-08 NOTE — DISCHARGE SUMMARY
[facility] - Short Stay Cardiac Unit  Discharge Summary      Admit Date: 1/8/2020    Discharge Date and Time:  01/08/2020 3:52 PM    Attending Physician: Bryan Zhang MD     Reason for Admission: Coronary and renal angiogram, PCI    Procedures Performed: Procedure(s) (LRB):  INSERTION, STENT, CORONARY ARTERY (N/A)  ANGIOGRAM, RENAL ARTERIES, BILATERAL (N/A)  Stent, Drug Eluting, Multi Vessel, Coronary    Hospital Course (synopsis of major diagnoses, care, treatment, and services provided during the course of the hospital stay):   Mr Delong was admitted for coronary and renal angiogram and PCI of the LAD and RCA. Patient tolerated well the procedure and there were no complications. There was no URBANO. We used minimal contrast. PRU checked today was 416. We stopped clopidogrel and started him on effient. 2D echo done today showed LVEF 40% and moderate to severe AS.   We will schedule  stress echo on this Friday and if he qualifies we will proceed with TAVR evaluation. In addition, he has chronic A fib with CHADS VASC 4 (7% stroke/year) and HAS BLED 3 (5% bleeding/year) and he qualifies for Watchman device. In case he does not meet TAVR criteria we will proceed with Watchman evaluation.     Consults: none    Significant Diagnostic Studies: Labs: All labs within the past 24 hours have been reviewed    Final Diagnoses:    Principal Problem: Coronary artery disease   Secondary Diagnoses:   Active Hospital Problems    Diagnosis  POA    *Coronary artery disease [I25.10]  Yes      Resolved Hospital Problems   No resolved problems to display.       Discharged Condition: good    Disposition: Home or Self Care    Follow Up/Patient Instructions:     Medications:  Reconciled Home Medications:      Medication List      START taking these medications    prasugrel 10 mg Tab  Commonly known as:  Effient  Take 1 tablet (10 mg total) by mouth once daily.        CONTINUE taking these medications    allopurinol 100  "MG tablet  Commonly known as:  ZYLOPRIM  Take 1 tablet (100 mg total) by mouth once daily.     aspirin 81 MG EC tablet  Commonly known as:  ECOTRIN  Take 1 tablet (81 mg total) by mouth once daily.     atorvastatin 40 MG tablet  Commonly known as:  LIPITOR  Take 1 tablet by mouth Daily.     cyanocobalamin 1,000 mcg/mL injection  INJECT 1 ML INTRAMUSCULARLY ONCE EVERY 30 DAYS     lactase 9,000 unit Tab tablet  Commonly known as:  Lactase Fast Acting  Take 1 tablet (9,000 Units total) by mouth 3 (three) times daily with meals.     losartan 50 MG tablet  Commonly known as:  COZAAR  Take 50 mg by mouth 2 (two) times daily.     metoprolol succinate 100 MG 24 hr tablet  Commonly known as:  TOPROL-XL  Take 1 tablet (100 mg total) by mouth once daily.     NIFEdipine 60 MG (OSM) 24 hr tablet  Commonly known as:  PROCARDIA-XL  Take 60 mg by mouth once daily.     pantoprazole 40 MG tablet  Commonly known as:  PROTONIX  Take 1 tablet (40 mg total) by mouth once daily.     Pradaxa 75 mg Cap  Generic drug:  dabigatran etexilate  Take 75 mg by mouth once daily. "Do NOT break, chew, or open capsules."     predniSONE 5 MG tablet  Commonly known as:  DELTASONE  Take 1 tablet (5 mg total) by mouth once daily.        STOP taking these medications    clopidogrel 75 mg tablet  Commonly known as:  PLAVIX          No discharge procedures on file.    "

## 2020-01-08 NOTE — Clinical Note
22 ml injected throughout the case. 78 mL total wasted during the case. 100 mL total used in the case.

## 2020-01-08 NOTE — INTERVAL H&P NOTE
The patient has been examined and the H&P has been reviewed:    I concur with the findings and no changes have occurred since H&P was written.    Anesthesia/Surgery risks, benefits and alternative options discussed and understood by patient/family.          Active Hospital Problems    Diagnosis  POA    Coronary artery disease [I25.10]  Yes      Resolved Hospital Problems   No resolved problems to display.

## 2020-01-09 DIAGNOSIS — Z98.61 POSTSURGICAL PERCUTANEOUS TRANSLUMINAL CORONARY ANGIOPLASTY STATUS: Primary | ICD-10-CM

## 2020-01-14 ENCOUNTER — HOSPITAL ENCOUNTER (OUTPATIENT)
Dept: CARDIOLOGY | Facility: CLINIC | Age: 80
Discharge: HOME | End: 2020-01-14
Attending: INTERNAL MEDICINE
Payer: MEDICARE

## 2020-01-14 VITALS
SYSTOLIC BLOOD PRESSURE: 112 MMHG | DIASTOLIC BLOOD PRESSURE: 44 MMHG | BODY MASS INDEX: 26.68 KG/M2 | HEART RATE: 65 BPM | RESPIRATION RATE: 16 BRPM | HEIGHT: 67 IN | WEIGHT: 170 LBS

## 2020-01-14 DIAGNOSIS — I35.0 NONRHEUMATIC AORTIC VALVE STENOSIS: ICD-10-CM

## 2020-01-14 LAB
AV INDEX (PROSTH): 0.26
AV MEAN GRADIENT: 43 MMHG
AV PEAK GRADIENT: 74 MMHG
AV VALVE AREA: 1.09 CM2
AV VELOCITY RATIO: 0.28
BSA FOR ECHO PROCEDURE: 1.91 M2
CV STRESS BASE HR: 61 BPM
DIASTOLIC BLOOD PRESSURE: 44 MMHG
DOP CALC AO PEAK VEL: 4.3 M/S
DOP CALC AO VTI: 85.08 CM
DOP CALC LVOT AREA: 4.2 CM2
DOP CALC LVOT DIAMETER: 2.3 CM
DOP CALC LVOT PEAK VEL: 1.21 M/S
DOP CALC LVOT STROKE VOLUME: 92.4 CM3
DOP CALCLVOT PEAK VEL VTI: 22.25 CM
OHS CV CPX 1 MINUTE RECOVERY HEART RATE: 111 BPM
OHS CV CPX 85 PERCENT MAX PREDICTED HEART RATE MALE: 119
OHS CV CPX MAX PREDICTED HEART RATE: 140
OHS CV CPX PATIENT IS FEMALE: 0
OHS CV CPX PATIENT IS MALE: 1
OHS CV CPX PEAK DIASTOLIC BLOOD PRESSURE: 49 MMHG
OHS CV CPX PEAK HEAR RATE: 103 BPM
OHS CV CPX PEAK RATE PRESSURE PRODUCT: NORMAL
OHS CV CPX PEAK SYSTOLIC BLOOD PRESSURE: 133 MMHG
OHS CV CPX PERCENT MAX PREDICTED HEART RATE ACHIEVED: 74
OHS CV CPX RATE PRESSURE PRODUCT PRESENTING: 6588
RETIRED EF AND QEF - SEE NOTES: 46 %
SYSTOLIC BLOOD PRESSURE: 108 MMHG

## 2020-01-14 PROCEDURE — 93351 STRESS ECHO (CUPID ONLY): ICD-10-PCS | Mod: S$GLB,,, | Performed by: INTERNAL MEDICINE

## 2020-01-14 PROCEDURE — 93351 STRESS TTE COMPLETE: CPT | Mod: S$GLB,,, | Performed by: INTERNAL MEDICINE

## 2020-01-14 RX ORDER — DOBUTAMINE HYDROCHLORIDE 200 MG/100ML
5 INJECTION INTRAVENOUS
Status: COMPLETED | OUTPATIENT
Start: 2020-01-14 | End: 2020-01-14

## 2020-01-14 RX ADMIN — DOBUTAMINE HYDROCHLORIDE 5 MCG/KG/MIN: 200 INJECTION INTRAVENOUS at 02:01

## 2020-01-15 ENCOUNTER — EDUCATION (OUTPATIENT)
Dept: CARDIOLOGY | Facility: CLINIC | Age: 80
End: 2020-01-15

## 2020-01-15 DIAGNOSIS — I35.0 NONRHEUMATIC AORTIC VALVE STENOSIS: Primary | ICD-10-CM

## 2020-01-15 DIAGNOSIS — N18.9 CHRONIC KIDNEY DISEASE, UNSPECIFIED CKD STAGE: ICD-10-CM

## 2020-01-15 DIAGNOSIS — I35.0 NODULAR CALCIFIC AORTIC VALVE STENOSIS: Primary | ICD-10-CM

## 2020-01-15 RX ORDER — DIPHENHYDRAMINE HCL 25 MG
50 CAPSULE ORAL ONCE
Status: CANCELLED | OUTPATIENT
Start: 2020-01-15 | End: 2020-01-15

## 2020-01-15 RX ORDER — DEXTROSE MONOHYDRATE AND SODIUM CHLORIDE 5; .45 G/100ML; G/100ML
INJECTION, SOLUTION INTRAVENOUS CONTINUOUS
Status: CANCELLED | OUTPATIENT
Start: 2020-01-15

## 2020-01-15 NOTE — PROGRESS NOTES
OUTPATIENT CATHETERIZATION INSTRUCTIONS    You have been scheduled for a procedure in the catheterization lab on Thursday, January 23, 2020.     Please report to the Cardiology Waiting Area on the Third floor of the hospital and check in at 10 AM.   You will then be taken to the SSCU (Short Stay Cardiac Unit) and prepared for your procedure. Please be aware that this is not the time of your procedure but the time you are to arrive. The procedures are scheduled on an hourly basis; however, emergency cases take precedence over all other cases.       You may not have anything to eat or drink after midnight the night before your test. You may take your regular morning medications with water. If there are any medications that you should not take you will be instructed to hold them that morning. If you are diabetic and on Metformin (Glucophage) do not take it the day before, the day of, and for 2 days after your procedure.      The procedure will take 1-2 hours to perform. After the procedure, you will return to SSCU on the third floor of the hospital. You will need to lie still (or keep your arm still) for the next 4 to 6 hours to minimize bleeding from the puncture site. Your family may remain in the room with you during this time.       You may be able to be discharged home that same afternoon if there is someone to drive you home and there were no complications. If you have one of the balloon, stent, or device procedures you may spend the night in the hospital. Your doctor will determine, based on your progress, the date and time of your discharge. The results of your procedure will be discussed with you before you are discharged. Any further testing or procedures will be scheduled for you either before you leave or you will be called with these appointments.       If you should have any questions, concerns, or need to change the date of your procedure, please call  CRISTINA Godoy @ [phone]    Special  Instructions:  Hold Pradaxa 5 days before        THE ABOVE INSTRUCTIONS WERE GIVEN TO THE PATIENT VERBALLY AND THEY VERBALIZED UNDERSTANDING.  THEY DO NOT REQUIRE ANY SPECIAL NEEDS AND DO NOT HAVE ANY LEARNING BARRIERS.          Directions for Reporting to Cardiology Waiting Area in the Hospital  If you park in the Parking Garage:  Take elevators to the1st floor of the parking garage.  Continue past the gift shop, coffee shop, and piano.  Take a right and go to the gold elevators. (Elevator B)  Take the elevator to the 3rd floor.  Follow the arrow on the sign on the wall that says Cath Lab Registration/EP Lab Registration.  Follow the long hallway all the way around until you come to a big open area.  This is the registration area.  Check in at Reception Desk.    OR    If family is dropping you off:  Have them drop you off at the front of the Hospital under the green overhang.  Enter through the doors and take a right.  Take the E elevators to the 3rd floor Cardiology Waiting Area.  Check in at the Reception Desk in the waiting room.

## 2020-01-22 ENCOUNTER — ANESTHESIA EVENT (OUTPATIENT)
Dept: MEDSURG UNIT | Facility: HOSPITAL | Age: 80
End: 2020-01-22
Payer: MEDICARE

## 2020-01-23 ENCOUNTER — HOSPITAL ENCOUNTER (OUTPATIENT)
Dept: PULMONOLOGY | Facility: CLINIC | Age: 80
Discharge: HOME | End: 2020-01-23
Payer: MEDICARE

## 2020-01-23 ENCOUNTER — HOSPITAL ENCOUNTER (OUTPATIENT)
Facility: HOSPITAL | Age: 80
Discharge: HOME | End: 2020-01-23
Attending: INTERNAL MEDICINE | Admitting: INTERNAL MEDICINE
Payer: MEDICARE

## 2020-01-23 ENCOUNTER — ANESTHESIA (OUTPATIENT)
Dept: MEDSURG UNIT | Facility: HOSPITAL | Age: 80
End: 2020-01-23
Payer: MEDICARE

## 2020-01-23 ENCOUNTER — HOSPITAL ENCOUNTER (OUTPATIENT)
Dept: CARDIOLOGY | Facility: CLINIC | Age: 80
Discharge: HOME | End: 2020-01-23
Attending: INTERNAL MEDICINE | Admitting: INTERNAL MEDICINE
Payer: MEDICARE

## 2020-01-23 VITALS
BODY MASS INDEX: 26.68 KG/M2 | SYSTOLIC BLOOD PRESSURE: 125 MMHG | RESPIRATION RATE: 20 BRPM | HEART RATE: 79 BPM | HEIGHT: 67 IN | WEIGHT: 170 LBS | OXYGEN SATURATION: 98 % | TEMPERATURE: 97 F | DIASTOLIC BLOOD PRESSURE: 62 MMHG

## 2020-01-23 VITALS
DIASTOLIC BLOOD PRESSURE: 58 MMHG | WEIGHT: 170 LBS | SYSTOLIC BLOOD PRESSURE: 117 MMHG | BODY MASS INDEX: 26.68 KG/M2 | HEIGHT: 67 IN

## 2020-01-23 VITALS — BODY MASS INDEX: 26.89 KG/M2 | HEIGHT: 67 IN | WEIGHT: 171.31 LBS

## 2020-01-23 DIAGNOSIS — I35.0 NODULAR CALCIFIC AORTIC VALVE STENOSIS: Primary | ICD-10-CM

## 2020-01-23 DIAGNOSIS — I35.0 NONRHEUMATIC AORTIC VALVE STENOSIS: ICD-10-CM

## 2020-01-23 DIAGNOSIS — N18.9 CHRONIC KIDNEY DISEASE, UNSPECIFIED CKD STAGE: ICD-10-CM

## 2020-01-23 DIAGNOSIS — I35.0 NODULAR CALCIFIC AORTIC VALVE STENOSIS: ICD-10-CM

## 2020-01-23 DIAGNOSIS — I35.0 SEVERE AORTIC STENOSIS: ICD-10-CM

## 2020-01-23 LAB
ABO + RH BLD: NORMAL
APTT BLDCRRT: <21 SEC (ref 21–32)
BLD GP AB SCN CELLS X3 SERPL QL: NORMAL
BSA FOR ECHO PROCEDURE: 1.91 M2
DLCO ADJ PRE: 19.25 ML/(MIN*MMHG) (ref 15.7–29.55)
DLCO SINGLE BREATH LLN: 15.7
DLCO SINGLE BREATH PRE REF: 76.3 %
DLCO SINGLE BREATH REF: 22.62
DLCOC SBVA LLN: 2.25
DLCOC SBVA PRE REF: 90.8 %
DLCOC SBVA REF: 3.48
DLCOC SINGLE BREATH LLN: 15.7
DLCOC SINGLE BREATH PRE REF: 85.1 %
DLCOC SINGLE BREATH REF: 22.62
DLCOCSBVAULN: 4.71
DLCOCSINGLEBREATHULN: 29.55
DLCOSINGLEBREATHULN: 29.55
DLCOVA LLN: 2.25
DLCOVA PRE REF: 81.4 %
DLCOVA PRE: 2.83 ML/(MIN*MMHG*L) (ref 2.25–4.71)
DLCOVA REF: 3.48
DLCOVAULN: 4.71
DLVAADJ PRE: 3.16 ML/(MIN*MMHG*L) (ref 2.25–4.71)
ERYTHROCYTE [DISTWIDTH] IN BLOOD BY AUTOMATED COUNT: 12.9 % (ref 11.5–14.5)
FEF 25 75 LLN: 0.71
FEF 25 75 PRE REF: 112.5 %
FEF 25 75 REF: 1.88
FEV05 LLN: 1.05
FEV05 REF: 2.18
FEV1 FVC LLN: 60
FEV1 FVC PRE REF: 99 %
FEV1 FVC REF: 75
FEV1 LLN: 1.8
FEV1 PRE REF: 104.5 %
FEV1 REF: 2.6
FVC LLN: 2.52
FVC PRE REF: 104.7 %
FVC REF: 3.49
HCT VFR BLD AUTO: 32.7 % (ref 40–54)
HGB BLD-MCNC: 10.6 G/DL (ref 14–18)
INR PPP: 1 (ref 0.8–1.2)
IVC PRE: 3.65 L (ref 2.52–4.46)
IVC SINGLE BREATH LLN: 2.52
IVC SINGLE BREATH PRE REF: 104.4 %
IVC SINGLE BREATH REF: 3.49
IVCSINGLEBREATHULN: 4.46
MCH RBC QN AUTO: 31.9 PG (ref 27–31)
MCHC RBC AUTO-ENTMCNC: 32.4 G/DL (ref 32–36)
MCV RBC AUTO: 99 FL (ref 82–98)
MVV LLN: 85
MVV PRE REF: 67 %
MVV REF: 100
PEF LLN: 4.44
PEF PRE REF: 108.6 %
PEF REF: 6.56
PHYSICIAN COMMENT: ABNORMAL
PLATELET # BLD AUTO: 207 K/UL (ref 150–350)
PMV BLD AUTO: 11.4 FL (ref 9.2–12.9)
PRE DLCO: 17.26 ML/(MIN*MMHG) (ref 15.7–29.55)
PRE FEF 25 75: 2.11 L/S (ref 0.71–3.04)
PRE FET 100: 6.9 SEC
PRE FEV05 REF: 99.5 %
PRE FEV1 FVC: 74.45 % (ref 60.3–90.06)
PRE FEV1: 2.72 L (ref 1.8–3.4)
PRE FEV5: 2.17 L (ref 1.05–3.32)
PRE FVC: 3.65 L (ref 2.52–4.46)
PRE MVV: 67 L/MIN (ref 85–115)
PRE PEF: 7.12 L/S (ref 4.44–8.68)
PROTHROMBIN TIME: 10.4 SEC (ref 9–12.5)
PROX AORTA: 3.6 CM
RBC # BLD AUTO: 3.32 M/UL (ref 4.6–6.2)
SINUS: 3.4 CM
STJ: 2.9 CM
VA PRE: 6.09 L (ref 6.35–6.35)
VA SINGLE BREATH LLN: 6.35
VA SINGLE BREATH PRE REF: 95.9 %
VA SINGLE BREATH REF: 6.35
VASINGLEBREATHULN: 6.35
WBC # BLD AUTO: 8.32 K/UL (ref 3.9–12.7)

## 2020-01-23 PROCEDURE — 99152 PR MOD CONSCIOUS SEDATION, SAME PHYS, 5+ YRS, FIRST 15 MIN: ICD-10-PCS | Mod: ,,, | Performed by: INTERNAL MEDICINE

## 2020-01-23 PROCEDURE — C1887 CATHETER, GUIDING: HCPCS | Performed by: INTERNAL MEDICINE

## 2020-01-23 PROCEDURE — 37252 INTRVASC US NONCORONARY 1ST: CPT | Mod: ,,, | Performed by: INTERNAL MEDICINE

## 2020-01-23 PROCEDURE — 37252 PR IVUS, NON-CORONARY, 1ST VESSEL: ICD-10-PCS | Mod: ,,, | Performed by: INTERNAL MEDICINE

## 2020-01-23 PROCEDURE — 93325 TRANSESOPHAGEAL ECHO (TEE) (CUPID ONLY): ICD-10-PCS | Mod: 26,,, | Performed by: INTERNAL MEDICINE

## 2020-01-23 PROCEDURE — 85610 PROTHROMBIN TIME: CPT

## 2020-01-23 PROCEDURE — D9220A PRA ANESTHESIA: ICD-10-PCS | Mod: ANES,,, | Performed by: ANESTHESIOLOGY

## 2020-01-23 PROCEDURE — 36215 PR PLACE CATH SELECTIVE ART,NECK: ICD-10-PCS | Mod: LT,,, | Performed by: INTERNAL MEDICINE

## 2020-01-23 PROCEDURE — 99214 OFFICE O/P EST MOD 30 MIN: CPT | Mod: ,,, | Performed by: NURSE PRACTITIONER

## 2020-01-23 PROCEDURE — 99214 PR OFFICE/OUTPT VISIT, EST, LEVL IV, 30-39 MIN: ICD-10-PCS | Mod: ,,, | Performed by: NURSE PRACTITIONER

## 2020-01-23 PROCEDURE — 63600175 PHARM REV CODE 636 W HCPCS: Performed by: NURSE ANESTHETIST, CERTIFIED REGISTERED

## 2020-01-23 PROCEDURE — 99152 MOD SED SAME PHYS/QHP 5/>YRS: CPT | Mod: ,,, | Performed by: INTERNAL MEDICINE

## 2020-01-23 PROCEDURE — 85730 THROMBOPLASTIN TIME PARTIAL: CPT

## 2020-01-23 PROCEDURE — 85027 COMPLETE CBC AUTOMATED: CPT

## 2020-01-23 PROCEDURE — 37253 INTRVASC US NONCORONARY ADDL: CPT | Mod: ,,, | Performed by: INTERNAL MEDICINE

## 2020-01-23 PROCEDURE — 93325 DOPPLER ECHO COLOR FLOW MAPG: CPT | Mod: 26,,, | Performed by: INTERNAL MEDICINE

## 2020-01-23 PROCEDURE — 93010 EKG 12-LEAD: ICD-10-PCS | Mod: 59,,, | Performed by: INTERNAL MEDICINE

## 2020-01-23 PROCEDURE — 37000008 HC ANESTHESIA 1ST 15 MINUTES

## 2020-01-23 PROCEDURE — 37253 INTRVASC US NONCORONARY ADDL: CPT | Performed by: INTERNAL MEDICINE

## 2020-01-23 PROCEDURE — 94618 PULMONARY STRESS TESTING: CPT | Mod: S$GLB,,, | Performed by: INTERNAL MEDICINE

## 2020-01-23 PROCEDURE — 25000003 PHARM REV CODE 250: Performed by: INTERNAL MEDICINE

## 2020-01-23 PROCEDURE — 93005 ELECTROCARDIOGRAM TRACING: CPT

## 2020-01-23 PROCEDURE — 93325 DOPPLER ECHO COLOR FLOW MAPG: CPT

## 2020-01-23 PROCEDURE — 94618 PULMONARY STRESS TESTING: ICD-10-PCS | Mod: S$GLB,,, | Performed by: INTERNAL MEDICINE

## 2020-01-23 PROCEDURE — 25000003 PHARM REV CODE 250: Performed by: NURSE ANESTHETIST, CERTIFIED REGISTERED

## 2020-01-23 PROCEDURE — C1894 INTRO/SHEATH, NON-LASER: HCPCS | Performed by: INTERNAL MEDICINE

## 2020-01-23 PROCEDURE — 86901 BLOOD TYPING SEROLOGIC RH(D): CPT

## 2020-01-23 PROCEDURE — 93312 TRANSESOPHAGEAL ECHO (TEE) (CUPID ONLY): ICD-10-PCS | Mod: 26,,, | Performed by: INTERNAL MEDICINE

## 2020-01-23 PROCEDURE — 94729 DIFFUSING CAPACITY: CPT | Mod: S$GLB,,, | Performed by: INTERNAL MEDICINE

## 2020-01-23 PROCEDURE — C1769 GUIDE WIRE: HCPCS | Performed by: INTERNAL MEDICINE

## 2020-01-23 PROCEDURE — 37253 PR INTRVASC US NON CORONARY, EACH ADDL VESSEL, S&I, ADD ON CODE: ICD-10-PCS | Mod: ,,, | Performed by: INTERNAL MEDICINE

## 2020-01-23 PROCEDURE — 99220 PR INITIAL OBSERVATION CARE,LEVL III: ICD-10-PCS | Mod: ,,, | Performed by: PHYSICIAN ASSISTANT

## 2020-01-23 PROCEDURE — 93320 DOPPLER ECHO COMPLETE: CPT | Mod: 26,,, | Performed by: INTERNAL MEDICINE

## 2020-01-23 PROCEDURE — 37000009 HC ANESTHESIA EA ADD 15 MINS

## 2020-01-23 PROCEDURE — D9220A PRA ANESTHESIA: Mod: CRNA,,, | Performed by: NURSE ANESTHETIST, CERTIFIED REGISTERED

## 2020-01-23 PROCEDURE — D9220A PRA ANESTHESIA: Mod: ANES,,, | Performed by: ANESTHESIOLOGY

## 2020-01-23 PROCEDURE — 99220 PR INITIAL OBSERVATION CARE,LEVL III: CPT | Mod: ,,, | Performed by: PHYSICIAN ASSISTANT

## 2020-01-23 PROCEDURE — 63600175 PHARM REV CODE 636 W HCPCS: Performed by: INTERNAL MEDICINE

## 2020-01-23 PROCEDURE — 36215 PLACE CATHETER IN ARTERY: CPT | Mod: LT,,, | Performed by: INTERNAL MEDICINE

## 2020-01-23 PROCEDURE — 94010 BREATHING CAPACITY TEST: CPT | Mod: S$GLB,,, | Performed by: INTERNAL MEDICINE

## 2020-01-23 PROCEDURE — D9220A PRA ANESTHESIA: ICD-10-PCS | Mod: CRNA,,, | Performed by: NURSE ANESTHETIST, CERTIFIED REGISTERED

## 2020-01-23 PROCEDURE — 93010 ELECTROCARDIOGRAM REPORT: CPT | Mod: 59,,, | Performed by: INTERNAL MEDICINE

## 2020-01-23 PROCEDURE — 99152 MOD SED SAME PHYS/QHP 5/>YRS: CPT | Performed by: INTERNAL MEDICINE

## 2020-01-23 PROCEDURE — 94010 BREATHING CAPACITY TEST: ICD-10-PCS | Mod: S$GLB,,, | Performed by: INTERNAL MEDICINE

## 2020-01-23 PROCEDURE — 37252 INTRVASC US NONCORONARY 1ST: CPT | Performed by: INTERNAL MEDICINE

## 2020-01-23 PROCEDURE — 93320 TRANSESOPHAGEAL ECHO (TEE) (CUPID ONLY): ICD-10-PCS | Mod: 26,,, | Performed by: INTERNAL MEDICINE

## 2020-01-23 PROCEDURE — 93312 ECHO TRANSESOPHAGEAL: CPT | Mod: 26,,, | Performed by: INTERNAL MEDICINE

## 2020-01-23 PROCEDURE — 27201423 OPTIME MED/SURG SUP & DEVICES STERILE SUPPLY: Performed by: INTERNAL MEDICINE

## 2020-01-23 PROCEDURE — C1760 CLOSURE DEV, VASC: HCPCS | Performed by: INTERNAL MEDICINE

## 2020-01-23 PROCEDURE — 36215 PLACE CATHETER IN ARTERY: CPT | Mod: LT | Performed by: INTERNAL MEDICINE

## 2020-01-23 PROCEDURE — 94729 PR C02/MEMBANE DIFFUSE CAPACITY: ICD-10-PCS | Mod: S$GLB,,, | Performed by: INTERNAL MEDICINE

## 2020-01-23 RX ORDER — DEXTROSE MONOHYDRATE AND SODIUM CHLORIDE 5; .45 G/100ML; G/100ML
INJECTION, SOLUTION INTRAVENOUS CONTINUOUS
Status: DISCONTINUED | OUTPATIENT
Start: 2020-01-23 | End: 2020-01-23 | Stop reason: HOSPADM

## 2020-01-23 RX ORDER — LIDOCAINE HCL/PF 100 MG/5ML
SYRINGE (ML) INTRAVENOUS
Status: DISCONTINUED | OUTPATIENT
Start: 2020-01-23 | End: 2020-01-23

## 2020-01-23 RX ORDER — LIDOCAINE HYDROCHLORIDE 20 MG/ML
INJECTION, SOLUTION INFILTRATION; PERINEURAL
Status: DISCONTINUED | OUTPATIENT
Start: 2020-01-23 | End: 2020-01-23 | Stop reason: HOSPADM

## 2020-01-23 RX ORDER — HYDROMORPHONE HYDROCHLORIDE 1 MG/ML
0.2 INJECTION, SOLUTION INTRAMUSCULAR; INTRAVENOUS; SUBCUTANEOUS EVERY 5 MIN PRN
Status: DISCONTINUED | OUTPATIENT
Start: 2020-01-23 | End: 2020-01-23 | Stop reason: HOSPADM

## 2020-01-23 RX ORDER — PHENYLEPHRINE HYDROCHLORIDE 10 MG/ML
INJECTION INTRAVENOUS
Status: DISCONTINUED | OUTPATIENT
Start: 2020-01-23 | End: 2020-01-23

## 2020-01-23 RX ORDER — HEPARIN SODIUM 200 [USP'U]/100ML
INJECTION, SOLUTION INTRAVENOUS
Status: DISCONTINUED | OUTPATIENT
Start: 2020-01-23 | End: 2020-01-23 | Stop reason: HOSPADM

## 2020-01-23 RX ORDER — CEFAZOLIN SODIUM 1 G/3ML
INJECTION, POWDER, FOR SOLUTION INTRAMUSCULAR; INTRAVENOUS
Status: DISCONTINUED | OUTPATIENT
Start: 2020-01-23 | End: 2020-01-23 | Stop reason: HOSPADM

## 2020-01-23 RX ORDER — FENTANYL CITRATE 50 UG/ML
25 INJECTION, SOLUTION INTRAMUSCULAR; INTRAVENOUS EVERY 5 MIN PRN
Status: DISCONTINUED | OUTPATIENT
Start: 2020-01-23 | End: 2020-01-23 | Stop reason: HOSPADM

## 2020-01-23 RX ORDER — SODIUM CHLORIDE 9 MG/ML
INJECTION, SOLUTION INTRAVENOUS CONTINUOUS PRN
Status: DISCONTINUED | OUTPATIENT
Start: 2020-01-23 | End: 2020-01-23

## 2020-01-23 RX ORDER — DEXTROSE MONOHYDRATE AND SODIUM CHLORIDE 5; .45 G/100ML; G/100ML
INJECTION, SOLUTION INTRAVENOUS CONTINUOUS
Status: CANCELLED | OUTPATIENT
Start: 2020-01-23

## 2020-01-23 RX ORDER — DIPHENHYDRAMINE HCL 25 MG
50 CAPSULE ORAL ONCE
Status: CANCELLED | OUTPATIENT
Start: 2020-01-23 | End: 2020-01-23

## 2020-01-23 RX ORDER — GLYCOPYRROLATE 0.2 MG/ML
INJECTION INTRAMUSCULAR; INTRAVENOUS
Status: DISCONTINUED | OUTPATIENT
Start: 2020-01-23 | End: 2020-01-23

## 2020-01-23 RX ORDER — DIPHENHYDRAMINE HCL 50 MG
50 CAPSULE ORAL ONCE
Status: DISCONTINUED | OUTPATIENT
Start: 2020-01-23 | End: 2020-01-23 | Stop reason: HOSPADM

## 2020-01-23 RX ORDER — PROPOFOL 10 MG/ML
VIAL (ML) INTRAVENOUS CONTINUOUS PRN
Status: DISCONTINUED | OUTPATIENT
Start: 2020-01-23 | End: 2020-01-23

## 2020-01-23 RX ORDER — PROPOFOL 10 MG/ML
VIAL (ML) INTRAVENOUS
Status: DISCONTINUED | OUTPATIENT
Start: 2020-01-23 | End: 2020-01-23

## 2020-01-23 RX ORDER — DIPHENHYDRAMINE HYDROCHLORIDE 50 MG/ML
25 INJECTION INTRAMUSCULAR; INTRAVENOUS EVERY 6 HOURS PRN
Status: DISCONTINUED | OUTPATIENT
Start: 2020-01-23 | End: 2020-01-23 | Stop reason: HOSPADM

## 2020-01-23 RX ORDER — ONDANSETRON 8 MG/1
8 TABLET, ORALLY DISINTEGRATING ORAL EVERY 8 HOURS PRN
Status: DISCONTINUED | OUTPATIENT
Start: 2020-01-23 | End: 2020-01-23 | Stop reason: HOSPADM

## 2020-01-23 RX ORDER — ETOMIDATE 2 MG/ML
INJECTION INTRAVENOUS
Status: DISCONTINUED | OUTPATIENT
Start: 2020-01-23 | End: 2020-01-23

## 2020-01-23 RX ORDER — LIDOCAINE HYDROCHLORIDE 20 MG/ML
SOLUTION OROPHARYNGEAL
Status: DISCONTINUED | OUTPATIENT
Start: 2020-01-23 | End: 2020-01-23

## 2020-01-23 RX ORDER — MIDAZOLAM HYDROCHLORIDE 1 MG/ML
INJECTION, SOLUTION INTRAMUSCULAR; INTRAVENOUS
Status: DISCONTINUED | OUTPATIENT
Start: 2020-01-23 | End: 2020-01-23 | Stop reason: HOSPADM

## 2020-01-23 RX ORDER — ONDANSETRON 2 MG/ML
4 INJECTION INTRAMUSCULAR; INTRAVENOUS ONCE AS NEEDED
Status: DISCONTINUED | OUTPATIENT
Start: 2020-01-23 | End: 2020-01-23 | Stop reason: HOSPADM

## 2020-01-23 RX ORDER — FENTANYL CITRATE 50 UG/ML
INJECTION, SOLUTION INTRAMUSCULAR; INTRAVENOUS
Status: DISCONTINUED | OUTPATIENT
Start: 2020-01-23 | End: 2020-01-23 | Stop reason: HOSPADM

## 2020-01-23 RX ADMIN — ETOMIDATE 2 MG: 2 INJECTION, SOLUTION INTRAVENOUS at 09:01

## 2020-01-23 RX ADMIN — PROPOFOL 10 MG: 10 INJECTION, EMULSION INTRAVENOUS at 09:01

## 2020-01-23 RX ADMIN — GLYCOPYRROLATE 0.2 MG: 0.2 INJECTION, SOLUTION INTRAMUSCULAR; INTRAVENOUS at 09:01

## 2020-01-23 RX ADMIN — PROPOFOL 50 MCG/KG/MIN: 10 INJECTION, EMULSION INTRAVENOUS at 09:01

## 2020-01-23 RX ADMIN — LIDOCAINE HYDROCHLORIDE 60 MG: 20 INJECTION, SOLUTION INTRAVENOUS at 09:01

## 2020-01-23 RX ADMIN — ETOMIDATE 4 MG: 2 INJECTION, SOLUTION INTRAVENOUS at 09:01

## 2020-01-23 RX ADMIN — PHENYLEPHRINE HYDROCHLORIDE 100 MCG: 10 INJECTION INTRAVENOUS at 09:01

## 2020-01-23 RX ADMIN — SODIUM CHLORIDE: 0.9 INJECTION, SOLUTION INTRAVENOUS at 09:01

## 2020-01-23 RX ADMIN — LIDOCAINE HYDROCHLORIDE 15 ML: 20 SOLUTION ORAL; TOPICAL at 09:01

## 2020-01-23 NOTE — CONSULTS
"[facility] - Short Stay Cardiac Unit  Cardiothoracic Surgery  Consult Note    Patient Name: Lucho Delong  MRN: [id number]  Admission Date: 1/23/2020  Attending Physician: Bryan Zhang MD  Referring Provider: Bryan Zhang MD    Patient information was obtained from patient and past medical records.     Inpatient consult to Cardiothoracic Surgery  Consult performed by: Danette Mccall NP  Consult ordered by: Stella Mg NP  Reason for consult: TAVR work up         Subjective:     Principal Problem: <principal problem not specified>    History of Present Illness: 80 yo man with medical history significant for HTN, DLPD, atrial fibrillation, CVA (March 2019), CKD 3, CAD and aortic stenosis who is referred for evaluation of severe AS. He is now s/p RCA and LAD PCI by Dr. Zhang on 1/8/20. TTE at the time did not qualify for TAVR with EF 40%. Dobutamine stress echo done following was qualifying. Patient reports COLON able to walk less than a block before experiencing symptoms. He is unable to play golf like he used to due to SOB. His renal function has declined over the past year. Both renal arteries were patent on last angiogram. Cr 2.7.        No current facility-administered medications on file prior to encounter.      Current Outpatient Medications on File Prior to Encounter   Medication Sig    allopurinol (ZYLOPRIM) 100 MG tablet Take 1 tablet (100 mg total) by mouth once daily.    aspirin (ECOTRIN) 81 MG EC tablet Take 1 tablet (81 mg total) by mouth once daily.    atorvastatin (LIPITOR) 40 MG tablet Take 1 tablet by mouth Daily.    cyanocobalamin 1,000 mcg/mL injection INJECT 1 ML INTRAMUSCULARLY ONCE EVERY 30 DAYS    dabigatran etexilate (PRADAXA) 75 mg Cap Take 75 mg by mouth once daily. "Do NOT break, chew, or open capsules."    lactase (LACTASE FAST ACTING) 9,000 unit Tab tablet Take 1 tablet (9,000 Units total) by mouth 3 (three) times daily with meals.    losartan " (COZAAR) 50 MG tablet Take 50 mg by mouth 2 (two) times daily.    metoprolol succinate (TOPROL-XL) 100 MG 24 hr tablet Take 1 tablet (100 mg total) by mouth once daily.    NIFEdipine (PROCARDIA-XL) 60 MG (OSM) 24 hr tablet Take 60 mg by mouth once daily.    pantoprazole (PROTONIX) 40 MG tablet Take 1 tablet (40 mg total) by mouth once daily.    prasugrel (EFFIENT) 10 mg Tab Take 1 tablet (10 mg total) by mouth once daily.    predniSONE (DELTASONE) 5 MG tablet Take 1 tablet (5 mg total) by mouth once daily.       Review of patient's allergies indicates:   Allergen Reactions    Tramadol Itching and Swelling     Began medication, two doses into this treatment patient experiences tongue swelling and severe itching. Patient MD advised to not take this medication anymore       Past Medical History:   Diagnosis Date    Anticoagulant long-term use     Atrial fibrillation     Bursitis of hip     Chronic back pain     Chronic kidney disease, stage 3     Gastroesophageal reflux disease     Gout     Hyperlipemia     Hypertension     Prostate cancer 1997    Stroke 04/2019     Past Surgical History:   Procedure Laterality Date    CATHETERIZATION OF BOTH LEFT AND RIGHT HEART N/A 12/2/2019    Procedure: CATHETERIZATION, HEART, BOTH LEFT AND RIGHT;  Surgeon: Everett Sosa MD;  Location: [facility] CATH/EP LAB;  Service: Cardiology;  Laterality: N/A;    COLONOSCOPY  1997    CORONARY ANGIOGRAPHY N/A 1/8/2020    Procedure: ANGIOGRAM, CORONARY ARTERY;  Surgeon: Bryan Zhang MD;  [facility];  Service: Cardiology;  Laterality: N/A;    CORONARY STENT PLACEMENT N/A 1/8/2020    Procedure: INSERTION, STENT, CORONARY ARTERY;  Surgeon: Bryan Zhang MD;  Location: [facility] C[facility] LAB;  Service: Cardiology;  Laterality: N/A;    PROSTATE SURGERY  1997    TONSILLECTOMY       Family History     Problem Relation (Age of Onset)    Alzheimer's disease Mother (86), Sister    Cancer Father (86)        Tobacco Use     Smoking status: Never Smoker    Smokeless tobacco: Never Used   Substance and Sexual Activity    Alcohol use: Yes     Alcohol/week: 3.3 standard drinks     Types: 4 Standard drinks or equivalent per week     Frequency: 2-4 times a month     Comment: bourbon or beer    Drug use: No    Sexual activity: Not on file     Review of Systems   Constitutional: Negative for activity change and fatigue.   Respiratory: Positive for shortness of breath. Negative for cough.    Cardiovascular: Negative for chest pain, palpitations and leg swelling.   Gastrointestinal: Negative for abdominal pain, diarrhea and vomiting.   Endocrine: Negative for polydipsia, polyphagia and polyuria.   Genitourinary: Negative for dysuria.   Musculoskeletal: Negative for gait problem.   Skin: Negative for rash.   Allergic/Immunologic: Negative for immunocompromised state.   Neurological: Negative for dizziness, syncope and weakness.   Hematological: Does not bruise/bleed easily.   Psychiatric/Behavioral: Negative for behavioral problems.     Objective:     Vital Signs (Most Recent):  Temp: 96.6 °F (35.9 °C) (01/23/20 1126)  Pulse: 69 (01/23/20 1300)  Resp: 20 (01/23/20 1126)  BP: (!) 115/59 (01/23/20 1300)  SpO2: 98 % (01/23/20 1126) Vital Signs (24h Range):  Temp:  [96.6 °F (35.9 °C)-98.1 °F (36.7 °C)] 96.6 °F (35.9 °C)  Pulse:  [66-94] 69  Resp:  [18-22] 20  SpO2:  [97 %-100 %] 98 %  BP: (110-131)/(58-66) 115/59     Weight: 77.1 kg (170 lb)  Body mass index is 26.63 kg/m².    SpO2: 98 %  O2 Device (Oxygen Therapy): room air     Intake/Output - Last 3 Shifts       01/21 0700 - 01/22 0659 01/22 0700 - 01/23 0659 01/23 0700 - 01/24 0659    I.V. (mL/kg)   300 (3.9)    Total Intake(mL/kg)   300 (3.9)    Net   +300                  Lines/Drains/Airways     Peripheral Intravenous Line                 Peripheral IV - Single Lumen 01/14/20 1325 22 G Right Forearm 8 days         Peripheral IV - Single Lumen 01/23/20 0914 18 G Right Antecubital less than 1  day                 STS Risk Score: 3%    Physical Exam   Constitutional: He is oriented to person, place, and time. He appears well-developed and well-nourished.   HENT:   Head: Normocephalic and atraumatic.   Eyes: EOM are normal.   Neck: Normal range of motion.   Cardiovascular: Normal rate and regular rhythm.   Murmur heard.  Pulmonary/Chest: Effort normal and breath sounds normal.   Abdominal: Soft.   Musculoskeletal: Normal range of motion.   Neurological: He is alert and oriented to person, place, and time.   Skin: Skin is warm, dry and intact. Capillary refill takes less than 2 seconds.   Psychiatric: He has a normal mood and affect.       Significant Labs:    CBC:   Recent Labs   Lab 01/23/20  0841   WBC 8.32   RBC 3.32*   HGB 10.6*   HCT 32.7*      MCV 99*   MCH 31.9*   MCHC 32.4     Coagulation:   Recent Labs   Lab 01/23/20  0841   INR 1.0   APTT <21.0       Significant Diagnostics:    The patient has undergone the following TAVR work-up:   · ECHO (Date 1/14/20): DUDLEY= 1.09 cm2 (indexed for BSA 0.57), MG= 43mmHg, Peak Eben= 4.3m/s, EF= 45%.   · LHC (Date 1/8/20): s/p PCI to LAD/RCA with Dr Zhang. Non-obstructive CAD.    · STS: 3%   · Frailty: 1/4 ()   · Iliacs are >8mm on R by IVUS   · LVOT area by CTA is ** cm2 (** mm X ** mm) and Avg Diameter is ** per Dr Zhang measured on HYACINTH  · Incidental findings on CT: no CT done  · Rhythm issues: Afib   · PFTs: FEV1 105% predicted, DLCO 76% predicted.                           Comorbidities: CKD, CVA hx, Afib    Assessment/Plan:     NYHA Score: NYHA III: marked limitation of physical activity, comfortable at rest    Nodular calcific aortic valve stenosis  Pt is high risk for SAVR due to comorbidities. Dr. Cm to review and give final recommendations.       Thank you for your consult. I will sign off. Please contact us if you have any additional questions.    Danette Mccall NP  Cardiothoracic Surgery  [facility] Short Stay Cardiac  Unit    CTS Attending Note:    I have personally taken the history and examined this patient and agree with the ANTOLIN's note as stated above. Given age and comorbid conditions he is at moderate risk for surgical valve replacement.  Recommend TA VI.

## 2020-01-23 NOTE — PROGRESS NOTES
Lab called to inform that BMP specimen was hemolyzed. Tony EVANS with cath lap said he will redraw during next procedure in cath lab.

## 2020-01-23 NOTE — CONSULTS
[facility] Short Stay Cardiac Unit  Cardiology  Consult Note    Patient Name: Lucho Delong  MRN: [id number]  Admission Date: 1/23/2020  Hospital Length of Stay: 0 days  Code Status: No Order   Attending Provider: Bryan Zhang MD   Consulting Provider: Mitzi Peoples MD  Primary Care Physician: Jose Nolan MD  Principal Problem:<principal problem not specified>    Patient information was obtained from patient and ER records.     Consults  Subjective:     Chief Complaint:  Severe AS     HPI:   Mr Delong is a 78 yo man with medical history significant for HTN, DLPD, atrial fibrillation, CVA (March 2019), CKD 3, CAD s/p LAD, RCA PCI and severe aortic stenosis who is referred for TAVR work up HYACINTH.  TTE DSE AS 1/20  · The patient reached the end of the protocol.  · Mildly decreased left ventricular systolic function. The estimated ejection fraction is 45%.  · Left ventricular diastolic dysfunction.  · Local segmental wall motion abnormalities.  · The quantitatively dervived ejection fraction is 46%.  · There were no arrhythmias during stress.  · The ECG portion of this study is negative for myocardial ischemia.  · The patient's exercise capacity was normal.  · Severe aortic valve stenosis.  · Aortic valve area is 1.09 cm2; peak velocity is 4.3 m/s; mean gradient is 43 mmHg with stress. Prior LVOT diameter undermeasured. DUDLEY indexed for BSA 0.57/m2, consistent with severe AS.      Past Medical History:   Diagnosis Date    Anticoagulant long-term use     Atrial fibrillation     Bursitis of hip     Chronic back pain     Chronic kidney disease, stage 3     Gastroesophageal reflux disease     Gout     Hyperlipemia     Hypertension     Prostate cancer 1997    Stroke 04/2019       Past Surgical History:   Procedure Laterality Date    CATHETERIZATION OF BOTH LEFT AND RIGHT HEART N/A 12/2/2019    Procedure: CATHETERIZATION, HEART, BOTH LEFT AND RIGHT;  Surgeon: Everett Sosa MD;   "Location: [facility] CATH/EP LAB;  Service: Cardiology;  Laterality: N/A;    COLONOSCOPY  1997    CORONARY ANGIOGRAPHY N/A 1/8/2020    Procedure: ANGIOGRAM, CORONARY ARTERY;  Surgeon: Bryan Zhang MD;  Location: [facility] CATH LAB;  Service: Cardiology;  Laterality: N/A;    CORONARY STENT PLACEMENT N/A 1/8/2020    Procedure: INSERTION, STENT, CORONARY ARTERY;  Surgeon: Bryan Zhang MD;  Location: [facility] CATH LAB;  Service: Cardiology;  Laterality: N/A;    PROSTATE SURGERY  1997    TONSILLECTOMY         Review of patient's allergies indicates:   Allergen Reactions    Tramadol Itching and Swelling     Began medication, two doses into this treatment patient experiences tongue swelling and severe itching. Patient MD advised to not take this medication anymore       No current facility-administered medications on file prior to encounter.      Current Outpatient Medications on File Prior to Encounter   Medication Sig    allopurinol (ZYLOPRIM) 100 MG tablet Take 1 tablet (100 mg total) by mouth once daily.    aspirin (ECOTRIN) 81 MG EC tablet Take 1 tablet (81 mg total) by mouth once daily.    atorvastatin (LIPITOR) 40 MG tablet Take 1 tablet by mouth Daily.    cyanocobalamin 1,000 mcg/mL injection INJECT 1 ML INTRAMUSCULARLY ONCE EVERY 30 DAYS    dabigatran etexilate (PRADAXA) 75 mg Cap Take 75 mg by mouth once daily. "Do NOT break, chew, or open capsules."    lactase (LACTASE FAST ACTING) 9,000 unit Tab tablet Take 1 tablet (9,000 Units total) by mouth 3 (three) times daily with meals.    losartan (COZAAR) 50 MG tablet Take 50 mg by mouth 2 (two) times daily.    metoprolol succinate (TOPROL-XL) 100 MG 24 hr tablet Take 1 tablet (100 mg total) by mouth once daily.    NIFEdipine (PROCARDIA-XL) 60 MG (OSM) 24 hr tablet Take 60 mg by mouth once daily.    pantoprazole (PROTONIX) 40 MG tablet Take 1 tablet (40 mg total) by mouth once daily.    prasugrel (EFFIENT) 10 mg Tab Take 1 tablet (10 mg total) by mouth " once daily.    predniSONE (DELTASONE) 5 MG tablet Take 1 tablet (5 mg total) by mouth once daily.     Family History     Problem Relation (Age of Onset)    Alzheimer's disease Mother (86), Sister    Cancer Father (86)        Tobacco Use    Smoking status: Never Smoker    Smokeless tobacco: Never Used   Substance and Sexual Activity    Alcohol use: Yes     Alcohol/week: 3.3 standard drinks     Types: 4 Standard drinks or equivalent per week     Frequency: 2-4 times a month     Comment: bourbon or beer    Drug use: No    Sexual activity: Not on file     Review of Systems   Constitution: Negative for chills, decreased appetite and diaphoresis.   HENT: Negative for congestion and ear discharge.    Eyes: Negative for blurred vision and discharge.   Cardiovascular: Negative for chest pain, dyspnea on exertion, irregular heartbeat, leg swelling and paroxysmal nocturnal dyspnea.   Respiratory: Negative for cough, hemoptysis and shortness of breath.    Gastrointestinal: Negative for abdominal pain.     Objective:     Vital Signs (Most Recent):    Vital Signs (24h Range):           There is no height or weight on file to calculate BMI.            No intake or output data in the 24 hours ending 01/23/20 0829    Lines/Drains/Airways     Peripheral Intravenous Line                 Peripheral IV - Single Lumen 01/14/20 1325 22 G Right Forearm 8 days                Physical Exam   Constitutional: He is oriented to person, place, and time. He appears well-developed and well-nourished. No distress.   Eyes: Pupils are equal, round, and reactive to light. Conjunctivae are normal.   Neck: No tracheal deviation present. No thyromegaly present.   Cardiovascular: Normal rate, regular rhythm, normal heart sounds and intact distal pulses. Exam reveals no gallop and no friction rub.   No murmur heard.  Pulses:       Radial pulses are 2+ on the right side, and 2+ on the left side.        Femoral pulses are 2+ on the right side, and 2+  on the left side.  Pulmonary/Chest: Effort normal and breath sounds normal. No respiratory distress. He has no wheezes. He has no rales.   Abdominal: Soft. Bowel sounds are normal. He exhibits no distension. There is no tenderness.   Musculoskeletal: He exhibits no edema or deformity.   Neurological: He is alert and oriented to person, place, and time. No cranial nerve deficit. Coordination normal.   Skin: Skin is warm and dry. He is not diaphoretic.   Psychiatric: He has a normal mood and affect. His behavior is normal.       Significant Labs: BMP: No results for input(s): GLU, NA, K, CL, CO2, BUN, CREATININE, CALCIUM, MG in the last 48 hours.    Significant Imaging: Echocardiogram:   Transthoracic echo (TTE) complete (Cupid Only):   Results for orders placed or performed during the hospital encounter of 01/08/20   Echo Color Flow Doppler? Yes   Result Value Ref Range    BSA 1.91 m2    LA WIDTH 3.29 cm    LVIDD 4.75 3.5 - 6.0 cm    IVS 1.00 0.6 - 1.1 cm    PW 1.03 0.6 - 1.1 cm    LVIDS 3.65 2.1 - 4.0 cm    FS 23 28 - 44 %    LA volume 78.99 cm3    Sinus 3.25 cm    STJ 2.60 cm    Ascending aorta 3.15 cm    LV mass 170.74 g    LA size 4.41 cm    RVDD 2.93 cm    TAPSE 2.03 cm    Left Ventricle Relative Wall Thickness 0.43 cm    AV mean gradient 29 mmHg    AV valve area 0.86 cm2    AV Velocity Ratio 0.23     AV index (prosthetic) 0.23     E/A ratio 2.51     E wave decelartion time 210.68 msec    LVOT diameter 2.17 cm    LVOT area 3.7 cm2    LVOT peak eben 0.81 m/s    LVOT peak VTI 17.59 cm    Ao peak eben 3.56 m/s    Ao VTI 76.04 cm    LVOT stroke volume 65.02 cm3    AV peak gradient 51 mmHg    MV Peak E Eben 1.13 m/s    TR Max Eben 2.75 m/s    MV Peak A Eben 0.45 m/s    LV Systolic Volume 56.24 mL    LV Systolic Volume Index 29.8 mL/m2    LV Diastolic Volume 104.94 mL    LV Diastolic Volume Index 55.61 mL/m2    LA Volume Index 41.9 mL/m2    LV Mass Index 90 g/m2    RA Major Axis 5.19 cm    Left Atrium Minor Axis 6.38 cm     Left Atrium Major Axis 6.43 cm    Triscuspid Valve Regurgitation Peak Gradient 30 mmHg    RA Width 3.48 cm    Narrative    · Concentric left ventricular remodeling. Mildly decreased left   ventricular systolic function. The estimated ejection fraction is 40%.   Wall motion abnoramlities  · Local segmental wall motion abnormalities.  · Moderate left atrial enlargement.  · Mild tricuspid regurgitation.  · Normal right ventricular systolic function.  · Moderate-to-severe aortic valve stenosis. Aortic valve area is 0.86 cm2;   peak velocity is 3.56 m/s; mean gradient is 29 mmHg. Di of 0.23. SVI of   34cc/m2  · Atrial fibrillation observed.        Assessment and Plan:     Nodular calcific aortic valve stenosis  1. HYACINTH for evaluation of AV for TAVR  -No absolute contraindications of esophageal stricture, tumor, perforation, laceration,or diverticulum and/or active GI bleed  -The risks, benefits & alternatives of the procedure were explained to the patient.   -The risks of transesophageal echo include but are not limited to:  Dental trauma, esophageal trauma/perforation, bleeding, laryngospasm/brochospasm, aspiration, sore throat/hoarseness, & dislodgement of the endotracheal tube/nasogastric tube (where applicable).    -The risks of moderate sedation include hypotension, respiratory depression, arrhythmias, bronchospasm, & death.    -Informed consent was obtained. The patient is agreeable to proceed with the procedure and all questions and concerns addressed.    Case discussed with an attending in echocardiography lab.     Further recommendations per attending addendum        VTE Risk Mitigation (From admission, onward)    None          Thank you for your consult. I will follow-up with patient. Please contact us if you have any additional questions.    Mitzi Peoples MD  Cardiology   [facility] Stay [facility]

## 2020-01-23 NOTE — PROGRESS NOTES
TAVR SUMMARY NOTE    Lucho Delong is a 79 y.o. male referred by Dr Sosa for evaluation of severe AS (NYHA Class III sx).     The patient has undergone the following TAVR work-up:   · ECHO (Date 1/14/20): DUDLEY= 1.09 cm2 (indexed for BSA 0.57), MG= 43mmHg, Peak Eben= 4.3m/s, EF= 45%.   · LHC (Date 1/8/20): s/p PCI to LAD/RCA with Dr Zhang. Non-obstructive CAD.    · STS: 3%   · Frailty: 1/4 ()   · Right IVUS DEMETRIO >8 mm, DAIRO 9 mm, RCA 8 mm, Left axillary and subclavian > 8 mm  · LVOT area by HYACINTH : is oval, were 3.0 x 2.4, average 2.7cm.  The area was 5.9 centimeters squared  · Incidental findings on CT:Note done  · CT Surgery risk assessment: high risk, per Dr Cm  · Rhythm issues: Afib   · PFTs: FEV1 105% predicted, DLCO 76% predicted.   · Comorbidities: CKD 3, CVA hx, Afib        Lucho Delong is a 34 mm  Evolut R valve candidate via R TF access.    After showing the TAVR presentation to the patient, he is not sure if he wants to proceed with valve replacement and if he wants to be FULL CODE. He was instructed to call us at any time if he decides to be FULL CODE.   We will cancel his TAVR procedure.   He should continue to follow up with Dr Sosa.   Dr Zhang was part of the conversation.     Carlos Patino MD  Structural/Valve Interventional Fellow  PGY VIII  1/23/2020 2:11 PM

## 2020-01-23 NOTE — ASSESSMENT & PLAN NOTE
1. HYACINTH for evaluation of AV for TAVR  -No absolute contraindications of esophageal stricture, tumor, perforation, laceration,or diverticulum and/or active GI bleed  -The risks, benefits & alternatives of the procedure were explained to the patient.   -The risks of transesophageal echo include but are not limited to:  Dental trauma, esophageal trauma/perforation, bleeding, laryngospasm/brochospasm, aspiration, sore throat/hoarseness, & dislodgement of the endotracheal tube/nasogastric tube (where applicable).    -The risks of moderate sedation include hypotension, respiratory depression, arrhythmias, bronchospasm, & death.    -Informed consent was obtained. The patient is agreeable to proceed with the procedure and all questions and concerns addressed.    Case discussed with an attending in echocardiography lab.     Further recommendations per attending addendum

## 2020-01-23 NOTE — TRANSFER OF CARE
"Anesthesia Transfer of Care Note    Patient: Lucho Delong    Procedure(s) Performed: Procedure(s) (LRB):  ECHOCARDIOGRAM, TRANSESOPHAGEAL (N/A)    Patient location: PACU    Anesthesia Type: general    Transport from OR: Transported from OR on 2-3 L/min O2 by NC with adequate spontaneous ventilation    Post pain: adequate analgesia    Post assessment: no apparent anesthetic complications    Post vital signs: stable    Level of consciousness: awake    Nausea/Vomiting: no nausea/vomiting    Complications: none    Transfer of care protocol was followed      Last vitals:   Visit Vitals  /63 (BP Location: Left arm, Patient Position: Lying)   Pulse 66   Temp 36.2 °C (97.1 °F) (Oral)   Resp 18   Ht 5' 7" (1.702 m)   Wt 77.1 kg (170 lb)   SpO2 97%   BMI 26.63 kg/m²     "

## 2020-01-23 NOTE — HPI
Mr Delong is a 78 yo man with medical history significant for HTN, DLPD, atrial fibrillation, CVA (March 2019), CKD 3, CAD s/p LAD, RCA PCI and severe aortic stenosis who is referred for TAVR work up HYACINTH.  TTE DSE AS 1/20  · The patient reached the end of the protocol.  · Mildly decreased left ventricular systolic function. The estimated ejection fraction is 45%.  · Left ventricular diastolic dysfunction.  · Local segmental wall motion abnormalities.  · The quantitatively dervived ejection fraction is 46%.  · There were no arrhythmias during stress.  · The ECG portion of this study is negative for myocardial ischemia.  · The patient's exercise capacity was normal.  · Severe aortic valve stenosis.  · Aortic valve area is 1.09 cm2; peak velocity is 4.3 m/s; mean gradient is 43 mmHg with stress. Prior LVOT diameter undermeasured. DUDLEY indexed for BSA 0.57/m2, consistent with severe AS.

## 2020-01-23 NOTE — BRIEF OP NOTE
Brief Operative Note:    : Bryan Zhang MD     Referring Physician: Bryan Zhang     All Operators: Surgeon(s):  MD Carlos Knott MD Vinod A. Chainani, MD     Preoperative Diagnosis: Nodular calcific aortic valve stenosis [I35.0]     Postop Diagnosis: Nodular calcific aortic valve stenosis [I35.0]    Treatments/Procedures: Procedure(s) (LRB):  PTA, PERIPHERAL VESSEL (N/A)    Findings:IVUS of L axillary, subclavian, and iliacs done. See catheterization report for full details.    Estimated Blood loss: <5 cc    Specimens removed: No    Tyshawn Warren

## 2020-01-23 NOTE — ASSESSMENT & PLAN NOTE
- HYACINTH, IVUS, PFTs, 6 min walk, and CTS scheduled for today  - Currently on Effient and ASA therapy    Lucho Delong is a 79 y.o. male referred by Dr Sosa for evaluation of severe AS (NYHA Class III sx).    The patient has undergone the following TAVR work-up:   ECHO (Date 1/14/20): DUDLEY= 1.09 cm2 (indexed for BSA 0.57), MG= 43mmHg, Peak Eben= 4.3m/s, EF= 45%.   LHC (Date 1/8/20): s/p PCI to LAD/RCA with Dr Zhang. Non-obstructive CAD.    STS: 3%   Frailty: 1/4 ()   Iliacs are >** on R and > ** on L   LVOT area by CTA is ** cm2 (** mm X ** mm) and Avg Diameter is ** per  **  Incidental findings on CT: **  CT Surgery risk assessment: high risk, per Dr Cm  Rhythm issues: Afib   PFTs: FEV1 105% predicted, DLCO 76% predicted.   Comorbidities: CKD, CVA hx, Afib      Lucho Delong is a ** mm  ** valve candidate via ** access.

## 2020-01-23 NOTE — PLAN OF CARE
This patient has moderate AS by TTE with low EF.  Today he is being admitted for a dobutamine echo to evaluate his TAVR candidacy.  He will also get HYACINTH for LVOT measurement and IVUS of the ILICACs and L subclavian artery.  His Creatinine is over 2.5 so no contrast will be used.  He will need a summary note.

## 2020-01-23 NOTE — SUBJECTIVE & OBJECTIVE
"Past Medical History:   Diagnosis Date    Anticoagulant long-term use     Atrial fibrillation     Bursitis of hip     Chronic back pain     Chronic kidney disease, stage 3     Gastroesophageal reflux disease     Gout     Hyperlipemia     Hypertension     Prostate cancer 1997    Stroke 04/2019       Past Surgical History:   Procedure Laterality Date    CATHETERIZATION OF BOTH LEFT AND RIGHT HEART N/A 12/2/2019    Procedure: CATHETERIZATION, HEART, BOTH LEFT AND RIGHT;  Surgeon: Everett Sosa MD;  Location: [facility] CATH/EP LAB;  Service: Cardiology;  Laterality: N/A;    COLONOSCOPY  1997    CORONARY ANGIOGRAPHY N/A 1/8/2020    Procedure: ANGIOGRAM, CORONARY ARTERY;  Surgeon: Bryan Zhang MD;  Location: [facility] CATH LAB;  Service: Cardiology;  Laterality: N/A;    CORONARY STENT PLACEMENT N/A 1/8/2020    Procedure: INSERTION, STENT, CORONARY ARTERY;  Surgeon: Bryan Zhang MD;  Location: [facility] CATH LAB;  Service: Cardiology;  Laterality: N/A;    PROSTATE SURGERY  1997    TONSILLECTOMY         Review of patient's allergies indicates:   Allergen Reactions    Tramadol Itching and Swelling     Began medication, two doses into this treatment patient experiences tongue swelling and severe itching. Patient MD advised to not take this medication anymore       No current facility-administered medications on file prior to encounter.      Current Outpatient Medications on File Prior to Encounter   Medication Sig    allopurinol (ZYLOPRIM) 100 MG tablet Take 1 tablet (100 mg total) by mouth once daily.    aspirin (ECOTRIN) 81 MG EC tablet Take 1 tablet (81 mg total) by mouth once daily.    atorvastatin (LIPITOR) 40 MG tablet Take 1 tablet by mouth Daily.    cyanocobalamin 1,000 mcg/mL injection INJECT 1 ML INTRAMUSCULARLY ONCE EVERY 30 DAYS    dabigatran etexilate (PRADAXA) 75 mg Cap Take 75 mg by mouth once daily. "Do NOT break, chew, or open capsules."    lactase (LACTASE FAST ACTING) 9,000 unit Tab " tablet Take 1 tablet (9,000 Units total) by mouth 3 (three) times daily with meals.    losartan (COZAAR) 50 MG tablet Take 50 mg by mouth 2 (two) times daily.    metoprolol succinate (TOPROL-XL) 100 MG 24 hr tablet Take 1 tablet (100 mg total) by mouth once daily.    NIFEdipine (PROCARDIA-XL) 60 MG (OSM) 24 hr tablet Take 60 mg by mouth once daily.    pantoprazole (PROTONIX) 40 MG tablet Take 1 tablet (40 mg total) by mouth once daily.    prasugrel (EFFIENT) 10 mg Tab Take 1 tablet (10 mg total) by mouth once daily.    predniSONE (DELTASONE) 5 MG tablet Take 1 tablet (5 mg total) by mouth once daily.     Family History     Problem Relation (Age of Onset)    Alzheimer's disease Mother (86), Sister    Cancer Father (86)        Tobacco Use    Smoking status: Never Smoker    Smokeless tobacco: Never Used   Substance and Sexual Activity    Alcohol use: Yes     Alcohol/week: 3.3 standard drinks     Types: 4 Standard drinks or equivalent per week     Frequency: 2-4 times a month     Comment: bourbon or beer    Drug use: No    Sexual activity: Not on file     Review of Systems   Constitution: Negative for chills, decreased appetite and diaphoresis.   HENT: Negative for congestion and ear discharge.    Eyes: Negative for blurred vision and discharge.   Cardiovascular: Negative for chest pain, dyspnea on exertion, irregular heartbeat, leg swelling and paroxysmal nocturnal dyspnea.   Respiratory: Negative for cough, hemoptysis and shortness of breath.    Gastrointestinal: Negative for abdominal pain.     Objective:     Vital Signs (Most Recent):    Vital Signs (24h Range):           There is no height or weight on file to calculate BMI.            No intake or output data in the 24 hours ending 01/23/20 0829    Lines/Drains/Airways     Peripheral Intravenous Line                 Peripheral IV - Single Lumen 01/14/20 1325 22 G Right Forearm 8 days                Physical Exam   Constitutional: He is oriented to  person, place, and time. He appears well-developed and well-nourished. No distress.   Eyes: Pupils are equal, round, and reactive to light. Conjunctivae are normal.   Neck: No tracheal deviation present. No thyromegaly present.   Cardiovascular: Normal rate, regular rhythm, normal heart sounds and intact distal pulses. Exam reveals no gallop and no friction rub.   No murmur heard.  Pulses:       Radial pulses are 2+ on the right side, and 2+ on the left side.        Femoral pulses are 2+ on the right side, and 2+ on the left side.  Pulmonary/Chest: Effort normal and breath sounds normal. No respiratory distress. He has no wheezes. He has no rales.   Abdominal: Soft. Bowel sounds are normal. He exhibits no distension. There is no tenderness.   Musculoskeletal: He exhibits no edema or deformity.   Neurological: He is alert and oriented to person, place, and time. No cranial nerve deficit. Coordination normal.   Skin: Skin is warm and dry. He is not diaphoretic.   Psychiatric: He has a normal mood and affect. His behavior is normal.       Significant Labs: BMP: No results for input(s): GLU, NA, K, CL, CO2, BUN, CREATININE, CALCIUM, MG in the last 48 hours.    Significant Imaging: Echocardiogram:   Transthoracic echo (TTE) complete (Cupid Only):   Results for orders placed or performed during the hospital encounter of 01/08/20   Echo Color Flow Doppler? Yes   Result Value Ref Range    BSA 1.91 m2    LA WIDTH 3.29 cm    LVIDD 4.75 3.5 - 6.0 cm    IVS 1.00 0.6 - 1.1 cm    PW 1.03 0.6 - 1.1 cm    LVIDS 3.65 2.1 - 4.0 cm    FS 23 28 - 44 %    LA volume 78.99 cm3    Sinus 3.25 cm    STJ 2.60 cm    Ascending aorta 3.15 cm    LV mass 170.74 g    LA size 4.41 cm    RVDD 2.93 cm    TAPSE 2.03 cm    Left Ventricle Relative Wall Thickness 0.43 cm    AV mean gradient 29 mmHg    AV valve area 0.86 cm2    AV Velocity Ratio 0.23     AV index (prosthetic) 0.23     E/A ratio 2.51     E wave decelartion time 210.68 msec    LVOT diameter  2.17 cm    LVOT area 3.7 cm2    LVOT peak eben 0.81 m/s    LVOT peak VTI 17.59 cm    Ao peak eben 3.56 m/s    Ao VTI 76.04 cm    LVOT stroke volume 65.02 cm3    AV peak gradient 51 mmHg    MV Peak E Eben 1.13 m/s    TR Max Eben 2.75 m/s    MV Peak A Eben 0.45 m/s    LV Systolic Volume 56.24 mL    LV Systolic Volume Index 29.8 mL/m2    LV Diastolic Volume 104.94 mL    LV Diastolic Volume Index 55.61 mL/m2    LA Volume Index 41.9 mL/m2    LV Mass Index 90 g/m2    RA Major Axis 5.19 cm    Left Atrium Minor Axis 6.38 cm    Left Atrium Major Axis 6.43 cm    Triscuspid Valve Regurgitation Peak Gradient 30 mmHg    RA Width 3.48 cm    Narrative    · Concentric left ventricular remodeling. Mildly decreased left   ventricular systolic function. The estimated ejection fraction is 40%.   Wall motion abnoramlities  · Local segmental wall motion abnormalities.  · Moderate left atrial enlargement.  · Mild tricuspid regurgitation.  · Normal right ventricular systolic function.  · Moderate-to-severe aortic valve stenosis. Aortic valve area is 0.86 cm2;   peak velocity is 3.56 m/s; mean gradient is 29 mmHg. Di of 0.23. SVI of   34cc/m2  · Atrial fibrillation observed.

## 2020-01-23 NOTE — DISCHARGE SUMMARY
[facility]  Cardiology  Discharge Summary      Patient Name: Lucho Delong  MRN: [id number]  Admission Date: 1/23/2020  Hospital Length of Stay: 0 days  Discharge Date and Time:  01/23/2020 11:09 AM  Attending Physician: Bryan Zhang MD  Discharging Provider: Tyshawn Warren MD  Primary Care Physician: Jose Nolan MD    HPI: 80 yo man with medical history significant for HTN, DLPD, atrial fibrillation, CVA (March 2019), CKD 3, CAD and aortic stenosis who is referred for evaluation of severe AS. He is now s/p RCA and LAD PCI by Dr. Zhang on 1/8/20. TTE at the time did not qualify for TAVR with EF 40%. Dobutamine stress echo done following was qualifying. Patient reports COLON able to walk less than a block before experiencing symptoms. He is unable to play golf like he used to due to SOB. His renal function has declined over the past year. Both renal arteries were patent on last angiogram. Cr 2.7.      Lucho Delong is a 79 y.o. male referred by Dr Sosa for evaluation of severe AS (NYHA Class III sx).     The patient has undergone the following TAVR work-up:   · ECHO (Date 1/14/20): DUDLEY= 1.09 cm2 (indexed for BSA 0.57), MG= 43mmHg, Peak Eben= 4.3m/s, EF= 45%.   · LHC (Date 1/8/20): s/p PCI to LAD/RCA with Dr Zhang. Non-obstructive CAD.    · STS: 3%   · Frailty: 1/4 ()   · Iliacs are >8mm on R by IVUS   · LVOT area by CTA is ** cm2 (** mm X ** mm) and Avg Diameter is ** per Dr Zhang measured on HYACINTH  · Incidental findings on CT: no CT done  · CT Surgery risk assessment: high risk, per Dr Cm  · Rhythm issues: Afib   · PFTs: FEV1 105% predicted, DLCO 76% predicted.   · Comorbidities: CKD, CVA hx, Afib       Procedure(s) (LRB):  PTA, PERIPHERAL VESSEL (N/A)     Indwelling Lines/Drains at time of discharge:  Lines/Drains/Airways     None                 Hospital Course   IVUS completed without complication    Consults:   Consults (From admission, onward)        Status  Ordering Provider     Inpatient consult to Cardiothoracic Surgery  Once     Provider:  (Not yet assigned)    Acknowledged BROCK CARDONA          Significant Diagnostic Studies: Angiography: as per report    Pending Diagnostic Studies:     Procedure Component Value Units Date/Time    Transesophageal echo (HYACINTH) [[id number]]     Order Status:  Sent Lab Status:  No result           Final Active Diagnoses:    Diagnosis Date Noted POA    Nodular calcific aortic valve stenosis [I35.0] 01/23/2020 Yes    Stage 3 chronic kidney disease [N18.3] 12/16/2019 Yes      Problems Resolved During this Admission:       Discharged Condition: stable    Follow Up: Dr Zhang as scheduled    Patient Instructions:      Type & Screen   Standing Status: Future Standing Exp. Date: 03/15/21     Medications:  Reconciled Home Medications:      Medication List      CONTINUE taking these medications    allopurinol 100 MG tablet  Commonly known as:  ZYLOPRIM  Take 1 tablet (100 mg total) by mouth once daily.     aspirin 81 MG EC tablet  Commonly known as:  ECOTRIN  Take 1 tablet (81 mg total) by mouth once daily.     atorvastatin 40 MG tablet  Commonly known as:  LIPITOR  Take 1 tablet by mouth Daily.     cyanocobalamin 1,000 mcg/mL injection  INJECT 1 ML INTRAMUSCULARLY ONCE EVERY 30 DAYS     lactase 9,000 unit Tab tablet  Commonly known as:  Lactase Fast Acting  Take 1 tablet (9,000 Units total) by mouth 3 (three) times daily with meals.     losartan 50 MG tablet  Commonly known as:  COZAAR  Take 50 mg by mouth 2 (two) times daily.     metoprolol succinate 100 MG 24 hr tablet  Commonly known as:  TOPROL-XL  Take 1 tablet (100 mg total) by mouth once daily.     NIFEdipine 60 MG (OSM) 24 hr tablet  Commonly known as:  PROCARDIA-XL  Take 60 mg by mouth once daily.     pantoprazole 40 MG tablet  Commonly known as:  PROTONIX  Take 1 tablet (40 mg total) by mouth once daily.     Pradaxa 75 mg Cap  Generic drug:  dabigatran etexilate  Take 75 mg by  "mouth once daily. "Do NOT break, chew, or open capsules."     prasugrel 10 mg Tab  Commonly known as:  Effient  Take 1 tablet (10 mg total) by mouth once daily.     predniSONE 5 MG tablet  Commonly known as:  DELTASONE  Take 1 tablet (5 mg total) by mouth once daily.            Time spent on the discharge of patient: 40 minutes    Tyshawn Warren MD  Cardiology  [facility]  "

## 2020-01-23 NOTE — PLAN OF CARE
Patient arrived to room. PIV placed, labs sent. Fluids running. Admit assessment completed. Plan of care discussed with patient. Will continue to monitor.    Problem: Adult Inpatient Plan of Care  Goal: Plan of Care Review  Outcome: Ongoing, Progressing  Goal: Patient-Specific Goal (Individualization)  Outcome: Ongoing, Progressing  Goal: Absence of Hospital-Acquired Illness or Injury  Outcome: Ongoing, Progressing  Goal: Optimal Comfort and Wellbeing  Outcome: Ongoing, Progressing  Goal: Readiness for Transition of Care  Outcome: Ongoing, Progressing  Goal: Rounds/Family Conference  Outcome: Ongoing, Progressing     Problem: Fall Injury Risk  Goal: Absence of Fall and Fall-Related Injury  Outcome: Ongoing, Progressing     Problem: Arrhythmia/Dysrhythmia (Cardiac Catheterization)  Goal: Stable Heart Rate and Rhythm  Outcome: Ongoing, Progressing     Problem: Bleeding (Cardiac Catheterization)  Goal: Absence of Bleeding  Outcome: Ongoing, Progressing     Problem: Contrast-Induced Injury Risk (Cardiac Catheterization)  Goal: Absence of Contrast-Induced Injury  Outcome: Ongoing, Progressing     Problem: Embolism (Cardiac Catheterization)  Goal: Absence of Embolism Signs/Symptoms  Outcome: Ongoing, Progressing     Problem: Ongoing Anesthesia/Sedation Effects (Cardiac Catheterization)  Goal: Anesthesia/Sedation Recovery  Outcome: Ongoing, Progressing     Problem: Pain (Cardiac Catheterization)  Goal: Acceptable Pain Control  Outcome: Ongoing, Progressing     Problem: Vascular Access Protection (Cardiac Catheterization)  Goal: Absence of Vascular Access Complication  Outcome: Ongoing, Progressing

## 2020-01-23 NOTE — PLAN OF CARE
Received report from Cardiac Cath lab RN. Patient s/p iliacs IVOS, AAOx3. VSS, no c/o pain or discomfort at this time, resp even and unlabored. Gauze/tegaderm dressing to R groin is CDI. No active bleeding. No hematoma noted. Post procedure protocol reviewed with patient and patient's family. Understanding verbalized. Family members at bedside. Nurse call bell within reach. Will continue to monitor per post procedure protocol.        Problem: Adult Inpatient Plan of Care  Goal: Plan of Care Review  1/23/2020 1123 by Charlette Valera RN  Outcome: Ongoing, Progressing  1/23/2020 0903 by Charlette Valera RN  Outcome: Ongoing, Progressing  Goal: Patient-Specific Goal (Individualization)  1/23/2020 1123 by Charlette Valera RN  Outcome: Ongoing, Progressing  1/23/2020 0903 by Charlette Valera RN  Outcome: Ongoing, Progressing  Goal: Absence of Hospital-Acquired Illness or Injury  1/23/2020 1123 by Charlette Valera RN  Outcome: Ongoing, Progressing  1/23/2020 0903 by Charlette Valera RN  Outcome: Ongoing, Progressing  Goal: Optimal Comfort and Wellbeing  1/23/2020 1123 by Charlette Valera RN  Outcome: Ongoing, Progressing  1/23/2020 0903 by Charlette Valera RN  Outcome: Ongoing, Progressing  Goal: Readiness for Transition of Care  1/23/2020 1123 by Charlette Valera RN  Outcome: Ongoing, Progressing  1/23/2020 0903 by Charlette Valera RN  Outcome: Ongoing, Progressing  Goal: Rounds/Family Conference  1/23/2020 1123 by Charlette Valera RN  Outcome: Ongoing, Progressing  1/23/2020 0903 by Charlette Valera RN  Outcome: Ongoing, Progressing     Problem: Fall Injury Risk  Goal: Absence of Fall and Fall-Related Injury  1/23/2020 1123 by Charlette Valera RN  Outcome: Ongoing, Progressing  1/23/2020 0903 by Charlette Valera RN  Outcome: Ongoing, Progressing     Problem: Arrhythmia/Dysrhythmia (Cardiac Catheterization)  Goal: Stable Heart Rate and Rhythm  1/23/2020 1123 by Charlette Valera RN  Outcome: Ongoing, Progressing  1/23/2020 0903 by Charlette Valera RN  Outcome: Ongoing, Progressing      Problem: Bleeding (Cardiac Catheterization)  Goal: Absence of Bleeding  1/23/2020 1123 by Charlette Valera RN  Outcome: Ongoing, Progressing  1/23/2020 0903 by Charlette Valera RN  Outcome: Ongoing, Progressing     Problem: Contrast-Induced Injury Risk (Cardiac Catheterization)  Goal: Absence of Contrast-Induced Injury  1/23/2020 1123 by Charlette Valera RN  Outcome: Ongoing, Progressing  1/23/2020 0903 by Charlette Valera RN  Outcome: Ongoing, Progressing     Problem: Embolism (Cardiac Catheterization)  Goal: Absence of Embolism Signs/Symptoms  1/23/2020 1123 by Charlette Valera RN  Outcome: Ongoing, Progressing  1/23/2020 0903 by Charlette Valera RN  Outcome: Ongoing, Progressing     Problem: Ongoing Anesthesia/Sedation Effects (Cardiac Catheterization)  Goal: Anesthesia/Sedation Recovery  1/23/2020 1123 by Charlette Valera RN  Outcome: Ongoing, Progressing  1/23/2020 0903 by Charlette Valera RN  Outcome: Ongoing, Progressing     Problem: Pain (Cardiac Catheterization)  Goal: Acceptable Pain Control  1/23/2020 1123 by Charlette Valera RN  Outcome: Ongoing, Progressing  1/23/2020 0903 by Charlette Valera RN  Outcome: Ongoing, Progressing     Problem: Vascular Access Protection (Cardiac Catheterization)  Goal: Absence of Vascular Access Complication  1/23/2020 1123 by Charlette Valera RN  Outcome: Ongoing, Progressing  1/23/2020 0903 by Charlette Valera RN  Outcome: Ongoing, Progressing

## 2020-01-23 NOTE — HPI
80 yo man with medical history significant for HTN, DLPD, atrial fibrillation, CVA (March 2019), CKD 3, CAD and aortic stenosis who is referred for evaluation of severe AS. He is now s/p RCA and LAD PCI by Dr. Zhang on 1/8/20. TTE at the time did not qualify for TAVR with EF 40%. Dobutamine stress echo done following was qualifying. Patient reports COLON able to walk less than a block before experiencing symptoms. He is unable to play golf like he used to due to SOB. His renal function has declined over the past year. Both renal arteries were patent on last angiogram. Cr 2.7.      Lucho Delong is a 79 y.o. male referred by Dr Sosa for evaluation of severe AS (NYHA Class III sx).     The patient has undergone the following TAVR work-up:   · ECHO (Date 1/14/20): DUDLEY= 1.09 cm2 (indexed for BSA 0.57), MG= 43mmHg, Peak Eben= 4.3m/s, EF= 45%.   · LHC (Date 1/8/20): s/p PCI to LAD/RCA with Dr Zhang. Non-obstructive CAD.    · STS: 3%   · Frailty: 1/4 ()   · Iliacs are >8mm on R by IVUS   · LVOT area by CTA is ** cm2 (** mm X ** mm) and Avg Diameter is ** per Dr Zhang measured on HYACINTH  · Incidental findings on CT: no CT done  · Rhythm issues: Afib   · PFTs: FEV1 105% predicted, DLCO 76% predicted.   · Comorbidities: CKD, CVA hx, Afib

## 2020-01-23 NOTE — ANESTHESIA POSTPROCEDURE EVALUATION
Anesthesia Post Evaluation    Patient: Lucho Delong    Procedure(s) Performed: Procedure(s) (LRB):  ECHOCARDIOGRAM, TRANSESOPHAGEAL (N/A)    Final Anesthesia Type: general    Patient location during evaluation: PACU  Patient participation: Yes- Able to Participate  Level of consciousness: awake and alert  Post-procedure vital signs: reviewed and stable  Pain management: adequate  Airway patency: patent    PONV status at discharge: No PONV  Anesthetic complications: no      Cardiovascular status: hemodynamically stable  Respiratory status: unassisted  Hydration status: euvolemic  Follow-up not needed.          Vitals Value Taken Time   /62 1/23/2020  2:00 PM   Temp 35.9 °C (96.6 °F) 1/23/2020 11:26 AM   Pulse 79 1/23/2020  2:00 PM   Resp 20 1/23/2020 11:26 AM   SpO2 98 % 1/23/2020 11:26 AM         No case tracking events are documented in the log.      Pain/Ck Score: Ck Score: 10 (1/23/2020 11:06 AM)

## 2020-01-23 NOTE — SUBJECTIVE & OBJECTIVE
"No current facility-administered medications on file prior to encounter.      Current Outpatient Medications on File Prior to Encounter   Medication Sig    allopurinol (ZYLOPRIM) 100 MG tablet Take 1 tablet (100 mg total) by mouth once daily.    aspirin (ECOTRIN) 81 MG EC tablet Take 1 tablet (81 mg total) by mouth once daily.    atorvastatin (LIPITOR) 40 MG tablet Take 1 tablet by mouth Daily.    cyanocobalamin 1,000 mcg/mL injection INJECT 1 ML INTRAMUSCULARLY ONCE EVERY 30 DAYS    dabigatran etexilate (PRADAXA) 75 mg Cap Take 75 mg by mouth once daily. "Do NOT break, chew, or open capsules."    lactase (LACTASE FAST ACTING) 9,000 unit Tab tablet Take 1 tablet (9,000 Units total) by mouth 3 (three) times daily with meals.    losartan (COZAAR) 50 MG tablet Take 50 mg by mouth 2 (two) times daily.    metoprolol succinate (TOPROL-XL) 100 MG 24 hr tablet Take 1 tablet (100 mg total) by mouth once daily.    NIFEdipine (PROCARDIA-XL) 60 MG (OSM) 24 hr tablet Take 60 mg by mouth once daily.    pantoprazole (PROTONIX) 40 MG tablet Take 1 tablet (40 mg total) by mouth once daily.    prasugrel (EFFIENT) 10 mg Tab Take 1 tablet (10 mg total) by mouth once daily.    predniSONE (DELTASONE) 5 MG tablet Take 1 tablet (5 mg total) by mouth once daily.       Review of patient's allergies indicates:   Allergen Reactions    Tramadol Itching and Swelling     Began medication, two doses into this treatment patient experiences tongue swelling and severe itching. Patient MD advised to not take this medication anymore       Past Medical History:   Diagnosis Date    Anticoagulant long-term use     Atrial fibrillation     Bursitis of hip     Chronic back pain     Chronic kidney disease, stage 3     Gastroesophageal reflux disease     Gout     Hyperlipemia     Hypertension     Prostate cancer 1997    Stroke 04/2019     Past Surgical History:   Procedure Laterality Date    CATHETERIZATION OF BOTH LEFT AND RIGHT HEART " N/A 12/2/2019    Procedure: CATHETERIZATION, HEART, BOTH LEFT AND RIGHT;  Surgeon: Everett Sosa MD;  Location: [facility] CATH/EP LAB;  Service: Cardiology;  Laterality: N/A;    COLONOSCOPY  1997    CORONARY ANGIOGRAPHY N/A 1/8/2020    Procedure: ANGIOGRAM, CORONARY ARTERY;  Surgeon: Bryan Zhang MD;  Location: [facility] CATH LAB;  Service: Cardiology;  Laterality: N/A;    CORONARY STENT PLACEMENT N/A 1/8/2020    Procedure: INSERTION, STENT, CORONARY ARTERY;  Surgeon: Bryan Zhang MD;  Location: [facility] CATH LAB;  Service: Cardiology;  Laterality: N/A;    PROSTATE SURGERY  1997    TONSILLECTOMY       Family History     Problem Relation (Age of Onset)    Alzheimer's disease Mother (86), Sister    Cancer Father (86)        Tobacco Use    Smoking status: Never Smoker    Smokeless tobacco: Never Used   Substance and Sexual Activity    Alcohol use: Yes     Alcohol/week: 3.3 standard drinks     Types: 4 Standard drinks or equivalent per week     Frequency: 2-4 times a month     Comment: bourbon or beer    Drug use: No    Sexual activity: Not on file     Review of Systems   Constitutional: Negative for activity change and fatigue.   Respiratory: Positive for shortness of breath. Negative for cough.    Cardiovascular: Negative for chest pain, palpitations and leg swelling.   Gastrointestinal: Negative for abdominal pain, diarrhea and vomiting.   Endocrine: Negative for polydipsia, polyphagia and polyuria.   Genitourinary: Negative for dysuria.   Musculoskeletal: Negative for gait problem.   Skin: Negative for rash.   Allergic/Immunologic: Negative for immunocompromised state.   Neurological: Negative for dizziness, syncope and weakness.   Hematological: Does not bruise/bleed easily.   Psychiatric/Behavioral: Negative for behavioral problems.     Objective:     Vital Signs (Most Recent):  Temp: 96.6 °F (35.9 °C) (01/23/20 1126)  Pulse: 69 (01/23/20 1300)  Resp: 20 (01/23/20 1126)  BP: (!) 115/59 (01/23/20  1300)  SpO2: 98 % (01/23/20 1126) Vital Signs (24h Range):  Temp:  [96.6 °F (35.9 °C)-98.1 °F (36.7 °C)] 96.6 °F (35.9 °C)  Pulse:  [66-94] 69  Resp:  [18-22] 20  SpO2:  [97 %-100 %] 98 %  BP: (110-131)/(58-66) 115/59     Weight: 77.1 kg (170 lb)  Body mass index is 26.63 kg/m².    SpO2: 98 %  O2 Device (Oxygen Therapy): room air     Intake/Output - Last 3 Shifts       01/21 0700 - 01/22 0659 01/22 0700 - 01/23 0659 01/23 0700 - 01/24 0659    I.V. (mL/kg)   300 (3.9)    Total Intake(mL/kg)   300 (3.9)    Net   +300                  Lines/Drains/Airways     Peripheral Intravenous Line                 Peripheral IV - Single Lumen 01/14/20 1325 22 G Right Forearm 8 days         Peripheral IV - Single Lumen 01/23/20 0914 18 G Right Antecubital less than 1 day                 STS Risk Score: 3%    Physical Exam   Constitutional: He is oriented to person, place, and time. He appears well-developed and well-nourished.   HENT:   Head: Normocephalic and atraumatic.   Eyes: EOM are normal.   Neck: Normal range of motion.   Cardiovascular: Normal rate and regular rhythm.   Murmur heard.  Pulmonary/Chest: Effort normal and breath sounds normal.   Abdominal: Soft.   Musculoskeletal: Normal range of motion.   Neurological: He is alert and oriented to person, place, and time.   Skin: Skin is warm, dry and intact. Capillary refill takes less than 2 seconds.   Psychiatric: He has a normal mood and affect.       Significant Labs:  BMP: No results for input(s): GLU, NA, K, CL, CO2, BUN, CREATININE, CALCIUM, MG in the last 48 hours.  CBC:   Recent Labs   Lab 01/23/20  0841   WBC 8.32   RBC 3.32*   HGB 10.6*   HCT 32.7*      MCV 99*   MCH 31.9*   MCHC 32.4     Coagulation:   Recent Labs   Lab 01/23/20  0841   INR 1.0   APTT <21.0       Significant Diagnostics:

## 2020-01-23 NOTE — HPI
Referring Physician: Dr Sosa     Mr Delong is a 78 yo man with medical history significant for HTN, DLPD, atrial fibrillation, CVA (March 2019), CKD 3, CAD and aortic stenosis who is referred for evaluation of severe AS. He is now s/p RCA and LAD PCI by Dr. Zhang on 1/8/20. TTE at the time did not qualify for TAVR with EF 40%. Dobutamine stress echo done following was qualifying. Patient reports COLON able to walk less than a block before experiencing symptoms. He is unable to play golf like he used to due to SOB. His renal function has declined over the past year. Both renal arteries were patent on last angiogram. Cr 2.7.     Lucho Delong is a 79 y.o. male referred by Dr Sosa for evaluation of severe AS (NYHA Class III sx).    The patient has undergone the following TAVR work-up:   ECHO (Date 1/14/20): DDULEY= 1.09 cm2 (indexed for BSA 0.57), MG= 43mmHg, Peak Eben= 4.3m/s, EF= 45%.   [facility] (Date 1/8/20): s/p PCI to LAD/RCA with Dr Zhang. Non-obstructive CAD.    STS: 3%   Frailty: 1/4 ()   Iliacs are >** on R and > ** on L   LVOT area by CTA is ** cm2 (** mm X ** mm) and Avg Diameter is ** per Dr **  Incidental findings on CT: **  CT Surgery risk assessment: high risk, per Dr Cm  Rhythm issues: Afib   PFTs: FEV1 105% predicted, DLCO 76% predicted.   Comorbidities: CKD, CVA hx, Afib      Lucho Delong is a ** mm  ** valve candidate via ** access.

## 2020-01-23 NOTE — Clinical Note
Catheter is repositioned to the Right Common Iliac Artery. IVUS PERFORMED OF ALEXY, TEMITOPE, STEVENSON.

## 2020-01-23 NOTE — PLAN OF CARE
Patient discharged per MD orders. Instructions given on medications, wound care, activity, signs of infection, when to call MD, and follow up appointments. Pt verbalized understanding.  Patient AAOx3, VSS, no c/o pain or discomfort at this time. Telemetry and PIV removed. Patient left unit via wheelchair with transport and family.      Problem: Adult Inpatient Plan of Care  Goal: Plan of Care Review  1/23/2020 1417 by Charlette Valera RN  Outcome: Met  1/23/2020 1123 by Charlette Valera RN  Outcome: Ongoing, Progressing  1/23/2020 0903 by Charlette Valera RN  Outcome: Ongoing, Progressing  Goal: Patient-Specific Goal (Individualization)  1/23/2020 1417 by Charlette Valera RN  Outcome: Met  1/23/2020 1123 by Charlette Valera RN  Outcome: Ongoing, Progressing  1/23/2020 0903 by Charlette Valera RN  Outcome: Ongoing, Progressing  Goal: Absence of Hospital-Acquired Illness or Injury  1/23/2020 1417 by Charlette Valera RN  Outcome: Met  1/23/2020 1123 by Charlette Valera RN  Outcome: Ongoing, Progressing  1/23/2020 0903 by Charlette Valera RN  Outcome: Ongoing, Progressing  Goal: Optimal Comfort and Wellbeing  1/23/2020 1417 by Charlette Valera RN  Outcome: Met  1/23/2020 1123 by Charlette Valera RN  Outcome: Ongoing, Progressing  1/23/2020 0903 by Charlette Valera RN  Outcome: Ongoing, Progressing  Goal: Readiness for Transition of Care  1/23/2020 1417 by Charlette Valera RN  Outcome: Met  1/23/2020 1123 by Charlette Valera RN  Outcome: Ongoing, Progressing  1/23/2020 0903 by Charlette Valera RN  Outcome: Ongoing, Progressing  Goal: Rounds/Family Conference  1/23/2020 1417 by Charlette Valera RN  Outcome: Met  1/23/2020 1123 by Charlette Valera RN  Outcome: Ongoing, Progressing  1/23/2020 0903 by Charlette Valera RN  Outcome: Ongoing, Progressing     Problem: Fall Injury Risk  Goal: Absence of Fall and Fall-Related Injury  1/23/2020 1417 by Charlette Rohn, RN  Outcome: Met  1/23/2020 1123 by Charlette Valera RN  Outcome: Ongoing, Progressing  1/23/2020 0903 by Charlette Valera RN  Outcome: Ongoing,  Progressing     Problem: Arrhythmia/Dysrhythmia (Cardiac Catheterization)  Goal: Stable Heart Rate and Rhythm  1/23/2020 1417 by Charlette Valera RN  Outcome: Met  1/23/2020 1123 by Charlette Valera RN  Outcome: Ongoing, Progressing  1/23/2020 0903 by Charlette Valera RN  Outcome: Ongoing, Progressing     Problem: Bleeding (Cardiac Catheterization)  Goal: Absence of Bleeding  1/23/2020 1417 by Charlette Valera RN  Outcome: Met  1/23/2020 1123 by Charlette Valera RN  Outcome: Ongoing, Progressing  1/23/2020 0903 by Charlette Valera RN  Outcome: Ongoing, Progressing     Problem: Contrast-Induced Injury Risk (Cardiac Catheterization)  Goal: Absence of Contrast-Induced Injury  1/23/2020 1417 by Charlette Valera RN  Outcome: Met  1/23/2020 1123 by Charlette Valera RN  Outcome: Ongoing, Progressing  1/23/2020 0903 by Charlette Valera RN  Outcome: Ongoing, Progressing     Problem: Embolism (Cardiac Catheterization)  Goal: Absence of Embolism Signs/Symptoms  1/23/2020 1417 by Charlette Valera RN  Outcome: Met  1/23/2020 1123 by Charlette Valera RN  Outcome: Ongoing, Progressing  1/23/2020 0903 by Charlette Valera RN  Outcome: Ongoing, Progressing     Problem: Ongoing Anesthesia/Sedation Effects (Cardiac Catheterization)  Goal: Anesthesia/Sedation Recovery  1/23/2020 1417 by Charlette Valera RN  Outcome: Met  1/23/2020 1123 by Charlette Valera RN  Outcome: Ongoing, Progressing  1/23/2020 0903 by Charlette Valera RN  Outcome: Ongoing, Progressing     Problem: Pain (Cardiac Catheterization)  Goal: Acceptable Pain Control  1/23/2020 1417 by Charlette Valera RN  Outcome: Met  1/23/2020 1123 by Charlette Valera RN  Outcome: Ongoing, Progressing  1/23/2020 0903 by Charlette Valera RN  Outcome: Ongoing, Progressing     Problem: Vascular Access Protection (Cardiac Catheterization)  Goal: Absence of Vascular Access Complication  1/23/2020 1417 by Charlette Valera RN  Outcome: Met  1/23/2020 1123 by Charlette Valera RN  Outcome: Ongoing, Progressing  1/23/2020/2020 0903 by Charlette Valera RN  Outcome: Ongoing,  Progressing

## 2020-01-23 NOTE — ANESTHESIA PREPROCEDURE EVALUATION
01/23/2020  Lucho Delong is a 79 y.o., male.  Patient Active Problem List   Diagnosis    Persistent atrial fibrillation    HTN (hypertension)    HLD (hyperlipidemia)    Atrial flutter    History of cardioversion    Long term current use of amiodarone    Current use of long term anticoagulation    PUSHPA (obstructive sleep apnea)    Gout    Right-sided muscle weakness    Difficulty walking    Chronic lower back pain    B12 deficiency    Dyspnea    Nonrheumatic aortic valve stenosis    CAD (coronary artery disease)    Coronary artery disease    Stage 3 chronic kidney disease    Nodular calcific aortic valve stenosis       Anesthesia Evaluation         Review of Systems      Physical Exam  General:  Well nourished    Airway/Jaw/Neck:  Airway Findings: Mouth Opening: Normal Tongue: Normal  General Airway Assessment: Adult  Mallampati: II  Improves to II with phonation.  TM Distance: Normal, at least 6 cm      Dental:  Dental Findings:   Chest/Lungs:  Chest/Lungs Findings: Clear to auscultation     Heart/Vascular:  Heart Findings: Rate: Normal  Rhythm: Regular Rhythm  Sounds: Normal        Mental Status:  Mental Status Findings:  Cooperative, Alert and Oriented         Anesthesia Plan  Type of Anesthesia, risks & benefits discussed:  Anesthesia Type:  general  Patient's Preference: General  Intra-op Monitoring Plan: standard ASA monitors  Intra-op Monitoring Plan Comments: Standard ASA monitors.   Post Op Pain Control Plan: per primary service following discharge from PACU  Post Op Pain Control Plan Comments: Per primary service.     Induction:   IV  Beta Blocker:  Patient is not currently on a Beta-Blocker (No further documentation required).       Informed Consent: Patient understands risks and agrees with Anesthesia plan.  Questions answered. Anesthesia consent signed with patient.  ASA  Score: 4     Day of Surgery Review of History & Physical:    H&P update referred to the surgeon.     Anesthesia Plan Notes: Chart reviewed, patient interviewed and examined.  The plan for general anesthesia was explained.  Questions were answered and the consent was signed.  [facility] From Anesthesia Perspective.

## 2020-01-23 NOTE — H&P
[facility] - Short Stay Cardiac Unit  Interventional Cardiology  H&P    Patient Name: Lucho Delong  MRN: [id number]  Admission Date: 1/23/2020  Code Status: No Order   Attending Provider: Bryan Zhang MD   Primary Care Physician: Jose Nolan MD  Principal Problem:<principal problem not specified>    Patient information was obtained from patient and ER records.     Subjective:     Chief Complaint:  SOB     HPI:  Referring Physician: Dr Sosa     Mr Delogn is a 78 yo man with medical history significant for HTN, DLPD, atrial fibrillation, CVA (March 2019), CKD 3, CAD and aortic stenosis who is referred for evaluation of severe AS. He is now s/p RCA and LAD PCI by Dr. Zhang on 1/8/20. TTE at the time did not qualify for TAVR with EF 40%. Dobutamine stress echo done following was qualifying. Patient reports COLON able to walk less than a block before experiencing symptoms. He is unable to play golf like he used to due to SOB. His renal function has declined over the past year. Both renal arteries were patent on last angiogram. Cr 2.7.     Lucho Delong is a 79 y.o. male referred by Dr Sosa for evaluation of severe AS (NYHA Class III sx).    The patient has undergone the following TAVR work-up:   ECHO (Date 1/14/20): DUDLEY= 1.09 cm2 (indexed for BSA 0.57), MG= 43mmHg, Peak Eben= 4.3m/s, EF= 45%.   LHC (Date 1/8/20): s/p PCI to LAD/RCA with Dr Zhang. Non-obstructive CAD.    STS: 3%   Frailty: 1/4 ()   Iliacs are >** on R and > ** on L   LVOT area by CTA is ** cm2 (** mm X ** mm) and Avg Diameter is ** per  **  Incidental findings on CT: **  CT Surgery risk assessment: high risk, per Dr Cm  Rhythm issues: Afib   PFTs: FEV1 105% predicted, DLCO 76% predicted.   Comorbidities: CKD, CVA hx, Afib      Lucho Delong is a ** mm  ** valve candidate via ** access.    Past Medical History:   Diagnosis Date    Anticoagulant long-term use     Atrial fibrillation     Bursitis of hip  "    Chronic back pain     Chronic kidney disease, stage 3     Gastroesophageal reflux disease     Gout     Hyperlipemia     Hypertension     Prostate cancer 1997    Stroke 04/2019       Past Surgical History:   Procedure Laterality Date    CATHETERIZATION OF BOTH LEFT AND RIGHT HEART N/A 12/2/2019    Procedure: CATHETERIZATION, HEART, BOTH LEFT AND RIGHT;  Surgeon: Everett Sosa MD;  Location: [facility] CATH/EP LAB;  Service: Cardiology;  Laterality: N/A;    COLONOSCOPY  1997    CORONARY ANGIOGRAPHY N/A 1/8/2020    Procedure: ANGIOGRAM, CORONARY ARTERY;  Surgeon: Bryan Zhang MD;  Location: [facility] CATH LAB;  Service: Cardiology;  Laterality: N/A;    CORONARY STENT PLACEMENT N/A 1/8/2020    Procedure: INSERTION, STENT, CORONARY ARTERY;  Surgeon: Bryan Zhang MD;  Location: [facility] CATH LAB;  Service: Cardiology;  Laterality: N/A;    PROSTATE SURGERY  1997    TONSILLECTOMY         Review of patient's allergies indicates:   Allergen Reactions    Tramadol Itching and Swelling     Began medication, two doses into this treatment patient experiences tongue swelling and severe itching. Patient MD advised to not take this medication anymore       PTA Medications   Medication Sig    allopurinol (ZYLOPRIM) 100 MG tablet Take 1 tablet (100 mg total) by mouth once daily.    aspirin (ECOTRIN) 81 MG EC tablet Take 1 tablet (81 mg total) by mouth once daily.    atorvastatin (LIPITOR) 40 MG tablet Take 1 tablet by mouth Daily.    cyanocobalamin 1,000 mcg/mL injection INJECT 1 ML INTRAMUSCULARLY ONCE EVERY 30 DAYS    dabigatran etexilate (PRADAXA) 75 mg Cap Take 75 mg by mouth once daily. "Do NOT break, chew, or open capsules."    lactase (LACTASE FAST ACTING) 9,000 unit Tab tablet Take 1 tablet (9,000 Units total) by mouth 3 (three) times daily with meals.    losartan (COZAAR) 50 MG tablet Take 50 mg by mouth 2 (two) times daily.    metoprolol succinate (TOPROL-XL) 100 MG 24 hr tablet Take 1 tablet (100 mg " total) by mouth once daily.    NIFEdipine (PROCARDIA-XL) 60 MG (OSM) 24 hr tablet Take 60 mg by mouth once daily.    pantoprazole (PROTONIX) 40 MG tablet Take 1 tablet (40 mg total) by mouth once daily.    prasugrel (EFFIENT) 10 mg Tab Take 1 tablet (10 mg total) by mouth once daily.    predniSONE (DELTASONE) 5 MG tablet Take 1 tablet (5 mg total) by mouth once daily.     Family History     Problem Relation (Age of Onset)    Alzheimer's disease Mother (86), Sister    Cancer Father (86)        Tobacco Use    Smoking status: Never Smoker    Smokeless tobacco: Never Used   Substance and Sexual Activity    Alcohol use: Yes     Alcohol/week: 3.3 standard drinks     Types: 4 Standard drinks or equivalent per week     Frequency: 2-4 times a month     Comment: bourbon or beer    Drug use: No    Sexual activity: Not on file     Review of Systems   Constitution: Negative. Negative for chills and fever.   HENT: Negative.  Negative for sore throat.    Eyes: Negative.  Negative for blurred vision.   Cardiovascular: Positive for dyspnea on exertion. Negative for chest pain, claudication, irregular heartbeat, leg swelling, orthopnea, palpitations and syncope.   Respiratory: Negative.  Negative for cough and sputum production.    Endocrine: Negative.    Hematologic/Lymphatic: Does not bruise/bleed easily.   Skin: Negative.  Negative for itching, rash and suspicious lesions.   Musculoskeletal: Negative.  Negative for falls.   Gastrointestinal: Negative.  Negative for abdominal pain.   Genitourinary: Negative.  Negative for dysuria.   Neurological: Negative.  Negative for disturbances in coordination, dizziness and loss of balance.   Psychiatric/Behavioral: Negative for altered mental status.     Objective:     Vital Signs (Most Recent):  Temp: 97.1 °F (36.2 °C) (01/23/20 0850)  Pulse: 66 (01/23/20 0850)  Resp: 18 (01/23/20 0850)  BP: 116/63 (01/23/20 0900)  SpO2: 97 % (01/23/20 0850) Vital Signs (24h Range):  Temp:  [97.1  °F (36.2 °C)] 97.1 °F (36.2 °C)  Pulse:  [66] 66  Resp:  [18] 18  SpO2:  [97 %] 97 %  BP: (116-121)/(58-63) 117/58     Weight: 77.1 kg (170 lb)  Body mass index is 26.63 kg/m².    SpO2: 97 %       No intake or output data in the 24 hours ending 01/23/20 0935    Lines/Drains/Airways     Peripheral Intravenous Line                 Peripheral IV - Single Lumen 01/14/20 1325 22 G Right Forearm 8 days         Peripheral IV - Single Lumen 01/23/20 0914 18 G Right Antecubital less than 1 day                Physical Exam   Constitutional: He is oriented to person, place, and time. He appears well-developed and well-nourished.   HENT:   Head: Normocephalic and atraumatic.   Eyes: Pupils are equal, round, and reactive to light. Conjunctivae and EOM are normal.   Neck: Normal range of motion. Neck supple. No JVD present.   Cardiovascular: Normal rate and regular rhythm. Exam reveals no gallop and no friction rub.   Murmur heard.  Normal pulses throughout   Pulmonary/Chest: Effort normal and breath sounds normal. No respiratory distress. He has no wheezes. He has no rales. He exhibits no tenderness.   Abdominal: Soft. Bowel sounds are normal. He exhibits no distension. There is no tenderness.   Musculoskeletal: Normal range of motion. He exhibits no edema or tenderness.   Neurological: He is alert and oriented to person, place, and time.   Skin: Skin is warm and dry. No erythema. No pallor.       Significant Labs:   Recent Lab Results       01/23/20  0841   01/23/20  0737        DLCOc SBVA LLN   2.25[P]     DLCOc SBVA Pre Ref   90.8[P]     DLCOc SBVA Ref   3.48[P]     DLCOc Single Breath LLN   15.70[P]     DLCOc Single Breath Pre Ref   85.1[P]     DLCOc Single Breath Ref   22.62[P]     DLCO Single Breath LLN   15.70[P]     DLCO Single Breath Pre Ref   76.3[P]     DLCO Single Breath Ref   22.62[P]     DLCOVA LLN   2.25[P]     DLCOVA PRE   2.83[P]     DLCOVA Pre Ref   81.4[P]     DLCOVA Ref   3.48[P]     DLVAAdj PRE   3.16[P]      FEF 25 75 LLN   0.71[P]     FEF 25 75 Pre Ref   112.5[P]     FEF 25 75 Ref   1.88[P]     FEV1 FVC LLN   60[P]     FEV1 FVC Pre Ref   99.0[P]     FEV1 FVC Ref   75[P]     FEV1 LLN   1.80[P]     FEV1 Pre Ref   104.5[P]     FEV1 Ref   2.60[P]     FVC LLN   2.52[P]     FVC Pre Ref   104.7[P]     FVC Ref   3.49[P]     IVC PRE   3.65[P]     IVC Single Breath LLN   2.52[P]     IVC Single Breath Pre Ref   104.4[P]     IVC Single Breath Ref   3.49[P]     MVV LLN   85[P]     MVV Pre Ref   67.0[P]     MVV Ref   100[P]     PEF LLN   4.44[P]     PEF Pre Ref   108.6[P]     PEF Ref   6.56[P]     VA PRE   6.09[P]     VA Single Breath LLN   6.35[P]     VA Single Breath Pre Ref   95.9[P]     VA Single Breath Ref   6.35[P]     DLCO ADJ PRE   19.25[P]     DLCOCSBVA ULN   4.71[P]     DLCOC SINGLEBREATH ULN   29.55[P]     DLCO SINGLEBREATH ULN   29.55[P]     DLCOVA ULN   4.71[P]     FEV05 LLN   1.05[P]     FEV05 REF   2.18[P]     Hematocrit 32.7       Hemoglobin 10.6       IVC SINGLEBREATH ULN   4.46[P]     MCH 31.9       MCHC 32.4       MCV 99       MPV 11.4       Platelets 207       Pre DLCO   17.26[P]     Pre FEF 25 75   2.11[P]     Pre    6.90[P]     PRE FEV05 REF   99.5[P]     Pre FEV1   2.72[P]     Pre FEV1 FVC   74.45[P]     PRE FEV5   2.17[P]     Pre FVC   3.65[P]     Pre MVV   67.00[P]     Pre PEF   7.12[P]     RBC 3.32       RDW 12.9       VA SINGLEBREATH ULN   6.35[P]     WBC 8.32             Significant Imaging: Echocardiogram:   Transthoracic echo (TTE) complete (Cupid Only):   Results for orders placed or performed during the hospital encounter of 01/08/20   Echo Color Flow Doppler? Yes   Result Value Ref Range    BSA 1.91 m2    LA WIDTH 3.29 cm    LVIDD 4.75 3.5 - 6.0 cm    IVS 1.00 0.6 - 1.1 cm    PW 1.03 0.6 - 1.1 cm    LVIDS 3.65 2.1 - 4.0 cm    FS 23 28 - 44 %    LA volume 78.99 cm3    Sinus 3.25 cm    STJ 2.60 cm    Ascending aorta 3.15 cm    LV mass 170.74 g    LA size 4.41 cm    RVDD 2.93 cm    TAPSE 2.03 cm     Left Ventricle Relative Wall Thickness 0.43 cm    AV mean gradient 29 mmHg    AV valve area 0.86 cm2    AV Velocity Ratio 0.23     AV index (prosthetic) 0.23     E/A ratio 2.51     E wave decelartion time 210.68 msec    LVOT diameter 2.17 cm    LVOT area 3.7 cm2    LVOT peak eben 0.81 m/s    LVOT peak VTI 17.59 cm    Ao peak eben 3.56 m/s    Ao VTI 76.04 cm    LVOT stroke volume 65.02 cm3    AV peak gradient 51 mmHg    MV Peak E Eben 1.13 m/s    TR Max Eben 2.75 m/s    MV Peak A Eben 0.45 m/s    LV Systolic Volume 56.24 mL    LV Systolic Volume Index 29.8 mL/m2    LV Diastolic Volume 104.94 mL    LV Diastolic Volume Index 55.61 mL/m2    LA Volume Index 41.9 mL/m2    LV Mass Index 90 g/m2    RA Major Axis 5.19 cm    Left Atrium Minor Axis 6.38 cm    Left Atrium Major Axis 6.43 cm    Triscuspid Valve Regurgitation Peak Gradient 30 mmHg    RA Width 3.48 cm    Narrative    · Concentric left ventricular remodeling. Mildly decreased left   ventricular systolic function. The estimated ejection fraction is 40%.   Wall motion abnoramlities  · Local segmental wall motion abnormalities.  · Moderate left atrial enlargement.  · Mild tricuspid regurgitation.  · Normal right ventricular systolic function.  · Moderate-to-severe aortic valve stenosis. Aortic valve area is 0.86 cm2;   peak velocity is 3.56 m/s; mean gradient is 29 mmHg. Di of 0.23. SVI of   34cc/m2  · Atrial fibrillation observed.        Assessment and Plan:     Nodular calcific aortic valve stenosis  - HYACINTH, IVUS, PFTs, 6 min walk, and CTS scheduled for today  - Currently on Effient and ASA therapy    Lucho Delong is a 79 y.o. male referred by Dr Sosa for evaluation of severe AS (NYHA Class III sx).    The patient has undergone the following TAVR work-up:   ECHO (Date 1/14/20): DUDLEY= 1.09 cm2 (indexed for BSA 0.57), MG= 43mmHg, Peak Eben= 4.3m/s, EF= 45%.   LHC (Date 1/8/20): s/p PCI to LAD/RCA with Dr Zhang. Non-obstructive CAD.    STS: 3%   Frailty: 1/4 ()    Iliacs are >** on R and > ** on L   LVOT area by CTA is ** cm2 (** mm X ** mm) and Avg Diameter is ** per Dr **  Incidental findings on CT: **  CT Surgery risk assessment: high risk, per Dr Cm  Rhythm issues: Afib   PFTs: FEV1 105% predicted, DLCO 76% predicted.   Comorbidities: CKD, CVA hx, Afib      Lucho Delong is a ** mm  ** valve candidate via ** access.    Stage 3 chronic kidney disease  - Will not obtain TAVR CTA due to Cr 2.7  - HYACINTH/IVUS scheduled for today      VTE Risk Mitigation (From admission, onward)    None          Mis Kramer PA-C  Interventional Cardiology   [facility] - Short Stay Cardiac Unit

## 2020-01-24 ENCOUNTER — PATIENT MESSAGE (OUTPATIENT)
Dept: CARDIOLOGY | Facility: CLINIC | Age: 80
End: 2020-01-24

## 2020-01-24 ENCOUNTER — PATIENT MESSAGE (OUTPATIENT)
Dept: FAMILY MEDICINE | Facility: CLINIC | Age: 80
End: 2020-01-24

## 2020-01-24 NOTE — PROCEDURES
Lucho Delong is a 79 y.o.  male patient, who presents for a 6 minute walk test ordered by Bryan Zhang MD.  The diagnosis is Aortic Valve Disorder.  The patient's BMI is 26.8 kg/m2.  Predicted distance (lower limit of normal) is 281.45 meters.      Test Results:    The test was completed without stopping.  The total time walked was 360 seconds.  During walking, the patient reported:  Dyspnea, Lightheadedness.  The patient used no assistive devices during testing.     01/23/2020---------Distance: 328.27 meters (1077 feet)     O2 Sat % Supplemental Oxygen Heart Rate Blood Pressure Nilda Scale   Pre-exercise  (Resting) 100 % Room Air 72 bpm 123/63 mmHg 0   During Exercise 98 % Room Air 72 bpm 124/62 mmHg 3   Post-exercise  (Recovery) 99 % Room Air  81 bpm       Recovery Time:  60 seconds    Performing nurse/tech:  Ovidio HODGSON      PREVIOUS STUDY:   The patient has not had a previous study.      CLINICAL INTERPRETATION:  Six minute walk distance is 328.27 meters (1077 feet) with moderate dyspnea.  During exercise, there was no significant desaturation while breathing room air.  Both blood pressure and heart rate remained stable with walking.  The patient reported non-pulmonary symptoms during exercise.  No previous study performed.  Based upon age and body mass index, exercise capacity is normal.

## 2020-01-25 ENCOUNTER — PATIENT MESSAGE (OUTPATIENT)
Dept: CARDIOLOGY | Facility: CLINIC | Age: 80
End: 2020-01-25

## 2020-01-27 ENCOUNTER — PATIENT MESSAGE (OUTPATIENT)
Dept: FAMILY MEDICINE | Facility: CLINIC | Age: 80
End: 2020-01-27

## 2020-01-27 DIAGNOSIS — I35.0 NODULAR CALCIFIC AORTIC VALVE STENOSIS: Primary | ICD-10-CM

## 2020-01-27 DIAGNOSIS — N18.9 CHRONIC KIDNEY DISEASE, UNSPECIFIED CKD STAGE: ICD-10-CM

## 2020-01-27 NOTE — TELEPHONE ENCOUNTER
Would you reach out to this couple and see if you can accommodate their appointment request with me for Wednesday morning?

## 2020-01-27 NOTE — PROGRESS NOTES
TAVR SUMMARY NOTE     Lucho Delong is a 79 y.o. male referred by Dr Sosa for evaluation of severe AS (NYHA Class III sx).     The patient has undergone the following TAVR work-up:   · ECHO (Date 1/14/20): DUDLEY= 1.09 cm2 (indexed for BSA 0.57), MG= 43mmHg, Peak Eben= 4.3m/s, EF= 45%.   · LHC (Date 1/8/20): s/p PCI to LAD/RCA with Dr Zhang. Non-obstructive CAD.    · STS: 3%   · Frailty: 1/4 ()   · Right IVUS DEMETRIO >8 mm, DARIO 9 mm, RCA 8 mm, Left axillary and subclavian > 8 mm  · LVOT area by HYACINTH : is oval, were 3.0 x 2.4, average 2.7cm.  The area was 5.9 centimeters squared  · Incidental findings on CT:Note done  · CT Surgery risk assessment: high risk, per Dr Cm  · Rhythm issues: Afib   · PFTs: FEV1 105% predicted, DLCO 76% predicted.   · Comorbidities: CKD 3, CVA hx, Afib        Lucho Delong is a 34 mm  Evolut R valve candidate via R TF access.     TAVR PLAN    1. Valve: 34 mm Evolut R  2. TAVR access: R TF  3. Valvuloplasty balloon: 24 mm True Balloon    4. Viabahn size if needed: 8 x 5 mm  5. Antithrombotic therapy: ASA and Plavix  6. Pacemaker risk factors: Afib   7. Patient was educated about the pathophysiology and natural history of severe aortic stenosis and was educated about the treatment options including surgical and transcatheter valve replacement.   8. He will see us in clinic to sign consents if he decides to be FULL CODE.     Carlos Patino MD  Structural/Valve Interventional Fellow  PGY VIII  1/27/2020 4:10 PM

## 2020-01-28 RX ORDER — NIFEDIPINE 60 MG/1
TABLET, EXTENDED RELEASE ORAL
Status: ON HOLD | COMMUNITY
Start: 2020-01-25 | End: 2020-02-19 | Stop reason: SDUPTHER

## 2020-01-28 RX ORDER — SERTRALINE HYDROCHLORIDE 50 MG/1
50 TABLET, FILM COATED ORAL DAILY
COMMUNITY
Start: 2019-12-17 | End: 2020-10-06 | Stop reason: SDUPTHER

## 2020-01-29 ENCOUNTER — OFFICE VISIT (OUTPATIENT)
Dept: FAMILY MEDICINE | Facility: CLINIC | Age: 80
End: 2020-01-29
Payer: MEDICARE

## 2020-01-29 VITALS
OXYGEN SATURATION: 98 % | WEIGHT: 169.13 LBS | RESPIRATION RATE: 16 BRPM | HEART RATE: 71 BPM | HEIGHT: 67 IN | BODY MASS INDEX: 26.55 KG/M2 | DIASTOLIC BLOOD PRESSURE: 60 MMHG | SYSTOLIC BLOOD PRESSURE: 117 MMHG

## 2020-01-29 DIAGNOSIS — I48.19 PERSISTENT ATRIAL FIBRILLATION: Primary | ICD-10-CM

## 2020-01-29 DIAGNOSIS — I10 ESSENTIAL HYPERTENSION: ICD-10-CM

## 2020-01-29 PROCEDURE — 3074F SYST BP LT 130 MM HG: CPT | Mod: CPTII,S$GLB,, | Performed by: FAMILY MEDICINE

## 2020-01-29 PROCEDURE — 3074F PR MOST RECENT SYSTOLIC BLOOD PRESSURE < 130 MM HG: ICD-10-PCS | Mod: CPTII,S$GLB,, | Performed by: FAMILY MEDICINE

## 2020-01-29 PROCEDURE — 99213 OFFICE O/P EST LOW 20 MIN: CPT | Mod: S$GLB,,, | Performed by: FAMILY MEDICINE

## 2020-01-29 PROCEDURE — 3078F DIAST BP <80 MM HG: CPT | Mod: CPTII,S$GLB,, | Performed by: FAMILY MEDICINE

## 2020-01-29 PROCEDURE — 1159F MED LIST DOCD IN RCRD: CPT | Mod: S$GLB,,, | Performed by: FAMILY MEDICINE

## 2020-01-29 PROCEDURE — 3078F PR MOST RECENT DIASTOLIC BLOOD PRESSURE < 80 MM HG: ICD-10-PCS | Mod: CPTII,S$GLB,, | Performed by: FAMILY MEDICINE

## 2020-01-29 PROCEDURE — 1101F PT FALLS ASSESS-DOCD LE1/YR: CPT | Mod: CPTII,S$GLB,, | Performed by: FAMILY MEDICINE

## 2020-01-29 PROCEDURE — 99213 PR OFFICE/OUTPT VISIT, EST, LEVL III, 20-29 MIN: ICD-10-PCS | Mod: S$GLB,,, | Performed by: FAMILY MEDICINE

## 2020-01-29 PROCEDURE — 1159F PR MEDICATION LIST DOCUMENTED IN MEDICAL RECORD: ICD-10-PCS | Mod: S$GLB,,, | Performed by: FAMILY MEDICINE

## 2020-01-29 PROCEDURE — 99999 PR PBB SHADOW E&M-EST. PATIENT-LVL III: ICD-10-PCS | Mod: PBBFAC,,, | Performed by: FAMILY MEDICINE

## 2020-01-29 PROCEDURE — 99999 PR PBB SHADOW E&M-EST. PATIENT-LVL III: CPT | Mod: PBBFAC,,, | Performed by: FAMILY MEDICINE

## 2020-01-29 PROCEDURE — 1101F PR PT FALLS ASSESS DOC 0-1 FALLS W/OUT INJ PAST YR: ICD-10-PCS | Mod: CPTII,S$GLB,, | Performed by: FAMILY MEDICINE

## 2020-01-29 RX ORDER — METOPROLOL SUCCINATE 100 MG/1
100 TABLET, EXTENDED RELEASE ORAL DAILY
Qty: 90 TABLET | Refills: 3 | Status: SHIPPED | OUTPATIENT
Start: 2020-01-29 | End: 2021-02-15

## 2020-01-29 RX ORDER — NIFEDIPINE 60 MG/1
60 TABLET, EXTENDED RELEASE ORAL DAILY
Qty: 90 TABLET | Refills: 3 | Status: SHIPPED | OUTPATIENT
Start: 2020-01-29 | End: 2020-03-02 | Stop reason: SDUPTHER

## 2020-01-29 NOTE — PROGRESS NOTES
"Subjective:       Patient ID: Lucho Delong is a 79 y.o. male.    Chief Complaint: Follow-up and Medication Refill    In regards to the patient's hypertension, patient denies chest pain/sob, and reports compliance with medication regimen.    Pt states that atrial fib is well controlled  No issues/side effects  Compliant with treatment regimen      Review of Systems   Constitutional: Positive for fatigue. Negative for activity change, appetite change and fever.   HENT: Negative for congestion, dental problem, ear discharge, hearing loss, postnasal drip, sinus pain, sneezing and trouble swallowing.    Eyes: Negative for photophobia and discharge.   Respiratory: Positive for shortness of breath. Negative for apnea and cough.    Cardiovascular: Negative for chest pain.   Gastrointestinal: Negative for abdominal distention, abdominal pain, blood in stool, diarrhea, nausea and vomiting.   Endocrine: Negative for cold intolerance.   Genitourinary: Negative for difficulty urinating, flank pain, frequency, hematuria and testicular pain.   Musculoskeletal: Negative for arthralgias and myalgias.   Skin: Negative for color change.   Allergic/Immunologic: Negative for environmental allergies, food allergies and immunocompromised state.   Neurological: Negative for dizziness, syncope, numbness and headaches.   Hematological: Negative for adenopathy. Does not bruise/bleed easily.   Psychiatric/Behavioral: Negative for agitation, confusion, decreased concentration, hallucinations, self-injury and sleep disturbance.   All other systems reviewed and are negative.        Reviewed family, medical, surgical, and social history.    Objective:      /60 (BP Location: Left arm, Patient Position: Sitting, BP Method: Medium (Automatic))   Pulse 71   Resp 16   Ht 5' 7" (1.702 m)   Wt 76.7 kg (169 lb 1.6 oz)   SpO2 98%   BMI 26.48 kg/m²   Physical Exam   Constitutional: He is oriented to person, place, and time. He appears " well-developed and well-nourished. No distress.   HENT:   Head: Normocephalic and atraumatic.   Nose: Nose normal.   Mouth/Throat: Oropharynx is clear and moist. No oropharyngeal exudate.   Eyes: Pupils are equal, round, and reactive to light. Conjunctivae and EOM are normal.   Neck: Normal range of motion. No thyromegaly present.   Cardiovascular: Normal rate, regular rhythm, normal heart sounds and intact distal pulses. Exam reveals no gallop and no friction rub.   No murmur heard.  Pulmonary/Chest: Effort normal and breath sounds normal. No respiratory distress. He has no wheezes. He has no rales.   Abdominal: Soft. He exhibits no distension. There is no tenderness. There is no guarding.   Musculoskeletal: Normal range of motion. He exhibits no edema, tenderness or deformity.   Neurological: He is alert and oriented to person, place, and time. He displays normal reflexes. No cranial nerve deficit or sensory deficit. He exhibits normal muscle tone. Coordination normal.   Skin: Skin is warm and dry. No rash noted. He is not diaphoretic. No erythema. No pallor.   Psychiatric: He has a normal mood and affect. His behavior is normal. Judgment and thought content normal.   Nursing note and vitals reviewed.      Assessment:       1. Persistent atrial fibrillation    2. Essential hypertension        Plan:       Persistent atrial fibrillation  -     Ambulatory referral to Cardiology    Essential hypertension  -     NIFEdipine (PROCARDIA-XL) 60 MG (OSM) 24 hr tablet; Take 1 tablet (60 mg total) by mouth once daily.  Dispense: 90 tablet; Refill: 3  -     metoprolol succinate (TOPROL-XL) 100 MG 24 hr tablet; Take 1 tablet (100 mg total) by mouth once daily.  Dispense: 90 tablet; Refill: 3            Risks, benefits, and side effects were discussed with the patient. All questions were answered to the fullest satisfaction of the patient, and pt verbalized understanding and agreement to treatment plan. Pt was to call with any  new or worsening symptoms, or present to the ER.

## 2020-02-03 ENCOUNTER — TELEPHONE (OUTPATIENT)
Dept: FAMILY MEDICINE | Facility: CLINIC | Age: 80
End: 2020-02-03

## 2020-02-03 NOTE — TELEPHONE ENCOUNTER
----- Message from Nikia Oglesby LPN sent at 2/3/2020 12:19 PM CST -----  Contact: wife      ----- Message -----  From: Zandra Lino  Sent: 2/3/2020  11:15 AM CST  To: Sumit MILES Staff    Type: Needs Medical Advice    Who Called:  Wife  Best Call Back Number:   Additional Information: patient wife calling she stated she ask for a sooner patient with doctor Ovidio then the one given wants a call back asap, please call to advise

## 2020-02-03 NOTE — TELEPHONE ENCOUNTER
----- Message from Nikia Oglesby LPN sent at 2/3/2020 12:18 PM CST -----  Contact: wife      ----- Message -----  From: Zandra Lino  Sent: 2/3/2020  11:15 AM CST  To: Sumit MILES Staff    Type: Needs Medical Advice    Who Called:  Wife  Best Call Back Number:   Additional Information: patient wife calling she stated she ask for a sooner patient with doctor Ovidio then the one given wants a call back asap, please call to advise

## 2020-02-05 ENCOUNTER — PATIENT MESSAGE (OUTPATIENT)
Dept: MEDSURG UNIT | Facility: HOSPITAL | Age: 80
End: 2020-02-05

## 2020-02-05 ENCOUNTER — TELEPHONE (OUTPATIENT)
Dept: FAMILY MEDICINE | Facility: CLINIC | Age: 80
End: 2020-02-05

## 2020-02-05 NOTE — TELEPHONE ENCOUNTER
Pt advised and voiced understanding, he also stated that he went to Urgent care and they are running some stool studies on him and he asked them to send us a copy .

## 2020-02-05 NOTE — TELEPHONE ENCOUNTER
Pt c/o diarrhea x3 days, and is asking if additional medication can be sent to the pharmacy or if an appointment is needed.  Please advise, thank you.           ----- Message from Audelia Woods sent at 2/5/2020  1:34 PM CST -----  Contact: wife  Type: Needs Medical Advice    Who Called:      Best Call Back Number:     Additional Information: Requesting a call back from Nurse, regarding chronic diarrhea for 3 weeks and went away and now it has come back and wife wants to know hat to do about it or does pt  need to come back in ,please call back with advice on number provided

## 2020-02-17 ENCOUNTER — ANESTHESIA EVENT (OUTPATIENT)
Dept: MEDSURG UNIT | Facility: HOSPITAL | Age: 80
DRG: 267 | End: 2020-02-17
Payer: MEDICARE

## 2020-02-17 ENCOUNTER — OFFICE VISIT (OUTPATIENT)
Dept: CARDIOLOGY | Facility: CLINIC | Age: 80
DRG: 267 | End: 2020-02-17
Payer: MEDICARE

## 2020-02-17 VITALS
HEIGHT: 69 IN | WEIGHT: 169.31 LBS | OXYGEN SATURATION: 100 % | BODY MASS INDEX: 25.08 KG/M2 | HEART RATE: 80 BPM | SYSTOLIC BLOOD PRESSURE: 124 MMHG | DIASTOLIC BLOOD PRESSURE: 66 MMHG

## 2020-02-17 DIAGNOSIS — I25.10 CORONARY ARTERY DISEASE INVOLVING NATIVE CORONARY ARTERY OF NATIVE HEART, ANGINA PRESENCE UNSPECIFIED: ICD-10-CM

## 2020-02-17 DIAGNOSIS — I35.0 SEVERE AORTIC STENOSIS: Primary | ICD-10-CM

## 2020-02-17 DIAGNOSIS — I48.19 PERSISTENT ATRIAL FIBRILLATION: ICD-10-CM

## 2020-02-17 DIAGNOSIS — I10 ESSENTIAL HYPERTENSION: ICD-10-CM

## 2020-02-17 DIAGNOSIS — N18.30 STAGE 3 CHRONIC KIDNEY DISEASE: ICD-10-CM

## 2020-02-17 PROCEDURE — 1159F MED LIST DOCD IN RCRD: CPT | Mod: S$GLB,,, | Performed by: INTERNAL MEDICINE

## 2020-02-17 PROCEDURE — 3074F PR MOST RECENT SYSTOLIC BLOOD PRESSURE < 130 MM HG: ICD-10-PCS | Mod: CPTII,S$GLB,, | Performed by: INTERNAL MEDICINE

## 2020-02-17 PROCEDURE — 1126F AMNT PAIN NOTED NONE PRSNT: CPT | Mod: S$GLB,,, | Performed by: INTERNAL MEDICINE

## 2020-02-17 PROCEDURE — 99214 PR OFFICE/OUTPT VISIT, EST, LEVL IV, 30-39 MIN: ICD-10-PCS | Mod: S$GLB,,, | Performed by: INTERNAL MEDICINE

## 2020-02-17 PROCEDURE — 1101F PT FALLS ASSESS-DOCD LE1/YR: CPT | Mod: CPTII,S$GLB,, | Performed by: INTERNAL MEDICINE

## 2020-02-17 PROCEDURE — 99214 OFFICE O/P EST MOD 30 MIN: CPT | Mod: S$GLB,,, | Performed by: INTERNAL MEDICINE

## 2020-02-17 PROCEDURE — 1101F PR PT FALLS ASSESS DOC 0-1 FALLS W/OUT INJ PAST YR: ICD-10-PCS | Mod: CPTII,S$GLB,, | Performed by: INTERNAL MEDICINE

## 2020-02-17 PROCEDURE — 1159F PR MEDICATION LIST DOCUMENTED IN MEDICAL RECORD: ICD-10-PCS | Mod: S$GLB,,, | Performed by: INTERNAL MEDICINE

## 2020-02-17 PROCEDURE — 1126F PR PAIN SEVERITY QUANTIFIED, NO PAIN PRESENT: ICD-10-PCS | Mod: S$GLB,,, | Performed by: INTERNAL MEDICINE

## 2020-02-17 PROCEDURE — 3078F PR MOST RECENT DIASTOLIC BLOOD PRESSURE < 80 MM HG: ICD-10-PCS | Mod: CPTII,S$GLB,, | Performed by: INTERNAL MEDICINE

## 2020-02-17 PROCEDURE — 3074F SYST BP LT 130 MM HG: CPT | Mod: CPTII,S$GLB,, | Performed by: INTERNAL MEDICINE

## 2020-02-17 PROCEDURE — 99999 PR PBB SHADOW E&M-EST. PATIENT-LVL IV: ICD-10-PCS | Mod: PBBFAC,,,

## 2020-02-17 PROCEDURE — 3078F DIAST BP <80 MM HG: CPT | Mod: CPTII,S$GLB,, | Performed by: INTERNAL MEDICINE

## 2020-02-17 PROCEDURE — 99999 PR PBB SHADOW E&M-EST. PATIENT-LVL IV: CPT | Mod: PBBFAC,,,

## 2020-02-17 NOTE — H&P (VIEW-ONLY)
Subjective:    Patient ID:  Lucho Delong is a 79 y.o. male who presents for TAVR with CKD3     Referring Physician: Dr Sosa    HPI      Mr Delong is a 79 yo man with medical history significant for HTN, DLPD, atrial fibrillation, CVA (March 2019), CKD 3, CAD and aortic stenosis who is referred for coronary PCI and severe AS evaluation. Diagnostic angiogram showed significant RCA stenosis.     Upon interview, patient reported COLON upon mild exertion. He is only able to walk less than one block until SOB symptoms appear. Symptoms have been present for over one year. He used to be active by playing golf but no longer he does it due to SOB.  He does not take nitro SL. Denies palpitations, diaphoresis or syncopal events. His renal function has declined over the past year. Cr was 3.0 but the most recent one is 1.9. He is followed by nephrology.    He was also found by echo to have severe aortic stenosis, NYHA Class III, CCS 0      Lucho Delong is a 79 y.o. male referred by Dr Sosa for evaluation of severe AS (NYHA Class III sx).     The patient has undergone the following TAVR work-up:   · ECHO (Date 1/14/20): DUDLEY= 1.09 cm2 (indexed for BSA 0.57), MG= 43mmHg, Peak Eben= 4.3m/s, EF= 45%.   · LHC (Date 1/8/20): s/p PCI to LAD/RCA with Dr Zhang. Non-obstructive CAD.    · STS: 3%   · Frailty: 1/4 ()   · Right IVUS DEMETRIO >8 mm, DARIO 9 mm, RCA 8 mm, Left axillary and subclavian > 8 mm  · LVOT area by HYACINTH : is oval, were 3.0 x 2.4, average 2.7cm.  The area was 5.9 centimeters squared  · Incidental findings on CT:Note done  · CT Surgery risk assessment: high risk, per Dr Cm  · Rhythm issues: Afib   · PFTs: FEV1 105% predicted, DLCO 76% predicted.   · Comorbidities: CKD 3, CVA hx, Afib        Lucho Delong is a 34 mm  Evolut R valve candidate via R TF access.      Review of Systems   Constitution: Negative.   HENT: Negative.    Eyes: Negative.    Cardiovascular: Positive for chest pain, dyspnea on  "exertion and orthopnea. Negative for claudication, cyanosis, irregular heartbeat, leg swelling, near-syncope, palpitations, paroxysmal nocturnal dyspnea and syncope.   Respiratory: Positive for shortness of breath. Negative for cough, hemoptysis, sleep disturbances due to breathing, snoring, sputum production and wheezing.    Endocrine: Negative.    Hematologic/Lymphatic: Negative.    Gastrointestinal: Negative.    Genitourinary: Negative.      Past Medical History:   Diagnosis Date    Anticoagulant long-term use     Atrial fibrillation     Bursitis of hip     Chronic back pain     Chronic kidney disease, stage 3     Gastroesophageal reflux disease     Gout     Hyperlipemia     Hypertension     Prostate cancer 1997    Stroke 04/2019     Current Outpatient Medications on File Prior to Visit   Medication Sig Dispense Refill    allopurinol (ZYLOPRIM) 100 MG tablet Take 1 tablet (100 mg total) by mouth once daily. 90 tablet 3    atorvastatin (LIPITOR) 40 MG tablet Take 1 tablet by mouth Daily.  0    cyanocobalamin 1,000 mcg/mL injection INJECT 1 ML INTRAMUSCULARLY ONCE EVERY 30 DAYS  1    losartan (COZAAR) 50 MG tablet Take 50 mg by mouth once daily.   6    metoprolol succinate (TOPROL-XL) 100 MG 24 hr tablet Take 1 tablet (100 mg total) by mouth once daily. 90 tablet 3    NIFEdipine (PROCARDIA-XL) 60 MG (OSM) 24 hr tablet Take 1 tablet (60 mg total) by mouth once daily. 90 tablet 3    pantoprazole (PROTONIX) 40 MG tablet Take 1 tablet (40 mg total) by mouth once daily. 90 tablet 3    prasugrel (EFFIENT) 10 mg Tab Take 1 tablet (10 mg total) by mouth once daily. 30 tablet 11    predniSONE (DELTASONE) 5 MG tablet Take 1 tablet (5 mg total) by mouth once daily. 90 tablet 3    sertraline (ZOLOFT) 50 MG tablet Take 50 mg by mouth once daily.       dabigatran etexilate (PRADAXA) 75 mg Cap Take 75 mg by mouth once daily. "Do NOT break, chew, or open capsules."      lactase (LACTASE FAST ACTING) 9,000 " "unit Tab tablet Take 1 tablet (9,000 Units total) by mouth 3 (three) times daily with meals. (Patient not taking: Reported on 2/17/2020) 90 tablet 3    NIFEdipine (ADALAT CC) 60 MG TbSR        No current facility-administered medications on file prior to visit.      Vitals:    02/17/20 1439 02/17/20 1442   BP: (!) 115/56 124/66   BP Location: Left arm Right arm   Patient Position: Sitting Sitting   BP Method: Large (Automatic) Large (Automatic)   Pulse: 70 80   SpO2: 100%    Weight: 76.8 kg (169 lb 5 oz)    Height: 5' 8.9" (1.75 m)      Body mass index is 25.08 kg/m².     Objective:    Physical Exam   Constitutional: He is oriented to person, place, and time. He appears well-developed and well-nourished.   HENT:   Head: Normocephalic and atraumatic.   Eyes: Conjunctivae are normal.   Neck: Neck supple. No JVD present. No thyromegaly present.   Cardiovascular: Normal rate and regular rhythm.   Murmur heard.   Harsh midsystolic murmur is present with a grade of 3/6 at the upper right sternal border radiating to the neck.  Pulses:       Carotid pulses are 2+ on the right side, and 2+ on the left side.       Radial pulses are 2+ on the right side, and 2+ on the left side.        Femoral pulses are 2+ on the right side, and 2+ on the left side.       Popliteal pulses are 2+ on the right side, and 2+ on the left side.        Dorsalis pedis pulses are 2+ on the right side, and 2+ on the left side.        Posterior tibial pulses are 2+ on the right side, and 2+ on the left side.   Pulmonary/Chest: Effort normal and breath sounds normal. No respiratory distress. He has no wheezes. He has no rales.   Abdominal: Soft. Bowel sounds are normal. He exhibits no distension. There is no tenderness. There is no rebound.   Musculoskeletal: Normal range of motion.   Neurological: He is oriented to person, place, and time.   Skin: Skin is warm.   Nursing note and vitals reviewed.        Assessment:       1. Severe aortic stenosis "   Lucho Delong is a 79 y.o. male referred by Dr Sosa for evaluation of severe AS (NYHA Class III sx).     The patient has undergone the following TAVR work-up:   · Dobutamine ECHO (Date 1/14/20): DUDLEY= 1.09 cm2 (indexed for BSA 0.57), MG= 43mmHg, Peak Eben= 4.3m/s, EF= 45%.   · LHC (Date 1/8/20): s/p PCI to LAD and RCA with Dr Zhang. Complete revascularization.    · STS: 3%   · Frailty: 1/4 ()   · Right IVUS DEMETRIO >8 mm, DARIO 9 mm, RCA 8 mm, Left axillary and subclavian > 8 mm  · LVOT area by HYACINTH : is oval, were 3.0 x 2.4, average 2.7cm.  The area was 5.9 centimeters squared  · CT not done  · CT Surgery risk assessment: high risk, per Dr Cm  · Rhythm issues: Afib   · PFTs: FEV1 105% predicted, DLCO 76% predicted.   · Comorbidities: CKD IV with Cr 2.8, CVA hx, Afib        Lucho Delong is a 34 mm  Evolut R valve candidate via R TF access.      2. Coronary artery disease involving native coronary artery of native heart, angina presence unspecified    3. Persistent atrial fibrillation    4. Essential hypertension    5. Stage 3 chronic kidney disease    6.      Stroke with expressive aphasia, mild.     Plan:       Coronary artery disease  -If no evidence of URBANO requiring stenting  (See below) will do coronary intervention on the LAD (long lesion involving diagonal branch) and RCA (Two lesions involving a small PDA).    -Start ASA 81 mg po daily and plavix load with 300 mg the day prior to PCI.  -Check PRU at the day of the procedure  -Instructed to drink plenty of fluid day before the angiogram   -IVF at 3 ml/hour   -Patient is a SARAI candidate  -The risks, benefits & alternatives of the procedure were explained to the patient.    -The risks of coronary angiography include but are not limited to:  Bleeding, infection, heart rhythm abnormalities, allergic reactions, kidney injury, stroke and death.    -Should stenting be indicated, the patient has agreed to dual anti-platelet therapy for 1-consecutive  year with a drug-eluting stent and a minimum of 1-month with the use of a bare metal stent.    -The risks of moderate sedation include hypotension, respiratory depression, arrhythmias, bronchospasm, & death.    -Informed consent was obtained & the patient is agreeable to proceed with the procedure.      Stage 3 chronic kidney disease  -We will do bilateral renal angiogram and possible stenting first.    -Staged coronary intervention after renal stenting if URBANO is found.    Nonrheumatic aortic valve stenosis, not severe on physical exam  -Will do 2D echo   -Continue medical treatment     Permanent Atrial Fibrillation, CHADS VASC 5 (8%/year) and HAS BLED 4 (7%/year)  -Continue NOAC  -He is a Watchman candidate; will discuss the procedure with him after his angiogram    Carlos Patino MD  Structural/Valve Interventional Fellow  2/17/2020 9:20 AM        Staff:  I have personally taken the history and examined this patient and agree with the fellow's note as stated above and amended it accordingly :-)       TAVR PLAN:     1. Valve: 34 mm Evolut R with no contrast  2. TAVR access: R TF  3. Valvuloplasty balloon: 24 mm True Balloon    4. Viabahn size if needed: 8 x 5 mm  5. Antithrombotic therapy: ASA, plavix, and Effient.  6. Pacemaker risk factors: Afib. Possible Watchman Candidate  7. Patient was educated about the pathophysiology and natural history of severe aortic stenosis and was educated about the treatment options including surgical and transcatheter valve replacement.   8. The risks, benefits, and alternatives of transcatheter aortic valve replacement were discussed with the patient. All questions were answered and informed consent was obtained. I had a detailed discussion with the patient regarding risk of stroke, MI, bleeding access site complications including limb loss, allergy, kidney failure including dialysis and death.  9. The patient understands the risks and benefits and wishes to go ahead with the  procedure.  10. He is a full code for the foreseeable future.  11. All patient's questions were answered

## 2020-02-17 NOTE — PROGRESS NOTES
Subjective:    Patient ID:  Lucho Delong is a 79 y.o. male who presents for TAVR with CKD3     Referring Physician: Dr Sosa    HPI      Mr Delong is a 77 yo man with medical history significant for HTN, DLPD, atrial fibrillation, CVA (March 2019), CKD 3, CAD and aortic stenosis who is referred for coronary PCI and severe AS evaluation. Diagnostic angiogram showed significant RCA stenosis.     Upon interview, patient reported COLON upon mild exertion. He is only able to walk less than one block until SOB symptoms appear. Symptoms have been present for over one year. He used to be active by playing golf but no longer he does it due to SOB.  He does not take nitro SL. Denies palpitations, diaphoresis or syncopal events. His renal function has declined over the past year. Cr was 3.0 but the most recent one is 1.9. He is followed by nephrology.    He was also found by echo to have severe aortic stenosis, NYHA Class III, CCS 0      Lucho Delong is a 79 y.o. male referred by Dr Sosa for evaluation of severe AS (NYHA Class III sx).     The patient has undergone the following TAVR work-up:   · ECHO (Date 1/14/20): DUDLEY= 1.09 cm2 (indexed for BSA 0.57), MG= 43mmHg, Peak Eben= 4.3m/s, EF= 45%.   · LHC (Date 1/8/20): s/p PCI to LAD/RCA with Dr Zhang. Non-obstructive CAD.    · STS: 3%   · Frailty: 1/4 ()   · Right IVUS DEMETRIO >8 mm, DARIO 9 mm, RCA 8 mm, Left axillary and subclavian > 8 mm  · LVOT area by HYACINTH : is oval, were 3.0 x 2.4, average 2.7cm.  The area was 5.9 centimeters squared  · Incidental findings on CT:Note done  · CT Surgery risk assessment: high risk, per Dr Cm  · Rhythm issues: Afib   · PFTs: FEV1 105% predicted, DLCO 76% predicted.   · Comorbidities: CKD 3, CVA hx, Afib        Lucho Delong is a 34 mm  Evolut R valve candidate via R TF access.      Review of Systems   Constitution: Negative.   HENT: Negative.    Eyes: Negative.    Cardiovascular: Positive for chest pain, dyspnea on  "exertion and orthopnea. Negative for claudication, cyanosis, irregular heartbeat, leg swelling, near-syncope, palpitations, paroxysmal nocturnal dyspnea and syncope.   Respiratory: Positive for shortness of breath. Negative for cough, hemoptysis, sleep disturbances due to breathing, snoring, sputum production and wheezing.    Endocrine: Negative.    Hematologic/Lymphatic: Negative.    Gastrointestinal: Negative.    Genitourinary: Negative.      Past Medical History:   Diagnosis Date    Anticoagulant long-term use     Atrial fibrillation     Bursitis of hip     Chronic back pain     Chronic kidney disease, stage 3     Gastroesophageal reflux disease     Gout     Hyperlipemia     Hypertension     Prostate cancer 1997    Stroke 04/2019     Current Outpatient Medications on File Prior to Visit   Medication Sig Dispense Refill    allopurinol (ZYLOPRIM) 100 MG tablet Take 1 tablet (100 mg total) by mouth once daily. 90 tablet 3    atorvastatin (LIPITOR) 40 MG tablet Take 1 tablet by mouth Daily.  0    cyanocobalamin 1,000 mcg/mL injection INJECT 1 ML INTRAMUSCULARLY ONCE EVERY 30 DAYS  1    losartan (COZAAR) 50 MG tablet Take 50 mg by mouth once daily.   6    metoprolol succinate (TOPROL-XL) 100 MG 24 hr tablet Take 1 tablet (100 mg total) by mouth once daily. 90 tablet 3    NIFEdipine (PROCARDIA-XL) 60 MG (OSM) 24 hr tablet Take 1 tablet (60 mg total) by mouth once daily. 90 tablet 3    pantoprazole (PROTONIX) 40 MG tablet Take 1 tablet (40 mg total) by mouth once daily. 90 tablet 3    prasugrel (EFFIENT) 10 mg Tab Take 1 tablet (10 mg total) by mouth once daily. 30 tablet 11    predniSONE (DELTASONE) 5 MG tablet Take 1 tablet (5 mg total) by mouth once daily. 90 tablet 3    sertraline (ZOLOFT) 50 MG tablet Take 50 mg by mouth once daily.       dabigatran etexilate (PRADAXA) 75 mg Cap Take 75 mg by mouth once daily. "Do NOT break, chew, or open capsules."      lactase (LACTASE FAST ACTING) 9,000 " "unit Tab tablet Take 1 tablet (9,000 Units total) by mouth 3 (three) times daily with meals. (Patient not taking: Reported on 2/17/2020) 90 tablet 3    NIFEdipine (ADALAT CC) 60 MG TbSR        No current facility-administered medications on file prior to visit.      Vitals:    02/17/20 1439 02/17/20 1442   BP: (!) 115/56 124/66   BP Location: Left arm Right arm   Patient Position: Sitting Sitting   BP Method: Large (Automatic) Large (Automatic)   Pulse: 70 80   SpO2: 100%    Weight: 76.8 kg (169 lb 5 oz)    Height: 5' 8.9" (1.75 m)      Body mass index is 25.08 kg/m².     Objective:    Physical Exam   Constitutional: He is oriented to person, place, and time. He appears well-developed and well-nourished.   HENT:   Head: Normocephalic and atraumatic.   Eyes: Conjunctivae are normal.   Neck: Neck supple. No JVD present. No thyromegaly present.   Cardiovascular: Normal rate and regular rhythm.   Murmur heard.   Harsh midsystolic murmur is present with a grade of 3/6 at the upper right sternal border radiating to the neck.  Pulses:       Carotid pulses are 2+ on the right side, and 2+ on the left side.       Radial pulses are 2+ on the right side, and 2+ on the left side.        Femoral pulses are 2+ on the right side, and 2+ on the left side.       Popliteal pulses are 2+ on the right side, and 2+ on the left side.        Dorsalis pedis pulses are 2+ on the right side, and 2+ on the left side.        Posterior tibial pulses are 2+ on the right side, and 2+ on the left side.   Pulmonary/Chest: Effort normal and breath sounds normal. No respiratory distress. He has no wheezes. He has no rales.   Abdominal: Soft. Bowel sounds are normal. He exhibits no distension. There is no tenderness. There is no rebound.   Musculoskeletal: Normal range of motion.   Neurological: He is oriented to person, place, and time.   Skin: Skin is warm.   Nursing note and vitals reviewed.        Assessment:       1. Severe aortic stenosis "   Lucho Delong is a 79 y.o. male referred by Dr Sosa for evaluation of severe AS (NYHA Class III sx).     The patient has undergone the following TAVR work-up:   · Dobutamine ECHO (Date 1/14/20): DUDLEY= 1.09 cm2 (indexed for BSA 0.57), MG= 43mmHg, Peak Eben= 4.3m/s, EF= 45%.   · LHC (Date 1/8/20): s/p PCI to LAD and RCA with Dr Zhang. Complete revascularization.    · STS: 3%   · Frailty: 1/4 ()   · Right IVUS DEMETRIO >8 mm, DARIO 9 mm, RCA 8 mm, Left axillary and subclavian > 8 mm  · LVOT area by HYACINTH : is oval, were 3.0 x 2.4, average 2.7cm.  The area was 5.9 centimeters squared  · CT not done  · CT Surgery risk assessment: high risk, per Dr Cm  · Rhythm issues: Afib   · PFTs: FEV1 105% predicted, DLCO 76% predicted.   · Comorbidities: CKD IV with Cr 2.8, CVA hx, Afib        Lucho Delong is a 34 mm  Evolut R valve candidate via R TF access.      2. Coronary artery disease involving native coronary artery of native heart, angina presence unspecified    3. Persistent atrial fibrillation    4. Essential hypertension    5. Stage 3 chronic kidney disease    6.      Stroke with expressive aphasia, mild.     Plan:       Coronary artery disease  -If no evidence of URBANO requiring stenting  (See below) will do coronary intervention on the LAD (long lesion involving diagonal branch) and RCA (Two lesions involving a small PDA).    -Start ASA 81 mg po daily and plavix load with 300 mg the day prior to PCI.  -Check PRU at the day of the procedure  -Instructed to drink plenty of fluid day before the angiogram   -IVF at 3 ml/hour   -Patient is a SARAI candidate  -The risks, benefits & alternatives of the procedure were explained to the patient.    -The risks of coronary angiography include but are not limited to:  Bleeding, infection, heart rhythm abnormalities, allergic reactions, kidney injury, stroke and death.    -Should stenting be indicated, the patient has agreed to dual anti-platelet therapy for 1-consecutive  year with a drug-eluting stent and a minimum of 1-month with the use of a bare metal stent.    -The risks of moderate sedation include hypotension, respiratory depression, arrhythmias, bronchospasm, & death.    -Informed consent was obtained & the patient is agreeable to proceed with the procedure.      Stage 3 chronic kidney disease  -We will do bilateral renal angiogram and possible stenting first.    -Staged coronary intervention after renal stenting if URBANO is found.    Nonrheumatic aortic valve stenosis, not severe on physical exam  -Will do 2D echo   -Continue medical treatment     Permanent Atrial Fibrillation, CHADS VASC 5 (8%/year) and HAS BLED 4 (7%/year)  -Continue NOAC  -He is a Watchman candidate; will discuss the procedure with him after his angiogram    Carlos Patino MD  Structural/Valve Interventional Fellow  2/17/2020 9:20 AM        Staff:  I have personally taken the history and examined this patient and agree with the fellow's note as stated above and amended it accordingly :-)       TAVR PLAN:     1. Valve: 34 mm Evolut R with no contrast  2. TAVR access: R TF  3. Valvuloplasty balloon: 24 mm True Balloon    4. Viabahn size if needed: 8 x 5 mm  5. Antithrombotic therapy: ASA, plavix, and Effient.  6. Pacemaker risk factors: Afib. Possible Watchman Candidate  7. Patient was educated about the pathophysiology and natural history of severe aortic stenosis and was educated about the treatment options including surgical and transcatheter valve replacement.   8. The risks, benefits, and alternatives of transcatheter aortic valve replacement were discussed with the patient. All questions were answered and informed consent was obtained. I had a detailed discussion with the patient regarding risk of stroke, MI, bleeding access site complications including limb loss, allergy, kidney failure including dialysis and death.  9. The patient understands the risks and benefits and wishes to go ahead with the  procedure.  10. He is a full code for the foreseeable future.  11. All patient's questions were answered

## 2020-02-18 ENCOUNTER — ANESTHESIA (OUTPATIENT)
Dept: MEDSURG UNIT | Facility: HOSPITAL | Age: 80
DRG: 267 | End: 2020-02-18
Payer: MEDICARE

## 2020-02-18 ENCOUNTER — HOSPITAL ENCOUNTER (INPATIENT)
Facility: HOSPITAL | Age: 80
LOS: 1 days | Discharge: HOME | DRG: 267 | End: 2020-02-19
Attending: INTERNAL MEDICINE | Admitting: INTERNAL MEDICINE
Payer: MEDICARE

## 2020-02-18 DIAGNOSIS — Z95.2 S/P TAVR (TRANSCATHETER AORTIC VALVE REPLACEMENT): Primary | ICD-10-CM

## 2020-02-18 DIAGNOSIS — N18.9 CHRONIC KIDNEY DISEASE, UNSPECIFIED CKD STAGE: ICD-10-CM

## 2020-02-18 DIAGNOSIS — I35.0 NODULAR CALCIFIC AORTIC VALVE STENOSIS: ICD-10-CM

## 2020-02-18 LAB
ABO + RH BLD: NORMAL
ANION GAP SERPL CALC-SCNC: 14 MMOL/L (ref 8–16)
ANION GAP SERPL CALC-SCNC: 9 MMOL/L (ref 8–16)
APTT BLDCRRT: 26 SEC (ref 21–32)
BLD GP AB SCN CELLS X3 SERPL QL: NORMAL
BUN SERPL-MCNC: 43 MG/DL (ref 8–23)
BUN SERPL-MCNC: 49 MG/DL (ref 8–23)
CALCIUM SERPL-MCNC: 8 MG/DL (ref 8.7–10.5)
CALCIUM SERPL-MCNC: 8.7 MG/DL (ref 8.7–10.5)
CHLORIDE SERPL-SCNC: 111 MMOL/L (ref 95–110)
CHLORIDE SERPL-SCNC: 112 MMOL/L (ref 95–110)
CO2 SERPL-SCNC: 16 MMOL/L (ref 23–29)
CO2 SERPL-SCNC: 17 MMOL/L (ref 23–29)
CREAT SERPL-MCNC: 2.7 MG/DL (ref 0.5–1.4)
CREAT SERPL-MCNC: 2.9 MG/DL (ref 0.5–1.4)
ERYTHROCYTE [DISTWIDTH] IN BLOOD BY AUTOMATED COUNT: 13.1 % (ref 11.5–14.5)
EST. GFR  (AFRICAN AMERICAN): 22.6 ML/MIN/1.73 M^2
EST. GFR  (AFRICAN AMERICAN): 24.6 ML/MIN/1.73 M^2
EST. GFR  (NON AFRICAN AMERICAN): 19.5 ML/MIN/1.73 M^2
EST. GFR  (NON AFRICAN AMERICAN): 21.3 ML/MIN/1.73 M^2
GLUCOSE SERPL-MCNC: 124 MG/DL (ref 70–110)
GLUCOSE SERPL-MCNC: 79 MG/DL (ref 70–110)
HCT VFR BLD AUTO: 35.5 % (ref 40–54)
HGB BLD-MCNC: 11.4 G/DL (ref 14–18)
INR PPP: 1 (ref 0.8–1.2)
MCH RBC QN AUTO: 31.8 PG (ref 27–31)
MCHC RBC AUTO-ENTMCNC: 32.1 G/DL (ref 32–36)
MCV RBC AUTO: 99 FL (ref 82–98)
PLATELET # BLD AUTO: 197 K/UL (ref 150–350)
PMV BLD AUTO: 11.6 FL (ref 9.2–12.9)
POTASSIUM SERPL-SCNC: 5.6 MMOL/L (ref 3.5–5.1)
POTASSIUM SERPL-SCNC: 5.8 MMOL/L (ref 3.5–5.1)
PROTHROMBIN TIME: 10.6 SEC (ref 9–12.5)
RBC # BLD AUTO: 3.59 M/UL (ref 4.6–6.2)
SODIUM SERPL-SCNC: 138 MMOL/L (ref 136–145)
SODIUM SERPL-SCNC: 141 MMOL/L (ref 136–145)
WBC # BLD AUTO: 9.14 K/UL (ref 3.9–12.7)

## 2020-02-18 PROCEDURE — 33361 REPLACE AORTIC VALVE PERQ: CPT | Mod: 62,Q0 | Performed by: INTERNAL MEDICINE

## 2020-02-18 PROCEDURE — 33361 PR TAVR, PERCUTANEOUS FEMORAL: ICD-10-PCS | Mod: 62,Q0,, | Performed by: THORACIC SURGERY (CARDIOTHORACIC VASCULAR SURGERY)

## 2020-02-18 PROCEDURE — 25000003 PHARM REV CODE 250: Performed by: STUDENT IN AN ORGANIZED HEALTH CARE EDUCATION/TRAINING PROGRAM

## 2020-02-18 PROCEDURE — D9220A PRA ANESTHESIA: Mod: Q0,,, | Performed by: ANESTHESIOLOGY

## 2020-02-18 PROCEDURE — 33361 PR TAVR, PERCUTANEOUS FEMORAL: ICD-10-PCS | Mod: 62,Q0,, | Performed by: INTERNAL MEDICINE

## 2020-02-18 PROCEDURE — 86900 BLOOD TYPING SEROLOGIC ABO: CPT

## 2020-02-18 PROCEDURE — 33210 INSERT ELECTRD/PM CATH SNGL: CPT | Mod: 59,Q0,, | Performed by: ANESTHESIOLOGY

## 2020-02-18 PROCEDURE — 63600175 PHARM REV CODE 636 W HCPCS: Performed by: INTERNAL MEDICINE

## 2020-02-18 PROCEDURE — 37000008 HC ANESTHESIA 1ST 15 MINUTES: Performed by: INTERNAL MEDICINE

## 2020-02-18 PROCEDURE — 25000003 PHARM REV CODE 250: Performed by: INTERNAL MEDICINE

## 2020-02-18 PROCEDURE — 27000239 HC STAND-BY BYPASS PUMP

## 2020-02-18 PROCEDURE — 85730 THROMBOPLASTIN TIME PARTIAL: CPT

## 2020-02-18 PROCEDURE — C1725 CATH, TRANSLUMIN NON-LASER: HCPCS | Performed by: INTERNAL MEDICINE

## 2020-02-18 PROCEDURE — 63600175 PHARM REV CODE 636 W HCPCS: Performed by: STUDENT IN AN ORGANIZED HEALTH CARE EDUCATION/TRAINING PROGRAM

## 2020-02-18 PROCEDURE — A4216 STERILE WATER/SALINE, 10 ML: HCPCS | Performed by: STUDENT IN AN ORGANIZED HEALTH CARE EDUCATION/TRAINING PROGRAM

## 2020-02-18 PROCEDURE — 85027 COMPLETE CBC AUTOMATED: CPT

## 2020-02-18 PROCEDURE — 93010 ELECTROCARDIOGRAM REPORT: CPT | Mod: ,,, | Performed by: INTERNAL MEDICINE

## 2020-02-18 PROCEDURE — 33210 TVP: ICD-10-PCS | Mod: 59,Q0,, | Performed by: ANESTHESIOLOGY

## 2020-02-18 PROCEDURE — 37000009 HC ANESTHESIA EA ADD 15 MINS: Performed by: INTERNAL MEDICINE

## 2020-02-18 PROCEDURE — 99222 PR INITIAL HOSPITAL CARE,LEVL II: ICD-10-PCS | Mod: ,,, | Performed by: INTERNAL MEDICINE

## 2020-02-18 PROCEDURE — 93010 ELECTROCARDIOGRAM REPORT: CPT | Mod: 76,,, | Performed by: INTERNAL MEDICINE

## 2020-02-18 PROCEDURE — 33361 REPLACE AORTIC VALVE PERQ: CPT | Mod: 62,Q0,, | Performed by: INTERNAL MEDICINE

## 2020-02-18 PROCEDURE — C1894 INTRO/SHEATH, NON-LASER: HCPCS | Performed by: INTERNAL MEDICINE

## 2020-02-18 PROCEDURE — 36620 ARTERIAL: ICD-10-PCS | Mod: 59,Q0,, | Performed by: ANESTHESIOLOGY

## 2020-02-18 PROCEDURE — C1769 GUIDE WIRE: HCPCS | Performed by: INTERNAL MEDICINE

## 2020-02-18 PROCEDURE — 93010 EKG 12-LEAD: ICD-10-PCS | Mod: ,,, | Performed by: INTERNAL MEDICINE

## 2020-02-18 PROCEDURE — 93005 ELECTROCARDIOGRAM TRACING: CPT

## 2020-02-18 PROCEDURE — 20000000 HC ICU ROOM

## 2020-02-18 PROCEDURE — 99222 1ST HOSP IP/OBS MODERATE 55: CPT | Mod: ,,, | Performed by: INTERNAL MEDICINE

## 2020-02-18 PROCEDURE — 80048 BASIC METABOLIC PNL TOTAL CA: CPT | Mod: 91

## 2020-02-18 PROCEDURE — 85610 PROTHROMBIN TIME: CPT

## 2020-02-18 PROCEDURE — D9220A PRA ANESTHESIA: ICD-10-PCS | Mod: Q0,,, | Performed by: ANESTHESIOLOGY

## 2020-02-18 PROCEDURE — 27800903 OPTIME MED/SURG SUP & DEVICES OTHER IMPLANTS: Performed by: INTERNAL MEDICINE

## 2020-02-18 PROCEDURE — 36620 INSERTION CATHETER ARTERY: CPT | Mod: 59,Q0,, | Performed by: ANESTHESIOLOGY

## 2020-02-18 PROCEDURE — 27000191 HC C-V MONITORING

## 2020-02-18 PROCEDURE — C1760 CLOSURE DEV, VASC: HCPCS | Performed by: INTERNAL MEDICINE

## 2020-02-18 PROCEDURE — 27201423 OPTIME MED/SURG SUP & DEVICES STERILE SUPPLY: Performed by: INTERNAL MEDICINE

## 2020-02-18 PROCEDURE — 33361 REPLACE AORTIC VALVE PERQ: CPT | Mod: 62,Q0,, | Performed by: THORACIC SURGERY (CARDIOTHORACIC VASCULAR SURGERY)

## 2020-02-18 DEVICE — VALVE EVOLUT PRO+ TAVR 34MM: Type: IMPLANTABLE DEVICE | Site: OTHER (ADD COMMENT) | Status: FUNCTIONAL

## 2020-02-18 RX ORDER — PRASUGREL 10 MG/1
10 TABLET, FILM COATED ORAL DAILY
Status: DISCONTINUED | OUTPATIENT
Start: 2020-02-18 | End: 2020-02-19 | Stop reason: HOSPADM

## 2020-02-18 RX ORDER — SERTRALINE HYDROCHLORIDE 50 MG/1
50 TABLET, FILM COATED ORAL DAILY
Status: DISCONTINUED | OUTPATIENT
Start: 2020-02-19 | End: 2020-02-19 | Stop reason: HOSPADM

## 2020-02-18 RX ORDER — HEPARIN SODIUM 200 [USP'U]/100ML
INJECTION, SOLUTION INTRAVENOUS
Status: DISCONTINUED | OUTPATIENT
Start: 2020-02-18 | End: 2020-02-19 | Stop reason: HOSPADM

## 2020-02-18 RX ORDER — LIDOCAINE HYDROCHLORIDE 20 MG/ML
INJECTION, SOLUTION INFILTRATION; PERINEURAL
Status: DISCONTINUED | OUTPATIENT
Start: 2020-02-18 | End: 2020-02-18 | Stop reason: HOSPADM

## 2020-02-18 RX ORDER — NOREPINEPHRINE BITARTRATE/D5W 4MG/250ML
0.02 PLASTIC BAG, INJECTION (ML) INTRAVENOUS CONTINUOUS
Status: DISCONTINUED | OUTPATIENT
Start: 2020-02-18 | End: 2020-02-18

## 2020-02-18 RX ORDER — ASPIRIN 81 MG/1
81 TABLET ORAL DAILY
Status: DISCONTINUED | OUTPATIENT
Start: 2020-02-19 | End: 2020-02-19 | Stop reason: HOSPADM

## 2020-02-18 RX ORDER — MAGNESIUM SULFATE HEPTAHYDRATE 40 MG/ML
4 INJECTION, SOLUTION INTRAVENOUS
Status: DISCONTINUED | OUTPATIENT
Start: 2020-02-18 | End: 2020-02-19 | Stop reason: HOSPADM

## 2020-02-18 RX ORDER — PREDNISONE 2.5 MG/1
5 TABLET ORAL DAILY
Status: DISCONTINUED | OUTPATIENT
Start: 2020-02-19 | End: 2020-02-19 | Stop reason: HOSPADM

## 2020-02-18 RX ORDER — DEXTROSE MONOHYDRATE AND SODIUM CHLORIDE 5; .45 G/100ML; G/100ML
INJECTION, SOLUTION INTRAVENOUS CONTINUOUS
Status: DISCONTINUED | OUTPATIENT
Start: 2020-02-18 | End: 2020-02-19 | Stop reason: HOSPADM

## 2020-02-18 RX ORDER — FUROSEMIDE 10 MG/ML
10 INJECTION INTRAMUSCULAR; INTRAVENOUS ONCE
Status: COMPLETED | OUTPATIENT
Start: 2020-02-18 | End: 2020-02-18

## 2020-02-18 RX ORDER — DIPHENHYDRAMINE HCL 50 MG
50 CAPSULE ORAL ONCE
Status: COMPLETED | OUTPATIENT
Start: 2020-02-18 | End: 2020-02-18

## 2020-02-18 RX ORDER — MAGNESIUM SULFATE HEPTAHYDRATE 40 MG/ML
2 INJECTION, SOLUTION INTRAVENOUS
Status: DISCONTINUED | OUTPATIENT
Start: 2020-02-18 | End: 2020-02-19 | Stop reason: HOSPADM

## 2020-02-18 RX ORDER — POTASSIUM CHLORIDE 7.45 MG/ML
10 INJECTION INTRAVENOUS
Status: DISCONTINUED | OUTPATIENT
Start: 2020-02-18 | End: 2020-02-19 | Stop reason: HOSPADM

## 2020-02-18 RX ORDER — DEXMEDETOMIDINE HYDROCHLORIDE 100 UG/ML
INJECTION, SOLUTION INTRAVENOUS
Status: DISCONTINUED | OUTPATIENT
Start: 2020-02-18 | End: 2020-02-18

## 2020-02-18 RX ORDER — POTASSIUM CHLORIDE 7.45 MG/ML
40 INJECTION INTRAVENOUS
Status: DISCONTINUED | OUTPATIENT
Start: 2020-02-18 | End: 2020-02-19 | Stop reason: HOSPADM

## 2020-02-18 RX ORDER — NOREPINEPHRINE BITARTRATE/D5W 4MG/250ML
0.02 PLASTIC BAG, INJECTION (ML) INTRAVENOUS CONTINUOUS PRN
Status: DISCONTINUED | OUTPATIENT
Start: 2020-02-18 | End: 2020-02-19 | Stop reason: HOSPADM

## 2020-02-18 RX ORDER — HEPARIN SODIUM 1000 [USP'U]/ML
INJECTION, SOLUTION INTRAVENOUS; SUBCUTANEOUS
Status: DISCONTINUED | OUTPATIENT
Start: 2020-02-18 | End: 2020-02-18

## 2020-02-18 RX ORDER — ATORVASTATIN CALCIUM 20 MG/1
40 TABLET, FILM COATED ORAL DAILY
Status: DISCONTINUED | OUTPATIENT
Start: 2020-02-19 | End: 2020-02-19 | Stop reason: HOSPADM

## 2020-02-18 RX ORDER — SODIUM CHLORIDE 9 MG/ML
INJECTION, SOLUTION INTRAVENOUS CONTINUOUS
Status: ACTIVE | OUTPATIENT
Start: 2020-02-18 | End: 2020-02-18

## 2020-02-18 RX ORDER — ASPIRIN 81 MG/1
81 TABLET ORAL DAILY
COMMUNITY
End: 2020-06-18 | Stop reason: ALTCHOICE

## 2020-02-18 RX ORDER — PANTOPRAZOLE SODIUM 40 MG/1
40 TABLET, DELAYED RELEASE ORAL DAILY
Status: DISCONTINUED | OUTPATIENT
Start: 2020-02-19 | End: 2020-02-19 | Stop reason: HOSPADM

## 2020-02-18 RX ADMIN — SODIUM CHLORIDE: 0.9 INJECTION, SOLUTION INTRAVENOUS at 10:02

## 2020-02-18 RX ADMIN — HEPARIN SODIUM 1000 UNITS: 1000 INJECTION, SOLUTION INTRAVENOUS; SUBCUTANEOUS at 09:02

## 2020-02-18 RX ADMIN — DIPHENHYDRAMINE HYDROCHLORIDE 50 MG: 50 CAPSULE ORAL at 06:02

## 2020-02-18 RX ADMIN — DEXMEDETOMIDINE HYDROCHLORIDE 36 MCG: 100 INJECTION, SOLUTION, CONCENTRATE INTRAVENOUS at 08:02

## 2020-02-18 RX ADMIN — FUROSEMIDE 10 MG: 10 INJECTION, SOLUTION INTRAMUSCULAR; INTRAVENOUS at 06:02

## 2020-02-18 RX ADMIN — HEPARIN SODIUM 9000 UNITS: 1000 INJECTION, SOLUTION INTRAVENOUS; SUBCUTANEOUS at 08:02

## 2020-02-18 RX ADMIN — SODIUM CHLORIDE 500 ML: 0.9 INJECTION, SOLUTION INTRAVENOUS at 01:02

## 2020-02-18 RX ADMIN — DEXMEDETOMIDINE HYDROCHLORIDE 1 MCG/KG/HR: 100 INJECTION, SOLUTION, CONCENTRATE INTRAVENOUS at 08:02

## 2020-02-18 RX ADMIN — DEXMEDETOMIDINE HYDROCHLORIDE 1 MCG/KG/HR: 100 INJECTION, SOLUTION, CONCENTRATE INTRAVENOUS at 07:02

## 2020-02-18 RX ADMIN — NOREPINEPHRINE BITARTRATE 0.01 MCG/KG/MIN: 1 INJECTION, SOLUTION, CONCENTRATE INTRAVENOUS at 09:02

## 2020-02-18 RX ADMIN — CEFAZOLIN 2 G: 1 INJECTION, POWDER, FOR SOLUTION INTRAMUSCULAR; INTRAVENOUS at 08:02

## 2020-02-18 RX ADMIN — DEXMEDETOMIDINE HYDROCHLORIDE 77 MCG: 100 INJECTION, SOLUTION, CONCENTRATE INTRAVENOUS at 07:02

## 2020-02-18 RX ADMIN — PRASUGREL 10 MG: 10 TABLET, FILM COATED ORAL at 02:02

## 2020-02-18 NOTE — PLAN OF CARE
CM met with patient at the bedside to discuss D/C POC needs. Patient AAO x's 3 and able to verify demographics in the chart are correct. CM name and contact number listed on the patient's white board.  CM provided explanation of discharge plan process. CM left blue folder at the bedside with explanation of qualification for placement and facility resources. Patient expressed understanding. CM remains available for any further patient needs or concerns.     Jose Nolan MD  [address] / [place] [address]      [id number] - [place], [place] - [address]  [address]  [place] [address]  Phone: [phone] Fax: [phone]    [facility]/pharmacy #[id number] - [place], [place] - [address]  [address] [place] [address]  Phone: [phone] Fax: [phone]      Extended Emergency Contact Information  Primary Emergency Contact: Asia Gansy  Address: [address] [place] [address]           [place] [address] [facility]  Home Phone: [phone]  Mobile Phone: [phone]  Relation: Spouse  Preferred language: English   needed? No    Future Appointments   Date Time Provider Department Center   3/17/2020 10:00 AM Hussain Arana MD [facility]       Payor: [employer] MEDICARE / Plan: [employer] MEDICARE HMO / Product Type: Capitation /        02/18/20 1526   Discharge Assessment   Assessment Type Discharge Planning Assessment   Confirmed/corrected address and phone number on facesheet? Yes   Assessment information obtained from? Patient   Prior to hospitilization cognitive status: Alert/Oriented   Prior to hospitalization functional status: Independent   Current cognitive status: Alert/Oriented   Current Functional Status: Needs Assistance;Partially Dependent   Facility Arrived From: cath lab   Lives With spouse   Able to Return to Prior Arrangements other (see comments)  (TBD)   Is patient able to care for self after discharge? Unable to determine at this time  (comments)   Who are your caregiver(s) and their phone number(s)? Jerilyn Delong, wife, [phone]   Patient currently being followed by outpatient case management? No   Patient currently receives any other outside agency services? No   Equipment Currently Used at Home cane, straight   Do you have any problems affording any of your prescribed medications? No   Is the patient taking medications as prescribed? yes   Does the patient have transportation home? Yes   Transportation Anticipated family or friend will provide   Does the patient receive services at the [facility]? No   Discharge Plan A Home   Discharge Plan B Home with family   DME Needed Upon Discharge    (TBD, states that he has been thinking about gettiing a wc)   Patient/Family in Agreement with Plan yes       Juan Gomez RN MSN  Critical Care-   Ext. [phone]

## 2020-02-18 NOTE — CONSULTS
[facility]  Cardiac Electrophysiology  Consult Note    Admission Date: 2/18/2020  Code Status: No Order   Attending Provider: Bryan Zhang MD  Consulting Provider: Rafiq Plummer MD  Principal Problem:<principal problem not specified>    Inpatient consult to Electrophysiology  Consult performed by: Rafiq Plummer MD  Consult ordered by: Carlos Patino MD        Subjective:     Chief Complaint:  S/p TAVR    HPI:   80 y/o M with severe symptomatic (NYHA III) aortic stenosis underwent TAVR today. Has a hx of HTN, DLPD, atrial fibrillation (on Pradaxa), CVA (March 2019), CKD 3, CAD. Had no intraprocedural complications. No Phx or FHx of arrhythmias, no FHx of SCD. Patient seen in ICU after TAVR, still under effects of anesthesia.     Recent TTE: EF 45%    Pre TAVR EKG: Atrial fibrillation, QRS 84  Post TAVR EKG 1: Atrial fibrillation, QRS 74    TVP: VVI, base rate 40, threshold 0.4    Past Medical History:   Diagnosis Date    Anticoagulant long-term use     Atrial fibrillation     Bursitis of hip     Chronic back pain     Chronic kidney disease, stage 3     Gastroesophageal reflux disease     Gout     Hyperlipemia     Hypertension     Prostate cancer 1997    Stroke 04/2019       Past Surgical History:   Procedure Laterality Date    CATHETERIZATION OF BOTH LEFT AND RIGHT HEART N/A 12/2/2019    Procedure: CATHETERIZATION, HEART, BOTH LEFT AND RIGHT;  Surgeon: Everett Sosa MD;  Location: [facility] CATH/EP LAB;  Service: Cardiology;  Laterality: N/A;    COLONOSCOPY  1997    CORONARY ANGIOGRAPHY N/A 1/8/2020    Procedure: ANGIOGRAM, CORONARY ARTERY;  Surgeon: Bryan Zhang MD;  Location: [facility] CATH LAB;  Service: Cardiology;  Laterality: N/A;    CORONARY STENT PLACEMENT N/A 1/8/2020    Procedure: INSERTION, STENT, CORONARY ARTERY;  Surgeon: Bryan Zhang MD;  Location: [facility] CATH LAB;  Service: Cardiology;  Laterality: N/A;    PERCUTANEOUS TRANSLUMINAL ANGIOPLASTY (PTA) OF  "PERIPHERAL VESSEL N/A 1/23/2020    Procedure: PTA, PERIPHERAL VESSEL;  Surgeon: Bryan Zhang MD;  Location: [facility] CATH LAB;  Service: Cardiology;  Laterality: N/A;    PROSTATE SURGERY  1997    TONSILLECTOMY      TRANSESOPHAGEAL ECHOCARDIOGRAPHY N/A 1/23/2020    Procedure: ECHOCARDIOGRAM, TRANSESOPHAGEAL;  Surgeon: Bhavani Diagnostic Provider;  Location: [facility] EP LAB;  Service: Anesthesiology;  Laterality: N/A;       Review of patient's allergies indicates:   Allergen Reactions    Tramadol Itching and Swelling     Began medication, two doses into this treatment patient experiences tongue swelling and severe itching. Patient MD advised to not take this medication anymore       No current facility-administered medications on file prior to encounter.      Current Outpatient Medications on File Prior to Encounter   Medication Sig    allopurinol (ZYLOPRIM) 100 MG tablet Take 1 tablet (100 mg total) by mouth once daily.    aspirin (ECOTRIN) 81 MG EC tablet Take 81 mg by mouth once daily.    atorvastatin (LIPITOR) 40 MG tablet Take 1 tablet by mouth Daily.    losartan (COZAAR) 50 MG tablet Take 50 mg by mouth once daily.     pantoprazole (PROTONIX) 40 MG tablet Take 1 tablet (40 mg total) by mouth once daily.    prasugrel (EFFIENT) 10 mg Tab Take 1 tablet (10 mg total) by mouth once daily.    predniSONE (DELTASONE) 5 MG tablet Take 1 tablet (5 mg total) by mouth once daily.    cyanocobalamin 1,000 mcg/mL injection INJECT 1 ML INTRAMUSCULARLY ONCE EVERY 30 DAYS    dabigatran etexilate (PRADAXA) 75 mg Cap Take 75 mg by mouth once daily. "Do NOT break, chew, or open capsules."    lactase (LACTASE FAST ACTING) 9,000 unit Tab tablet Take 1 tablet (9,000 Units total) by mouth 3 (three) times daily with meals. (Patient not taking: Reported on 2/17/2020)     Family History     Problem Relation (Age of Onset)    Alzheimer's disease Mother (86), Sister    Cancer Father (86)        Tobacco Use    Smoking status: Never " Smoker    Smokeless tobacco: Never Used   Substance and Sexual Activity    Alcohol use: Not Currently     Alcohol/week: 3.3 standard drinks     Types: 4 Standard drinks or equivalent per week     Frequency: 2-4 times a month     Comment: bourbon or beer    Drug use: No    Sexual activity: Not on file     Review of Systems   Unable to perform ROS: other (sedated post anesthesia)     Objective:     Vital Signs (Most Recent):  Temp: 96.2 °F (35.7 °C) (02/18/20 0600)  Pulse: 62 (02/18/20 0945)  Resp: 19 (02/18/20 0945)  BP: (!) 85/47 (02/18/20 0945)  SpO2: 100 % (02/18/20 0945) Vital Signs (24h Range):  Temp:  [96.2 °F (35.7 °C)] 96.2 °F (35.7 °C)  Pulse:  [60-80] 62  Resp:  [19-20] 19  SpO2:  [99 %-100 %] 100 %  BP: ()/(47-75) 85/47       Weight: 77.1 kg (170 lb)  Body mass index is 25.85 kg/m².    SpO2: 100 %  O2 Device (Oxygen Therapy): nasal cannula    Physical Exam   Constitutional: He appears well-developed and well-nourished. No distress.   HENT:   Head: Normocephalic and atraumatic.   r IJ TVP   Eyes: Conjunctivae and EOM are normal.   Neck: Normal range of motion. No tracheal deviation present.   Cardiovascular: Normal rate, regular rhythm and normal heart sounds.   No murmur heard.  Pulmonary/Chest: Effort normal and breath sounds normal. No respiratory distress. He has no wheezes.   Abdominal: Soft. Bowel sounds are normal. He exhibits no distension. There is no tenderness.   Musculoskeletal: Normal range of motion. He exhibits no edema.   Neurological:   Sedated post anesthesia   Skin: He is not diaphoretic.       Significant Labs: All pertinent lab results from the last 24 hours have been reviewed.    Significant Imaging: All pertinent images from the last 24h have been reviewed.                Assessment and Plan:     S/P TAVR (transcatheter aortic valve replacement)  80 y/o with severe AS s/p TAVR.    Pre TAVR EKG: Atrial fibrillation, QRS 84  Post TAVR EKG 1: Atrial fibrillation, QRS 74    -  Monitor overnight. Keep TVP in place.  - Repeat ECG at 5 am        Thank you for your consult. I will follow-up with patient. Please contact us if you have any additional questions.    Rafiq Plummer MD  Cardiac Electrophysiology  [facility]

## 2020-02-18 NOTE — ANESTHESIA PREPROCEDURE EVALUATION
02/17/2020  Lucho Delong is a 79 y.o., male.  Pre-operative evaluation for Procedure(s) (LRB):  REPLACEMENT, AORTIC VALVE, TRANSCATHETER (TAVR) (N/A)    Lucho Delong is a 79 y.o. male with medical history significant for GERD, PUSHPA, HTN, atrial fibrillation, CVA (March 2019), CKD 3, CAD s/p PCI to LAD/RCA and aortic stenosis who is scheduled for the above procedure.       Prev airway: None documented    Echo  ·  Severe aortic valve stenosis. By 3D HYACINTH, LVOT diameter is 2.4cm x 3.0cm, area is 5.7cm2.  · Mildly decreased left ventricular systolic function. The estimated ejection fraction is 45%.  · Mild aortic regurgitation.  · Mild mitral regurgitation.  · Normal right ventricular systolic function.  · Mild tricuspid regurgitation.  · Grade 3 plaque present.     [facility]  Summary:     1.  Severe three-vessel coronary disease status post PCI.  2.  CKD 4 with creatinine over 2.  3.  Adequate femoral access for transfemoral TAVR.  Vessels are large but there is some calcium.  4.  Adequate left trans axillary access as well.  5.  Transesophageal echo measurements of the LVOT, which is oval, were 3.0 x 2.4, average 2.7cm.  The area was 5.9 centimeters squared.    Patient Active Problem List   Diagnosis    Persistent atrial fibrillation    HTN (hypertension)    HLD (hyperlipidemia)    Atrial flutter    History of cardioversion    Long term current use of amiodarone    Current use of long term anticoagulation    PUSHPA (obstructive sleep apnea)    Gout    Right-sided muscle weakness    Difficulty walking    Chronic lower back pain    B12 deficiency    Dyspnea    Severe aortic stenosis    CAD (coronary artery disease)    Coronary artery disease    Stage 3 chronic kidney disease    Nodular calcific aortic valve stenosis       Review of patient's allergies indicates:   Allergen Reactions     "Tramadol Itching and Swelling     Began medication, two doses into this treatment patient experiences tongue swelling and severe itching. Patient MD advised to not take this medication anymore        No current facility-administered medications on file prior to encounter.      Current Outpatient Medications on File Prior to Encounter   Medication Sig Dispense Refill    allopurinol (ZYLOPRIM) 100 MG tablet Take 1 tablet (100 mg total) by mouth once daily. 90 tablet 3    atorvastatin (LIPITOR) 40 MG tablet Take 1 tablet by mouth Daily.  0    cyanocobalamin 1,000 mcg/mL injection INJECT 1 ML INTRAMUSCULARLY ONCE EVERY 30 DAYS  1    dabigatran etexilate (PRADAXA) 75 mg Cap Take 75 mg by mouth once daily. "Do NOT break, chew, or open capsules."      lactase (LACTASE FAST ACTING) 9,000 unit Tab tablet Take 1 tablet (9,000 Units total) by mouth 3 (three) times daily with meals. (Patient not taking: Reported on 2/17/2020) 90 tablet 3    losartan (COZAAR) 50 MG tablet Take 50 mg by mouth once daily.   6    pantoprazole (PROTONIX) 40 MG tablet Take 1 tablet (40 mg total) by mouth once daily. 90 tablet 3    prasugrel (EFFIENT) 10 mg Tab Take 1 tablet (10 mg total) by mouth once daily. 30 tablet 11    predniSONE (DELTASONE) 5 MG tablet Take 1 tablet (5 mg total) by mouth once daily. 90 tablet 3       Past Surgical History:   Procedure Laterality Date    CATHETERIZATION OF BOTH LEFT AND RIGHT HEART N/A 12/2/2019    Procedure: CATHETERIZATION, HEART, BOTH LEFT AND RIGHT;  Surgeon: Everett Sosa MD;  Location: [facility] CATH/EP LAB;  Service: Cardiology;  Laterality: N/A;    COLONOSCOPY  1997    CORONARY ANGIOGRAPHY N/A 1/8/2020    Procedure: ANGIOGRAM, CORONARY ARTERY;  Surgeon: Bryan Zhang MD;  Location: [facility] CATH LAB;  Service: Cardiology;  Laterality: N/A;    CORONARY STENT PLACEMENT N/A 1/8/2020    Procedure: INSERTION, STENT, CORONARY ARTERY;  Surgeon: Bryan Zhang MD;  Location: [facility] CATH LAB;  Service: " Cardiology;  Laterality: N/A;    PERCUTANEOUS TRANSLUMINAL ANGIOPLASTY (PTA) OF PERIPHERAL VESSEL N/A 1/23/2020    Procedure: PTA, PERIPHERAL VESSEL;  Surgeon: Bryan Zhang MD;  Location: [facility] CATH LAB;  Service: Cardiology;  Laterality: N/A;    PROSTATE SURGERY  1997    TONSILLECTOMY      TRANSESOPHAGEAL ECHOCARDIOGRAPHY N/A 1/23/2020    Procedure: ECHOCARDIOGRAM, TRANSESOPHAGEAL;  Surgeon: Bhavani Diagnostic Provider;  Location: [facility] EP LAB;  Service: Anesthesiology;  Laterality: N/A;       Social History     Socioeconomic History    Marital status:      Spouse name: Not on file    Number of children: 3    Years of education: commisioner for [employer]    Highest education level: Doctorate   Occupational History    Not on file   Social Needs    Financial resource strain: Not on file    Food insecurity:     Worry: Not on file     Inability: Not on file    Transportation needs:     Medical: Not on file     Non-medical: Not on file   Tobacco Use    Smoking status: Never Smoker    Smokeless tobacco: Never Used   Substance and Sexual Activity    Alcohol use: Not Currently     Alcohol/week: 3.3 standard drinks     Types: 4 Standard drinks or equivalent per week     Frequency: 2-4 times a month     Comment: bourbon or beer    Drug use: No    Sexual activity: Not on file   Lifestyle    Physical activity:     Days per week: Not on file     Minutes per session: Not on file    Stress: Not on file   Relationships    Social connections:     Talks on phone: Not on file     Gets together: Not on file     Attends [employer] witness service: Not on file     Active member of club or organization: Not on file     Attends meetings of clubs or organizations: Not on file     Relationship status: Not on file   Other Topics Concern    Not on file   Social History Narrative    Not on file         Vital Signs Range (Last 24H):  Pulse:  [70-80]   BP: (115-124)/(56-66)   SpO2:  [100 %]       CBC:   Recent Labs      02/17/20  1417   WBC 8.12   RBC 3.47*   HGB 11.0*   HCT 35.0*      *   MCH 31.7*   MCHC 31.4*       CMP:   Recent Labs     02/17/20  1417      K 5.5*      CO2 16*   BUN 50*   CREATININE 3.1*   *   CALCIUM 9.0   ALBUMIN 3.9       INR  Recent Labs     02/17/20  1417   INR 1.1   APTT 27.3           Diagnostic Studies:      EKG:  Vent. Rate : 065 BPM     Atrial Rate : 063 BPM     P-R Int : 000 ms          QRS Dur : 084 ms      QT Int : 398 ms       P-R-T Axes : 000 057 076 degrees     QTc Int : 413 ms    Atrial fibrillation  Nonspecific T wave abnormality  Abnormal ECG  When compared with ECG of 14-JAN-2020 13:32,  Previous ECG has undetermined rhythm, needs review  Nonspecific T wave abnormality now evident in Lateral leads  QT has shortened  Confirmed by REJI MATA MD ([id number]) on 1/23/2020 11:07:50 AM    Anesthesia Evaluation    I have reviewed the Patient Summary Reports.    I have reviewed the Nursing Notes.   I have reviewed the Medications.     Review of Systems  Hematology/Oncology:     Oncology Normal     EENT/Dental:EENT/Dental Normal   Cardiovascular:   Hypertension Valvular problems/Murmurs, AS CAD  Dysrhythmias atrial fibrillation    Pulmonary:   Shortness of breath Sleep Apnea    Renal/:   Chronic Renal Disease, CRI    Hepatic/GI:   GERD, well controlled    Musculoskeletal:  Musculoskeletal Normal    Neurological:   CVA    Endocrine:  Endocrine Normal    Dermatological:  Skin Normal    Psych:  Psychiatric Normal           Physical Exam  General:  Well nourished    Airway/Jaw/Neck:  Airway Findings: Mouth Opening: Normal Tongue: Normal  General Airway Assessment: Adult  Mallampati: II  Jaw/Neck Findings:  Neck ROM: Normal ROM     Eyes/Ears/Nose:  Eyes/Ears/Nose Findings:    Dental:  Dental Findings: In tact   Chest/Lungs:  Chest/Lungs Findings: Clear to auscultation, Normal Respiratory Rate     Heart/Vascular:  Heart Findings: Rate: Normal  Rhythm: Regular Rhythm  Sounds:  Normal  Heart Murmur  Systolic  Systolic Heart Murmur Description: L Upper Sternal Border, Radiates to Carotids, Holosystolic, Ejection Type  Systolic Heart Murmur Grade: Grade III  Vascular Findings:        Mental Status:  Mental Status Findings:  Cooperative, Alert and Oriented         Anesthesia Plan  Type of Anesthesia, risks & benefits discussed:  Anesthesia Type:  general, MAC  Patient's Preference: MAC  Intra-op Monitoring Plan: standard ASA monitors, central line and arterial line  Intra-op Monitoring Plan Comments:   Post Op Pain Control Plan: multimodal analgesia, IV/PO Opioids PRN and per primary service following discharge from PACU  Post Op Pain Control Plan Comments:   Induction:   IV  Beta Blocker:  Patient is on a Beta-Blocker and has received one dose within the past 24 hours (No further documentation required).       Informed Consent: Patient understands risks and agrees with Anesthesia plan.  Questions answered. Anesthesia consent signed with patient.  ASA Score: 4     Day of Surgery Review of History & Physical:    H&P update referred to the provider.     Anesthesia Plan Notes: Discussed plan for awake arterial line placement, central line with pacing catheter, and possibility of intraprocedural HYACINTH. MAC anesthetic discussed. Pt agrees with plan.        Ready For Surgery From Anesthesia Perspective.

## 2020-02-18 NOTE — ANESTHESIA POSTPROCEDURE EVALUATION
Anesthesia Post Evaluation    Patient: Lucho Delong    Procedure(s) Performed: Procedure(s):  REPLACEMENT, AORTIC VALVE, TRANSCATHETER (TAVR)    Final Anesthesia Type: general    Patient location during evaluation: ICU  Patient participation: Yes- Able to Participate  Level of consciousness: awake and alert  Post-procedure vital signs: reviewed and stable  Pain management: adequate  Airway patency: patent    PONV status at discharge: No PONV  Anesthetic complications: no      Cardiovascular status: blood pressure returned to baseline  Respiratory status: unassisted  Hydration status: euvolemic  Follow-up not needed.          Vitals Value Taken Time   /73 2/18/2020  3:01 PM   Temp 36.4 °C (97.6 °F) 2/18/2020  3:00 PM   Pulse 64 2/18/2020  3:55 PM   Resp 70 2/18/2020  3:55 PM   SpO2 100 % 2/18/2020  3:55 PM   Vitals shown include unvalidated device data.      No case tracking events are documented in the log.      Pain/Ck Score: No data recorded

## 2020-02-18 NOTE — PLAN OF CARE
CMICU DAILY GOALS       A: Awake    RASS: Goal -    Actual -     Restraint necessity:    B: Breath   SBT: Not intubated   C: Coordinate A & B, analgesics/sedatives   Pain: managed    SAT: Not intubated  D: Delirium   CAM-ICU: Overall CAM-ICU: Negative  E: Early Mobility   MOVE Screen: Pass   Activity: Activity Management: up in chair - L3  FAS: Feeding/Nutrition   Diet order: Diet/Nutrition Received: low saturated fat/low cholesterol, 2 gram sodium,   Fluid restriction:    T: Thrombus   DVT prophylaxis: VTE Required Core Measure: Pharmacological prophylaxis initiated/maintained  H: HOB Elevation   Head of Bed (HOB): HOB at 45 degrees  U: Ulcer Prophylaxis   GI: no  G: Glucose control   managed    S: Skin   Bundle compliance: yes     Date: [unfilled]  B: Bowel Function   no issues   I: Indwelling Catheters   [place] necessity:     CVC necessity: No   IPAD offered: No  D: De-escalation Antibx   No  Plan for the day   Monitor overnight, repeat EKG in AM to r/o pacemaker placement  Family/Goals of care/Code Status   Code Status: No Order     No acute events throughout day, VS and assessment per flow sheet, patient progressing towards goals as tolerated, plan of care reviewed with Lucho Delong and family, all concerns addressed, will continue to monitor.

## 2020-02-18 NOTE — HPI
78 y/o M with severe symptomatic (NYHA III) aortic stenosis underwent TAVR today. Has a hx of HTN, DLPD, atrial fibrillation (on Pradaxa), CVA (March 2019), CKD 3, CAD. Had no intraprocedural complications. No Phx or FHx of arrhythmias, no FHx of SCD. Patient seen in ICU after TAVR, still under effects of anesthesia.     Recent TTE: EF 45%    Pre TAVR EKG: Atrial fibrillation, QRS 84  Post TAVR EKG 1: Atrial fibrillation, QRS 74    TVP: VVI, base rate 40, threshold 0.4

## 2020-02-18 NOTE — ANESTHESIA PROCEDURE NOTES
Arterial    Diagnosis: as    Patient location during procedure: done in OR  Procedure start time: 2/18/2020 7:51 AM  Timeout: 2/18/2020 7:50 AM  Procedure end time: 2/18/2020 7:57 AM    Staffing  Authorizing Provider: Harvinder Whitney MD  Performing Provider: Aixa Monroe MD    Anesthesiologist was present at the time of the procedure.    Preanesthetic Checklist  Completed: patient identified, site marked, surgical consent, pre-op evaluation, timeout performed, IV checked, risks and benefits discussed, monitors and equipment checked and anesthesia consent givenArterial  Skin Prep: chlorhexidine gluconate  Local Infiltration: lidocaine  Orientation: right  Location: radial  Catheter Size: 20 G  Catheter placement by Anatomical landmarks. Heme positive aspiration all ports.Insertion Attempts: 1  Assessment  Dressing: secured with tape and tegaderm  Patient: Tolerated well

## 2020-02-18 NOTE — ANESTHESIA PROCEDURE NOTES
TVP    Diagnosis: as  Patient location during procedure: done in OR  Procedure start time: 2/18/2020 8:01 AM  Timeout: 2/18/2020 8:00 AM  Procedure end time: 2/18/2020 8:20 AM    Staffing  Authorizing Provider: Harvinder Whitney MD  Performing Provider: Aixa Monroe MD    Anesthesiologist was present at the time of the procedure.  Preanesthetic Checklist  Completed: patient identified, site marked, surgical consent, pre-op evaluation, timeout performed, IV checked, risks and benefits discussed, monitors and equipment checked and anesthesia consent given  [employer] Jyoti Line  Skin Prep: chlorhexidine gluconate  Local Infiltration: lidocaine  Location: right,  internal jugular vein  Vessel Caliber: medium, patent, compressibility normal  Coaxial introducer size: 6FR. manometry used.  Device: transvenous pacing catheter  Catheter Size: 5 Fr  Catheter placement by yes. Heme positive aspiration all ports.Sterile sheath usedInsertion Attempts: 1  Indication: transvenous pacing  Ultrasound Guidance  Needle advanced into vessel with real time Ultrasound guidance.  Guidewire confirmed in vessel.  Sterile sheath used.  Assessment  Central Line Bundle Protocol followed. Hand hygiene before procedure, surgical cap worn, surgical mask worn, sterile surgical gloves worn, large sterile drape used.  Verification: ultrasound and blood return  Dressing: secured with tape and tegaderm and sutured in place and taped  Patient: Tolerated Well

## 2020-02-18 NOTE — OP NOTE
[facility]  Cardiothoracic Surgery Operative Report    Patient Name:  Lucho Delong; [id number]    DATE OF PROCEDURE: 02/18/2020   ATTENDING SURGEONS: Bryan Zhang M.D., Vega Miles M.D., and Bryan Adrian M.D.  PREOPERATIVE DIAGNOSIS:  Severe aortic stenosis.  POSTOPERATIVE DIAGNOSIS:  Severe aortic stenosis  ?  OPERATION PERFORMED: Transcatheter aortic valve insertion via transfemoral approach using a 34mm Medtronic Evolut Pro bioprosthesis  ANESTHESIA: General.  ESTIMATED BLOOD LOSS: 10 mL.  BRIEF HISTORY: This patient is an 79 year old male with symptomatic severe aortic stenosis.  The patient has had progressive dyspnea on exertion. This prompted a thoughtful and thorough evaluation, which demonstrated severe aortic stenosis. Given the comorbid conditions, a transcatheter valve insertion was recommended. The patient now presents for transcatheter aortic valve insertion.  PROCEDURE: After obtaining informed and written consent, the patient was brought to the cath lab and placed on the cath table in supine position. After induction of adequate anesthesia, a transvenous pacing wire was placed.  Bilateral femoral arterial and femoral venous access was obtained. A wire was advanced across the aortic valve. It was exchanged for stiff wire, and a 24mm aortic balloon was placed. Under rapid ventricular pacing, balloon valvuloplasty was performed. The balloon was then withdrawn, and a valve was advanced to the level of the aortic annulus. Once the team was satisfied that the valve was in proper position, it was deployed. Excellent positioning was obtained. Post-procedure echo demonstrated no aortic insufficiency. The femoral access sites were controlled using percutaneous techniques. The patient tolerated the procedure well, there were no complications. At the conclusion of the case, sponge and instrument counts were correct.

## 2020-02-18 NOTE — TRANSFER OF CARE
"Anesthesia Transfer of Care Note    Patient: Lucho Delong    Procedure(s) Performed: Procedure(s) (LRB):  REPLACEMENT, AORTIC VALVE, TRANSCATHETER (TAVR) (N/A)    Patient location: ICU    Anesthesia Type: MAC    Transport from OR: Transported from OR on 6-10 L/min O2 by face mask with adequate spontaneous ventilation    Post pain: adequate analgesia    Post assessment: no apparent anesthetic complications    Post vital signs: stable    Level of consciousness: awake    Nausea/Vomiting: no nausea/vomiting    Complications: none    Transfer of care protocol was followed      Last vitals:   Visit Vitals  BP (!) 151/75 (BP Location: Left arm, Patient Position: Lying)   Pulse 60   Temp 35.7 °C (96.2 °F) (Oral)   Resp 20   Ht 5' 8" (1.727 m)   Wt 77.1 kg (170 lb)   SpO2 99%   BMI 25.85 kg/m²     "

## 2020-02-18 NOTE — SUBJECTIVE & OBJECTIVE
Past Medical History:   Diagnosis Date    Anticoagulant long-term use     Atrial fibrillation     Bursitis of hip     Chronic back pain     Chronic kidney disease, stage 3     Gastroesophageal reflux disease     Gout     Hyperlipemia     Hypertension     Prostate cancer 1997    Stroke 04/2019       Past Surgical History:   Procedure Laterality Date    CATHETERIZATION OF BOTH LEFT AND RIGHT HEART N/A 12/2/2019    Procedure: CATHETERIZATION, HEART, BOTH LEFT AND RIGHT;  Surgeon: Everett Sosa MD;  Location: [facility] CATH/EP LAB;  Service: Cardiology;  Laterality: N/A;    COLONOSCOPY  1997    CORONARY ANGIOGRAPHY N/A 1/8/2020    Procedure: ANGIOGRAM, CORONARY ARTERY;  Surgeon: Bryan Zhang MD;  Location: [facility] CATH LAB;  Service: Cardiology;  Laterality: N/A;    CORONARY STENT PLACEMENT N/A 1/8/2020    Procedure: INSERTION, STENT, CORONARY ARTERY;  Surgeon: Bryan Zhang MD;  Location: [facility] CATH LAB;  Service: Cardiology;  Laterality: N/A;    PERCUTANEOUS TRANSLUMINAL ANGIOPLASTY (PTA) OF PERIPHERAL VESSEL N/A 1/23/2020    Procedure: PTA, PERIPHERAL VESSEL;  Surgeon: Bryan Zhang MD;  Location: [facility] CATH LAB;  Service: Cardiology;  Laterality: N/A;    PROSTATE SURGERY  1997    TONSILLECTOMY      TRANSESOPHAGEAL ECHOCARDIOGRAPHY N/A 1/23/2020    Procedure: ECHOCARDIOGRAM, TRANSESOPHAGEAL;  Surgeon: Bhavani Diagnostic Provider;  Location: [facility] EP LAB;  Service: Anesthesiology;  Laterality: N/A;       Review of patient's allergies indicates:   Allergen Reactions    Tramadol Itching and Swelling     Began medication, two doses into this treatment patient experiences tongue swelling and severe itching. Patient MD advised to not take this medication anymore       No current facility-administered medications on file prior to encounter.      Current Outpatient Medications on File Prior to Encounter   Medication Sig    allopurinol (ZYLOPRIM) 100 MG tablet Take 1 tablet (100 mg total) by mouth once  "daily.    aspirin (ECOTRIN) 81 MG EC tablet Take 81 mg by mouth once daily.    atorvastatin (LIPITOR) 40 MG tablet Take 1 tablet by mouth Daily.    losartan (COZAAR) 50 MG tablet Take 50 mg by mouth once daily.     pantoprazole (PROTONIX) 40 MG tablet Take 1 tablet (40 mg total) by mouth once daily.    prasugrel (EFFIENT) 10 mg Tab Take 1 tablet (10 mg total) by mouth once daily.    predniSONE (DELTASONE) 5 MG tablet Take 1 tablet (5 mg total) by mouth once daily.    cyanocobalamin 1,000 mcg/mL injection INJECT 1 ML INTRAMUSCULARLY ONCE EVERY 30 DAYS    dabigatran etexilate (PRADAXA) 75 mg Cap Take 75 mg by mouth once daily. "Do NOT break, chew, or open capsules."    lactase (LACTASE FAST ACTING) 9,000 unit Tab tablet Take 1 tablet (9,000 Units total) by mouth 3 (three) times daily with meals. (Patient not taking: Reported on 2/17/2020)     Family History     Problem Relation (Age of Onset)    Alzheimer's disease Mother (86), Sister    Cancer Father (86)        Tobacco Use    Smoking status: Never Smoker    Smokeless tobacco: Never Used   Substance and Sexual Activity    Alcohol use: Not Currently     Alcohol/week: 3.3 standard drinks     Types: 4 Standard drinks or equivalent per week     Frequency: 2-4 times a month     Comment: bourbon or beer    Drug use: No    Sexual activity: Not on file     Review of Systems   Unable to perform ROS: other (sedated post anesthesia)     Objective:     Vital Signs (Most Recent):  Temp: 96.2 °F (35.7 °C) (02/18/20 0600)  Pulse: 62 (02/18/20 0945)  Resp: 19 (02/18/20 0945)  BP: (!) 85/47 (02/18/20 0945)  SpO2: 100 % (02/18/20 0945) Vital Signs (24h Range):  Temp:  [96.2 °F (35.7 °C)] 96.2 °F (35.7 °C)  Pulse:  [60-80] 62  Resp:  [19-20] 19  SpO2:  [99 %-100 %] 100 %  BP: ()/(47-75) 85/47       Weight: 77.1 kg (170 lb)  Body mass index is 25.85 kg/m².    SpO2: 100 %  O2 Device (Oxygen Therapy): nasal cannula    Physical Exam   Constitutional: He appears " well-developed and well-nourished. No distress.   HENT:   Head: Normocephalic and atraumatic.   r IJ TVP   Eyes: Conjunctivae and EOM are normal.   Neck: Normal range of motion. No tracheal deviation present.   Cardiovascular: Normal rate, regular rhythm and normal heart sounds.   No murmur heard.  Pulmonary/Chest: Effort normal and breath sounds normal. No respiratory distress. He has no wheezes.   Abdominal: Soft. Bowel sounds are normal. He exhibits no distension. There is no tenderness.   Musculoskeletal: Normal range of motion. He exhibits no edema.   Neurological:   Sedated post anesthesia   Skin: He is not diaphoretic.       Significant Labs: All pertinent lab results from the last 24 hours have been reviewed.    Significant Imaging: All pertinent images from the last 24h have been reviewed.

## 2020-02-18 NOTE — ASSESSMENT & PLAN NOTE
78 y/o with severe AS s/p TAVR.    Pre TAVR EKG: Atrial fibrillation, QRS 84  Post TAVR EKG 1: Atrial fibrillation, QRS 74    - Monitor overnight. Keep TVP in place.  - Repeat ECG at 5 am

## 2020-02-19 VITALS
TEMPERATURE: 98 F | SYSTOLIC BLOOD PRESSURE: 158 MMHG | HEART RATE: 79 BPM | OXYGEN SATURATION: 100 % | DIASTOLIC BLOOD PRESSURE: 80 MMHG | WEIGHT: 170 LBS | HEIGHT: 68 IN | BODY MASS INDEX: 25.76 KG/M2 | RESPIRATION RATE: 22 BRPM

## 2020-02-19 PROBLEM — M75.92 LESION OF LEFT SHOULDER: Status: ACTIVE | Noted: 2020-02-19

## 2020-02-19 PROBLEM — I50.33 ACUTE ON CHRONIC DIASTOLIC (CONGESTIVE) HEART FAILURE: Status: ACTIVE | Noted: 2020-02-19

## 2020-02-19 LAB
ANION GAP SERPL CALC-SCNC: 10 MMOL/L (ref 8–16)
BASOPHILS # BLD AUTO: 0.06 K/UL (ref 0–0.2)
BASOPHILS NFR BLD: 0.7 % (ref 0–1.9)
BUN SERPL-MCNC: 43 MG/DL (ref 8–23)
CALCIUM SERPL-MCNC: 7.9 MG/DL (ref 8.7–10.5)
CHLORIDE SERPL-SCNC: 113 MMOL/L (ref 95–110)
CO2 SERPL-SCNC: 16 MMOL/L (ref 23–29)
CREAT SERPL-MCNC: 2.6 MG/DL (ref 0.5–1.4)
DIFFERENTIAL METHOD: ABNORMAL
EOSINOPHIL # BLD AUTO: 0.4 K/UL (ref 0–0.5)
EOSINOPHIL NFR BLD: 4.6 % (ref 0–8)
ERYTHROCYTE [DISTWIDTH] IN BLOOD BY AUTOMATED COUNT: 13.1 % (ref 11.5–14.5)
EST. GFR  (AFRICAN AMERICAN): 25.8 ML/MIN/1.73 M^2
EST. GFR  (NON AFRICAN AMERICAN): 22.3 ML/MIN/1.73 M^2
GLUCOSE SERPL-MCNC: 125 MG/DL (ref 70–110)
GLUCOSE SERPL-MCNC: 81 MG/DL (ref 70–110)
HCO3 UR-SCNC: 20.4 MMOL/L (ref 24–28)
HCT VFR BLD AUTO: 31 % (ref 40–54)
HCT VFR BLD CALC: 31 %PCV (ref 36–54)
HGB BLD-MCNC: 9.9 G/DL (ref 14–18)
IMM GRANULOCYTES # BLD AUTO: 0.02 K/UL (ref 0–0.04)
IMM GRANULOCYTES NFR BLD AUTO: 0.2 % (ref 0–0.5)
LYMPHOCYTES # BLD AUTO: 1.4 K/UL (ref 1–4.8)
LYMPHOCYTES NFR BLD: 15.6 % (ref 18–48)
MCH RBC QN AUTO: 31.5 PG (ref 27–31)
MCHC RBC AUTO-ENTMCNC: 31.9 G/DL (ref 32–36)
MCV RBC AUTO: 99 FL (ref 82–98)
MONOCYTES # BLD AUTO: 1.3 K/UL (ref 0.3–1)
MONOCYTES NFR BLD: 14.1 % (ref 4–15)
NEUTROPHILS # BLD AUTO: 5.9 K/UL (ref 1.8–7.7)
NEUTROPHILS NFR BLD: 64.8 % (ref 38–73)
NRBC BLD-RTO: 0 /100 WBC
PCO2 BLDA: 35.5 MMHG (ref 35–45)
PH SMN: 7.37 [PH] (ref 7.35–7.45)
PLATELET # BLD AUTO: 144 K/UL (ref 150–350)
PMV BLD AUTO: 11 FL (ref 9.2–12.9)
PO2 BLDA: 199 MMHG (ref 80–100)
POC ACTIVATED CLOTTING TIME K: 114 SEC (ref 74–137)
POC ACTIVATED CLOTTING TIME K: 131 SEC (ref 74–137)
POC ACTIVATED CLOTTING TIME K: 136 SEC (ref 74–137)
POC ACTIVATED CLOTTING TIME K: 235 SEC (ref 74–137)
POC BE: -5 MMOL/L
POC IONIZED CALCIUM: 1.19 MMOL/L (ref 1.06–1.42)
POC SATURATED O2: 100 % (ref 95–100)
POC TCO2: 21 MMOL/L (ref 23–27)
POTASSIUM BLD-SCNC: 4.5 MMOL/L (ref 3.5–5.1)
POTASSIUM SERPL-SCNC: 4.3 MMOL/L (ref 3.5–5.1)
RBC # BLD AUTO: 3.14 M/UL (ref 4.6–6.2)
SAMPLE: ABNORMAL
SAMPLE: ABNORMAL
SAMPLE: NORMAL
SODIUM BLD-SCNC: 141 MMOL/L (ref 136–145)
SODIUM SERPL-SCNC: 139 MMOL/L (ref 136–145)
WBC # BLD AUTO: 9.09 K/UL (ref 3.9–12.7)

## 2020-02-19 PROCEDURE — 80048 BASIC METABOLIC PNL TOTAL CA: CPT

## 2020-02-19 PROCEDURE — 25000003 PHARM REV CODE 250: Performed by: STUDENT IN AN ORGANIZED HEALTH CARE EDUCATION/TRAINING PROGRAM

## 2020-02-19 PROCEDURE — 99232 PR SUBSEQUENT HOSPITAL CARE,LEVL II: ICD-10-PCS | Mod: ,,, | Performed by: INTERNAL MEDICINE

## 2020-02-19 PROCEDURE — 33210 INSERT ELECTRD/PM CATH SNGL: CPT

## 2020-02-19 PROCEDURE — 99239 HOSP IP/OBS DSCHRG MGMT >30: CPT | Mod: ,,, | Performed by: PHYSICIAN ASSISTANT

## 2020-02-19 PROCEDURE — 27200188 HC TRANSDUCER, ART ADULT/PEDS

## 2020-02-19 PROCEDURE — 94761 N-INVAS EAR/PLS OXIMETRY MLT: CPT

## 2020-02-19 PROCEDURE — 85025 COMPLETE CBC W/AUTO DIFF WBC: CPT

## 2020-02-19 PROCEDURE — 36620 INSERTION CATHETER ARTERY: CPT

## 2020-02-19 PROCEDURE — 93010 ELECTROCARDIOGRAM REPORT: CPT | Mod: ,,, | Performed by: INTERNAL MEDICINE

## 2020-02-19 PROCEDURE — 99239 PR HOSPITAL DISCHARGE DAY,>30 MIN: ICD-10-PCS | Mod: ,,, | Performed by: PHYSICIAN ASSISTANT

## 2020-02-19 PROCEDURE — 93005 ELECTROCARDIOGRAM TRACING: CPT

## 2020-02-19 PROCEDURE — 93010 EKG 12-LEAD: ICD-10-PCS | Mod: ,,, | Performed by: INTERNAL MEDICINE

## 2020-02-19 PROCEDURE — 99900035 HC TECH TIME PER 15 MIN (STAT)

## 2020-02-19 PROCEDURE — C1894 INTRO/SHEATH, NON-LASER: HCPCS

## 2020-02-19 PROCEDURE — 99232 SBSQ HOSP IP/OBS MODERATE 35: CPT | Mod: ,,, | Performed by: INTERNAL MEDICINE

## 2020-02-19 PROCEDURE — 63600175 PHARM REV CODE 636 W HCPCS: Performed by: INTERNAL MEDICINE

## 2020-02-19 PROCEDURE — 63600175 PHARM REV CODE 636 W HCPCS: Performed by: STUDENT IN AN ORGANIZED HEALTH CARE EDUCATION/TRAINING PROGRAM

## 2020-02-19 RX ORDER — FUROSEMIDE 20 MG/1
TABLET ORAL
Qty: 10 TABLET | Refills: 0 | Status: SHIPPED | OUTPATIENT
Start: 2020-02-19 | End: 2020-03-13

## 2020-02-19 RX ORDER — SODIUM CHLORIDE 9 MG/ML
INJECTION, SOLUTION INTRAVENOUS CONTINUOUS
Status: DISCONTINUED | OUTPATIENT
Start: 2020-02-19 | End: 2020-02-19

## 2020-02-19 RX ADMIN — PANTOPRAZOLE SODIUM 40 MG: 40 TABLET, DELAYED RELEASE ORAL at 09:02

## 2020-02-19 RX ADMIN — ASPIRIN 81 MG: 81 TABLET, COATED ORAL at 09:02

## 2020-02-19 RX ADMIN — SODIUM CHLORIDE 500 ML: 0.9 INJECTION, SOLUTION INTRAVENOUS at 06:02

## 2020-02-19 RX ADMIN — SERTRALINE HYDROCHLORIDE 50 MG: 50 TABLET ORAL at 09:02

## 2020-02-19 RX ADMIN — SODIUM CHLORIDE: 0.9 INJECTION, SOLUTION INTRAVENOUS at 06:02

## 2020-02-19 RX ADMIN — PREDNISONE 5 MG: 2.5 TABLET ORAL at 09:02

## 2020-02-19 RX ADMIN — PRASUGREL 10 MG: 10 TABLET, FILM COATED ORAL at 09:02

## 2020-02-19 NOTE — ASSESSMENT & PLAN NOTE
- Transient Acute on Chronic Diastolic CHF, expected after the TAVR procedure, not a complication of the procedure.   - Treated with IV Lasix, now improved.   - PRN Lasix ordered at discharge.

## 2020-02-19 NOTE — ASSESSMENT & PLAN NOTE
- On Pradaxa.  - CHADS VASC 5 (8%/year) and HAS BLED 4 (7%/year)   - Patient is a Watchman Device candidate. Will discuss at 1 month follow up appointment.

## 2020-02-19 NOTE — SUBJECTIVE & OBJECTIVE
Interval History: Feels well this morning, no issues overnight. No events on tele.     Review of Systems   Constitution: Negative for chills and fever.   Cardiovascular: Negative for chest pain, dyspnea on exertion, palpitations and syncope.   Respiratory: Negative for cough and sleep disturbances due to breathing.    Musculoskeletal: Negative for back pain.   Gastrointestinal: Negative for abdominal pain, nausea and vomiting.   Neurological: Negative for dizziness and focal weakness.   Psychiatric/Behavioral: Negative for altered mental status.     Objective:     Vital Signs (Most Recent):  Temp: 98.4 °F (36.9 °C) (02/19/20 0701)  Pulse: 88 (02/19/20 0824)  Resp: (!) 76 (02/19/20 0824)  BP: (!) 120/58 (02/19/20 0800)  SpO2: 100 % (02/19/20 0824) Vital Signs (24h Range):  Temp:  [96.7 °F (35.9 °C)-99 °F (37.2 °C)] 98.4 °F (36.9 °C)  Pulse:  [57-91] 88  Resp:  [16-76] 76  SpO2:  [94 %-100 %] 100 %  BP: ()/() 120/58  Arterial Line BP: (110-146)/(44-90) 141/90     Weight: 77.1 kg (170 lb)  Body mass index is 25.85 kg/m².     SpO2: 100 %  O2 Device (Oxygen Therapy): room air    Physical Exam   Constitutional: He is oriented to person, place, and time. He appears well-developed and well-nourished. No distress.   HENT:   Head: Normocephalic and atraumatic.   Eyes: Conjunctivae and EOM are normal.   Neck: Normal range of motion. No tracheal deviation present.   Cardiovascular: Normal rate, regular rhythm and normal heart sounds.   No murmur heard.  Pulmonary/Chest: Effort normal and breath sounds normal. No respiratory distress. He has no wheezes.   Abdominal: Soft. Bowel sounds are normal. He exhibits no distension. There is no tenderness.   Musculoskeletal: Normal range of motion. He exhibits no edema.   Neurological: He is alert and oriented to person, place, and time.   Skin: He is not diaphoretic.       Significant Labs: All pertinent lab results from the last 24 hours have been reviewed.    Significant  Imaging: All pertinent images from the last 24h have been reviewed.

## 2020-02-19 NOTE — ASSESSMENT & PLAN NOTE
- Creatinine stable at 2.6 (baseline 2.9).  - No contrast used during TAVR.  - Continue to follow up with nephrology.

## 2020-02-19 NOTE — ASSESSMENT & PLAN NOTE
80 y/o with severe AS s/p TAVR.    Pre TAVR EKG: Atrial fibrillation, QRS 84  Post TAVR EKG 1: Atrial fibrillation, QRS 74  AM EKG - atrial fibrillation, QRS 68    - Ok to discharge from EP standpoint. No further testing required.

## 2020-02-19 NOTE — ASSESSMENT & PLAN NOTE
- 1/8/20: s/p multiple SARAI placement to LAD and RCA.  - Patient is a Plavix nonresponder.  - Continue ASA/Effient for at least a year.

## 2020-02-19 NOTE — PROGRESS NOTES
[facility]  Cardiac Electrophysiology  Progress Note    Admission Date: 2/18/2020  Code Status: No Order   Attending Physician: Bryan Zhang MD   Expected Discharge Date:   Principal Problem:<principal problem not specified>    Subjective:     Interval History: Feels well this morning, no issues overnight. No events on tele.     Review of Systems   Constitution: Negative for chills and fever.   Cardiovascular: Negative for chest pain, dyspnea on exertion, palpitations and syncope.   Respiratory: Negative for cough and sleep disturbances due to breathing.    Musculoskeletal: Negative for back pain.   Gastrointestinal: Negative for abdominal pain, nausea and vomiting.   Neurological: Negative for dizziness and focal weakness.   Psychiatric/Behavioral: Negative for altered mental status.     Objective:     Vital Signs (Most Recent):  Temp: 98.4 °F (36.9 °C) (02/19/20 0701)  Pulse: 88 (02/19/20 0824)  Resp: (!) 76 (02/19/20 0824)  BP: (!) 120/58 (02/19/20 0800)  SpO2: 100 % (02/19/20 0824) Vital Signs (24h Range):  Temp:  [96.7 °F (35.9 °C)-99 °F (37.2 °C)] 98.4 °F (36.9 °C)  Pulse:  [57-91] 88  Resp:  [16-76] 76  SpO2:  [94 %-100 %] 100 %  BP: ()/() 120/58  Arterial Line BP: (110-146)/(44-90) 141/90     Weight: 77.1 kg (170 lb)  Body mass index is 25.85 kg/m².     SpO2: 100 %  O2 Device (Oxygen Therapy): room air    Physical Exam   Constitutional: He is oriented to person, place, and time. He appears well-developed and well-nourished. No distress.   HENT:   Head: Normocephalic and atraumatic.   Eyes: Conjunctivae and EOM are normal.   Neck: Normal range of motion. No tracheal deviation present.   Cardiovascular: Normal rate, regular rhythm and normal heart sounds.   No murmur heard.  Pulmonary/Chest: Effort normal and breath sounds normal. No respiratory distress. He has no wheezes.   Abdominal: Soft. Bowel sounds are normal. He exhibits no distension. There is no tenderness.    Musculoskeletal: Normal range of motion. He exhibits no edema.   Neurological: He is alert and oriented to person, place, and time.   Skin: He is not diaphoretic.       Significant Labs: All pertinent lab results from the last 24 hours have been reviewed.    Significant Imaging: All pertinent images from the last 24h have been reviewed.      Assessment and Plan:     S/P TAVR (transcatheter aortic valve replacement)  80 y/o with severe AS s/p TAVR.    Pre TAVR EKG: Atrial fibrillation, QRS 84  Post TAVR EKG 1: Atrial fibrillation, QRS 74  AM EKG - atrial fibrillation, QRS 68    - Ok to discharge from EP standpoint. No further testing required.        Rafiq Plummer MD  Cardiac Electrophysiology  [facility]

## 2020-02-19 NOTE — DISCHARGE INSTRUCTIONS
TAVR Discharge Instructions    You have received a temporary card with your valve serial number on it. Please keep this in your wallet. The company will send you a permanent plastic card in approximately 6 weeks. This is important to keep with you in the future.     1. Preventing Infection on Your Heart Valve  o You have also been given a card: PREVENTION OF INFECTIVE BACTERIAL ENDOCARDITIS  o Provide a copy of this card to your Dentist and Gastroenterologist to keep in your chart.  o You DO need to take antibiotics prior to any routine dental cleaning, GI procedures (colonoscopy, endoscopy),  (cystoscopy, bladder procedures), or respiratory procedures (bronchoscopy).  o NO elective procedures for 6 months after your valve replacement  o You need antibiotics if you are having a procedure that requires cutting into infected skin (lancing a boil).  o Your Dentist or Gastroenterologist will prescribe these for you prior to your appointment. Should you have any issues getting these prescribed, please call our office.     2. Managing Your Heart Failure  o Weigh yourself daily.  o If you are on Lasix, continue to take it as prescribed.  o If you gain 3 pounds overnight or 5 pounds over 5 days, begin taking Lasix or double on your Lasix for 3 days.     3. General Post-op Instructions  o No lifting > 5 pounds for 5 days.  o No driving for 5 days.   o You may shower as soon as your get home but no bathing or submerging in water (lakes, pools, tub, etc.) for 1 week. You may remove the dressing and replace it with a band-aid.  o Keep incision clean and dry. No lotions, oils, or creams. You may chance the band-aid daily until a scab has formed over.     4. Follow Up  o As agreed upon prior to your TAVR, you are required to return to [facility] for a one month and one year visit with lab work, an echo, and an appointment to see a member of our team. Please remember these visits are required at [facility] in [place] in  order to have your TAVR covered by your insurance. These appointments will be made for you.     5. Discharge  o Should you have any questions or concerns, please do not hesitate to call our office ([phone])    Please address any other concerns with your primary Cardiologist.

## 2020-02-19 NOTE — ASSESSMENT & PLAN NOTE
- Scheduled for outpatient evaluation of left shoulder lesion.  - Will need SBE prophylaxis for biopsy/excision. Avoid for 6 months if not concerning to dermatology.

## 2020-02-19 NOTE — HPI
Referring Physician: Dr Sosa, scheduled to start seeing Dr. Wolfe as primary cardiologist after TAVR.      Mr Delong is a 78 year old male with a past medical history of HTN, DLPD, atrial fibrillation, CVA (March 2019), CKD 3, CAD and severe aortic stenosis who was being followed in Valve clinic for evaluation of TAVR. Prior to TAVR he was found to have significant LAD/RCA stenosis requiring intervention by Dr. Zhang. Upon interview, patient reported COLON upon mild exertion. Symptoms started about a year ago. He reported SOB with walking less than one black. He used to be active by playing golf but no longer he does it due to SOB.  He has a history of atrial fibrillation on Pradaxa. He is also followed by nephrology with declining renal function. Denies CP, palpitations, orthopnea, LE swelling, and syncopal episodes.     Lucho Delong is a 79 y.o. male referred by Dr Sosa for evaluation of severe AS (NYHA Class III sx).     The patient has undergone the following TAVR work-up:   · ECHO (Date 1/14/20): DUDLEY= 1.09 cm2 (indexed for BSA 0.57), MG= 43mmHg, Peak Eben= 4.3m/s, EF= 45%.   · LHC (Date 1/8/20): s/p PCI to LAD/RCA with Dr Zhang. Non-obstructive CAD.    · STS: 3%   · Frailty: 1/4 ()   · Right IVUS DEMETRIO >8 mm, DARIO 9 mm, RCA 8 mm, Left axillary and subclavian > 8 mm  · LVOT area by HYACINTH : is oval, were 3.0 x 2.4, average 2.7cm.  The area was 5.9 centimeters squared  · Incidental findings on CT:Note done  · CT Surgery risk assessment: high risk, per Dr Cm  · Rhythm issues: Afib   · PFTs: FEV1 105% predicted, DLCO 76% predicted.   · Comorbidities: CKD 3, CVA hx, Afib        Lucho Delong is a 34 mm  Evolut R valve candidate via R TF access.

## 2020-02-19 NOTE — PLAN OF CARE
CMICU DAILY GOALS       A: Awake    RASS: Goal -    Actual -     Restraint necessity:    B: Breath   SBT: Not intubated   C: Coordinate A & B, analgesics/sedatives   Pain: managed    SAT: Not intubated  D: Delirium   CAM-ICU: Overall CAM-ICU: Negative  E: Early Mobility   MOVE Screen: Pass   Activity: Activity Management: activity adjusted per tolerance  FAS: Feeding/Nutrition   Diet order: Diet/Nutrition Received: 2 gram sodium, low saturated fat/low cholesterol,   Fluid restriction:    T: Thrombus   DVT prophylaxis: VTE Required Core Measure: Pharmacological prophylaxis initiated/maintained  H: HOB Elevation   Head of Bed (HOB): HOB at 30-45 degrees  U: Ulcer Prophylaxis   GI: yes  G: Glucose control   managed    S: Skin   Bundle compliance: yes   Bathing/Skin Care: patient refused Date: 2/18/20  B: Bowel Function   No issues    I: Indwelling Catheters   Weller necessity:     CVC necessity: Yes   IPAD offered: No  D: De-escalation Antibx   Yes  Plan for the day   Discharge home  Family/Goals of care/Code Status   Code Status: No Order     No acute events throughout day, VS and assessment per flow sheet, patient progressing towards goals as tolerated, plan of care reviewed with Lucho Delong and family, all concerns addressed, will continue to monitor.

## 2020-02-19 NOTE — HOSPITAL COURSE
Mr. Delong was admitted and underwent successful placement of a 34 mm Evolut TAVR via RTF access under MAC sedation. TVP was left in place and EP was consulted. He was taken to the CCU in stable condition where he remained stable overnight. The following morning, he required diuresis of acute on chronic diastolic heart failure with IV Lasix. He diuresed well. He developed transient confusion upon waking that has resolved. EP was continued to follow and no PPM was recommended. That afternoon and this morning, he ambulated with without difficulty. He was eager to go home. It was felt he was stable for discharge. He will be discharged on ASA, Effient, and Pradaxa daily. Lasix PRN ordered at discharge.

## 2020-02-19 NOTE — DISCHARGE SUMMARY
[facility]  Interventional Cardiology  Discharge Summary      Patient Name: Lucho Delong  MRN: [id number]  Admission Date: 2/18/2020  Hospital Length of Stay: 1 days  Discharge Date and Time:  02/19/2020 11:20 AM  Attending Physician: Bryan Zhang MD  Discharging Provider: Mis Kramer PA-C  Primary Care Physician: Jose Nolan MD    HPI:  Referring Physician: Dr Sosa, scheduled to start seeing Dr. Wolfe as primary cardiologist after TAVR.      Mr Delong is a 78 year old male with a past medical history of HTN, DLPD, atrial fibrillation, CVA (March 2019), CKD 3, CAD and severe aortic stenosis who was being followed in Valve clinic for evaluation of TAVR. Prior to TAVR he was found to have significant LAD/RCA stenosis requiring intervention by Dr. Zhang. Upon interview, patient reported COLON upon mild exertion. Symptoms started about a year ago. He reported SOB with walking less than one black. He used to be active by playing golf but no longer he does it due to SOB.   He has a history of atrial fibrillation on Pradaxa. He is also followed by nephrology with declining renal function. Denies CP, palpitations, orthopnea, LE swelling, and syncopal episodes.     Lucho Delong is a 79 y.o. male referred by Dr Sosa for evaluation of severe AS (NYHA Class III sx).     The patient has undergone the following TAVR work-up:   · ECHO (Date 1/14/20): DUDLEY= 1.09 cm2 (indexed for BSA 0.57), MG= 43mmHg, Peak Eben= 4.3m/s, EF= 45%.   · LHC (Date 1/8/20): s/p PCI to LAD/RCA with Dr Zhang. Non-obstructive CAD.    · STS: 3%   · Frailty: 1/4 ()   · Right IVUS DEMETRIO >8 mm, DARIO 9 mm, RCA 8 mm, Left axillary and subclavian > 8 mm  · LVOT area by HYACINTH : is oval, were 3.0 x 2.4, average 2.7cm.  The area was 5.9 centimeters squared  · Incidental findings on CT:Note done  · CT Surgery risk assessment: high risk, per Dr Parrino  · Rhythm issues: Afib   · PFTs: FEV1 105% predicted, DLCO 76%  predicted.   · Comorbidities: CKD 3, CVA hx, Afib        Lucho Delong is a 34 mm  Evolut R valve candidate via R TF access.    Procedure(s):  REPLACEMENT, AORTIC VALVE, TRANSCATHETER (TAVR)     Indwelling Lines/Drains at time of discharge:  Lines/Drains/Airways     Pressure Ulcer                 Pressure Injury 02/18/20 0945 Sacral spine Stage 1 1 day                Hospital Course:  Mr. Delong was admitted and underwent successful placement of a 34 mm Evolut TAVR via RTF access under MAC sedation. TVP was left in place and EP was consulted. He was taken to the CCU in stable condition where he remained stable overnight. The following morning, he required diuresis of acute on chronic diastolic heart failure with IV Lasix. He diuresed well. He developed transient confusion upon waking that has resolved. EP was continued to follow and no PPM was recommended. That afternoon and this morning, he ambulated with without difficulty. He was eager to go home. It was felt he was stable for discharge. He will be discharged on ASA, Effient, and Pradaxa daily. Lasix PRN ordered at discharge.     Consults (From admission, onward)        Status Ordering Provider     Inpatient consult to Electrophysiology  Once     Provider:  (Not yet assigned)    Completed DONAVAN PIZARRO        Review of Systems   Constitution: Negative for chills and fever.   HENT: Negative for hearing loss.    Eyes: Negative for blurred vision, double vision and photophobia.   Cardiovascular: Positive for dyspnea on exertion. Negative for chest pain, claudication, cyanosis, irregular heartbeat, leg swelling, near-syncope, orthopnea, palpitations, paroxysmal nocturnal dyspnea and syncope.   Respiratory: Positive for shortness of breath. Negative for cough and hemoptysis.    Musculoskeletal: Negative for falls.   Gastrointestinal: Negative for abdominal pain, constipation, diarrhea, nausea and vomiting.   Genitourinary: Negative for dysuria.    Neurological: Negative for disturbances in coordination, dizziness, focal weakness, headaches, loss of balance and weakness.     Physical Exam   Constitutional: He is oriented to person, place, and time. He appears well-developed and well-nourished. No distress.   HENT:   Head: Normocephalic and atraumatic.   Nose: Nose normal.   Eyes: EOM are normal.   Neck: Normal range of motion. Neck supple. No JVD present.   Cardiovascular: Normal rate and regular rhythm.   Murmur heard.   Harsh midsystolic murmur is present at the upper right sternal border radiating to the neck.  Pulmonary/Chest: Effort normal and breath sounds normal. No respiratory distress. He has no wheezes. He has no rales.   Abdominal: Soft. He exhibits no distension.   Musculoskeletal: Normal range of motion. He exhibits no edema.   Neurological: He is alert and oriented to person, place, and time.   Skin: Skin is warm and dry. He is not diaphoretic.   No noted bleeding or hematoma to groin sites, bilaterally   Psychiatric: He has a normal mood and affect. His behavior is normal. Judgment and thought content normal.     Significant Diagnostic Studies: Labs:   BMP:   Recent Labs   Lab 02/18/20  0550 02/18/20  1601 02/19/20  0338   GLU 79 124* 81    138 139   K 5.6* 5.8* 4.3   * 112* 113*   CO2 16* 17* 16*   BUN 49* 43* 43*   CREATININE 2.9* 2.7* 2.6*   CALCIUM 8.7 8.0* 7.9*   , CBC   Recent Labs   Lab 02/17/20  1417 02/18/20  0550  02/19/20  0338   WBC 8.12 9.14  --  9.09   HGB 11.0* 11.4*  --  9.9*   HCT 35.0* 35.5*   < > 31.0*    197  --  144*    < > = values in this interval not displayed.    and All labs within the past 24 hours have been reviewed    Pending Diagnostic Studies:     Procedure Component Value Units Date/Time    EKG 12-LEAD on arrival to floor [[id number]]     Order Status:  Sent Lab Status:  No result     EKG 12-LEAD starting tomorrow [[id number]]     Order Status:  Sent Lab Status:  No result     EKG 12-lead  [[id number]]     Order Status:  Sent Lab Status:  No result         * S/P TAVR (transcatheter aortic valve replacement)  - Successful right transfemoral 34 mm evolute Pro TAVR.  - No PVL, mean gradient 4.0, peak velocity 1.6 post TAVR.  - Follow up with [facility] 1 month and 1 year with labs and Echo   - ASA/Effient/Pradaxa daily (s/p SARAI on 1/8/20)  - Avoid non sterile procedures which could cause endocarditis for 6 months including dental work, endoscopy, colonoscopy, and  procedures.   - SBE prophylaxis for life     Nodular calcific aortic valve stenosis  - See s/p TAVR.    Acute on chronic diastolic (congestive) heart failure  - Transient Acute on Chronic Diastolic CHF, expected after the TAVR procedure, not a complication of the procedure.   - Treated with IV Lasix, now improved.   - PRN Lasix ordered at discharge.     Persistent atrial fibrillation  - On Pradaxa.  - CHADS VASC 5 (8%/year) and HAS BLED 4 (7%/year)   - Patient is a Watchman Device candidate. Will discuss at 1 month follow up appointment.     HTN (hypertension)  - Resume losartan, Toprol XL, and nifedipine.  - Follow up with Dr. Wolfe as scheduled.     CAD (coronary artery disease)  - 1/8/20: s/p multiple SARAI placement to LAD and RCA.  - Patient is a Plavix nonresponder.  - Continue ASA/Effient for at least a year.     Lesion of left shoulder  - Scheduled for outpatient evaluation of left shoulder lesion.  - Will need SBE prophylaxis for biopsy/excision. Avoid for 6 months if not concerning to dermatology.     Stage 3 chronic kidney disease  - Creatinine stable at 2.6 (baseline 2.9).  - No contrast used during TAVR.  - Continue to follow up with nephrology.         Discharged Condition: good    Follow Up:    Patient Instructions:      Notify your health care provider if you experience any of the following:  persistent nausea and vomiting or diarrhea     Notify your health care provider if you experience any of the following:  severe  "uncontrolled pain     Notify your health care provider if you experience any of the following:  redness, tenderness, or signs of infection (pain, swelling, redness, odor or green/yellow discharge around incision site)     Notify your health care provider if you experience any of the following:  difficulty breathing or increased cough     Notify your health care provider if you experience any of the following:  severe persistent headache     Notify your health care provider if you experience any of the following:  persistent dizziness, light-headedness, or visual disturbances     Notify your health care provider if you experience any of the following:  increased confusion or weakness     Type & Screen   Standing Status: Future Standing Exp. Date: 03/23/21     Medications:  Reconciled Home Medications:      Medication List      START taking these medications    furosemide 20 MG tablet  Commonly known as:  LASIX  Take only for 3 lb weight gain overnight or 5 lbs in 5 days.        CONTINUE taking these medications    allopurinoL 100 MG tablet  Commonly known as:  ZYLOPRIM  Take 1 tablet (100 mg total) by mouth once daily.     aspirin 81 MG EC tablet  Commonly known as:  ECOTRIN  Take 81 mg by mouth once daily.     atorvastatin 40 MG tablet  Commonly known as:  LIPITOR  Take 1 tablet by mouth Daily.     cyanocobalamin 1,000 mcg/mL injection  INJECT 1 ML INTRAMUSCULARLY ONCE EVERY 30 DAYS     losartan 50 MG tablet  Commonly known as:  COZAAR  Take 50 mg by mouth once daily.     metoprolol succinate 100 MG 24 hr tablet  Commonly known as:  TOPROL-XL  Take 1 tablet (100 mg total) by mouth once daily.     NIFEdipine 60 MG (OSM) 24 hr tablet  Commonly known as:  PROCARDIA-XL  Take 1 tablet (60 mg total) by mouth once daily.     pantoprazole 40 MG tablet  Commonly known as:  PROTONIX  Take 1 tablet (40 mg total) by mouth once daily.     Pradaxa 75 mg Cap  Generic drug:  dabigatran etexilate  Take 75 mg by mouth once daily. "Do " "NOT break, chew, or open capsules."     prasugrel 10 mg Tab  Commonly known as:  Effient  Take 1 tablet (10 mg total) by mouth once daily.     predniSONE 5 MG tablet  Commonly known as:  DELTASONE  Take 1 tablet (5 mg total) by mouth once daily.     sertraline 50 MG tablet  Commonly known as:  ZOLOFT  Take 50 mg by mouth once daily.            Time spent on the discharge of patient: 35 minutes    Mis Kramer PA-C  Interventional Cardiology  [facility]  "

## 2020-02-19 NOTE — PHYSICIAN QUERY
PT Name: Lucho Delong  MR #: [id number]  Physician Query Form - CKD Clarification     CDS/: Mishel Mcneal RN, CCDS              Contact information: [email or web address]  This form is a permanent document in the medical record.     Query Date: February 19, 2020    By submitting this query, we are merely seeking further clarification of documentation. Please utilize your independent clinical judgment when addressing the question(s) below.    The Medical record contains the following:     Indicators   Supporting Clinical Findings   Location in Medical Record   X CKD or Chronic Kidney (Renal) Failure / Disease Comorbidities:  CKD 3  Comorbidities: CKD IV 2/17 h/p   X BUN/Creatinine                          GFR Cr 3.1->2.9->2.7->2.6  gfr 18.0->19.5->21.3->22.3 2/17-2/19 lab    Dehydration      Nausea / Vomiting      Dialysis / CRRT      Medication      Treatment     X Other Chronic Conditions medical history significant for HTN, DLPD, atrial fibrillation, CVA (March 2019), CKD 3, CAD and aortic stenosis  2/17 h/p    Other       Provider, please further specify the stage of CKD.  CKD 3 vs CKD 4    [   ] Chronic Kidney Disease (CKD) (please specify stage* below)      [employer] Definitions     Stage Description eGFR (mL/min)   [   ]    III Moderately reduced kidney function 30-59   [X]    IV Severely reduced kidney function 15-29   [   ] Other (please specify): ____________    [   ]  Clinically Undetermined          Please document in your progress notes daily for the duration of treatment until resolved and include in your discharge summary.

## 2020-02-19 NOTE — PLAN OF CARE
Pt has no discharge needs.  Pt will be transported home by family.       02/19/20 1210   Final Note   Assessment Type Final Discharge Note   Anticipated Discharge Disposition Home   What phone number can be called within the next 1-3 days to see how you are doing after discharge? [phone]  (Cell phone #)   Hospital Follow Up  Appt(s) scheduled? Yes   Discharge plans and expectations educations in teach back method with documentation complete? Yes   Right Care Referral Info   Post Acute Recommendation No Care   Referral Type none   Post-Acute Status   Discharge Delays None known at this time

## 2020-02-19 NOTE — ASSESSMENT & PLAN NOTE
- Successful right transfemoral 34 mm evolute Pro TAVR.  - No PVL, mean gradient 4.0, peak velocity 1.6 post TAVR.  - Follow up with [facility] 1 month and 1 year with labs and Echo   - ASA/Effient/Pradaxa daily (s/p SARAI on 1/8/20)  - Avoid non sterile procedures which could cause endocarditis for 6 months including dental work, endoscopy, colonoscopy, and  procedures.   - SBE prophylaxis for life

## 2020-03-02 ENCOUNTER — CLINICAL SUPPORT (OUTPATIENT)
Dept: FAMILY MEDICINE | Facility: CLINIC | Age: 80
End: 2020-03-02
Payer: MEDICARE

## 2020-03-02 DIAGNOSIS — I10 ESSENTIAL HYPERTENSION: ICD-10-CM

## 2020-03-02 RX ORDER — NIFEDIPINE 60 MG/1
60 TABLET, EXTENDED RELEASE ORAL DAILY
Qty: 90 TABLET | Refills: 3 | Status: SHIPPED | OUTPATIENT
Start: 2020-03-02 | End: 2021-03-15

## 2020-03-02 NOTE — PROGRESS NOTES
B12 injection administered IM in pts right dorsogluteal. Pt tolerated well. No other concerns at this time.

## 2020-03-02 NOTE — TELEPHONE ENCOUNTER
B12 appt scheduled at 2pm.  Refill pended as requested.  Please advise, thank you.      ----- Message from Cathy Kowalski sent at 3/2/2020 10:05 AM CST -----  Contact: Jerilyn Gan (Spouse) ph# [phone]  Jerilyn Gan (Spouse) ph# [phone] calling to schedule nurse visit; b12 injection.  Requesting to come in today (3/2/2020).  Please call upon request.

## 2020-03-13 ENCOUNTER — TELEPHONE (OUTPATIENT)
Dept: CARDIOLOGY | Facility: CLINIC | Age: 80
End: 2020-03-13

## 2020-03-13 NOTE — TELEPHONE ENCOUNTER
Spoke with wife to discuss husbands med list prior to upcoming visit.  All medications were correct with the exception of Lasix that had been D/C'd.  No other concerns were voiced during this encounter.    ----- Message from Nehemias aGlaviz sent at 3/13/2020 11:12 AM CDT -----  Contact: pt  Type: Needs Medical Advice    Who Called:  pt    Best Call Back Number: [phone]  Additional Information: pt would like to discuss med list before visit to make sure is up to date so saves time during visit. Please call to discuss. Best to call after 12:30pm.

## 2020-03-17 ENCOUNTER — OFFICE VISIT (OUTPATIENT)
Dept: CARDIOLOGY | Facility: CLINIC | Age: 80
End: 2020-03-17
Payer: MEDICARE

## 2020-03-17 VITALS
WEIGHT: 165.88 LBS | TEMPERATURE: 98 F | DIASTOLIC BLOOD PRESSURE: 65 MMHG | OXYGEN SATURATION: 98 % | RESPIRATION RATE: 16 BRPM | SYSTOLIC BLOOD PRESSURE: 131 MMHG | HEIGHT: 68 IN | HEART RATE: 64 BPM | BODY MASS INDEX: 25.14 KG/M2

## 2020-03-17 DIAGNOSIS — Z95.2 S/P TAVR (TRANSCATHETER AORTIC VALVE REPLACEMENT): ICD-10-CM

## 2020-03-17 DIAGNOSIS — Z79.01 CURRENT USE OF LONG TERM ANTICOAGULATION: ICD-10-CM

## 2020-03-17 DIAGNOSIS — Z95.5 STATUS POST INSERTION OF DRUG-ELUTING STENT INTO LEFT ANTERIOR DESCENDING (LAD) ARTERY: ICD-10-CM

## 2020-03-17 DIAGNOSIS — N18.4 STAGE 4 CHRONIC KIDNEY DISEASE: ICD-10-CM

## 2020-03-17 DIAGNOSIS — I48.19 PERSISTENT ATRIAL FIBRILLATION: ICD-10-CM

## 2020-03-17 DIAGNOSIS — G47.19 EXCESSIVE DAYTIME SLEEPINESS: ICD-10-CM

## 2020-03-17 DIAGNOSIS — R53.82 CHRONIC FATIGUE: ICD-10-CM

## 2020-03-17 DIAGNOSIS — I25.118 ATHEROSCLEROTIC HEART DISEASE OF NATIVE CORONARY ARTERY WITH OTHER FORMS OF ANGINA PECTORIS: ICD-10-CM

## 2020-03-17 DIAGNOSIS — D64.9 ANEMIA, UNSPECIFIED TYPE: ICD-10-CM

## 2020-03-17 DIAGNOSIS — Z86.73 HISTORY OF STROKE: ICD-10-CM

## 2020-03-17 DIAGNOSIS — G47.33 OSA (OBSTRUCTIVE SLEEP APNEA): ICD-10-CM

## 2020-03-17 DIAGNOSIS — R06.09 DYSPNEA ON EXERTION: ICD-10-CM

## 2020-03-17 DIAGNOSIS — R06.09 DOE (DYSPNEA ON EXERTION): Primary | ICD-10-CM

## 2020-03-17 DIAGNOSIS — Z91.89 SEDENTARY LIFESTYLE: ICD-10-CM

## 2020-03-17 PROBLEM — Z79.899 LONG TERM CURRENT USE OF AMIODARONE: Status: RESOLVED | Noted: 2017-11-29 | Resolved: 2020-03-17

## 2020-03-17 PROCEDURE — 3078F PR MOST RECENT DIASTOLIC BLOOD PRESSURE < 80 MM HG: ICD-10-PCS | Mod: CPTII,S$GLB,, | Performed by: INTERNAL MEDICINE

## 2020-03-17 PROCEDURE — 1101F PR PT FALLS ASSESS DOC 0-1 FALLS W/OUT INJ PAST YR: ICD-10-PCS | Mod: CPTII,S$GLB,, | Performed by: INTERNAL MEDICINE

## 2020-03-17 PROCEDURE — 99999 PR PBB SHADOW E&M-EST. PATIENT-LVL IV: CPT | Mod: PBBFAC,,, | Performed by: INTERNAL MEDICINE

## 2020-03-17 PROCEDURE — 93000 ELECTROCARDIOGRAM COMPLETE: CPT | Mod: S$GLB,,, | Performed by: INTERNAL MEDICINE

## 2020-03-17 PROCEDURE — 3078F DIAST BP <80 MM HG: CPT | Mod: CPTII,S$GLB,, | Performed by: INTERNAL MEDICINE

## 2020-03-17 PROCEDURE — 3075F PR MOST RECENT SYSTOLIC BLOOD PRESS GE 130-139MM HG: ICD-10-PCS | Mod: CPTII,S$GLB,, | Performed by: INTERNAL MEDICINE

## 2020-03-17 PROCEDURE — 99205 PR OFFICE/OUTPT VISIT, NEW, LEVL V, 60-74 MIN: ICD-10-PCS | Mod: S$GLB,,, | Performed by: INTERNAL MEDICINE

## 2020-03-17 PROCEDURE — 1159F MED LIST DOCD IN RCRD: CPT | Mod: S$GLB,,, | Performed by: INTERNAL MEDICINE

## 2020-03-17 PROCEDURE — 99205 OFFICE O/P NEW HI 60 MIN: CPT | Mod: S$GLB,,, | Performed by: INTERNAL MEDICINE

## 2020-03-17 PROCEDURE — 3075F SYST BP GE 130 - 139MM HG: CPT | Mod: CPTII,S$GLB,, | Performed by: INTERNAL MEDICINE

## 2020-03-17 PROCEDURE — 1159F PR MEDICATION LIST DOCUMENTED IN MEDICAL RECORD: ICD-10-PCS | Mod: S$GLB,,, | Performed by: INTERNAL MEDICINE

## 2020-03-17 PROCEDURE — 99999 PR PBB SHADOW E&M-EST. PATIENT-LVL IV: ICD-10-PCS | Mod: PBBFAC,,, | Performed by: INTERNAL MEDICINE

## 2020-03-17 PROCEDURE — 1101F PT FALLS ASSESS-DOCD LE1/YR: CPT | Mod: CPTII,S$GLB,, | Performed by: INTERNAL MEDICINE

## 2020-03-17 PROCEDURE — 93000 EKG 12-LEAD: ICD-10-PCS | Mod: S$GLB,,, | Performed by: INTERNAL MEDICINE

## 2020-03-17 NOTE — PROGRESS NOTES
Subjective:    Patient ID:  Lucho Delong is a 79 y.o. male who presents for evaluation of Atrial Fibrillation; Hypertension; and Valve Replacement-Aortic  For establish cardiac care post TAVR, 6 SARAI, HFpEF with persistent COLON, persistent AF on NOAC and DAPT  Came on own, attended my talk at [employer].  PCP: Dr. CHELSEA Nolan  Prior cardiologist: Dr. Sosa, last seen 12/2019  Intervention: Dr. Zhang  Lives with wife, Pat, non-smoker  Retired  for the [employer]    Patient is a new patient to me.  Difficult historian with memory difficulties post stroke    Health literacy: high  Activities: had PT post stoke, no cardiac rehab post procedures, sedentary lifestyle especially for last 3 years, played daily golf until having to stop with COLON  Nicotine: never  Alcohol: occasional, once a month  Illicit drugs: none  Cardiac symptoms: COLON  Home BP: 120/60  Medication compliance: yes, do not like the bruising  Diet: regular  Caffeine: 1 cpd  Labs:   No results found for: TSH   No results found for: LABA1C, HGBA1C    Lab Results   Component Value Date    WBC 9.09 02/19/2020    HGB 9.9 (L) 02/19/2020    HCT 31.0 (L) 02/19/2020    MCV 99 (H) 02/19/2020     (L) 02/19/2020       CMP  Sodium   Date Value Ref Range Status   02/19/2020 139 136 - 145 mmol/L Final     Potassium   Date Value Ref Range Status   02/19/2020 4.3 3.5 - 5.1 mmol/L Final     Chloride   Date Value Ref Range Status   02/19/2020 113 (H) 95 - 110 mmol/L Final     CO2   Date Value Ref Range Status   02/19/2020 16 (L) 23 - 29 mmol/L Final     Glucose   Date Value Ref Range Status   02/19/2020 81 70 - 110 mg/dL Final     BUN, Bld   Date Value Ref Range Status   02/19/2020 43 (H) 8 - 23 mg/dL Final     Creatinine   Date Value Ref Range Status   02/19/2020 2.6 (H) 0.5 - 1.4 mg/dL Final     Calcium   Date Value Ref Range Status   02/19/2020 7.9 (L) 8.7 - 10.5 mg/dL Final     Total Protein   Date Value Ref Range Status    11/26/2019 7.3 6.0 - 8.4 g/dL Final     Albumin   Date Value Ref Range Status   02/17/2020 3.9 3.5 - 5.2 g/dL Final     Total Bilirubin   Date Value Ref Range Status   11/26/2019 1.0 0.1 - 1.0 mg/dL Final     Comment:     For infants and newborns, interpretation of results should be based  on gestational age, weight and in agreement with clinical  observations.  Premature Infant recommended reference ranges:  Up to 24 hours.............<8.0 mg/dL  Up to 48 hours............<12.0 mg/dL  3-5 days..................<15.0 mg/dL  6-29 days.................<15.0 mg/dL       Alkaline Phosphatase   Date Value Ref Range Status   11/26/2019 55 55 - 135 U/L Final     AST   Date Value Ref Range Status   11/26/2019 21 10 - 40 U/L Final     ALT   Date Value Ref Range Status   11/26/2019 17 10 - 44 U/L Final     Anion Gap   Date Value Ref Range Status   02/19/2020 10 8 - 16 mmol/L Final     eGFR if    Date Value Ref Range Status   02/19/2020 25.8 (A) >60 mL/min/1.73 m^2 Final     eGFR if non    Date Value Ref Range Status   02/19/2020 22.3 (A) >60 mL/min/1.73 m^2 Final     Comment:     Calculation used to obtain the estimated glomerular filtration  rate (eGFR) is the CKD-EPI equation.        @labrcntip(troponini)@  No results found for: BNP}   Lab Results   Component Value Date    CHOL 245 (A) 05/29/2015    CHOL 193 04/11/2006    CHOL 168 07/27/2005     Lab Results   Component Value Date    HDL 55 05/29/2015    HDL 36.0 (L) 04/11/2006    HDL 39.0 (L) 07/27/2005     Lab Results   Component Value Date    LDLCALC 145 05/29/2015    LDLCALC 87.8 04/11/2006    LDLCALC 82.0 07/27/2005     Lab Results   Component Value Date    TRIG 227 05/29/2015    TRIG 346 (H) 04/11/2006    TRIG 235 (H) 07/27/2005     Lab Results   Component Value Date    CHOLHDL 18.7 (L) 04/11/2006    CHOLHDL 23.2 07/27/2005      LDL outside lab 9/2019 result 46    Last Echo: 1/2020 HYACINTH, DSE  Last stress test: 1/2020  Cardiovascular  angiogram: 1/2020, 6 SARAI placed  ECG: AF, rate 63  Fundoscopic exam: within the past year, negative for retinopathy    WM here to establish cardiac care. Major complaint of COLON for the past few years, had to give up golfing and had a number of CV event since 4/2019. Completed 4-6 weeks of PT post stroke, did not find much benefit, and did not proceed with any Cardiac Rehab post procedures. Had review with Dr. Zhang     Chart reviewed -  CHADS VASC 5 (8%/year) and HAS BLED 4 (7%/year)      Successful right transfemoral 34 mm evolute Pro TAVR.  - No PVL, mean gradient 4.0, peak velocity 1.6 post TAVR.  - Follow up with [facility] 1 month and 1 year with labs and Echo   - ASA/Effient/Pradaxa daily (s/p SARAI on 1/8/20)  - Avoid non sterile procedures which could cause endocarditis for 6 months including dental work, endoscopy, colonoscopy, and  procedures.   - SBE prophylaxis for life     Follow up with [facility] 1 month and 1 year with labs and Echo   - ASA/Effient/Pradaxa daily (s/p SARAI on 1/8/20)  Transient Acute on Chronic Diastolic CHF, expected after the TAVR procedure, not a complication of the procedure.   Patient is a Watchman Device candidate. Will discuss at 1 month follow up appointment.     HYACINTH 1/2020 - Severe aortic valve stenosis. By 3D HYACINTH, LVOT diameter is 2.4cm x 3.0cm, area is 5.7cm2.  Mildly decreased left ventricular systolic function. The estimated ejection fraction is 45%.  Mild aortic regurgitation.  Mild mitral regurgitation.  Normal right ventricular systolic function.  Mild tricuspid regurgitation.  Grade 3 plaque present.     1/2020 DOBUTAMINE STRESS TEST PERFORMED FOR AORTIC STENOSIS.  · Mildly decreased left ventricular systolic function. The estimated ejection fraction is 45%.  · Left ventricular diastolic dysfunction.  · Local segmental wall motion abnormalities.  · The quantitatively dervived ejection fraction is 46%.  · There were no arrhythmias during stress.  · The ECG  portion of this study is negative for myocardial ischemia.  · The patient's exercise capacity was normal.  · Severe aortic valve stenosis.  · Aortic valve area is 1.09 cm2; peak velocity is 4.3 m/s; mean gradient is 43 mmHg with stress. Prior LVOT diameter undermeasured. DUDLEY indexed for BSA 0.57/m2, consistent with severe AS.    CXR - The lungs are clear. Costophrenic angles are seen without effusion. No pneumothorax is identified. The heart is normal in size. The mediastinum is within normal limits. Osseous structures show degenerative changes in the spine. The visualized upper abdomen is unremarkable.       Review of Systems   Constitution: Positive for chills, malaise/fatigue and weight loss (30 lbs over 3 years, not intentional). Negative for diaphoresis, fever, night sweats and weight gain.   HENT: Negative for nosebleeds and tinnitus.    Eyes: Positive for blurred vision. Negative for visual disturbance.   Cardiovascular: Positive for dyspnea on exertion. Negative for chest pain, claudication, cyanosis, irregular heartbeat, leg swelling, near-syncope, orthopnea, palpitations and paroxysmal nocturnal dyspnea.   Respiratory: Positive for shortness of breath. Negative for cough, sleep disturbances due to breathing, snoring and wheezing.         Ewing score 17, had inconclusive sleep study in 2019   Endocrine: Positive for cold intolerance. Negative for polydipsia and polyuria.   Hematologic/Lymphatic: Bruises/bleeds easily.   Skin: Positive for itching (major). Negative for color change, flushing, nail changes, poor wound healing and suspicious lesions.   Musculoskeletal: Positive for arthritis, gout, muscle weakness and neck pain. Negative for falls, joint pain, joint swelling, muscle cramps and myalgias.   Gastrointestinal: Positive for diarrhea. Negative for heartburn, hematemesis, hematochezia, melena and nausea.   Neurological: Positive for difficulty with concentration, disturbances in coordination,  "excessive daytime sleepiness, focal weakness, loss of balance, numbness and paresthesias. Negative for dizziness, headaches, light-headedness, vertigo and weakness.        Dysphasia   Psychiatric/Behavioral: Positive for depression and memory loss. Negative for substance abuse. The patient does not have insomnia and is not nervous/anxious.    Allergic/Immunologic: Positive for environmental allergies.        Objective:    Physical Exam   Constitutional: He is oriented to person, place, and time. He appears well-developed and well-nourished.   HENT:   Head: Normocephalic.   Eyes: Pupils are equal, round, and reactive to light. Conjunctivae and EOM are normal.   Neck: Normal range of motion. Neck supple. No JVD present. No thyromegaly present.   Cardiovascular: Normal rate and intact distal pulses. An irregularly irregular rhythm present. Exam reveals distant heart sounds. Exam reveals no gallop and no friction rub.   No murmur heard.  Pulses:       Carotid pulses are 2+ on the right side, and 2+ on the left side.       Radial pulses are 2+ on the right side, and 2+ on the left side.        Femoral pulses are 2+ on the right side, and 2+ on the left side.       Popliteal pulses are 2+ on the right side, and 2+ on the left side.        Dorsalis pedis pulses are 1+ on the right side, and 1+ on the left side.        Posterior tibial pulses are 1+ on the right side, and 1+ on the left side.   Pulmonary/Chest: Effort normal and breath sounds normal. He has no rales. He exhibits no tenderness.   Abdominal: Soft. Bowel sounds are normal. There is no tenderness.   Waist 39"   Musculoskeletal: Normal range of motion. He exhibits no edema.   Lymphadenopathy:     He has no cervical adenopathy.   Neurological: He is alert and oriented to person, place, and time.   Skin: Skin is warm and dry. No rash noted.   ecchymosis         Assessment:       1. COLON (dyspnea on exertion), onset 2017    2. PUSHPA (obstructive sleep apnea)    3. " History of stroke, 4/2019, right-sided weakness, dysphasia, memory difficulties     4. 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 Stage 4 chronic kidney disease    6. Current use of long term anticoagulation    7. Persistent atrial fibrillation    8. Status post insertion of drug-eluting stent into left anterior descending (LAD) artery and RCA, 6 in 1/2020,     9. Sedentary lifestyle    10. Chronic fatigue    11. Anemia, unspecified type    12. Excessive daytime sleepiness    13. 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    Stage 4 chronic kidney disease    Current use of long term anticoagulation    Persistent atrial fibrillation  -     TSH; Future  -     T4, free; Future    Status post insertion of drug-eluting stent into left anterior descending (LAD) artery and RCA, 6 in 1/2020,   -     Lipid panel; Future    Sedentary lifestyle    Chronic fatigue  -     T4, free; Future    Anemia, unspecified type  -     Reticulocytes; Future  -     Ferritin; Future  -     Iron and TIBC; Future  -     Ambulatory referral/consult to Hematology / Oncology; Future    Excessive daytime sleepiness    Atherosclerotic heart disease of native coronary artery with other forms of angina pectoris   -     Lipid panel; Future    - All medical issues reviewed, continue current Rx.  - Highly recommend cardiac rehab, unable to place thru Epic today  - CV status stable, all medications reviewed, patient acknowledge good understanding.  - Recommend healthy living: no nicotine, moderate alcohol, healthy  diet and regular exercise aiming for fitness, and weight control  - Need good exercise program, 4 key elements: 1. Aerobic (walking, swimming, dancing, jogging, biking, etc, 2. Muscle strengthening / resistance exercise, need to do 2-3 times weekly, 3. Stretching daily, good stretch takes a whole  total minute. 4. Balance exercise daily.   - Encourage activities as much as tolerated. Any activity is better than none!  - Instruction for Mediterranean diet and heart healthy exercise given.  - Highly recommend 30 minutes of exercise / activities daily, can have Sunday off, with 2-3 sessions of muscle strengthening weekly. A  would be very helpful.  - Follow up in 3 months to check efficacy  - Phone review / encourage use of [facility]    Greater than 50% of the time was spent in counseling and coordination of care. The above assessment and plan have been discussed at length. Labs and procedure over the last 6 months reviewed. Problem List updated. Asked to bring in all active medications / pills bottles with next visit.

## 2020-03-17 NOTE — PATIENT INSTRUCTIONS
Recommended Mediterranean dietEating Heart-Healthy Food: Using the DASH Plan  Eating for your heart doesnt have to be hard or boring. You just need to know how to make healthier choices. The DASH eating plan has been developed to help you do just that. DASH stands for Dietary Approaches to Stop Hypertension. It is a plan that has been proven to be healthier for your heart and to lower your risk for high blood pressure. It can also help lower your risk for cancer, heart disease, osteoporosis, and diabetes.  Choosing from Each Food Group  Choose foods from each of the food groups below each day. Try to get the recommended number of servings for each food group. The serving numbers are based on a diet of 2,000 calories a day. Talk to your doctor if youre unsure about your calorie needs.  Grains   Servings: 7-8 a day  A serving is:  · 1 slice bread  · 1 ounce dry cereal  · half a cup cooked rice or pasta  Best choices: Whole grains and any grains high in fiber.  Vegetables   Servings: 4-5 a day  A serving is:  · 1 cup raw leafy vegetable  · Half a cup cooked vegetable  · Three-quarter cup vegetable juice  Best choices: Fresh or frozen vegetable prepared without too much added salt or fat.    Fruits   Servings: 4-5 a day  A serving is:  · Three-quarter cup fruit juice  · 1 medium fruit  · One-quarter cup dried fruit  · One-half cup fresh, frozen, or canned fruit  Best choices: A variety of fresh fruits of different colors. Whole fruits are a much better choice than fruit juices.  Low-fat or Fat Free Dairy   Servings: 2-3 a day  A serving is:  · 8 ounces milk  · 1 cup yogurt  · One and a half ounces cheese  Best choices: Skim or 1% milk, low-fat or fat free yogurt or buttermilk, and low-fat cheeses.       Meat, Poultry, Fish   Servings: 2 or fewer a day  A serving is:  · 3 ounces cooked meat, poultry, or fish  Best choices: Lean meats and fish. Trim away visible fat. Broil, roast, or boil instead of frying. Remove skin  from poultry before eating.  Nuts, Seeds, Beans   Servings: 4-5 a week  A serving is:  · One third cup nuts (or one and a half ounces)  · 2 tablespoons sunflower seeds  · Half a cup cooked beans  Best choices: Dry roasted nuts with no salt added, lentils, kidney beans, garbanzo beans, and whole brandt beans.    Fats and Oils   Servings: 2 a day  A serving is:  · 1 teaspoon vegetable oil  · 1 teaspoon soft margarine  · 1 tablespoon low-fat mayonnaise  · 1 teaspoon regular mayonnaise  · 2 tablespoons light salad dressing  · 1 tablespoon regular salad dressing  Best choices: Monounsaturated and polyunsaturated fats such as olive, canola, or safflower oil.  Sweets   Servings: 5 a week or fewer  A serving is:  · 1 tablespoon sugar, maple syrup, or honey  · 1 tablespoon jam or jelly  · 1 half-ounce jelly beans (about 15)  · 8 ounces lemonade  Best choices: Dried fruit can be a satisfying sweet. Choose low-fat sweets when possible. And watch your serving sizes!       Aerobic Exercise for a Healthy Heart  Exercise is a lot more than an energy booster and a stress reliever. It also strengthens your heart muscle, lowers your blood pressure and blood cholesterol, and burns calories.      Remember, some activity is better than none.     Choose an Aerobic Activity  Choose a nonstop activity that makes your heart and lungs work harder than they do when you rest or walk normally. This aerobic exercise can improve the way your heart and other muscles use oxygen. Make it fun by exercising with a friend and choosing an activity you enjoy. Here are some ideas:  · Walking  · Swimming  · Bicycling  · Stair climbing  · Dancing  · Jogging  Exercise Regularly  If you havent been exercising regularly,  get your doctors okay first. Then start slowly.  Here are some tips:  · Begin exercising 3 times a week for 5-10 minutes at a time.  · When you feel comfortable, add a few minutes each week.  · Slowly build up to exercising 3-4 times each  week for 20-40 minutes. Aim for a total of 150 or more minutes a week.  · Be sure to carry your nitroglycerin with you when you exercise.  · If you get angina when youre exercising, stop what youre doing, take your nitroglycerin, and call your doctor.  © 6272-2728 [employer], [address] [place] [address], [place] [address]. All rights reserved. This information is not intended as a substitute for professional medical care. Always follow your healthcare professional's instructions.

## 2020-03-18 ENCOUNTER — LAB VISIT (OUTPATIENT)
Dept: LAB | Facility: HOSPITAL | Age: 80
End: 2020-03-18
Attending: INTERNAL MEDICINE
Payer: MEDICARE

## 2020-03-18 ENCOUNTER — TELEPHONE (OUTPATIENT)
Dept: HEMATOLOGY/ONCOLOGY | Facility: CLINIC | Age: 80
End: 2020-03-18

## 2020-03-18 DIAGNOSIS — I25.118 ATHEROSCLEROTIC HEART DISEASE OF NATIVE CORONARY ARTERY WITH OTHER FORMS OF ANGINA PECTORIS: ICD-10-CM

## 2020-03-18 DIAGNOSIS — D64.9 ANEMIA, UNSPECIFIED TYPE: ICD-10-CM

## 2020-03-18 DIAGNOSIS — Z95.5 STATUS POST INSERTION OF DRUG-ELUTING STENT INTO LEFT ANTERIOR DESCENDING (LAD) ARTERY: ICD-10-CM

## 2020-03-18 DIAGNOSIS — I48.19 PERSISTENT ATRIAL FIBRILLATION: ICD-10-CM

## 2020-03-18 DIAGNOSIS — R53.82 CHRONIC FATIGUE: ICD-10-CM

## 2020-03-18 LAB
CHOLEST SERPL-MCNC: 95 MG/DL (ref 120–199)
CHOLEST/HDLC SERPL: 2.9 {RATIO} (ref 2–5)
FERRITIN SERPL-MCNC: 489 NG/ML (ref 20–300)
HDLC SERPL-MCNC: 33 MG/DL (ref 40–75)
HDLC SERPL: 34.7 % (ref 20–50)
IRON SERPL-MCNC: 71 UG/DL (ref 45–160)
LDLC SERPL CALC-MCNC: 35 MG/DL (ref 63–159)
NONHDLC SERPL-MCNC: 62 MG/DL
RETICS/RBC NFR AUTO: 1.2 % (ref 0.4–2)
SATURATED IRON: 30 % (ref 20–50)
T4 FREE SERPL-MCNC: 0.77 NG/DL (ref 0.71–1.51)
TOTAL IRON BINDING CAPACITY: 234 UG/DL (ref 250–450)
TRANSFERRIN SERPL-MCNC: 158 MG/DL (ref 200–375)
TRIGL SERPL-MCNC: 135 MG/DL (ref 30–150)
TSH SERPL DL<=0.005 MIU/L-ACNC: 2.51 UIU/ML (ref 0.34–5.6)

## 2020-03-18 PROCEDURE — 84443 ASSAY THYROID STIM HORMONE: CPT

## 2020-03-18 PROCEDURE — 85045 AUTOMATED RETICULOCYTE COUNT: CPT

## 2020-03-18 PROCEDURE — 80061 LIPID PANEL: CPT

## 2020-03-18 PROCEDURE — 36415 COLL VENOUS BLD VENIPUNCTURE: CPT

## 2020-03-18 PROCEDURE — 82728 ASSAY OF FERRITIN: CPT

## 2020-03-18 PROCEDURE — 83540 ASSAY OF IRON: CPT

## 2020-03-18 PROCEDURE — 84439 ASSAY OF FREE THYROXINE: CPT

## 2020-03-18 NOTE — TELEPHONE ENCOUNTER
Referral information emailed to Dr. Bauer and placed on her desk.  Awaiting her review and response as to when she would like the patient to be seen.

## 2020-03-19 DIAGNOSIS — R53.82 CHRONIC FATIGUE: Primary | ICD-10-CM

## 2020-03-24 ENCOUNTER — PATIENT MESSAGE (OUTPATIENT)
Dept: HEMATOLOGY/ONCOLOGY | Facility: CLINIC | Age: 80
End: 2020-03-24

## 2020-03-24 ENCOUNTER — DOCUMENTATION ONLY (OUTPATIENT)
Dept: CARDIOLOGY | Facility: CLINIC | Age: 80
End: 2020-03-24

## 2020-03-24 ENCOUNTER — TELEPHONE (OUTPATIENT)
Dept: HEMATOLOGY/ONCOLOGY | Facility: CLINIC | Age: 80
End: 2020-03-24

## 2020-03-24 DIAGNOSIS — R53.82 CHRONIC FATIGUE: Primary | ICD-10-CM

## 2020-03-24 NOTE — PROGRESS NOTES
The following visit is a virtual visit due to the Coronavirus epidemic:  Patient location: Home  Visit type: Virtual visit with realtime audio.  Total time spent with patient: 25 min with more than 50% of this time being spent on direct counseling and coordination of care.     Each patient to whom he or she provides medical services by telemedicine is:  (1) informed of the relationship between the physician and patient and the respective role of any other health care provider with respect to management of the patient; and (2) notified that he or she may decline to receive medical services by telemedicine and may withdraw from such care at any time.    Reason for Visit: 1 month TAVR follow up    Referring Physician: Dr Sosa, scheduled to start seeing Dr. Wolfe as primary cardiologist after TAVR.     NYHA: II CCS: 0  HPI  Mr Delong is an 79 year old male with a past medical history of HTN, DLPD, atrial fibrillation, CVA (March 2019), CKD 3, CAD and severe aortic stenosis who is being evaluated 1 month following TAVR. He was admitted on 2/18/20 and underwent successful placement of a 34 mm Evolut TAVR via RTF access under MAC sedation. He states that he is still experiencing SOB. He denies LE swelling and weight gain. His weight today is 161 pounds. He has not tried Lasix for symptom relief.     Review of Systems   Constitution: Negative for chills and fever.   HENT: Negative for sore throat.    Eyes: Negative for blurred vision.   Cardiovascular: Positive for dyspnea on exertion. Negative for chest pain, claudication, cyanosis, irregular heartbeat, leg swelling, near-syncope, orthopnea, palpitations, paroxysmal nocturnal dyspnea and syncope.   Respiratory: Positive for shortness of breath. Negative for cough and sputum production.    Hematologic/Lymphatic: Does not bruise/bleed easily.   Skin: Negative for itching, rash and suspicious lesions.   Musculoskeletal: Negative for falls.   Gastrointestinal: Negative for  "abdominal pain and change in bowel habit.   Genitourinary: Negative for dysuria.   Neurological: Negative for disturbances in coordination, dizziness and loss of balance.   Psychiatric/Behavioral: Negative for altered mental status.        Past Medical History:   Diagnosis Date    Anticoagulant long-term use     Atrial fibrillation     Bursitis of hip     Chronic back pain     Chronic kidney disease, stage 3     Gastroesophageal reflux disease     Gout     Hyperlipemia     Hypertension     Prostate cancer 1997    Stroke 04/2019     Current Outpatient Medications on File Prior to Visit   Medication Sig Dispense Refill    allopurinol (ZYLOPRIM) 100 MG tablet Take 1 tablet (100 mg total) by mouth once daily. 90 tablet 3    aspirin (ECOTRIN) 81 MG EC tablet Take 81 mg by mouth once daily.      atorvastatin (LIPITOR) 40 MG tablet Take 1 tablet by mouth Daily.  0    cyanocobalamin 1,000 mcg/mL injection INJECT 1 ML INTRAMUSCULARLY ONCE EVERY 30 DAYS  1    dabigatran etexilate (PRADAXA) 75 mg Cap Take 75 mg by mouth once daily. "Do NOT break, chew, or open capsules."      losartan (COZAAR) 50 MG tablet Take 50 mg by mouth once daily.   6    metoprolol succinate (TOPROL-XL) 100 MG 24 hr tablet Take 1 tablet (100 mg total) by mouth once daily. 90 tablet 3    NIFEdipine (PROCARDIA-XL) 60 MG (OSM) 24 hr tablet Take 1 tablet (60 mg total) by mouth once daily. 90 tablet 3    pantoprazole (PROTONIX) 40 MG tablet Take 1 tablet (40 mg total) by mouth once daily. 90 tablet 3    prasugrel (EFFIENT) 10 mg Tab Take 1 tablet (10 mg total) by mouth once daily. 30 tablet 11    predniSONE (DELTASONE) 5 MG tablet Take 1 tablet (5 mg total) by mouth once daily. 90 tablet 3    sertraline (ZOLOFT) 50 MG tablet Take 50 mg by mouth once daily.        No current facility-administered medications on file prior to visit.        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  - Will schedule TTE/labs when Coronavirus has resolved.   - Follow up with [facility] in 1 year with labs and Echo   - Effient/Pradaxa daily (s/p SARAI on 1/8/20)  - Discontinue ASA  - Avoid non sterile procedures which could cause endocarditis for 6 months following TAVR including dental work, endoscopy, colonoscopy, and  procedures.   - SBE prophylaxis for life.  - Continue to follow up with Dr. Wolfe.     CAD (coronary artery disease)  - 1/8/20: s/p multiple SARAI placement to LAD and RCA.  - Patient is a Plavix nonresponder.  - Continue Effient for at least a year (1/8/21).   - Discontinue ASA.    Persistent atrial fibrillation, onset 2018  - On Pradaxa.  - CHADS VASC 5 (8%/year) and HAS BLED 4 (7%/year)   - Patient is a Watchman Device candidate.     Mis Kramer PA-C  Interventional Cardiology  [facility]

## 2020-03-25 ENCOUNTER — OFFICE VISIT (OUTPATIENT)
Dept: HEMATOLOGY/ONCOLOGY | Facility: CLINIC | Age: 80
End: 2020-03-25
Payer: MEDICARE

## 2020-03-25 ENCOUNTER — LAB VISIT (OUTPATIENT)
Dept: LAB | Facility: HOSPITAL | Age: 80
End: 2020-03-25
Attending: INTERNAL MEDICINE
Payer: MEDICARE

## 2020-03-25 DIAGNOSIS — D64.9 ANEMIA, UNSPECIFIED TYPE: ICD-10-CM

## 2020-03-25 DIAGNOSIS — D69.6 THROMBOCYTOPENIA: ICD-10-CM

## 2020-03-25 DIAGNOSIS — R53.82 CHRONIC FATIGUE: ICD-10-CM

## 2020-03-25 DIAGNOSIS — E78.2 MIXED HYPERLIPIDEMIA: ICD-10-CM

## 2020-03-25 DIAGNOSIS — N18.9 ANEMIA ASSOCIATED WITH CHRONIC RENAL FAILURE: ICD-10-CM

## 2020-03-25 DIAGNOSIS — N18.4 STAGE 4 CHRONIC KIDNEY DISEASE: Primary | ICD-10-CM

## 2020-03-25 DIAGNOSIS — I10 ESSENTIAL HYPERTENSION: ICD-10-CM

## 2020-03-25 DIAGNOSIS — I35.0 SEVERE AORTIC STENOSIS: ICD-10-CM

## 2020-03-25 DIAGNOSIS — D63.1 ANEMIA ASSOCIATED WITH CHRONIC RENAL FAILURE: ICD-10-CM

## 2020-03-25 LAB
BASOPHILS # BLD AUTO: 0.08 K/UL (ref 0–0.2)
BASOPHILS NFR BLD: 1.1 % (ref 0–1.9)
DIFFERENTIAL METHOD: ABNORMAL
EOSINOPHIL # BLD AUTO: 0.9 K/UL (ref 0–0.5)
EOSINOPHIL NFR BLD: 12.5 % (ref 0–8)
ERYTHROCYTE [DISTWIDTH] IN BLOOD BY AUTOMATED COUNT: 13.2 % (ref 11.5–14.5)
HCT VFR BLD AUTO: 30.4 % (ref 40–54)
HGB BLD-MCNC: 10.2 G/DL (ref 14–18)
IMM GRANULOCYTES # BLD AUTO: 0.01 K/UL (ref 0–0.04)
IMM GRANULOCYTES NFR BLD AUTO: 0.1 % (ref 0–0.5)
LYMPHOCYTES # BLD AUTO: 2.2 K/UL (ref 1–4.8)
LYMPHOCYTES NFR BLD: 30.7 % (ref 18–48)
MCH RBC QN AUTO: 32.3 PG (ref 27–31)
MCHC RBC AUTO-ENTMCNC: 33.6 G/DL (ref 32–36)
MCV RBC AUTO: 96 FL (ref 82–98)
MONOCYTES # BLD AUTO: 0.7 K/UL (ref 0.3–1)
MONOCYTES NFR BLD: 10.1 % (ref 4–15)
NEUTROPHILS # BLD AUTO: 3.3 K/UL (ref 1.8–7.7)
NEUTROPHILS NFR BLD: 45.5 % (ref 38–73)
NRBC BLD-RTO: 0 /100 WBC
PLATELET # BLD AUTO: 153 K/UL (ref 150–350)
PMV BLD AUTO: 11.2 FL (ref 9.2–12.9)
RBC # BLD AUTO: 3.16 M/UL (ref 4.6–6.2)
WBC # BLD AUTO: 7.21 K/UL (ref 3.9–12.7)

## 2020-03-25 PROCEDURE — 36415 COLL VENOUS BLD VENIPUNCTURE: CPT

## 2020-03-25 PROCEDURE — 1101F PR PT FALLS ASSESS DOC 0-1 FALLS W/OUT INJ PAST YR: ICD-10-PCS | Mod: CPTII,95,, | Performed by: INTERNAL MEDICINE

## 2020-03-25 PROCEDURE — 1159F MED LIST DOCD IN RCRD: CPT | Mod: 95,,, | Performed by: INTERNAL MEDICINE

## 2020-03-25 PROCEDURE — 85025 COMPLETE CBC W/AUTO DIFF WBC: CPT

## 2020-03-25 PROCEDURE — 1101F PT FALLS ASSESS-DOCD LE1/YR: CPT | Mod: CPTII,95,, | Performed by: INTERNAL MEDICINE

## 2020-03-25 PROCEDURE — 99205 PR OFFICE/OUTPT VISIT, NEW, LEVL V, 60-74 MIN: ICD-10-PCS | Mod: 95,,, | Performed by: INTERNAL MEDICINE

## 2020-03-25 PROCEDURE — 1159F PR MEDICATION LIST DOCUMENTED IN MEDICAL RECORD: ICD-10-PCS | Mod: 95,,, | Performed by: INTERNAL MEDICINE

## 2020-03-25 PROCEDURE — 99205 OFFICE O/P NEW HI 60 MIN: CPT | Mod: 95,,, | Performed by: INTERNAL MEDICINE

## 2020-03-25 NOTE — PROGRESS NOTES
The patient location is:  At home  The chief complaint leading to consultation is:  How are my blood counts  Visit type: Virtual visit with synchronous audio and video plus interrupted audio   Total time spent with patient: 40 min   Each patient to whom he or she provides medical services by telemedicine is:  (1) informed of the relationship between the physician and patient and the respective role of any other health care provider with respect to management of the patient; and (2) notified that he or she may decline to receive medical services by telemedicine and may withdraw from such care at any time.      Lucho Delong is a 79 y.o.  This is a pleasant 79 yr old gentleman with anemia and thrombocytopenia. He is tolerating anticoagulation for a fib, He has undergone an AVR and has a history of prostate cancer. He is extremely fatigued and worried he may need blood.  He reports no melena , no hematochezia.       Past Medical History:   Diagnosis Date    Anticoagulant long-term use     Atrial fibrillation     Bursitis of hip     Chronic back pain     Chronic kidney disease, stage 3     Gastroesophageal reflux disease     Gout     Hyperlipemia     Hypertension     Prostate cancer 1997    Stroke 04/2019     Past Surgical History:   Procedure Laterality Date    CATHETERIZATION OF BOTH LEFT AND RIGHT HEART N/A 12/2/2019    Procedure: CATHETERIZATION, HEART, BOTH LEFT AND RIGHT;  Surgeon: Everett Sosa MD;  Location: [facility] CATH/EP LAB;  Service: Cardiology;  Laterality: N/A;    COLONOSCOPY  1997    CORONARY ANGIOGRAPHY N/A 1/8/2020    Procedure: ANGIOGRAM, CORONARY ARTERY;  Surgeon: Bryan Zhang MD;  Location: [facility] CATH LAB;  Service: Cardiology;  Laterality: N/A;    CORONARY STENT PLACEMENT N/A 1/8/2020    Procedure: INSERTION, STENT, CORONARY ARTERY;  Surgeon: Bryan Zhang MD;  Location: [facility] CATH LAB;  Service: Cardiology;  Laterality: N/A;    PERCUTANEOUS TRANSLUMINAL ANGIOPLASTY  "(PTA) OF PERIPHERAL VESSEL N/A 1/23/2020    Procedure: PTA, PERIPHERAL VESSEL;  Surgeon: Bryan Zhang MD;  Location: [facility] CATH LAB;  Service: Cardiology;  Laterality: N/A;    PROSTATE SURGERY  1997    TONSILLECTOMY      TRANSCATHETER AORTIC VALVE REPLACEMENT (TAVR)  2/18/2020    Procedure: REPLACEMENT, AORTIC VALVE, TRANSCATHETER (TAVR);  Surgeon: Bryan Zhang MD;  Location: [facility] CATH LAB;  Service: Cardiology;;    TRANSESOPHAGEAL ECHOCARDIOGRAPHY N/A 1/23/2020    Procedure: ECHOCARDIOGRAM, TRANSESOPHAGEAL;  Surgeon: [facility] Provider;  Location: [facility] EP LAB;  Service: Anesthesiology;  Laterality: N/A;       Current Outpatient Medications:     allopurinol (ZYLOPRIM) 100 MG tablet, Take 1 tablet (100 mg total) by mouth once daily., Disp: 90 tablet, Rfl: 3    aspirin (ECOTRIN) 81 MG EC tablet, Take 81 mg by mouth once daily., Disp: , Rfl:     atorvastatin (LIPITOR) 40 MG tablet, Take 1 tablet by mouth Daily., Disp: , Rfl: 0    cyanocobalamin 1,000 mcg/mL injection, INJECT 1 ML INTRAMUSCULARLY ONCE EVERY 30 DAYS, Disp: , Rfl: 1    dabigatran etexilate (PRADAXA) 75 mg Cap, Take 75 mg by mouth once daily. "Do NOT break, chew, or open capsules.", Disp: , Rfl:     losartan (COZAAR) 50 MG tablet, Take 50 mg by mouth once daily. , Disp: , Rfl: 6    metoprolol succinate (TOPROL-XL) 100 MG 24 hr tablet, Take 1 tablet (100 mg total) by mouth once daily., Disp: 90 tablet, Rfl: 3    NIFEdipine (PROCARDIA-XL) 60 MG (OSM) 24 hr tablet, Take 1 tablet (60 mg total) by mouth once daily., Disp: 90 tablet, Rfl: 3    pantoprazole (PROTONIX) 40 MG tablet, Take 1 tablet (40 mg total) by mouth once daily., Disp: 90 tablet, Rfl: 3    prasugrel (EFFIENT) 10 mg Tab, Take 1 tablet (10 mg total) by mouth once daily., Disp: 30 tablet, Rfl: 11    predniSONE (DELTASONE) 5 MG tablet, Take 1 tablet (5 mg total) by mouth once daily., Disp: 90 tablet, Rfl: 3    sertraline (ZOLOFT) 50 MG tablet, Take 50 mg by mouth once " daily. , Disp: , Rfl:   Review of patient's allergies indicates:   Allergen Reactions    Amiodarone analogues Other (See Comments)     Dizziness    Tramadol Itching and Swelling     Began medication, two doses into this treatment patient experiences tongue swelling and severe itching. Patient MD advised to not take this medication anymore     Social History     Tobacco Use    Smoking status: Never Smoker    Smokeless tobacco: Never Used   Substance Use Topics    Alcohol use: Not Currently     Alcohol/week: 3.3 standard drinks     Types: 4 Standard drinks or equivalent per week     Frequency: 2-4 times a month     Comment: bourbon or beer    Drug use: No     Family History   Problem Relation Age of Onset    Cancer Father 86        colon    Alzheimer's disease Mother 86    Alzheimer's disease Sister         has not known fm for 15 yrs    Melanoma Neg Hx     Psoriasis Neg Hx     Lupus Neg Hx     Eczema Neg Hx        CONSTITUTIONAL: No fevers, chills, night sweats, wt. loss, appetite changes  SKIN: no rashes or itching  ENT: No headaches, head trauma, vision changes, or eye pain  LYMPH NODES: None noticed   CV: No chest pain, palpitations.   RESP: No dyspnea on exertion, cough, wheezing, or hemoptysis  GI: No nausea, emesis, diarrhea, constipation, melena, hematochezia, pain.   : No dysuria, hematuria, urgency, or frequency   HEME: No easy bruising, bleeding problems  PSYCHIATRIC: No depression, anxiety, psychosis, hallucinations.  NEURO: No seizures, memory loss, dizziness or difficulty sleeping  MSK: No arthralgias or joint swelling      General patient is in no distress well developed well nourished  Psych pleasant affect no evidence of depression no evidence of anxiety  Head normocephalic atraumatic, no hoarseness,  well-spoken speech intact  Eyes noninjected sclera conjunctiva appear pink  Face is symmetric  Skin intact no lesions noted no ecchymosis no rash  Neck with no limited range of motion no  JVD evident  Chest with no use of accessory muscles no labored breathing no evidence of tachypnea  No respiratory distress, effort normal   Abdomen appears to be nondistended  Extremities with no cyanosis no evidence of edema  Neuro muscular cranial nerves appear grossly intact  Gait appears steady  No joint effusions  Behavior normal judgment normal      Lab Results   Component Value Date    WBC 9.09 02/19/2020    HGB 9.9 (L) 02/19/2020    HCT 31.0 (L) 02/19/2020    MCV 99 (H) 02/19/2020     (L) 02/19/2020         CMP  Sodium   Date Value Ref Range Status   02/19/2020 139 136 - 145 mmol/L Final     Potassium   Date Value Ref Range Status   02/19/2020 4.3 3.5 - 5.1 mmol/L Final     Chloride   Date Value Ref Range Status   02/19/2020 113 (H) 95 - 110 mmol/L Final     CO2   Date Value Ref Range Status   02/19/2020 16 (L) 23 - 29 mmol/L Final     Glucose   Date Value Ref Range Status   02/19/2020 81 70 - 110 mg/dL Final     BUN, Bld   Date Value Ref Range Status   02/19/2020 43 (H) 8 - 23 mg/dL Final     Creatinine   Date Value Ref Range Status   02/19/2020 2.6 (H) 0.5 - 1.4 mg/dL Final     Calcium   Date Value Ref Range Status   02/19/2020 7.9 (L) 8.7 - 10.5 mg/dL Final     Total Protein   Date Value Ref Range Status   11/26/2019 7.3 6.0 - 8.4 g/dL Final     Albumin   Date Value Ref Range Status   02/17/2020 3.9 3.5 - 5.2 g/dL Final     Total Bilirubin   Date Value Ref Range Status   11/26/2019 1.0 0.1 - 1.0 mg/dL Final     Comment:     For infants and newborns, interpretation of results should be based  on gestational age, weight and in agreement with clinical  observations.  Premature Infant recommended reference ranges:  Up to 24 hours.............<8.0 mg/dL  Up to 48 hours............<12.0 mg/dL  3-5 days..................<15.0 mg/dL  6-29 days.................<15.0 mg/dL       Alkaline Phosphatase   Date Value Ref Range Status   11/26/2019 55 55 - 135 U/L Final     AST   Date Value Ref Range Status    11/26/2019 21 10 - 40 U/L Final     ALT   Date Value Ref Range Status   11/26/2019 17 10 - 44 U/L Final     Anion Gap   Date Value Ref Range Status   02/19/2020 10 8 - 16 mmol/L Final     eGFR if    Date Value Ref Range Status   02/19/2020 25.8 (A) >60 mL/min/1.73 m^2 Final     eGFR if non    Date Value Ref Range Status   02/19/2020 22.3 (A) >60 mL/min/1.73 m^2 Final     Comment:     Calculation used to obtain the estimated glomerular filtration  rate (eGFR) is the CKD-EPI equation.          Stage 4 chronic kidney disease    Anemia, unspecified type  -     Ambulatory referral/consult to Hematology / Oncology  -     Beta 2 Microglobulin, Serum; Future; Expected date: 03/26/2020  -     Immunofixation electrophoresis; Future; Expected date: 03/26/2020  -     Immunoglobulins (IgG, IgA, IgM) Quantitative; Future; Expected date: 03/26/2020  -     Protein electrophoresis, serum; Future; Expected date: 03/26/2020  -     Immunoglobulin free LT chains blood; Future; Expected date: 03/26/2020  -     Direct antiglobulin test; Future; Expected date: 03/26/2020  -     Lactate dehydrogenase; Future; Expected date: 03/26/2020  -     Methylmalonic Acid, Serum; Future; Expected date: 03/26/2020  -     Pyruvate kinase; Future; Expected date: 03/26/2020  -     CBC auto differential; Future; Expected date: 03/26/2020  -     Vitamin B1; Future; Expected date: 03/26/2020  -     Vitamin B6; Future; Expected date: 03/26/2020    Thrombocytopenia  -     Beta 2 Microglobulin, Serum; Future; Expected date: 03/26/2020  -     Immunofixation electrophoresis; Future; Expected date: 03/26/2020  -     Immunoglobulins (IgG, IgA, IgM) Quantitative; Future; Expected date: 03/26/2020  -     Protein electrophoresis, serum; Future; Expected date: 03/26/2020  -     Immunoglobulin free LT chains blood; Future; Expected date: 03/26/2020  -     Direct antiglobulin test; Future; Expected date: 03/26/2020  -     Lactate  dehydrogenase; Future; Expected date: 03/26/2020  -     Methylmalonic Acid, Serum; Future; Expected date: 03/26/2020  -     Pyruvate kinase; Future; Expected date: 03/26/2020  -     CBC auto differential; Future; Expected date: 03/26/2020  -     Vitamin B1; Future; Expected date: 03/26/2020  -     Vitamin B6; Future; Expected date: 03/26/2020    Essential hypertension    Mixed hyperlipidemia    Severe aortic stenosis    Anemia associated with chronic renal failure        start retacrit for anemia with ESRD   I have explained the lengty differential diagnosis for anemia.Labs will be ordered to evaluate for bone marrow suppression, peripheral destruction as well as nutritional deficits. An ultrasound of the abdomen may be indicated to evaluate for hepatosplenomegaly which can lead to sequestration of cells. Ultimately a bone marrow biopsy and aspirate may be in order. SPEP and UPEP will also be included to look for a possible paraproteinemia.   He likely has anemia of renal disease with peripheral destruction  Star retacrit for now     Thank you for allowing me to evaluate and participate in the care of this pleasant patient. Please fell free to call me with any questions or concerns.    Verito Caba MD    This note was dictated with Dragon and briefly proofread. Please excuse any sentences that may be unclear or words misspelled

## 2020-03-26 PROBLEM — N18.9 ANEMIA ASSOCIATED WITH CHRONIC RENAL FAILURE: Status: ACTIVE | Noted: 2020-03-26

## 2020-03-26 PROBLEM — D63.1 ANEMIA ASSOCIATED WITH CHRONIC RENAL FAILURE: Status: ACTIVE | Noted: 2020-03-26

## 2020-03-26 PROBLEM — D69.6 THROMBOCYTOPENIA: Status: ACTIVE | Noted: 2020-03-26

## 2020-03-26 NOTE — PHYSICIAN QUERY
PT Name: Lucho Delong  MR #: [id number]     Physician Query Form - Documentation Clarification      CDS/: Cari Jaimes RN  CCDS               Contact information: [email or web address]  *QUERY WITHDRAWN - ÓSCAR    This form is a permanent document in the medical record.     Query Date: March 26, 2020    By submitting this query, we are merely seeking further clarification of documentation. Please utilize your independent clinical judgment when addressing the question(s) below.    The Medical Record reflects the following:    Clinical Findings Location in Medical Record    ...77 yo man with medical history significant for HTN, DLPD, atrial fibrillation, CVA (March 2019), CKD 3, CAD and aortic stenosis who is referred for coronary PCI and severe AS evaluation.    The patient has undergone the following TAVR work-up:  ECHO (Date 1/14/20): DUDLEY= 1.09 cm2 (indexed for BSA 0.57), MG= 43mmHg, Peak Eben= 4.3m/s, EF= 45%     Transcatheter aortic valve insertion via transfemoral approach using a 34mm Medtronic Evolut Pro      Feels well this morning, no issues overnight. No events on tele.   Physical exam:  Pulmonary/Chest: Effort normal and breath sounds normal. No respiratory distress. He has no wheezes.   Abdominal: Soft. Bowel sounds are normal. He exhibits no distension. There is no tenderness.   Musculoskeletal: Normal range of motion. He exhibits no edema.     Hospital course: ...he remained stable overnight. The following morning, he required diuresis of acute on chronic diastolic heart failure with IV Lasix. He diuresed well.   Physical exam:  Neck: No JVD present  Pulmonary/Chest: Effort normal and breath sounds normal. No respiratory distress. He has no wheezes. He has no rales.   Abdominal: Soft. He exhibits no distension.   Musculoskeletal: Normal range of motion. He exhibits no edema.    Acute on chronic diastolic (congestive) heart failure   - Transient Acute on Chronic Diastolic CHF, expected  after the TAVR procedure, not a complication of the procedure.   - Treated with IV Lasix, now improved.      Furosemide 10 mg IV, given on 2/18 at 1805     2/19 Urine output = 175 mL at 0001, 500mL at 0301, unmeasured x 1 at 0800     Discharged on 2/19 at 1205  H&P               2/18 OR note         Arrhythmia progress note filed 2/19 at 0859               DC Summary filed 2/19 at 1121                     MAR (ordered 2/18 at 1726)     I&O Flowsheet     ADT DC date/time                                                                                Please clarify the diagnosis of acute on chronic diastolic heart failure:      Provider Use Only     [  ] Diagnosis ruled in and additional clinical support/ decision making indicators for the diagnosis include (specify):_____________     [  ]  Above stated diagnosis has been ruled out, chronic diastolic heart failure only      [  ]  Above stated diagnosis has been ruled out, no heart failure diagnosis     [  ]  Above stated diagnosis has been ruled out, other diagnosis ruled in (specify):___________     [  ] Other clarification (specify): ______________     [  ] Clinically undetermined                                                                                                                                         Present on admission (POA) status:   [   ] Yes (Y)                          [  ] Clinically Undetermined (W)  [   ] No (N)                            [   ] Documentation insufficient to determine if condition is POA (U)

## 2020-03-30 ENCOUNTER — PATIENT OUTREACH (OUTPATIENT)
Dept: ADMINISTRATIVE | Facility: OTHER | Age: 80
End: 2020-03-30

## 2020-03-30 ENCOUNTER — TELEPHONE (OUTPATIENT)
Dept: CARDIOLOGY | Facility: CLINIC | Age: 80
End: 2020-03-30

## 2020-03-30 NOTE — PROGRESS NOTES
Chart reviewed.   Requested updates from Care Everywhere.  Immunizations NOT in Links.  HM updated.

## 2020-03-30 NOTE — TELEPHONE ENCOUNTER
Patient's wife called and states that he continues to itch and it is getting progressively worse.  She thinks that it started when they put him on Pradaxa however she thinks he was on xeralto before and was switched to pradaxa due to itching.  Instructed her to call Dr Arana and will keep video conference call tomorrow with Dr Miles.

## 2020-03-31 ENCOUNTER — DOCUMENTATION ONLY (OUTPATIENT)
Dept: CARDIOLOGY | Facility: CLINIC | Age: 80
End: 2020-03-31

## 2020-03-31 ENCOUNTER — OFFICE VISIT (OUTPATIENT)
Dept: CARDIOLOGY | Facility: CLINIC | Age: 80
End: 2020-03-31
Payer: MEDICARE

## 2020-03-31 ENCOUNTER — PATIENT MESSAGE (OUTPATIENT)
Dept: CARDIOLOGY | Facility: CLINIC | Age: 80
End: 2020-03-31

## 2020-03-31 DIAGNOSIS — R06.02 SHORTNESS OF BREATH: Primary | ICD-10-CM

## 2020-03-31 PROCEDURE — 1159F MED LIST DOCD IN RCRD: CPT | Mod: 95,,, | Performed by: INTERNAL MEDICINE

## 2020-03-31 PROCEDURE — 99213 OFFICE O/P EST LOW 20 MIN: CPT | Mod: 95,,, | Performed by: INTERNAL MEDICINE

## 2020-03-31 PROCEDURE — 1101F PR PT FALLS ASSESS DOC 0-1 FALLS W/OUT INJ PAST YR: ICD-10-PCS | Mod: CPTII,95,, | Performed by: INTERNAL MEDICINE

## 2020-03-31 PROCEDURE — 1159F PR MEDICATION LIST DOCUMENTED IN MEDICAL RECORD: ICD-10-PCS | Mod: 95,,, | Performed by: INTERNAL MEDICINE

## 2020-03-31 PROCEDURE — 99213 PR OFFICE/OUTPT VISIT, EST, LEVL III, 20-29 MIN: ICD-10-PCS | Mod: 95,,, | Performed by: INTERNAL MEDICINE

## 2020-03-31 PROCEDURE — 1101F PT FALLS ASSESS-DOCD LE1/YR: CPT | Mod: CPTII,95,, | Performed by: INTERNAL MEDICINE

## 2020-03-31 NOTE — PROGRESS NOTES
The following visit is a virtual visit due to the Coronavirus epidemic:  Patient location: Home  Visit type: Virtual visit with realtime audio.  Total time spent with patient: 25 min with more than 50% of this time being spent on direct counseling and coordination of care.     Each patient to whom he or she provides medical services by telemedicine is:  (1) informed of the relationship between the physician and patient and the respective role of any other health care provider with respect to management of the patient; and (2) notified that he or she may decline to receive medical services by telemedicine and may withdraw from such care at any time.    Reason for Visit: 1 weeks follow up    Referring Physician: Dr Sosa, scheduled to start seeing Dr. Arana as primary cardiologist after TAVR.     NYHA: II CCS: 0  HPI  Mr Delong is an 79 year old male with a past medical history of HTN, DLPD, atrial fibrillation, CVA (March 2019), CKD 3, CAD and severe aortic stenosis who is being evaluated 1 month following TAVR. He was admitted on 2/18/20 and underwent successful placement of a 34 mm Evolut TAVR via RTF access under MAC sedation. He states that he is still experiencing SOB. He denies LE swelling and weight gain. His weight today is 161 pounds. He has not tried Lasix for symptom relief.    He reports continued SOB over th last week. Weight stable at 162 lb.     He repots generalized pruritus over the last 10 months.    Review of Systems   Constitution: Negative for chills and fever.   HENT: Negative for sore throat.    Eyes: Negative for blurred vision.   Cardiovascular: Positive for dyspnea on exertion. Negative for chest pain, claudication, cyanosis, irregular heartbeat, leg swelling, near-syncope, orthopnea, palpitations, paroxysmal nocturnal dyspnea and syncope.   Respiratory: Positive for shortness of breath. Negative for cough and sputum production.    Hematologic/Lymphatic: Does not bruise/bleed easily.  "  Skin: Negative for itching, rash and suspicious lesions.   Musculoskeletal: Negative for falls.   Gastrointestinal: Negative for abdominal pain and change in bowel habit.   Genitourinary: Negative for dysuria.   Neurological: Negative for disturbances in coordination, dizziness and loss of balance.   Psychiatric/Behavioral: Negative for altered mental status.        Past Medical History:   Diagnosis Date    Anticoagulant long-term use     Atrial fibrillation     Bursitis of hip     Chronic back pain     Chronic kidney disease, stage 3     Gastroesophageal reflux disease     Gout     Hyperlipemia     Hypertension     Prostate cancer 1997    Stroke 04/2019     Current Outpatient Medications on File Prior to Visit   Medication Sig Dispense Refill    aspirin (ECOTRIN) 81 MG EC tablet Take 81 mg by mouth once daily.      allopurinol (ZYLOPRIM) 100 MG tablet Take 1 tablet (100 mg total) by mouth once daily. 90 tablet 3    atorvastatin (LIPITOR) 40 MG tablet Take 1 tablet by mouth Daily.  0    cyanocobalamin 1,000 mcg/mL injection INJECT 1 ML INTRAMUSCULARLY ONCE EVERY 30 DAYS  1    losartan (COZAAR) 50 MG tablet Take 50 mg by mouth once daily.   6    metoprolol succinate (TOPROL-XL) 100 MG 24 hr tablet Take 1 tablet (100 mg total) by mouth once daily. 90 tablet 3    NIFEdipine (PROCARDIA-XL) 60 MG (OSM) 24 hr tablet Take 1 tablet (60 mg total) by mouth once daily. 90 tablet 3    pantoprazole (PROTONIX) 40 MG tablet Take 1 tablet (40 mg total) by mouth once daily. 90 tablet 3    prasugrel (EFFIENT) 10 mg Tab Take 1 tablet (10 mg total) by mouth once daily. 30 tablet 11    predniSONE (DELTASONE) 5 MG tablet Take 1 tablet (5 mg total) by mouth once daily. 90 tablet 3    sertraline (ZOLOFT) 50 MG tablet Take 50 mg by mouth once daily.       [DISCONTINUED] dabigatran etexilate (PRADAXA) 75 mg Cap Take 75 mg by mouth once daily. "Do NOT break, chew, or open capsules."       No current " facility-administered medications on file prior to visit.        Assessment & Plan:       Shortness of breath  - He has some JVD evidenced on the video call.  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  - Will schedule TTE/labs when Coronavirus has resolved.   - Follow up with [facility] in 1 year with labs and Echo   - Effient/Pradaxa daily (s/p SARAI on 1/8/20)  - Discontinue ASA  - Avoid non sterile procedures which could cause endocarditis for 6 months following TAVR including dental work, endoscopy, colonoscopy, and  procedures.   - SBE prophylaxis for life.  - Continue to follow up with Dr. Arana.     CAD (coronary artery disease)  - 1/8/20: s/p multiple SARAI placement to LAD and RCA.  - Patient is a Plavix nonresponder.  - Continue Effient for at least a year (1/8/21).   - Discontinue ASA.    Persistent atrial fibrillation, onset 2018  - Change pradaxa to xarelto given suspected allergy.  - CHADS VASC 5 (8%/year) and HAS BLED 4 (7%/year)   - Patient is a Watchman Device candidate.

## 2020-04-06 ENCOUNTER — PATIENT MESSAGE (OUTPATIENT)
Dept: CARDIOLOGY | Facility: CLINIC | Age: 80
End: 2020-04-06

## 2020-04-06 ENCOUNTER — PATIENT OUTREACH (OUTPATIENT)
Dept: ADMINISTRATIVE | Facility: OTHER | Age: 80
End: 2020-04-06

## 2020-04-06 ENCOUNTER — TELEPHONE (OUTPATIENT)
Dept: CARDIOLOGY | Facility: CLINIC | Age: 80
End: 2020-04-06

## 2020-04-06 NOTE — TELEPHONE ENCOUNTER
Patient sent a message stating that his Weight is not at 160 pounds.  He started taking Imodium for diarrhea 2-3 times per day.  He is starting xeralto April 6 since there was a problem with the insurance and has stopped his Pradaxa as of today.

## 2020-04-07 ENCOUNTER — DOCUMENTATION ONLY (OUTPATIENT)
Dept: CARDIOLOGY | Facility: CLINIC | Age: 80
End: 2020-04-07

## 2020-04-13 RX ORDER — ATORVASTATIN CALCIUM 40 MG/1
40 TABLET, FILM COATED ORAL DAILY
Qty: 90 TABLET | Refills: 3 | Status: SHIPPED | OUTPATIENT
Start: 2020-04-13 | End: 2021-05-02 | Stop reason: SDUPTHER

## 2020-04-13 NOTE — TELEPHONE ENCOUNTER
----- Message from Tash Lee sent at 4/13/2020  3:07 PM CDT -----  Type:  RX Refill Request    Who Called:  Wife-Pat  Refill or New Rx:  Refill   RX Name and Strength:  atorvastatin (LIPITOR) 40 MG tablet  How is the patient currently taking it? (ex. 1XDay):    Is this a 30 day or 90 day RX:  30 day supply   Preferred Pharmacy with phone number:  [facility] on [address] in [place], [place]  Local or Mail Order:  Local   Ordering Provider:  Dr Jose Nolan  Best Call Back Number:  [phone]   Additional Information:

## 2020-04-14 ENCOUNTER — TELEPHONE (OUTPATIENT)
Dept: ELECTROPHYSIOLOGY | Facility: CLINIC | Age: 80
End: 2020-04-14

## 2020-04-14 NOTE — TELEPHONE ENCOUNTER
Received call from pt's wife today. She states over the last 2 years the patient has been having problems with his skin itching.  At first they suspected it was the pradaxa and he was switched to eliquis and now Xarelto.  Itching continues and now his skin is starting to bleed.  Advised her to follow up with pt's dermatologist to see if they could pin point what meds could be causing the itching.  She did deny any other skin conditions.  She will follow up once he is seen.

## 2020-04-16 DIAGNOSIS — I48.19 PERSISTENT ATRIAL FIBRILLATION: Primary | ICD-10-CM

## 2020-04-16 DIAGNOSIS — R06.09 DYSPNEA ON EXERTION: ICD-10-CM

## 2020-05-05 ENCOUNTER — PATIENT MESSAGE (OUTPATIENT)
Dept: ADMINISTRATIVE | Facility: HOSPITAL | Age: 80
End: 2020-05-05

## 2020-05-12 ENCOUNTER — LAB VISIT (OUTPATIENT)
Dept: LAB | Facility: HOSPITAL | Age: 80
End: 2020-05-12
Attending: INTERNAL MEDICINE
Payer: MEDICARE

## 2020-05-12 DIAGNOSIS — D63.1 ANEMIA OF CHRONIC RENAL FAILURE: Primary | ICD-10-CM

## 2020-05-12 DIAGNOSIS — N18.9 ANEMIA OF CHRONIC RENAL FAILURE: Primary | ICD-10-CM

## 2020-05-12 DIAGNOSIS — N13.8 ENLARGED PROSTATE WITH URINARY OBSTRUCTION: ICD-10-CM

## 2020-05-12 DIAGNOSIS — N40.1 ENLARGED PROSTATE WITH URINARY OBSTRUCTION: ICD-10-CM

## 2020-05-12 DIAGNOSIS — R53.1 ASTHENIA: ICD-10-CM

## 2020-05-12 DIAGNOSIS — I10 ESSENTIAL HYPERTENSION, MALIGNANT: ICD-10-CM

## 2020-05-12 DIAGNOSIS — E55.9 AVITAMINOSIS D: ICD-10-CM

## 2020-05-12 DIAGNOSIS — M10.9 GOUT, UNSPECIFIED: ICD-10-CM

## 2020-05-12 DIAGNOSIS — N25.81 SECONDARY HYPERPARATHYROIDISM OF RENAL ORIGIN: ICD-10-CM

## 2020-05-12 DIAGNOSIS — N18.4 CHRONIC KIDNEY DISEASE, STAGE IV (SEVERE): ICD-10-CM

## 2020-05-12 LAB
ALBUMIN SERPL BCP-MCNC: 3.9 G/DL (ref 3.5–5.2)
ANION GAP SERPL CALC-SCNC: 7 MMOL/L (ref 8–16)
BACTERIA #/AREA URNS HPF: NORMAL /HPF
BILIRUB UR QL STRIP: NEGATIVE
BUN SERPL-MCNC: 46 MG/DL (ref 8–23)
CALCIUM SERPL-MCNC: 8.5 MG/DL (ref 8.7–10.5)
CHLORIDE SERPL-SCNC: 110 MMOL/L (ref 95–110)
CLARITY UR: CLEAR
CO2 SERPL-SCNC: 19 MMOL/L (ref 23–29)
COLOR UR: YELLOW
CREAT SERPL-MCNC: 2.5 MG/DL (ref 0.5–1.4)
CREAT UR-MCNC: 166.5 MG/DL (ref 23–375)
EST. GFR  (AFRICAN AMERICAN): 27 ML/MIN/1.73 M^2
EST. GFR  (NON AFRICAN AMERICAN): 23.4 ML/MIN/1.73 M^2
GLUCOSE SERPL-MCNC: 107 MG/DL (ref 70–110)
GLUCOSE UR QL STRIP: NEGATIVE
HGB UR QL STRIP: NEGATIVE
HYALINE CASTS #/AREA URNS LPF: 1 /LPF
KETONES UR QL STRIP: NEGATIVE
LEUKOCYTE ESTERASE UR QL STRIP: NEGATIVE
MICROSCOPIC COMMENT: NORMAL
NITRITE UR QL STRIP: NEGATIVE
PH UR STRIP: 6 [PH] (ref 5–8)
PHOSPHATE SERPL-MCNC: 2.9 MG/DL (ref 2.7–4.5)
POTASSIUM SERPL-SCNC: 4.3 MMOL/L (ref 3.5–5.1)
PROT UR QL STRIP: ABNORMAL
PROT UR-MCNC: 29 MG/DL (ref 0–15)
PROT/CREAT UR: 0.17 MG/G{CREAT} (ref 0–0.2)
RBC #/AREA URNS HPF: 1 /HPF (ref 0–4)
SODIUM SERPL-SCNC: 136 MMOL/L (ref 136–145)
SP GR UR STRIP: 1.01 (ref 1–1.03)
SQUAMOUS #/AREA URNS HPF: 5 /HPF
URN SPEC COLLECT METH UR: ABNORMAL
UROBILINOGEN UR STRIP-ACNC: NEGATIVE EU/DL
WBC #/AREA URNS HPF: 1 /HPF (ref 0–5)

## 2020-05-12 PROCEDURE — 81000 URINALYSIS NONAUTO W/SCOPE: CPT

## 2020-05-12 PROCEDURE — 36415 COLL VENOUS BLD VENIPUNCTURE: CPT

## 2020-05-12 PROCEDURE — 80069 RENAL FUNCTION PANEL: CPT

## 2020-05-12 PROCEDURE — 82570 ASSAY OF URINE CREATININE: CPT

## 2020-05-18 ENCOUNTER — TELEPHONE (OUTPATIENT)
Dept: CARDIOLOGY | Facility: CLINIC | Age: 80
End: 2020-05-18

## 2020-05-18 NOTE — TELEPHONE ENCOUNTER
Spoke with patients wife and she believes that the Losartan is causing urticaria and is asking if he can d/c or replace it with something else.    Please advise.    Thank you so much,    Britany

## 2020-05-19 RX ORDER — LISINOPRIL 20 MG/1
20 TABLET ORAL DAILY
Qty: 90 TABLET | Refills: 3 | Status: SHIPPED | OUTPATIENT
Start: 2020-05-19 | End: 2021-05-18

## 2020-05-19 NOTE — TELEPHONE ENCOUNTER
Spoke with patients wife and she states that they have not introduced any new foods recently and states that the sx's started with this medication. I do however think that this is not an emergent situation as the sx's are reportable mild and this decision could wait until Dr. Nolan returns.    Please advise.    Thank you so much,    Britany

## 2020-05-19 NOTE — TELEPHONE ENCOUNTER
Spoke with wife regarding new medication order and BP recommendations.    No other concerns voiced during this telephone encounter.

## 2020-05-20 ENCOUNTER — PATIENT MESSAGE (OUTPATIENT)
Dept: ADMINISTRATIVE | Facility: HOSPITAL | Age: 80
End: 2020-05-20

## 2020-06-02 ENCOUNTER — OFFICE VISIT (OUTPATIENT)
Dept: FAMILY MEDICINE | Facility: CLINIC | Age: 80
End: 2020-06-02
Payer: MEDICARE

## 2020-06-02 VITALS
SYSTOLIC BLOOD PRESSURE: 137 MMHG | BODY MASS INDEX: 24.55 KG/M2 | RESPIRATION RATE: 18 BRPM | DIASTOLIC BLOOD PRESSURE: 69 MMHG | HEART RATE: 66 BPM | WEIGHT: 162 LBS | HEIGHT: 68 IN | TEMPERATURE: 98 F | OXYGEN SATURATION: 98 %

## 2020-06-02 DIAGNOSIS — I48.19 PERSISTENT ATRIAL FIBRILLATION: Primary | ICD-10-CM

## 2020-06-02 DIAGNOSIS — Z95.2 S/P TAVR (TRANSCATHETER AORTIC VALVE REPLACEMENT): ICD-10-CM

## 2020-06-02 DIAGNOSIS — L29.9 ITCHING: ICD-10-CM

## 2020-06-02 DIAGNOSIS — Z86.73 HISTORY OF STROKE: ICD-10-CM

## 2020-06-02 DIAGNOSIS — R21 RASH: ICD-10-CM

## 2020-06-02 PROCEDURE — 3078F PR MOST RECENT DIASTOLIC BLOOD PRESSURE < 80 MM HG: ICD-10-PCS | Mod: CPTII,S$GLB,, | Performed by: NURSE PRACTITIONER

## 2020-06-02 PROCEDURE — 3075F SYST BP GE 130 - 139MM HG: CPT | Mod: CPTII,S$GLB,, | Performed by: NURSE PRACTITIONER

## 2020-06-02 PROCEDURE — 99214 OFFICE O/P EST MOD 30 MIN: CPT | Mod: S$GLB,,, | Performed by: NURSE PRACTITIONER

## 2020-06-02 PROCEDURE — 99214 PR OFFICE/OUTPT VISIT, EST, LEVL IV, 30-39 MIN: ICD-10-PCS | Mod: S$GLB,,, | Performed by: NURSE PRACTITIONER

## 2020-06-02 PROCEDURE — 1126F PR PAIN SEVERITY QUANTIFIED, NO PAIN PRESENT: ICD-10-PCS | Mod: S$GLB,,, | Performed by: NURSE PRACTITIONER

## 2020-06-02 PROCEDURE — 99999 PR PBB SHADOW E&M-EST. PATIENT-LVL IV: ICD-10-PCS | Mod: PBBFAC,,, | Performed by: NURSE PRACTITIONER

## 2020-06-02 PROCEDURE — 3075F PR MOST RECENT SYSTOLIC BLOOD PRESS GE 130-139MM HG: ICD-10-PCS | Mod: CPTII,S$GLB,, | Performed by: NURSE PRACTITIONER

## 2020-06-02 PROCEDURE — 99999 PR PBB SHADOW E&M-EST. PATIENT-LVL IV: CPT | Mod: PBBFAC,,, | Performed by: NURSE PRACTITIONER

## 2020-06-02 PROCEDURE — 1101F PT FALLS ASSESS-DOCD LE1/YR: CPT | Mod: CPTII,S$GLB,, | Performed by: NURSE PRACTITIONER

## 2020-06-02 PROCEDURE — 3078F DIAST BP <80 MM HG: CPT | Mod: CPTII,S$GLB,, | Performed by: NURSE PRACTITIONER

## 2020-06-02 PROCEDURE — 1126F AMNT PAIN NOTED NONE PRSNT: CPT | Mod: S$GLB,,, | Performed by: NURSE PRACTITIONER

## 2020-06-02 PROCEDURE — 1101F PR PT FALLS ASSESS DOC 0-1 FALLS W/OUT INJ PAST YR: ICD-10-PCS | Mod: CPTII,S$GLB,, | Performed by: NURSE PRACTITIONER

## 2020-06-02 PROCEDURE — 1159F PR MEDICATION LIST DOCUMENTED IN MEDICAL RECORD: ICD-10-PCS | Mod: S$GLB,,, | Performed by: NURSE PRACTITIONER

## 2020-06-02 PROCEDURE — 1159F MED LIST DOCD IN RCRD: CPT | Mod: S$GLB,,, | Performed by: NURSE PRACTITIONER

## 2020-06-02 RX ORDER — HYDROCORTISONE 25 MG/G
CREAM TOPICAL 2 TIMES DAILY
Qty: 20 G | Refills: 0 | Status: SHIPPED | OUTPATIENT
Start: 2020-06-02 | End: 2022-06-11 | Stop reason: CLARIF

## 2020-06-02 RX ORDER — HYDROXYZINE HYDROCHLORIDE 25 MG/1
25 TABLET, FILM COATED ORAL 3 TIMES DAILY PRN
Qty: 90 TABLET | Refills: 2 | Status: SHIPPED | OUTPATIENT
Start: 2020-06-02 | End: 2020-07-02

## 2020-06-02 RX ORDER — LOSARTAN POTASSIUM 50 MG/1
TABLET ORAL
COMMUNITY
End: 2020-06-02

## 2020-06-02 RX ORDER — CLOPIDOGREL BISULFATE 75 MG/1
75 TABLET ORAL DAILY
Qty: 30 TABLET | Refills: 5 | Status: SHIPPED | OUTPATIENT
Start: 2020-06-02 | End: 2020-06-18 | Stop reason: ALTCHOICE

## 2020-06-02 NOTE — PROGRESS NOTES
Subjective:       Patient ID: Lucho Delong is a 79 y.o. male.    Chief Complaint: Arm Pain (itching and bleeding on arms neck and legs)    Mr. Lucho Delong is a 79 year old male who presents to the clinic today for itching and rash. Reports his itching started approx around December. Reports he itches all day long causing him to bleed. Reports it is interfering with his daily life. Reports he is about to stop all medications because he is tired of itching. He has significant hx of stroke, htn, afib, and heart valve replacement. He takes xarelto 20 mg daily, asa 81 mg and effient. His wife thinks he may be the effient because it seemed to start at the same time. Denies any cp sob n/v/d. Bleeds very easily, takes benadryl with no relief    Review of Systems   Constitutional: Positive for fatigue. Negative for activity change, appetite change and fever.        Itching   HENT: Negative for congestion, dental problem, ear discharge, hearing loss, postnasal drip, sinus pain, sneezing and trouble swallowing.    Eyes: Negative for photophobia and discharge.   Respiratory: Positive for shortness of breath. Negative for apnea and cough.    Cardiovascular: Negative for chest pain.   Gastrointestinal: Negative for abdominal distention, abdominal pain, blood in stool, diarrhea, nausea and vomiting.   Endocrine: Negative for cold intolerance.   Genitourinary: Negative for difficulty urinating, flank pain, frequency, hematuria and testicular pain.   Musculoskeletal: Negative for arthralgias and myalgias.   Skin: Positive for rash. Negative for color change.   Allergic/Immunologic: Negative for environmental allergies, food allergies and immunocompromised state.   Neurological: Negative for dizziness, syncope, numbness and headaches.   Hematological: Negative for adenopathy. Does not bruise/bleed easily.   Psychiatric/Behavioral: Negative for agitation, confusion, decreased concentration, hallucinations, self-injury and  "sleep disturbance.   All other systems reviewed and are negative.        Reviewed family, medical, surgical, and social history.    Objective:      /69 (BP Location: Left arm, Patient Position: Sitting, BP Method: Medium (Automatic))   Pulse 66   Temp 98 °F (36.7 °C) (Oral)   Resp 18   Ht 5' 8" (1.727 m)   Wt 73.5 kg (162 lb)   SpO2 98%   BMI 24.63 kg/m²   Physical Exam   Constitutional: He is oriented to person, place, and time. He appears well-developed and well-nourished. No distress.   HENT:   Head: Normocephalic and atraumatic.   Nose: Nose normal.   Mouth/Throat: Oropharynx is clear and moist. No oropharyngeal exudate.   Eyes: Pupils are equal, round, and reactive to light. Conjunctivae and EOM are normal.   Neck: Normal range of motion. No thyromegaly present.   Cardiovascular: Normal rate, regular rhythm, normal heart sounds and intact distal pulses. Exam reveals no gallop and no friction rub.   No murmur heard.  Pulmonary/Chest: Effort normal and breath sounds normal. No respiratory distress. He has no wheezes. He has no rales.   Abdominal: Soft. He exhibits no distension. There is no tenderness. There is no guarding.   Musculoskeletal: Normal range of motion. He exhibits no edema, tenderness or deformity.   Neurological: He is alert and oriented to person, place, and time. He displays normal reflexes. No cranial nerve deficit or sensory deficit. He exhibits normal muscle tone. Coordination normal.   Skin: Skin is warm and dry. Abrasion and rash noted. He is not diaphoretic. There is erythema. No pallor.   Psychiatric: He has a normal mood and affect. His behavior is normal. Judgment and thought content normal.   Nursing note and vitals reviewed.      Assessment:       1. Persistent atrial fibrillation    2. History of stroke, 4/2019, right-sided weakness, dysphasia, memory difficulties     3. Rash    4. 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 Itching        Plan:     "   Persistent atrial fibrillation  -     clopidogreL (PLAVIX) 75 mg tablet; Take 1 tablet (75 mg total) by mouth once daily.  Dispense: 30 tablet; Refill: 5    History of stroke, 4/2019, right-sided weakness, dysphasia, memory difficulties   -     clopidogreL (PLAVIX) 75 mg tablet; Take 1 tablet (75 mg total) by mouth once daily.  Dispense: 30 tablet; Refill: 5    Rash  -     clopidogreL (PLAVIX) 75 mg tablet; Take 1 tablet (75 mg total) by mouth once daily.  Dispense: 30 tablet; Refill: 5  -     hydrOXYzine HCL (ATARAX) 25 MG tablet; Take 1 tablet (25 mg total) by mouth 3 (three) times daily as needed for Itching.  Dispense: 90 tablet; Refill: 2  -     hydrocortisone 2.5 % cream; Apply topically 2 (two) times daily.  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  Dispense: 30 tablet; Refill: 5    Itching  -     hydrOXYzine HCL (ATARAX) 25 MG tablet; Take 1 tablet (25 mg total) by mouth 3 (three) times daily as needed for Itching.  Dispense: 90 tablet; Refill: 2  -     hydrocortisone 2.5 % cream; Apply topically 2 (two) times daily.  Dispense: 20 g; Refill: 0          PLAN:    - Discussed with patient and wife the plan of care  - stop effient, try plavix  - start hydroxizine TID  - start hydrocortisone   - Medications reviewed. Medication side effects discussed. Patient has no questions or concerns at this time. Informed patient to notify me regarding any concerns.   - Continue monitoring  - Informed patient to please notify me with any questions or concerns at anytime  - Follow up if no improvement and with PCP as ordered      Risks, benefits, and side effects were discussed with the patient. All questions were answered to the fullest satisfaction of the patient, and pt verbalized understanding and agreement to treatment plan. Pt was to call with any new or worsening symptoms, or  present to the ER.

## 2020-06-04 ENCOUNTER — TELEPHONE (OUTPATIENT)
Dept: FAMILY MEDICINE | Facility: CLINIC | Age: 80
End: 2020-06-04

## 2020-06-04 NOTE — TELEPHONE ENCOUNTER
----- Message from Viji Cross sent at 6/4/2020  8:29 AM CDT -----  Contact: Jerilyn Gan (Spouse)  Type: Needs Medical Advice  Who Called:  Jerilyn Gan (Spouse)  Best Call Back Number:   Additional Information: Calling to speak with the nurse about a medication change.

## 2020-06-05 NOTE — TELEPHONE ENCOUNTER
Notified Jerilyn that pt is no longer ordered to take the Xarelto and that he is ordered Plavix currently.  Voiced understanding and states pt has been taking the Plavix, along with a baby aspirin.

## 2020-06-05 NOTE — TELEPHONE ENCOUNTER
----- Message from Carolyn Billings sent at 6/5/2020  8:44 AM CDT -----  Contact: Jerilyn Gan (Spouse)  Type: Needs Medical Advice  Who Called:  Jerilyn Ontiverosellan (Spouse)  Best Call Back Number: [phone]  Additional Information: Calling to speak with the nurse about a medication change. She said the xarelto has been removed from his med list.  She does have the medication, but not sure if he should be taking it.  They are leaving to go out of town today.  Please call her at [phone].  Thank you!

## 2020-06-15 ENCOUNTER — HOSPITAL ENCOUNTER (OUTPATIENT)
Dept: CARDIOLOGY | Facility: HOSPITAL | Age: 80
Discharge: HOME | End: 2020-06-15
Attending: PHYSICIAN ASSISTANT
Payer: MEDICARE

## 2020-06-15 VITALS
HEIGHT: 68 IN | DIASTOLIC BLOOD PRESSURE: 72 MMHG | SYSTOLIC BLOOD PRESSURE: 120 MMHG | BODY MASS INDEX: 24.4 KG/M2 | WEIGHT: 161 LBS | HEART RATE: 70 BPM

## 2020-06-15 DIAGNOSIS — Z95.2 S/P TAVR (TRANSCATHETER AORTIC VALVE REPLACEMENT): ICD-10-CM

## 2020-06-15 LAB
ASCENDING AORTA: 3.37 CM
AV INDEX (PROSTH): 0.46
AV MEAN GRADIENT: 6 MMHG
AV PEAK GRADIENT: 12 MMHG
AV VALVE AREA: 2.65 CM2
AV VELOCITY RATIO: 0.43
BSA FOR ECHO PROCEDURE: 1.87 M2
CV ECHO LV RWT: 0.36 CM
DOP CALC AO PEAK VEL: 1.7 M/S
DOP CALC AO VTI: 34.22 CM
DOP CALC LVOT AREA: 5.7 CM2
DOP CALC LVOT DIAMETER: 2.7 CM
DOP CALC LVOT PEAK VEL: 0.73 M/S
DOP CALC LVOT STROKE VOLUME: 90.82 CM3
DOP CALCLVOT PEAK VEL VTI: 15.87 CM
E WAVE DECELERATION TIME: 196.23 MSEC
E/A RATIO: 1.75
E/E' RATIO: 17.71 M/S
ECHO LV POSTERIOR WALL: 0.91 CM (ref 0.6–1.1)
FRACTIONAL SHORTENING: 20 % (ref 28–44)
INTERVENTRICULAR SEPTUM: 0.86 CM (ref 0.6–1.1)
LA MAJOR: 7.04 CM
LA MINOR: 6.79 CM
LA WIDTH: 3.32 CM
LEFT ATRIUM SIZE: 4.45 CM
LEFT ATRIUM VOLUME INDEX: 46.6 ML/M2
LEFT ATRIUM VOLUME: 86.81 CM3
LEFT INTERNAL DIMENSION IN SYSTOLE: 4.01 CM (ref 2.1–4)
LEFT VENTRICLE DIASTOLIC VOLUME INDEX: 63.5 ML/M2
LEFT VENTRICLE DIASTOLIC VOLUME: 118.36 ML
LEFT VENTRICLE MASS INDEX: 83 G/M2
LEFT VENTRICLE SYSTOLIC VOLUME INDEX: 37.9 ML/M2
LEFT VENTRICLE SYSTOLIC VOLUME: 70.62 ML
LEFT VENTRICULAR INTERNAL DIMENSION IN DIASTOLE: 5 CM (ref 3.5–6)
LEFT VENTRICULAR MASS: 154.76 G
LV LATERAL E/E' RATIO: 15.5 M/S
LV SEPTAL E/E' RATIO: 20.67 M/S
MV PEAK A VEL: 0.71 M/S
MV PEAK E VEL: 1.24 M/S
PISA TR MAX VEL: 2.72 M/S
RA MAJOR: 5.43 CM
RA WIDTH: 3.55 CM
RIGHT VENTRICULAR END-DIASTOLIC DIMENSION: 2.75 CM
SINUS: 3.92 CM
STJ: 2.8 CM
TDI LATERAL: 0.08 M/S
TDI SEPTAL: 0.06 M/S
TDI: 0.07 M/S
TR MAX PG: 30 MMHG
TRICUSPID ANNULAR PLANE SYSTOLIC EXCURSION: 1.31 CM

## 2020-06-15 PROCEDURE — 93306 TTE W/DOPPLER COMPLETE: CPT

## 2020-06-15 PROCEDURE — 93306 TTE W/DOPPLER COMPLETE: CPT | Mod: 26,,, | Performed by: INTERNAL MEDICINE

## 2020-06-15 PROCEDURE — 93306 ECHO (CUPID ONLY): ICD-10-PCS | Mod: 26,,, | Performed by: INTERNAL MEDICINE

## 2020-06-16 NOTE — PROGRESS NOTES
" Patient ID:  Lucho Delong is a 79 y.o. male who presents for {Misc; evaluation/follow-up:23062::"follow-up"} of No chief complaint on file.      Health literacy: {DESC; LOW/MEDIUM/HIGH:68053}  Activities:   Nicotine:   Alcohol:   Illicit drugs:   Cardiac symptoms:   Home BP:  Medication compliance:   Diet: regular, diabetic, Mediterranean  Caffeine:   Labs: Total CHO 95L, LDL 35.0L Treated with Lipitor 40mg qd;  TSH 2.514;  Sodium 140;  K+ 4.8;  Glucose 104;  GFR 22.3L;  WBC 8.69;  Hemoglobin 10.7L  Last Echo: 06.15.2020  Last stress test: 01.14.2020  Cardiovascular angiogram: 02.18.2020   ECG:   Fundoscopic exam:     EPWORTH SLEEPINESS SCALE 7/12/2018   Sitting and reading 3   Watching TV 3   Sitting, inactive in a public place (e.g. a theatre or a meeting) 2   As a passenger in a car for an hour without a break 2   Lying down to rest in the afternoon when circumstances permit 3   Sitting and talking to someone 0   Sitting quietly after a lunch without alcohol 3   In a car, while stopped for a few minutes in traffic 0   Total score 16         HPI    ROS     Objective:    Physical Exam      Assessment:       No diagnosis found.     Plan:         There are no diagnoses linked to this encounter.              "

## 2020-06-17 ENCOUNTER — TELEPHONE (OUTPATIENT)
Dept: CARDIOLOGY | Facility: HOSPITAL | Age: 80
End: 2020-06-17

## 2020-06-18 ENCOUNTER — PATIENT OUTREACH (OUTPATIENT)
Dept: ADMINISTRATIVE | Facility: OTHER | Age: 80
End: 2020-06-18

## 2020-06-18 ENCOUNTER — OFFICE VISIT (OUTPATIENT)
Dept: CARDIOLOGY | Facility: CLINIC | Age: 80
End: 2020-06-18
Payer: MEDICARE

## 2020-06-18 VITALS
HEIGHT: 68 IN | SYSTOLIC BLOOD PRESSURE: 127 MMHG | TEMPERATURE: 98 F | OXYGEN SATURATION: 99 % | BODY MASS INDEX: 24.73 KG/M2 | HEART RATE: 69 BPM | DIASTOLIC BLOOD PRESSURE: 70 MMHG | WEIGHT: 163.19 LBS | RESPIRATION RATE: 17 BRPM

## 2020-06-18 DIAGNOSIS — I48.19 PERSISTENT ATRIAL FIBRILLATION: ICD-10-CM

## 2020-06-18 DIAGNOSIS — N18.9 ANEMIA ASSOCIATED WITH CHRONIC RENAL FAILURE: ICD-10-CM

## 2020-06-18 DIAGNOSIS — Z95.2 S/P TAVR (TRANSCATHETER AORTIC VALVE REPLACEMENT): ICD-10-CM

## 2020-06-18 DIAGNOSIS — L29.9 ITCHING: ICD-10-CM

## 2020-06-18 DIAGNOSIS — D63.1 ANEMIA ASSOCIATED WITH CHRONIC RENAL FAILURE: ICD-10-CM

## 2020-06-18 DIAGNOSIS — Z86.73 HISTORY OF STROKE: ICD-10-CM

## 2020-06-18 DIAGNOSIS — R21 RASH: ICD-10-CM

## 2020-06-18 DIAGNOSIS — F09 COGNITIVE DYSFUNCTION: ICD-10-CM

## 2020-06-18 DIAGNOSIS — N18.4 STAGE 4 CHRONIC KIDNEY DISEASE: ICD-10-CM

## 2020-06-18 DIAGNOSIS — Z95.5 STATUS POST INSERTION OF DRUG-ELUTING STENT INTO LEFT ANTERIOR DESCENDING (LAD) ARTERY: ICD-10-CM

## 2020-06-18 DIAGNOSIS — I48.19 PERSISTENT ATRIAL FIBRILLATION: Primary | ICD-10-CM

## 2020-06-18 PROCEDURE — 93000 EKG 12-LEAD: ICD-10-PCS | Mod: S$GLB,,, | Performed by: INTERNAL MEDICINE

## 2020-06-18 PROCEDURE — 99999 PR PBB SHADOW E&M-EST. PATIENT-LVL V: CPT | Mod: PBBFAC,,, | Performed by: INTERNAL MEDICINE

## 2020-06-18 PROCEDURE — 99999 PR PBB SHADOW E&M-EST. PATIENT-LVL V: ICD-10-PCS | Mod: PBBFAC,,, | Performed by: INTERNAL MEDICINE

## 2020-06-18 PROCEDURE — 1159F PR MEDICATION LIST DOCUMENTED IN MEDICAL RECORD: ICD-10-PCS | Mod: S$GLB,,, | Performed by: INTERNAL MEDICINE

## 2020-06-18 PROCEDURE — 1126F AMNT PAIN NOTED NONE PRSNT: CPT | Mod: S$GLB,,, | Performed by: INTERNAL MEDICINE

## 2020-06-18 PROCEDURE — 3074F SYST BP LT 130 MM HG: CPT | Mod: CPTII,S$GLB,, | Performed by: INTERNAL MEDICINE

## 2020-06-18 PROCEDURE — 99214 PR OFFICE/OUTPT VISIT, EST, LEVL IV, 30-39 MIN: ICD-10-PCS | Mod: 25,S$GLB,, | Performed by: INTERNAL MEDICINE

## 2020-06-18 PROCEDURE — 1159F MED LIST DOCD IN RCRD: CPT | Mod: S$GLB,,, | Performed by: INTERNAL MEDICINE

## 2020-06-18 PROCEDURE — 3074F PR MOST RECENT SYSTOLIC BLOOD PRESSURE < 130 MM HG: ICD-10-PCS | Mod: CPTII,S$GLB,, | Performed by: INTERNAL MEDICINE

## 2020-06-18 PROCEDURE — 3078F PR MOST RECENT DIASTOLIC BLOOD PRESSURE < 80 MM HG: ICD-10-PCS | Mod: CPTII,S$GLB,, | Performed by: INTERNAL MEDICINE

## 2020-06-18 PROCEDURE — 1126F PR PAIN SEVERITY QUANTIFIED, NO PAIN PRESENT: ICD-10-PCS | Mod: S$GLB,,, | Performed by: INTERNAL MEDICINE

## 2020-06-18 PROCEDURE — 93000 ELECTROCARDIOGRAM COMPLETE: CPT | Mod: S$GLB,,, | Performed by: INTERNAL MEDICINE

## 2020-06-18 PROCEDURE — 1101F PR PT FALLS ASSESS DOC 0-1 FALLS W/OUT INJ PAST YR: ICD-10-PCS | Mod: CPTII,S$GLB,, | Performed by: INTERNAL MEDICINE

## 2020-06-18 PROCEDURE — 99214 OFFICE O/P EST MOD 30 MIN: CPT | Mod: 25,S$GLB,, | Performed by: INTERNAL MEDICINE

## 2020-06-18 PROCEDURE — 3078F DIAST BP <80 MM HG: CPT | Mod: CPTII,S$GLB,, | Performed by: INTERNAL MEDICINE

## 2020-06-18 PROCEDURE — 1101F PT FALLS ASSESS-DOCD LE1/YR: CPT | Mod: CPTII,S$GLB,, | Performed by: INTERNAL MEDICINE

## 2020-06-18 RX ORDER — CLOPIDOGREL BISULFATE 75 MG/1
75 TABLET ORAL DAILY
Qty: 90 TABLET | Refills: 3 | Status: SHIPPED | OUTPATIENT
Start: 2020-06-18 | End: 2020-09-29 | Stop reason: ALTCHOICE

## 2020-06-18 RX ORDER — RIVAROXABAN 20 MG/1
20 TABLET, FILM COATED ORAL
Qty: 90 TABLET | Refills: 3 | Status: ON HOLD | OUTPATIENT
Start: 2020-06-18 | End: 2021-04-20 | Stop reason: HOSPADM

## 2020-06-18 NOTE — PATIENT INSTRUCTIONS
Taking Blood Thinners After Percutaneous Coronary Intervention (PCI)  Percutaneous coronary intervention (PCI) involves angioplasty and often stenting. After this procedure, there is a chance that a blood clot will form at the blockage site. A blood clot can also form on a stent, if you have one. Your doctor will prescribe medication to help prevent this. Over time, the artery may also become blocked again. By monitoring your symptoms, you help your doctor detect problems before they become too serious.  Preventing blood clots  To help prevent blood clots, you will need to take medication, usually aspirin, daily for an extended period of time. A second medication, such as clopidogrel, may also be needed. Take these medications exactly as directed. Doing so lowers your risk of heart attack and even death. Your heart doctor can tell you how long these medications will be needed. Don't stop taking them without talking to your heart doctor first.   Date Last Reviewed:   © 20005216-1092 [employer]. [address] [place] [address]. All rights reserved. This information is not intended as a substitute for professional medical care. Always follow your healthcare professional's instructions.        Using Blood Thinners (Anticoagulants)  Blood thinners or anticoagulants are medicines that help prevent blood clots from forming. They include warfarin, heparin, dabigatran, rivaroxaban, apixaban,and edoxaban. Your health care provider will help you decide which medicine is best for you.    Taking an anticoagulant safely  When you are taking a blood thinner, you will need to take certain steps to stay safe. Too much blood thinner puts you at risk for bleeding. Too little puts you at risk for stroke. Follow these guidelines. Also follow any others that your health care provider gives you.  · You may be told you need regular lab testing while taking these medicines. Warfarin requires routine testing to  blood-thinning level testing while the other medicines do not.  · Tell your doctor about all medicines you take. This includes over the counter medicines, supplements, or herbal remedies. Do not take any medicines (including ones you buy over the counter) that your doctor doesnt know about. Some medicines can interact with blood thinners and cause serious problems.  · Tell any health care provider that you see for care (such as doctors, dentists, chiropractors, home health nurses) that you take a blood thinner.  · Carry a medical ID card or wear a medical-alert bracelet that says you take an anticoagulant.  · Before taking aspirin, check with your doctor. Aspirin can significantly increase your risk of bleeding.  · This medicine makes bleeding harder to stop. To protect yourself:  ¨ Avoid activities that may cause injury. If you fall or are injured, contact your health care provider right away. Blood thinners prevent clotting, so you could be bleeding inside without realizing it.  ¨ Use a soft-bristle toothbrush and waxed dental floss. Shave with an electric razor rather than a blade.  ¨ Dont go barefoot. Dont trim corns or calluses yourself.  Warfarin: Other important information  Several precautions are especially important when you are taking warfarin. Always keep these points in mind:  · Be sure to follow your health care provider's instructions for taking warfarin.  · Take this medicine at the same time each day. Take it with a full glass of water, with or without food. If you miss a dose, contact your doctor to find out how much to take. Avoid takinga double dose.  · Warfarin is an effective drug, but it can be dangerous if not taken properly. It makes your blood less likely to form clots. If you take too much, it can cause serious internal or external bleeding.  · You will need to have regular monitoring while you are taking warfarin. This includes blood tests to check your international normalized ratio  (INR) and prothrombin time (PT). These tests show how quickly your blood clots. You will also have a complete blood count (CBC) periodically. This looks at your blood and platelet levels. Both of these need to be followed while you're on warfarin. Talk with your health care provider about whether you need to visit the clinic every week, or if services are available for monitoring in your home.  · Certain medicines can affect your INR and PT levels. Tell your health care provider if there are any changes in your medicines. This includes any over-the-counter medicines, supplements like vitamin K, or herbal remedies.  · Your diet can also affect your INR and PT levels. Because of this, it's important to eat a consistent diet. It is especially important to eat a consistent amount of foods that are high in vitamin K. Be sure to talk with your health care provider before making any big changes in your diet.  · Remember that warfarin increases your risk of bleeding. Be careful not to injure yourself. If you have a significant injury, contact your health care provider right away. It's important to alert your doctor if you've fallen or hurt yourself, even if you don't break your skin. You could be bleeding inside your body without realizing it.     Warfarin: Watch your INR/PT blood levels  Two tests are used to find out how your blood is clotting. One is protime (PT), the other is the international normalized ratio (INR).  · Go for your blood tests (INR/PT) as often as directed. Your diet and the other medicines you take can affect your INR/PT levels.  · Your INR was between ___ and ___.  · Ask your doctor what your goal INR is. My goal INR is between ___ and ___.  · My next INR/PT blood draw is due on _____________ (date) at ___________ (time) by ___________ (name of doctor or clinic).  · The name of the doctor who is monitoring my anticoagulation therapy is _____________________ and the phone number is  _________________.  · Follow up with your doctor or as advised by his or her staff. It usually takes a few hours for your doctor to get the results of your clotting tests. Call to get your lab results to find out if your doctor needs to make further changes to your warfarin dose.  · If your blood is drawn for these tests at a location other than your doctor's office, tell your doctor as soon as you get your lab results.   Warfarin: Watch what you eat  Vitamin K helps your blood clot. So you have to watch how much you eat of foods that contain vitamin K. These foods can affect the way warfarin works. They do not affect the other non-warfarin blood thinners.Here are some specific tips:  · Try to keep your diet about the same each day. If you change your diet for any reason, such as for illness or to lose weight, be sure to tell your doctor.  · Each day, eat the same amount of foods that are high in vitamin K. These include asparagus, avocado, broccoli, cabbage, kale, spinach, and some other leafy green vegetables. Oils, such as soybean, canola, and olive oils, are also high in vitamin K.  · Limit fats to 2 to 4 tablespoons a day.  · Ask your health care provider if you should avoid alcohol while you are taking a blood thinner.  · Avoid teas that contain sweet clover, sweet nemo, or tonka beans. These can affect how your medicine works.  · Talk with your health care provider and pharmacist about specific foods or special diets that can affect anticoagulant levels. These include grapefruit juice, cranberries and cranberry juice, fish oil supplements, garlic, hui, licorice, turmeric, and herbal teas and supplements.  Talk with your health care provider if you have concerns about these or other food products and their effects on warfarin.     When to call your doctor if youre taking an anticoagulant  Call your doctor right away if you have any of these:  · Bleeding that doesnt stop in 10 minutes  · A  heavier-than-normal menstrual period or bleeding between periods  · Coughing or throwing up blood  · Bloody diarrhea or bleeding hemorrhoids   · Dark-colored urine or black stools  · Red or black-and-blue marks on the skin that get larger  · Dizziness or fatigue  · Chest pain or trouble breathing  Allergic reactions:  · Rash  · Itching  · Swelling  · Trouble swallowing or breathing   Medical conditions and anticoagulants  Before starting a blood thinner, be sure your doctor knows if you have any of these conditions:  · Stomach ulcer now or in the past  · Vomited blood or had bloody stools (black or red color)  · Aneurysm, pericarditis, or pericardial effusion  · Blood disorder  · Recent surgery, stroke, mini-stroke, or spinal puncture  · Kidney or liver disease, uncontrolled high blood pressure, diabetes, vasculitis, heart failure, lupus, or other collagen-vascular disease, or high cholesterol  · Pregnancy or breastfeeding  · Younger than 18 years old  · Recent or planned dental procedure  Drug interactions and anticoagulants  Many medicines interfere with the effect of blood thinners. Before starting these medicines, be sure your doctor knows about any prescription, over-the-counter, or herbal supplements you take. In particular, tell your provider about:  · Antibiotics  · Heart medicines  · Cimetidine  · Aspirin or other anti-inflammatory drugs such as ibuprofen, naproxen, ketoprofen, or other arthritis medicines  · Drugs for depression, cancer HIV (protease inhibitors), diabetes, seizures, gout, high cholesterol, or thyroid replacement  · Vitamins containing vitamin K or herbal products such as ginkgo, Co-Q10, garlic, or Marisela's wort     Note: This information topic may not include all directions, precautions, medical conditions, drug/food interactions, and warnings for these medicines. Check with your doctor, nurse, or pharmacist for any questions you have.    Date Last Reviewed: 6/8/2015  © 7247-8804 [employer]. [address] [place] [address], [place] [address]. All rights reserved. This information is not intended as a substitute for professional medical care. Always follow your healthcare professional's instructions.        Living with Atrial Fibrillation: Preventing Stroke  Atrial fibrillation (AFib) is the most common abnormal heart rhythm in the world. The heart has 2 upper chambers called atria and 2 lower chambers called ventricles. AFib causes the atria to quiver (fibrillate) instead of pumping normally. Blood can then pool in the heart instead of moving in and out as usual. This can cause blood clots to form inside the heart. A clot can break free, travel to the brain and cause a stroke. A stroke can cause brain damage very quickly.    Taking medicine to prevent stroke  Your healthcare provider may prescribe a medicine to help prevent blood clots. This type of medicine is called a blood thinner. Blood thinners include:  · Antiplatelet medicines, such as aspirin or clopidrogrel  · Anticoagulation medicines, such as warfarin, dabigatran, rivaroxaban, apixaban, or edoxaban  Risks of blood thinner medicine  Blood thinners increase your risk of bleeding. If you take certain blood thinners, you may need to take extra steps to stay healthy. You may need regular blood tests to check the levels of medicine in your blood. Youll need to be careful not to injure yourself. And you may need to watch your diet for foods that affect blood clotting.  If your blood is too thin, you may have symptoms of excess bleeding, such as:  · Unusual bruising  · Bleeding from the gums  · Blood in the urine or stool  · Black stools  · Vomiting blood  · Nosebleeds  · An unusual or severe headache  Taking the right dose  Youll need to make sure to take the medicine exactly as directed by your healthcare provider. Take it at the same time each day. If you miss a dose, call your provider right away to find out how much to take.  Never take a double dose. If you take too much, it can cause too much bleeding. It can cause bleeding you can see, on the outside of your body. And it can cause bleeding on the inside of your body that you may not be aware of.  Getting your blood tested  Depending on which blood thinner you take, you may need to have your blood tested on a regular schedule. This is to make sure you dont have too much or too little of the medicine in your blood. Too much can cause excess bleeding. Too little may not prevent blood clots from harming you.  You may need to visit a hospital or clinic every week to have your blood tested. Or a nurse may come to your home and test your blood. In some cases, you may be able to test your blood at home with a small machine. Talk with your healthcare provider to find out whats best for you. After the blood test, your healthcare provider may tell you to change your dose of medicine.  Watching your diet  Some foods can affect how certain blood thinners work. In particular, warfarin levels are sensitive to your diet. For example, many foods contain vitamin K. Vitamin K is a substance that helps your blood clot. You dont need to avoid foods that have vitamin K. But you do need to keep the amount of them you eat steady as possible from day to day. Examples of foods high in vitamin K are asparagus, avocado, broccoli, cabbage, kale, spinach, and some other leafy green vegetables. Oils, such as soybean, canola, and olive, are also high in vitamin K.  Other foods and drinks can affect the way blood thinners work in your body. These include:  · Grapefruit and grapefruit juice  · Cranberries and cranberry juice  · Fish oil supplements  · Garlic, hui, licorice, and turmeric  · Herbs used in herbal teas or supplements  · Alcohol  If any of these items are part of your regular diet, continue using them as you normally would. Dont make any major changes in your diet without first talking with your  healthcare provider.  You may also need to limit fats in your diet to 2 to 4 tablespoons a day.  Preventing injury  Because blood thinners make you bleed more, youll need to protect yourself from breaks in the skin. Follow these guidelines:  · Don't go barefoot - always wear shoes.  · Don't trim corns or calluses yourself.  · Consider using an electric razor instead of a manual one.  · Use a soft-bristled toothbrush and waxed dental floss.  Youll also need to avoid any activities that may cause injury. If you fall or are injured, you could be bleeding inside your body and not know it. Make sure to get medical attention right away if you fall, hit your head, or have any other kind of injury.  Other safety tips  While on your medicine, be sure to:  · Tell all of your healthcare providers that you take a blood thinner for AFib. This includes all of your doctors, dental care providers, and your pharmacist.  · Ask your doctor before taking any new medicines, vitamins, or other supplements. Any of these can cause problems when you take a blood thinner.  · Wear a medical alert bracelet or carry an ID card in your wallet if you will be taking blood thinners for months or longer.  · Keep all appointments for your blood tests.  Procedures to prevent stroke  Most blood clots that form in the heart occur in a pouch of the left atrium called the appendage. This pouch can often be large and have multiple lobes which can permit blood pooling and clot formation. Left atrial appendage closure is a nonsurgical procedure in which a self-expanding plug is placed at the opening of the left atrial appendage to close off the appendage from the rest of the heart. Once the plug has fully sealed, no blood can enter or leave the appendage. This reduces blood clot formation and stroke risk. Ask your doctor if you qualify for this type of procedure.  Other ways to help prevent stroke  Your healthcare provider might give you other advice about  how to lower your risk for stroke, such as:  · Lowering your cholesterol with lifestyle changes or medicine  · Not smoking  · Getting physical activity  · Losing weight if needed  · Eating a heart-healthy diet  · Not drinking too much alcohol     When to call your healthcare provider  Call your healthcare provider right away if you have any of these:  · Unusual or severe headache  · Confusion, weakness, or numbness  · Loss of vision  · Difficulty with speech  · Bleeding that wont stop  · Coughing or vomiting blood  · Bright red blood in the stool  · Fall or injury to the head  · Symptoms of atrial fibrillation that are new or getting worse   Date Last Reviewed: 5/1/2016  © 5280-9188 [employer]. [address] [place] [address], [place] [address]. All rights reserved. This information is not intended as a substitute for professional medical care. Always follow your healthcare professional's instructions.

## 2020-06-18 NOTE — PROGRESS NOTES
Subjective:    Patient ID:  Lucho Delong is a 79 y.o. male who presents for evaluation of Follow-up (3 month f/u)  For post TAVR, 6 SARAI, HFpEF with persistent COLON, persistent AF now off NOAC and on DAPT only, persistent itching  Came on own, attended my talk at [employer].  PCP: Dr. CHELSEA Nolan  Prior cardiologist: Dr. Sosa, last seen 12/2019  Intervention: Dr. Zhang  Dermatologist: Dr. García in [facility]  Lives with wife, Pat, here with patient, non-smoker  Retired  for the [employer]    Difficult historian with memory difficulties post stroke    Health literacy: high  Activities: had PT post stoke, no cardiac rehab post procedures, sedentary lifestyle especially for last 3 years, played daily golf until having to stop with COLON  Nicotine: never  Alcohol: occasional, once a month  Illicit drugs: none  Cardiac symptoms: COLON  Home BP: 120/60  Medication compliance: yes, do not like the bruising and the itching despite care under dermatologist, wants to cut down on medication, challenging to Pat  Diet: regular  Caffeine: 1 cpd  Labs:   Lab Results   Component Value Date    TSH 2.514 03/18/2020      No results found for: LABA1C, HGBA1C    Lab Results   Component Value Date    WBC 9.09 02/19/2020    HGB 9.9 (L) 02/19/2020    HCT 31.0 (L) 02/19/2020    MCV 99 (H) 02/19/2020     (L) 02/19/2020       CMP  Sodium   Date Value Ref Range Status   06/15/2020 140 136 - 145 mmol/L Final     Potassium   Date Value Ref Range Status   06/15/2020 4.8 3.5 - 5.1 mmol/L Final     Chloride   Date Value Ref Range Status   06/15/2020 110 95 - 110 mmol/L Final     CO2   Date Value Ref Range Status   06/15/2020 22 (L) 23 - 29 mmol/L Final     Glucose   Date Value Ref Range Status   06/15/2020 104 70 - 110 mg/dL Final     BUN, Bld   Date Value Ref Range Status   06/15/2020 42 (H) 8 - 23 mg/dL Final     Creatinine   Date Value Ref Range Status   06/15/2020 2.6 (H) 0.5 - 1.4 mg/dL Final      Calcium   Date Value Ref Range Status   06/15/2020 8.8 8.7 - 10.5 mg/dL Final     Total Protein   Date Value Ref Range Status   11/26/2019 7.3 6.0 - 8.4 g/dL Final     Albumin   Date Value Ref Range Status   05/12/2020 3.9 3.5 - 5.2 g/dL Final     Total Bilirubin   Date Value Ref Range Status   11/26/2019 1.0 0.1 - 1.0 mg/dL Final     Comment:     For infants and newborns, interpretation of results should be based  on gestational age, weight and in agreement with clinical  observations.  Premature Infant recommended reference ranges:  Up to 24 hours.............<8.0 mg/dL  Up to 48 hours............<12.0 mg/dL  3-5 days..................<15.0 mg/dL  6-29 days.................<15.0 mg/dL       Alkaline Phosphatase   Date Value Ref Range Status   11/26/2019 55 55 - 135 U/L Final     AST   Date Value Ref Range Status   11/26/2019 21 10 - 40 U/L Final     ALT   Date Value Ref Range Status   11/26/2019 17 10 - 44 U/L Final     Anion Gap   Date Value Ref Range Status   06/15/2020 8 8 - 16 mmol/L Final     eGFR if    Date Value Ref Range Status   06/15/2020 25.8 (A) >60 mL/min/1.73 m^2 Final     eGFR if non    Date Value Ref Range Status   06/15/2020 22.3 (A) >60 mL/min/1.73 m^2 Final     Comment:     Calculation used to obtain the estimated glomerular filtration  rate (eGFR) is the CKD-EPI equation.        @labrcntip(troponini)@  No results found for: BNP}   Lab Results   Component Value Date    CHOL 95 (L) 03/18/2020    CHOL 245 (A) 05/29/2015    CHOL 193 04/11/2006     Lab Results   Component Value Date    HDL 33 (L) 03/18/2020    HDL 55 05/29/2015    HDL 36.0 (L) 04/11/2006     Lab Results   Component Value Date    LDLCALC 35.0 (L) 03/18/2020    LDLCALC 145 05/29/2015    LDLCALC 87.8 04/11/2006     Lab Results   Component Value Date    TRIG 135 03/18/2020    TRIG 227 05/29/2015    TRIG 346 (H) 04/11/2006     Lab Results   Component Value Date    CHOLHDL 34.7 03/18/2020    CHOLHDL  18.7 (L) 04/11/2006    CHOLHDL 23.2 07/27/2005      LDL outside lab 9/2019 result 46    Last Echo: 1/2020 HYACINTH, DSE  Last stress test: 1/2020  Cardiovascular angiogram: 1/2020, 6 SARAI placed  ECG: AF, rate 66  Fundoscopic exam: within the past year, negative for retinopathy    In 3/2020:  WM here to establish cardiac care. Major complaint of COLON for the past few years, had to give up golfing and had a number of CV event since 4/2019. Completed 4-6 weeks of PT post stroke, did not find much benefit, and did not proceed with any Cardiac Rehab post procedures. Had review with Dr. Zhang     Chart reviewed -  CHADS VASC 5 (8%/year) and HAS BLED 4 (7%/year)      Successful right transfemoral 34 mm evolute Pro TAVR.  - No PVL, mean gradient 4.0, peak velocity 1.6 post TAVR.  - Follow up with [facility] 1 month and 1 year with labs and Echo   - ASA/Effient/Pradaxa daily (s/p SARAI on 1/8/20)  - Avoid non sterile procedures which could cause endocarditis for 6 months including dental work, endoscopy, colonoscopy, and  procedures.   - SBE prophylaxis for life     Follow up with [facility] 1 month and 1 year with labs and Echo   - ASA/Effient/Pradaxa daily (s/p SARAI on 1/8/20)  Transient Acute on Chronic Diastolic CHF, expected after the TAVR procedure, not a complication of the procedure.   Patient is a Watchman Device candidate. Will discuss at 1 month follow up appointment.     HYACINTH 1/2020 - Severe aortic valve stenosis. By 3D HYACINTH, LVOT diameter is 2.4cm x 3.0cm, area is 5.7cm2.  Mildly decreased left ventricular systolic function. The estimated ejection fraction is 45%.  Mild aortic regurgitation.  Mild mitral regurgitation.  Normal right ventricular systolic function.  Mild tricuspid regurgitation.  Grade 3 plaque present.     1/2020 DOBUTAMINE STRESS TEST PERFORMED FOR AORTIC STENOSIS.  · Mildly decreased left ventricular systolic function. The estimated ejection fraction is 45%.  · Left ventricular diastolic  dysfunction.  · Local segmental wall motion abnormalities.  · The quantitatively dervived ejection fraction is 46%.  · There were no arrhythmias during stress.  · The ECG portion of this study is negative for myocardial ischemia.  · The patient's exercise capacity was normal.  · Severe aortic valve stenosis.  · Aortic valve area is 1.09 cm2; peak velocity is 4.3 m/s; mean gradient is 43 mmHg with stress. Prior LVOT diameter undermeasured. DUDLEY indexed for BSA 0.57/m2, consistent with severe AS.    CXR - The lungs are clear. Costophrenic angles are seen without effusion. No pneumothorax is identified. The heart is normal in size. The mediastinum is within normal limits. Osseous structures show degenerative changes in the spine. The visualized upper abdomen is unremarkable.     Follow-up  Associated symptoms include chills, neck pain and numbness. Pertinent negatives include no chest pain, coughing, diaphoresis, fever, headaches, joint swelling, myalgias, nausea, vertigo or weakness.     HPI comments: in 6/2020, little change but gotten off Xeralto at end of 3/2020 due to concern of itching and bleeding with scratching. OKayed by MA with Dr. Miles. Now on DAPT. On a number of potential candidate to cause itching    Review of Systems   Constitution: Positive for chills, malaise/fatigue and weight loss (30 lbs over 3 years, not intentional). Negative for diaphoresis, fever, night sweats and weight gain.   HENT: Negative for nosebleeds and tinnitus.    Eyes: Positive for blurred vision. Negative for visual disturbance.   Cardiovascular: Positive for dyspnea on exertion. Negative for chest pain, claudication, cyanosis, irregular heartbeat, leg swelling, near-syncope, orthopnea, palpitations and paroxysmal nocturnal dyspnea.   Respiratory: Positive for shortness of breath. Negative for cough, sleep disturbances due to breathing, snoring and wheezing.         Coolin score 17, had inconclusive sleep study in 2019    Endocrine: Positive for cold intolerance. Negative for polydipsia and polyuria.   Hematologic/Lymphatic: Bruises/bleeds easily.   Skin: Positive for itching (major). Negative for color change, flushing, nail changes, poor wound healing and suspicious lesions.   Musculoskeletal: Positive for arthritis, gout, muscle weakness and neck pain. Negative for falls, joint pain, joint swelling, muscle cramps and myalgias.   Gastrointestinal: Positive for diarrhea. Negative for heartburn, hematemesis, hematochezia, melena and nausea.   Neurological: Positive for difficulty with concentration, disturbances in coordination, excessive daytime sleepiness, focal weakness, loss of balance, numbness and paresthesias. Negative for dizziness, headaches, light-headedness, vertigo and weakness.        Dysphasia   Psychiatric/Behavioral: Positive for depression and memory loss. Negative for substance abuse. The patient does not have insomnia and is not nervous/anxious.    Allergic/Immunologic: Positive for environmental allergies.        Objective:    Physical Exam   Constitutional: He is oriented to person, place, and time. He appears well-developed and well-nourished.   HENT:   Head: Normocephalic.   Eyes: Pupils are equal, round, and reactive to light. Conjunctivae and EOM are normal.   Neck: Normal range of motion. Neck supple. No JVD present. No thyromegaly present.   Cardiovascular: Normal rate and intact distal pulses. An irregularly irregular rhythm present. Exam reveals distant heart sounds. Exam reveals no gallop and no friction rub.   No murmur heard.  Pulses:       Carotid pulses are 2+ on the right side and 2+ on the left side.       Radial pulses are 2+ on the right side and 2+ on the left side.        Femoral pulses are 2+ on the right side and 2+ on the left side.       Popliteal pulses are 2+ on the right side and 2+ on the left side.        Dorsalis pedis pulses are 1+ on the right side and 1+ on the left side.         "Posterior tibial pulses are 1+ on the right side and 1+ on the left side.   Pulmonary/Chest: Effort normal and breath sounds normal. He has no rales. He exhibits no tenderness.   Abdominal: Soft. Bowel sounds are normal. There is no abdominal tenderness.   Waist 39"   Musculoskeletal: Normal range of motion.         General: No edema.   Lymphadenopathy:     He has no cervical adenopathy.   Neurological: He is alert and oriented to person, place, and time.   Skin: Skin is warm and dry. No rash noted.   ecchymosis         Assessment:       1. Persistent atrial fibrillation, onset 2018    2. Itching, onset 12/2019    3. 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 History of stroke, 4/2019, right-sided weakness, dysphasia, memory difficulties     5. Stage 4 chronic kidney disease    6. Status post insertion of drug-eluting stent into left anterior descending (LAD) artery and RCA, 6 in 1/2020,     7. Cognitive dysfunction    8. Anemia associated with chronic renal failure    9. Persistent atrial fibrillation    10. Rash         Plan:       Lucho was seen today for follow-up.    Diagnoses and all orders for this visit:    Persistent atrial fibrillation, onset 2018  -     rivaroxaban (XARELTO) 20 mg Tab; Take 1 tablet (20 mg total) by mouth daily with dinner or evening meal.    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  -     Cardiac rehab phase II; Future    History of stroke, 4/2019, right-sided weakness, dysphasia, memory difficulties   -     rivaroxaban (XARELTO) 20 mg Tab; Take 1 tablet (20 mg total) by mouth daily with dinner or evening meal.  -     clopidogreL (PLAVIX) 75 mg tablet; Take 1 tablet (75 mg total) by mouth once daily.    Stage 4 chronic kidney disease    Status post insertion of drug-eluting stent into left anterior " descending (LAD) artery and RCA, 6 in 1/2020,   -     rivaroxaban (XARELTO) 20 mg Tab; Take 1 tablet (20 mg total) by mouth daily with dinner or evening meal.  -     clopidogreL (PLAVIX) 75 mg tablet; Take 1 tablet (75 mg total) by mouth once daily.  -     Cardiac rehab phase II; Future    Cognitive dysfunction    Anemia associated with chronic renal failure    Persistent atrial fibrillation  -     rivaroxaban (XARELTO) 20 mg Tab; Take 1 tablet (20 mg total) by mouth daily with dinner or evening meal.    Rash    - All medical issues reviewed, will stop ASA and continue Plavix with NOAC  - Highly recommend cardiac rehab  - Need help with Allergist  - CV status stable, all medications reviewed, patient acknowledge good understanding.  - Recommend healthy living: no nicotine, moderate alcohol, healthy diet and regular exercise aiming for fitness, and weight control  - Need good exercise program, 4 key elements: 1. Aerobic (walking, swimming, dancing, jogging, biking, etc, 2. Muscle strengthening / resistance exercise, need to do 2-3 times weekly, 3. Stretching daily, good stretch takes a whole  total minute. 4. Balance exercise daily.   - Encourage activities as much as tolerated. Any activity is better than none!  - Instruction for Mediterranean diet and heart healthy exercise given.  - Highly recommend 30 minutes of exercise / activities daily, can have Sunday off, with 2-3 sessions of muscle strengthening weekly. A  would be very helpful.  - Recommend at least biannual cardiovascular evaluation in view of patient's significant risk factors. Family preference.  - Phone review / encourage use of [facility]    Greater than 50% of the time was spent in counseling and coordination of care. The above assessment and plan have been discussed at length. Labs and procedure over the last 6 months reviewed. Problem List updated. Asked to bring in all active medications / pills bottles with next visit.

## 2020-07-06 ENCOUNTER — CLINICAL SUPPORT (OUTPATIENT)
Dept: CARDIAC REHAB | Facility: HOSPITAL | Age: 80
End: 2020-07-06
Attending: INTERNAL MEDICINE
Payer: MEDICARE

## 2020-07-06 VITALS — WEIGHT: 160 LBS | BODY MASS INDEX: 24.33 KG/M2

## 2020-07-06 DIAGNOSIS — Z95.2 S/P TAVR (TRANSCATHETER AORTIC VALVE REPLACEMENT): ICD-10-CM

## 2020-07-06 DIAGNOSIS — Z95.5 STATUS POST INSERTION OF DRUG-ELUTING STENT INTO LEFT ANTERIOR DESCENDING (LAD) ARTERY: ICD-10-CM

## 2020-07-06 PROCEDURE — 93798 PHYS/QHP OP CAR RHAB W/ECG: CPT

## 2020-07-06 NOTE — PROGRESS NOTES
See cardiac rehab report for today 6MW and initial treatment plan in the  after reviewed by Dr Arana.

## 2020-07-08 ENCOUNTER — CLINICAL SUPPORT (OUTPATIENT)
Dept: CARDIAC REHAB | Facility: HOSPITAL | Age: 80
End: 2020-07-08
Attending: INTERNAL MEDICINE
Payer: MEDICARE

## 2020-07-08 DIAGNOSIS — Z95.2 S/P TAVR (TRANSCATHETER AORTIC VALVE REPLACEMENT): Primary | ICD-10-CM

## 2020-07-08 PROCEDURE — 93798 PHYS/QHP OP CAR RHAB W/ECG: CPT

## 2020-07-14 ENCOUNTER — CLINICAL SUPPORT (OUTPATIENT)
Dept: CARDIAC REHAB | Facility: HOSPITAL | Age: 80
End: 2020-07-14
Attending: INTERNAL MEDICINE
Payer: MEDICARE

## 2020-07-14 DIAGNOSIS — Z95.2 S/P TAVR (TRANSCATHETER AORTIC VALVE REPLACEMENT): Primary | ICD-10-CM

## 2020-07-14 PROCEDURE — 93798 PHYS/QHP OP CAR RHAB W/ECG: CPT

## 2020-07-16 ENCOUNTER — CLINICAL SUPPORT (OUTPATIENT)
Dept: CARDIAC REHAB | Facility: HOSPITAL | Age: 80
End: 2020-07-16
Attending: INTERNAL MEDICINE
Payer: MEDICARE

## 2020-07-16 ENCOUNTER — TELEPHONE (OUTPATIENT)
Dept: FAMILY MEDICINE | Facility: CLINIC | Age: 80
End: 2020-07-16

## 2020-07-16 VITALS — BODY MASS INDEX: 24.48 KG/M2 | WEIGHT: 161 LBS

## 2020-07-16 DIAGNOSIS — Z95.2 S/P TAVR (TRANSCATHETER AORTIC VALVE REPLACEMENT): Primary | ICD-10-CM

## 2020-07-16 PROCEDURE — 93798 PHYS/QHP OP CAR RHAB W/ECG: CPT

## 2020-07-16 NOTE — TELEPHONE ENCOUNTER
----- Message from Carlos Sargent sent at 7/16/2020  4:18 PM CDT -----  Type: Needs Medical Advice    Who Called:  Asia (Spouse)  Best Call Back Number: [phone]  Additional Information: Caller would like to discuss scheduling a B12 injection for the patient. Please call to advise. Thanks!

## 2020-07-17 ENCOUNTER — CLINICAL SUPPORT (OUTPATIENT)
Dept: FAMILY MEDICINE | Facility: CLINIC | Age: 80
End: 2020-07-17
Payer: MEDICARE

## 2020-07-17 DIAGNOSIS — E53.8 B12 DEFICIENCY: Primary | ICD-10-CM

## 2020-07-17 PROCEDURE — 96372 THER/PROPH/DIAG INJ SC/IM: CPT | Mod: S$GLB,,, | Performed by: FAMILY MEDICINE

## 2020-07-17 PROCEDURE — 96372 PR INJECTION,THERAP/PROPH/DIAG2ST, IM OR SUBCUT: ICD-10-PCS | Mod: S$GLB,,, | Performed by: FAMILY MEDICINE

## 2020-07-17 RX ORDER — CYANOCOBALAMIN 1000 UG/ML
1000 INJECTION, SOLUTION INTRAMUSCULAR; SUBCUTANEOUS
Status: COMPLETED | OUTPATIENT
Start: 2020-07-17 | End: 2020-07-17

## 2020-07-17 RX ADMIN — CYANOCOBALAMIN 1000 MCG: 1000 INJECTION, SOLUTION INTRAMUSCULAR; SUBCUTANEOUS at 09:07

## 2020-07-21 ENCOUNTER — OFFICE VISIT (OUTPATIENT)
Dept: FAMILY MEDICINE | Facility: CLINIC | Age: 80
End: 2020-07-21
Payer: MEDICARE

## 2020-07-21 ENCOUNTER — CLINICAL SUPPORT (OUTPATIENT)
Dept: CARDIAC REHAB | Facility: HOSPITAL | Age: 80
End: 2020-07-21
Attending: INTERNAL MEDICINE
Payer: MEDICARE

## 2020-07-21 VITALS
HEART RATE: 61 BPM | BODY MASS INDEX: 24.55 KG/M2 | HEIGHT: 68 IN | WEIGHT: 162 LBS | SYSTOLIC BLOOD PRESSURE: 151 MMHG | RESPIRATION RATE: 16 BRPM | OXYGEN SATURATION: 98 % | TEMPERATURE: 98 F | DIASTOLIC BLOOD PRESSURE: 86 MMHG

## 2020-07-21 DIAGNOSIS — Z95.2 S/P TAVR (TRANSCATHETER AORTIC VALVE REPLACEMENT): Primary | ICD-10-CM

## 2020-07-21 DIAGNOSIS — L29.9 ITCHING: Primary | ICD-10-CM

## 2020-07-21 PROCEDURE — 1159F MED LIST DOCD IN RCRD: CPT | Mod: S$GLB,,, | Performed by: FAMILY MEDICINE

## 2020-07-21 PROCEDURE — 3077F PR MOST RECENT SYSTOLIC BLOOD PRESSURE >= 140 MM HG: ICD-10-PCS | Mod: CPTII,S$GLB,, | Performed by: FAMILY MEDICINE

## 2020-07-21 PROCEDURE — 1101F PT FALLS ASSESS-DOCD LE1/YR: CPT | Mod: CPTII,S$GLB,, | Performed by: FAMILY MEDICINE

## 2020-07-21 PROCEDURE — 1126F AMNT PAIN NOTED NONE PRSNT: CPT | Mod: S$GLB,,, | Performed by: FAMILY MEDICINE

## 2020-07-21 PROCEDURE — 99214 OFFICE O/P EST MOD 30 MIN: CPT | Mod: S$GLB,,, | Performed by: FAMILY MEDICINE

## 2020-07-21 PROCEDURE — 1159F PR MEDICATION LIST DOCUMENTED IN MEDICAL RECORD: ICD-10-PCS | Mod: S$GLB,,, | Performed by: FAMILY MEDICINE

## 2020-07-21 PROCEDURE — 3079F DIAST BP 80-89 MM HG: CPT | Mod: CPTII,S$GLB,, | Performed by: FAMILY MEDICINE

## 2020-07-21 PROCEDURE — 3077F SYST BP >= 140 MM HG: CPT | Mod: CPTII,S$GLB,, | Performed by: FAMILY MEDICINE

## 2020-07-21 PROCEDURE — 99214 PR OFFICE/OUTPT VISIT, EST, LEVL IV, 30-39 MIN: ICD-10-PCS | Mod: S$GLB,,, | Performed by: FAMILY MEDICINE

## 2020-07-21 PROCEDURE — 3079F PR MOST RECENT DIASTOLIC BLOOD PRESSURE 80-89 MM HG: ICD-10-PCS | Mod: CPTII,S$GLB,, | Performed by: FAMILY MEDICINE

## 2020-07-21 PROCEDURE — 1126F PR PAIN SEVERITY QUANTIFIED, NO PAIN PRESENT: ICD-10-PCS | Mod: S$GLB,,, | Performed by: FAMILY MEDICINE

## 2020-07-21 PROCEDURE — 93798 PHYS/QHP OP CAR RHAB W/ECG: CPT

## 2020-07-21 PROCEDURE — 1101F PR PT FALLS ASSESS DOC 0-1 FALLS W/OUT INJ PAST YR: ICD-10-PCS | Mod: CPTII,S$GLB,, | Performed by: FAMILY MEDICINE

## 2020-07-21 RX ORDER — GABAPENTIN 100 MG/1
100 CAPSULE ORAL NIGHTLY
Qty: 30 CAPSULE | Refills: 0 | Status: SHIPPED | OUTPATIENT
Start: 2020-07-21 | End: 2020-07-24

## 2020-07-21 NOTE — PROGRESS NOTES
[facility] - Clinic Note    Subjective      Mr. Delong is a 79 y.o. male who presents to clinic for Rash (itching all over body)    Patient reports persistent itching on for his body particularly on his arms and thighs and lower back.  There is no rash present.  This has been going on for the last 6 months.  He has a history of chronic kidney disease.  His BUN is elevated.  He is on chronic prednisone 5 mg daily for gout.  He also takes Zoloft.  He has tried hydroxyzine which did not help very much.  He is also try hydrocortisone ointment which helps but it is a large area that is itching it does not last.  He reports that he saw a dermatologist in [place] before.    [facility] Lucho has a past medical history of Anticoagulant long-term use, Atrial fibrillation, Bursitis of hip, Chronic back pain, Chronic kidney disease, stage 3, Gastroesophageal reflux disease, Gout, Hyperlipemia, Hypertension, Prostate cancer (1997), and Stroke (04/2019).   PS Lucho has a past surgical history that includes Colonoscopy (1997); Prostate surgery (1997); Tonsillectomy; Catheterization of both left and right heart (N/A, 12/2/2019); Coronary stent placement (N/A, 1/8/2020); Coronary angiography (N/A, 1/8/2020); Transesophageal echocardiography (N/A, 1/23/2020); Percutaneous transluminal angioplasty (PTA) of peripheral vessel (N/A, 1/23/2020); and Transcatheter aortic valve replacement (TAVR) (2/18/2020).    Lucho's family history includes Alzheimer's disease in his sister; Alzheimer's disease (age of onset: 86) in his mother; Cancer (age of onset: 86) in his father.    Lucho reports that he has never smoked. He has never used smokeless tobacco. He reports previous alcohol use of about 3.3 standard drinks of alcohol per week. He reports that he does not use drugs.   ALG Lucho is allergic to amiodarone analogues and tramadol.   KELLY Yepez has a current medication list which includes the following prescription(s): allopurinol,  "atorvastatin, clopidogrel, cyanocobalamin, hydrocortisone, lisinopril, metoprolol succinate, nifedipine, prednisone, xarelto, sertraline, and gabapentin.     Review of Systems   Constitutional: Negative for chills and fever.   Respiratory: Negative for cough and shortness of breath.    Skin: Negative for rash.     Objective     BP (!) 151/86 (BP Location: Right arm, Patient Position: Sitting, BP Method: Large (Automatic))   Pulse 61   Temp 97.5 °F (36.4 °C) (Temporal)   Resp 16   Ht 5' 8" (1.727 m)   Wt 73.5 kg (162 lb)   SpO2 98%   BMI 24.63 kg/m²     Physical Exam  Vitals signs and nursing note reviewed.   Constitutional:       General: He is not in acute distress.     Appearance: Normal appearance. He is well-developed. He is not diaphoretic.   HENT:      Head: Normocephalic and atraumatic.      Right Ear: External ear normal.      Left Ear: External ear normal.   Eyes:      General:         Right eye: No discharge.         Left eye: No discharge.   Cardiovascular:      Rate and Rhythm: Normal rate and regular rhythm.      Heart sounds: Normal heart sounds.   Pulmonary:      Effort: Pulmonary effort is normal.      Breath sounds: Normal breath sounds. No wheezing or rales.   Skin:     General: Skin is warm and dry.      Comments: Bruising noted on bilateral forearms.  No rash noted on arms or thighs or lower back.   Neurological:      Mental Status: He is alert and oriented to person, place, and time. Mental status is at baseline.   Psychiatric:         Mood and Affect: Mood normal.         Behavior: Behavior normal.         Thought Content: Thought content normal.         Judgment: Judgment normal.        Assessment/Plan     1. Itching  gabapentin (NEURONTIN) 100 MG capsule     New patient and new problem to me.  Given persistent itching will trial gabapentin low dose to see if that helps.  If symptoms do not improve recommended following up with a dermatologist.    Future Appointments   Date Time " Provider Department Center   7/21/2020 10:00 AM CARDIAC REHAB, [facility]   7/23/2020 10:00 AM CARDIAC REHAB, [facility]   7/28/2020 10:00 AM CARDIAC REHAB, [facility]   7/30/2020 10:00 AM CARDIAC REHAB, [facility]   8/4/2020 10:00 AM CARDIAC REHAB, [facility]   8/6/2020 10:00 AM CARDIAC REHAB, [facility]   8/11/2020 10:00 AM CARDIAC REHAB, [facility]   8/13/2020 10:00 AM CARDIAC REHAB, [facility]   8/14/2020  9:30 AM NURSE SCHEDULE, [facility]   8/18/2020 10:00 AM CARDIAC REHAB, [facility]   8/20/2020 10:00 AM CARDIAC REHAB, [facility]   8/25/2020 10:00 AM CARDIAC REHAB, [facility]   8/27/2020 10:00 AM CARDIAC REHAB, [facility]   9/1/2020 10:00 AM CARDIAC REHAB, [facility]   9/3/2020 10:00 AM CARDIAC REHAB, [facility]   9/8/2020 10:00 AM CARDIAC REHAB, [facility]   9/10/2020 10:00 AM CARDIAC REHAB, [facility]   9/15/2020 10:00 AM CARDIAC REHAB, [facility]   9/17/2020 10:00 AM CARDIAC REHAB, [facility]   9/22/2020 10:00 AM CARDIAC REHAB, [facility]   9/24/2020 10:00 AM CARDIAC REHAB, [facility]   9/29/2020 10:00 AM CARDIAC REHAB, [facility]   10/1/2020 10:00 AM CARDIAC REHAB, [facility]   10/6/2020 10:00 AM CARDIAC REHAB, [facility]   10/8/2020 10:00 AM CARDIAC REHAB, [facility]   10/13/2020 10:00 AM CARDIAC REHAB, [facility]   10/15/2020 10:00 AM CARDIAC REHAB, [facility]   10/20/2020 10:00 AM CARDIAC REHAB, [facility]   10/22/2020 10:00 AM CARDIAC REHAB, [facility]   12/8/2020  8:00 AM Hussain Arana MD [facility] CARDIO [facility]         Jey Siegel MD  Family Medicine  [facility]

## 2020-07-22 ENCOUNTER — HOSPITAL ENCOUNTER (EMERGENCY)
Facility: HOSPITAL | Age: 80
Discharge: HOME | End: 2020-07-22
Attending: EMERGENCY MEDICINE
Payer: MEDICARE

## 2020-07-22 VITALS
HEIGHT: 67 IN | HEART RATE: 73 BPM | TEMPERATURE: 98 F | WEIGHT: 120 LBS | BODY MASS INDEX: 18.83 KG/M2 | OXYGEN SATURATION: 96 % | SYSTOLIC BLOOD PRESSURE: 156 MMHG | DIASTOLIC BLOOD PRESSURE: 77 MMHG | RESPIRATION RATE: 16 BRPM
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
## 2020-07-22 DIAGNOSIS — S09.90XA INJURY OF HEAD, INITIAL ENCOUNTER: ICD-10-CM

## 2020-07-22 DIAGNOSIS — D50.8 OTHER IRON DEFICIENCY ANEMIA: ICD-10-CM

## 2020-07-22 LAB
ALBUMIN SERPL BCP-MCNC: 3.8 G/DL (ref 3.5–5.2)
ALP SERPL-CCNC: 63 U/L (ref 55–135)
ALT SERPL W/O P-5'-P-CCNC: 24 U/L (ref 10–44)
ANION GAP SERPL CALC-SCNC: 6 MMOL/L (ref 8–16)
AST SERPL-CCNC: 17 U/L (ref 10–40)
BASOPHILS # BLD AUTO: 0.06 K/UL (ref 0–0.2)
BASOPHILS NFR BLD: 0.7 % (ref 0–1.9)
BILIRUB SERPL-MCNC: 0.5 MG/DL (ref 0.1–1)
BUN SERPL-MCNC: 51 MG/DL (ref 8–23)
CALCIUM SERPL-MCNC: 8.7 MG/DL (ref 8.7–10.5)
CHLORIDE SERPL-SCNC: 115 MMOL/L (ref 95–110)
CO2 SERPL-SCNC: 19 MMOL/L (ref 23–29)
CREAT SERPL-MCNC: 2.5 MG/DL (ref 0.5–1.4)
DIFFERENTIAL METHOD: ABNORMAL
EOSINOPHIL # BLD AUTO: 1.1 K/UL (ref 0–0.5)
EOSINOPHIL NFR BLD: 13.3 % (ref 0–8)
ERYTHROCYTE [DISTWIDTH] IN BLOOD BY AUTOMATED COUNT: 12.8 % (ref 11.5–14.5)
EST. GFR  (AFRICAN AMERICAN): 27 ML/MIN/1.73 M^2
EST. GFR  (NON AFRICAN AMERICAN): 23.4 ML/MIN/1.73 M^2
GLUCOSE SERPL-MCNC: 83 MG/DL (ref 70–110)
HCT VFR BLD AUTO: 28.7 % (ref 40–54)
HGB BLD-MCNC: 9.5 G/DL (ref 14–18)
IMM GRANULOCYTES # BLD AUTO: 0.01 K/UL (ref 0–0.04)
IMM GRANULOCYTES NFR BLD AUTO: 0.1 % (ref 0–0.5)
INR PPP: 1.4 (ref 0.8–1.2)
IRON SERPL-MCNC: 55 UG/DL (ref 45–160)
LYMPHOCYTES # BLD AUTO: 2.1 K/UL (ref 1–4.8)
LYMPHOCYTES NFR BLD: 24.9 % (ref 18–48)
MCH RBC QN AUTO: 32.1 PG (ref 27–31)
MCHC RBC AUTO-ENTMCNC: 33.1 G/DL (ref 32–36)
MCV RBC AUTO: 97 FL (ref 82–98)
MONOCYTES # BLD AUTO: 0.9 K/UL (ref 0.3–1)
MONOCYTES NFR BLD: 10.9 % (ref 4–15)
NEUTROPHILS # BLD AUTO: 4.2 K/UL (ref 1.8–7.7)
NEUTROPHILS NFR BLD: 50.1 % (ref 38–73)
NRBC BLD-RTO: 0 /100 WBC
PLATELET # BLD AUTO: 152 K/UL (ref 150–350)
PMV BLD AUTO: 10.3 FL (ref 9.2–12.9)
POTASSIUM SERPL-SCNC: 4.6 MMOL/L (ref 3.5–5.1)
PROT SERPL-MCNC: 6.2 G/DL (ref 6–8.4)
PROTHROMBIN TIME: 13.8 SEC (ref 9–12.5)
RBC # BLD AUTO: 2.96 M/UL (ref 4.6–6.2)
SATURATED IRON: 24 % (ref 20–50)
SODIUM SERPL-SCNC: 140 MMOL/L (ref 136–145)
TOTAL IRON BINDING CAPACITY: 226 UG/DL (ref 250–450)
TRANSFERRIN SERPL-MCNC: 153 MG/DL (ref 200–375)
WBC # BLD AUTO: 8.43 K/UL (ref 3.9–12.7)

## 2020-07-22 PROCEDURE — 70450 CT HEAD/BRAIN W/O DYE: CPT | Mod: TC

## 2020-07-22 PROCEDURE — 70450 CT HEAD/BRAIN W/O DYE: CPT | Mod: 26,,, | Performed by: RADIOLOGY

## 2020-07-22 PROCEDURE — 25000003 PHARM REV CODE 250: Performed by: EMERGENCY MEDICINE

## 2020-07-22 PROCEDURE — 70450 CT HEAD WITHOUT CONTRAST: ICD-10-PCS | Mod: 26,,, | Performed by: RADIOLOGY

## 2020-07-22 PROCEDURE — 85025 COMPLETE CBC W/AUTO DIFF WBC: CPT

## 2020-07-22 PROCEDURE — 85610 PROTHROMBIN TIME: CPT

## 2020-07-22 PROCEDURE — 72125 CT NECK SPINE W/O DYE: CPT | Mod: 26,,, | Performed by: RADIOLOGY

## 2020-07-22 PROCEDURE — 80053 COMPREHEN METABOLIC PANEL: CPT

## 2020-07-22 PROCEDURE — 83540 ASSAY OF IRON: CPT

## 2020-07-22 PROCEDURE — 99284 EMERGENCY DEPT VISIT MOD MDM: CPT | Mod: 25

## 2020-07-22 PROCEDURE — 72125 CT CERVICAL SPINE WITHOUT CONTRAST: ICD-10-PCS | Mod: 26,,, | Performed by: RADIOLOGY

## 2020-07-22 PROCEDURE — 72125 CT NECK SPINE W/O DYE: CPT | Mod: TC

## 2020-07-22 RX ORDER — ACETAMINOPHEN 325 MG/1
650 TABLET ORAL
Status: COMPLETED | OUTPATIENT
Start: 2020-07-22 | End: 2020-07-22

## 2020-07-22 RX ADMIN — ACETAMINOPHEN 650 MG: 325 TABLET ORAL at 06:07

## 2020-07-22 NOTE — ED NOTES
Patient presents to ED POV accompanied by his wife after falling in his bathtub. He reports that he slipped and fell, hitting his head and neck. He denies LOC. He takes plavix and xarelto daily at home. He is AAOx4. Ambulatory unassisted with steady gait.

## 2020-07-22 NOTE — ED PROVIDER NOTES
Encounter Date: 7/22/2020       History     Chief Complaint   Patient presents with    Fall     79-year-old male brought by POV from home after he had a fall in the bathroom at about 515 this morning.  He states he got up to go to the bathroom, but neglected to turn on the light, and he tripped over something and fell against the sink, and then fell into the tub.  When he fell into the tub, he struck the back of his head.  He denies any LOC.  At some point he also struck his right elbow slightly, and sustained a slight keep skin tear.  He denies any significant pain there and has good range of motion.  Patient was able to ambulate after the incident.  Patient does take Plavix secondary to previous stroke.  He states his tetanus is up-to-date.  Denies any recent illnesses.  No fever or chills, no nausea vomiting, no constipation, diarrhea, etc..        Review of patient's allergies indicates:   Allergen Reactions    Amiodarone analogues Other (See Comments)     Dizziness    Tramadol Itching and Swelling     Began medication, two doses into this treatment patient experiences tongue swelling and severe itching. Patient MD advised to not take this medication anymore     Past Medical History:   Diagnosis Date    Anticoagulant long-term use     Atrial fibrillation     Bursitis of hip     Chronic back pain     Chronic kidney disease, stage 3     Gastroesophageal reflux disease     Gout     Hyperlipemia     Hypertension     Prostate cancer 1997    Stroke 04/2019     Past Surgical History:   Procedure Laterality Date    CATHETERIZATION OF BOTH LEFT AND RIGHT HEART N/A 12/2/2019    Procedure: CATHETERIZATION, HEART, BOTH LEFT AND RIGHT;  Surgeon: Everett Sosa MD;  Location: [facility] CATH/EP LAB;  Service: Cardiology;  Laterality: N/A;    COLONOSCOPY  1997    CORONARY ANGIOGRAPHY N/A 1/8/2020    Procedure: ANGIOGRAM, CORONARY ARTERY;  Surgeon: Bryan Zhang MD;  Location: [facility] CATH LAB;  Service: Cardiology;   Laterality: N/A;    CORONARY STENT PLACEMENT N/A 1/8/2020    Procedure: INSERTION, STENT, CORONARY ARTERY;  Surgeon: Bryan Zhang MD;  Location: [facility] CATH LAB;  Service: Cardiology;  Laterality: N/A;    PERCUTANEOUS TRANSLUMINAL ANGIOPLASTY (PTA) OF PERIPHERAL VESSEL N/A 1/23/2020    Procedure: PTA, PERIPHERAL VESSEL;  Surgeon: Bryan Zhang MD;  Location: [facility] CATH LAB;  Service: Cardiology;  Laterality: N/A;    PROSTATE SURGERY  1997    TONSILLECTOMY      TRANSCATHETER AORTIC VALVE REPLACEMENT (TAVR)  2/18/2020    Procedure: REPLACEMENT, AORTIC VALVE, TRANSCATHETER (TAVR);  Surgeon: Bryan Zhang MD;  Location: [facility] CATH LAB;  Service: Cardiology;;    TRANSESOPHAGEAL ECHOCARDIOGRAPHY N/A 1/23/2020    Procedure: ECHOCARDIOGRAM, TRANSESOPHAGEAL;  Surgeon: Bhavani Diagnostic Provider;  Location: [facility] EP LAB;  Service: Anesthesiology;  Laterality: N/A;     Family History   Problem Relation Age of Onset    Cancer Father 86        colon    Alzheimer's disease Mother 86    Alzheimer's disease Sister         has not known fm for 15 yrs    Melanoma Neg Hx     Psoriasis Neg Hx     Lupus Neg Hx     Eczema Neg Hx      Social History     Tobacco Use    Smoking status: Never Smoker    Smokeless tobacco: Never Used   Substance Use Topics    Alcohol use: Not Currently     Alcohol/week: 3.3 standard drinks     Types: 4 Standard drinks or equivalent per week     Frequency: 2-4 times a month     Comment: bourbon or beer    Drug use: No     Review of Systems   Constitutional: Negative for appetite change, chills and fever.   HENT: Negative for congestion, nosebleeds, rhinorrhea and sore throat.    Eyes: Negative for photophobia and visual disturbance.   Respiratory: Negative for cough, chest tightness and shortness of breath.    Cardiovascular: Negative for chest pain and palpitations.   Gastrointestinal: Negative for abdominal pain, constipation, diarrhea, nausea and vomiting.   Endocrine: Negative for  polydipsia and polyuria.   Genitourinary: Negative for difficulty urinating, enuresis, hematuria and urgency.   Musculoskeletal: Positive for neck pain. Negative for arthralgias, back pain, gait problem, joint swelling, myalgias and neck stiffness.   Skin: Positive for wound. Negative for pallor and rash.   Neurological: Negative for dizziness, seizures, syncope, weakness, light-headedness and headaches.   Psychiatric/Behavioral: Negative for confusion, decreased concentration and dysphoric mood.       Physical Exam     Initial Vitals [07/22/20 0516]   BP Pulse Resp Temp SpO2   (!) 156/77 73 16 97.5 °F (36.4 °C) 96 %      MAP       --         Physical Exam    Nursing note and vitals reviewed.  Constitutional: He appears well-nourished. No distress.   HENT:   Head: Normocephalic.   Nose: Nose normal.   Mouth/Throat: No oropharyngeal exudate.   Eyes: Conjunctivae and EOM are normal. Pupils are equal, round, and reactive to light. No scleral icterus.   Neck: Normal range of motion. Neck supple. No JVD present.   Cardiovascular: Normal rate, regular rhythm, normal heart sounds and intact distal pulses.   No murmur heard.  Pulmonary/Chest: Breath sounds normal. No stridor. No respiratory distress. He has no wheezes.   Abdominal: Soft. Bowel sounds are normal. He exhibits no distension. There is no abdominal tenderness. There is no rebound and no guarding.   Musculoskeletal: Normal range of motion. Tenderness present. No edema.      Comments: Cervical collar was placed in triage.  Patient does complain of posterior neck pain,.  I loosened the patient's cervical collar and palpated the spine.  There were no bony abnormalities or step-offs, but patient did complain of generalized tenderness.   Neurological: He is alert and oriented to person, place, and time. He has normal strength and normal reflexes. No sensory deficit. GCS score is 15. GCS eye subscore is 4. GCS verbal subscore is 5. GCS motor subscore is 6.   Skin: Skin  is warm and dry. Capillary refill takes 2 to 3 seconds. No erythema. No pallor.   Slight skin tear lateral aspect right elbow   Psychiatric: His behavior is normal. Thought content normal.         ED Course   Procedures  Labs Reviewed - No data to display       Imaging Results    None          Medical Decision Making:   Differential Diagnosis:   Skull fracture, intracranial hemorrhage, cervical fracture, cervical strain, etc..  Clinical Tests:   Lab Tests: Ordered and Reviewed  The following lab test(s) were unremarkable: CBC and CMP       <> Summary of Lab: CBC shows hematocrit 28% this is a slow and progressive decline compared to previous CBCs.  This needs to be evaluated further electively.  Decision on anticoagulation should be made.  BUN creatinine are slightly elevated but no change.  Radiological Study: Ordered and Reviewed  ED Management:  Labs a have ankle ordered, as well as CT brain and cervical spine.  At shift change, the patient's care be transferred to the oncoming emergency physician who will review patient's studies make final disposition.    CT of the brain and C-spine negative for any acute injury.    Patient has anemia and azotemia or progressing.  It sounds are with the anemia is secondary to blood loss from Plavix or from chronic kidney disease.  Could be both.  This should be addressed and whether platelet anticoagulation should be continued.    Recommend patient walk with a cane at home for extra balance.                                 Clinical Impression:       ICD-10-CM ICD-9-CM   1. Fall, initial encounter  W19.XXXA E888.9   2. Injury of head, initial encounter  S09.90XA 959.01   3. Other iron deficiency anemia  D50.8 280.8         Disposition:   Disposition: Discharged  Condition: Stable                        Paulino Rutledge MD  07/22/20 0649

## 2020-07-24 ENCOUNTER — OFFICE VISIT (OUTPATIENT)
Dept: FAMILY MEDICINE | Facility: CLINIC | Age: 80
End: 2020-07-24
Payer: MEDICARE

## 2020-07-24 VITALS
SYSTOLIC BLOOD PRESSURE: 103 MMHG | TEMPERATURE: 97 F | HEIGHT: 67 IN | HEART RATE: 73 BPM | DIASTOLIC BLOOD PRESSURE: 58 MMHG | RESPIRATION RATE: 20 BRPM | OXYGEN SATURATION: 97 % | BODY MASS INDEX: 25.55 KG/M2 | WEIGHT: 162.81 LBS

## 2020-07-24 DIAGNOSIS — L29.9 PRURITUS: Primary | ICD-10-CM

## 2020-07-24 PROCEDURE — 99999 PR PBB SHADOW E&M-EST. PATIENT-LVL IV: ICD-10-PCS | Mod: PBBFAC,,, | Performed by: FAMILY MEDICINE

## 2020-07-24 PROCEDURE — 99214 OFFICE O/P EST MOD 30 MIN: CPT | Mod: S$GLB,,, | Performed by: FAMILY MEDICINE

## 2020-07-24 PROCEDURE — 1159F PR MEDICATION LIST DOCUMENTED IN MEDICAL RECORD: ICD-10-PCS | Mod: S$GLB,,, | Performed by: FAMILY MEDICINE

## 2020-07-24 PROCEDURE — 99214 PR OFFICE/OUTPT VISIT, EST, LEVL IV, 30-39 MIN: ICD-10-PCS | Mod: S$GLB,,, | Performed by: FAMILY MEDICINE

## 2020-07-24 PROCEDURE — 1101F PT FALLS ASSESS-DOCD LE1/YR: CPT | Mod: CPTII,S$GLB,, | Performed by: FAMILY MEDICINE

## 2020-07-24 PROCEDURE — 99999 PR PBB SHADOW E&M-EST. PATIENT-LVL IV: CPT | Mod: PBBFAC,,, | Performed by: FAMILY MEDICINE

## 2020-07-24 PROCEDURE — 1159F MED LIST DOCD IN RCRD: CPT | Mod: S$GLB,,, | Performed by: FAMILY MEDICINE

## 2020-07-24 PROCEDURE — 3074F SYST BP LT 130 MM HG: CPT | Mod: CPTII,S$GLB,, | Performed by: FAMILY MEDICINE

## 2020-07-24 PROCEDURE — 1101F PR PT FALLS ASSESS DOC 0-1 FALLS W/OUT INJ PAST YR: ICD-10-PCS | Mod: CPTII,S$GLB,, | Performed by: FAMILY MEDICINE

## 2020-07-24 PROCEDURE — 3078F DIAST BP <80 MM HG: CPT | Mod: CPTII,S$GLB,, | Performed by: FAMILY MEDICINE

## 2020-07-24 PROCEDURE — 3074F PR MOST RECENT SYSTOLIC BLOOD PRESSURE < 130 MM HG: ICD-10-PCS | Mod: CPTII,S$GLB,, | Performed by: FAMILY MEDICINE

## 2020-07-24 PROCEDURE — 3078F PR MOST RECENT DIASTOLIC BLOOD PRESSURE < 80 MM HG: ICD-10-PCS | Mod: CPTII,S$GLB,, | Performed by: FAMILY MEDICINE

## 2020-07-24 RX ORDER — LEVOCETIRIZINE DIHYDROCHLORIDE 5 MG/1
5 TABLET, FILM COATED ORAL NIGHTLY
Qty: 30 TABLET | Refills: 11 | Status: SHIPPED | OUTPATIENT
Start: 2020-07-24 | End: 2020-12-08

## 2020-07-24 RX ORDER — MONTELUKAST SODIUM 10 MG/1
10 TABLET ORAL NIGHTLY
Qty: 30 TABLET | Refills: 11 | Status: SHIPPED | OUTPATIENT
Start: 2020-07-24 | End: 2020-08-23

## 2020-07-24 NOTE — PROGRESS NOTES
"Subjective:       Patient ID: Lucho Delong is a 79 y.o. male.    Chief Complaint: Fall and Follow-up (Itching)    Fall  Has stopped gabapentin and relieved    Pruritis  Persistent  No rash  Family is concerned about any sedating meds    Review of Systems   Constitutional: Negative for chills and fever.   Respiratory: Negative for cough and shortness of breath.    Skin: Negative for rash.   Neurological: Positive for dizziness.         Reviewed family, medical, surgical, and social history.    Objective:      BP (!) 103/58 (BP Location: Left arm, Patient Position: Sitting, BP Method: Medium (Automatic))   Pulse 73   Temp 97 °F (36.1 °C) (Oral)   Resp 20   Ht 5' 7" (1.702 m)   Wt 73.8 kg (162 lb 12.8 oz)   SpO2 97%   BMI 25.50 kg/m²   Physical Exam  Vitals signs and nursing note reviewed.   Constitutional:       General: He is not in acute distress.     Appearance: He is well-developed. He is not diaphoretic.   HENT:      Head: Normocephalic and atraumatic.      Nose: Nose normal.      Mouth/Throat:      Pharynx: No oropharyngeal exudate.   Eyes:      Conjunctiva/sclera: Conjunctivae normal.      Pupils: Pupils are equal, round, and reactive to light.   Neck:      Musculoskeletal: Normal range of motion.      Thyroid: No thyromegaly.   Cardiovascular:      Rate and Rhythm: Normal rate and regular rhythm.      Heart sounds: Normal heart sounds. No murmur. No friction rub. No gallop.    Pulmonary:      Effort: Pulmonary effort is normal. No respiratory distress.      Breath sounds: Normal breath sounds. No wheezing or rales.   Abdominal:      General: There is no distension.      Palpations: Abdomen is soft.      Tenderness: There is no abdominal tenderness. There is no guarding.   Musculoskeletal: Normal range of motion.         General: No tenderness or deformity.   Skin:     General: Skin is warm and dry.      Coloration: Skin is not pale.      Findings: Bruising, erythema and rash present. "   Neurological:      Mental Status: He is alert and oriented to person, place, and time.      Cranial Nerves: No cranial nerve deficit.      Sensory: No sensory deficit.      Motor: No abnormal muscle tone.      Coordination: Coordination normal.      Deep Tendon Reflexes: Reflexes normal.   Psychiatric:         Behavior: Behavior normal.         Thought Content: Thought content normal.         Judgment: Judgment normal.         Assessment:       1. Pruritus        Plan:       Pruritus  -     levocetirizine (XYZAL) 5 MG tablet; Take 1 tablet (5 mg total) by mouth every evening.  Dispense: 30 tablet; Refill: 11  -     montelukast (SINGULAIR) 10 mg tablet; Take 1 tablet (10 mg total) by mouth every evening.  Dispense: 30 tablet; Refill: 11    As shown        Risks, benefits, and side effects were discussed with the patient. All questions were answered to the fullest satisfaction of the patient, and pt verbalized understanding and agreement to treatment plan. Pt was to call with any new or worsening symptoms, or present to the ER.

## 2020-08-04 ENCOUNTER — CLINICAL SUPPORT (OUTPATIENT)
Dept: CARDIAC REHAB | Facility: HOSPITAL | Age: 80
End: 2020-08-04
Attending: INTERNAL MEDICINE
Payer: MEDICARE

## 2020-08-04 VITALS
HEART RATE: 73 BPM | DIASTOLIC BLOOD PRESSURE: 83 MMHG | WEIGHT: 161 LBS | SYSTOLIC BLOOD PRESSURE: 135 MMHG | OXYGEN SATURATION: 100 % | BODY MASS INDEX: 25.22 KG/M2 | RESPIRATION RATE: 20 BRPM

## 2020-08-04 DIAGNOSIS — Z95.2 S/P TAVR (TRANSCATHETER AORTIC VALVE REPLACEMENT): Primary | ICD-10-CM

## 2020-08-04 PROCEDURE — 93798 PHYS/QHP OP CAR RHAB W/ECG: CPT

## 2020-08-06 ENCOUNTER — CLINICAL SUPPORT (OUTPATIENT)
Dept: CARDIAC REHAB | Facility: HOSPITAL | Age: 80
End: 2020-08-06
Attending: INTERNAL MEDICINE
Payer: MEDICARE

## 2020-08-06 VITALS
SYSTOLIC BLOOD PRESSURE: 153 MMHG | RESPIRATION RATE: 20 BRPM | OXYGEN SATURATION: 100 % | HEART RATE: 67 BPM | DIASTOLIC BLOOD PRESSURE: 72 MMHG

## 2020-08-06 DIAGNOSIS — Z95.2 S/P TAVR (TRANSCATHETER AORTIC VALVE REPLACEMENT): Primary | ICD-10-CM

## 2020-08-06 PROCEDURE — 93798 PHYS/QHP OP CAR RHAB W/ECG: CPT

## 2020-08-13 ENCOUNTER — CLINICAL SUPPORT (OUTPATIENT)
Dept: CARDIAC REHAB | Facility: HOSPITAL | Age: 80
End: 2020-08-13
Attending: INTERNAL MEDICINE
Payer: MEDICARE

## 2020-08-13 VITALS
SYSTOLIC BLOOD PRESSURE: 123 MMHG | DIASTOLIC BLOOD PRESSURE: 76 MMHG | HEART RATE: 69 BPM | RESPIRATION RATE: 20 BRPM | OXYGEN SATURATION: 100 %

## 2020-08-13 DIAGNOSIS — Z95.2 S/P TAVR (TRANSCATHETER AORTIC VALVE REPLACEMENT): Primary | ICD-10-CM

## 2020-08-13 PROCEDURE — 93798 PHYS/QHP OP CAR RHAB W/ECG: CPT

## 2020-08-14 ENCOUNTER — CLINICAL SUPPORT (OUTPATIENT)
Dept: FAMILY MEDICINE | Facility: CLINIC | Age: 80
End: 2020-08-14
Payer: MEDICARE

## 2020-08-14 DIAGNOSIS — R53.83 FATIGUE, UNSPECIFIED TYPE: Primary | ICD-10-CM

## 2020-08-14 PROCEDURE — 96372 THER/PROPH/DIAG INJ SC/IM: CPT | Mod: S$GLB,,, | Performed by: FAMILY MEDICINE

## 2020-08-14 PROCEDURE — 96372 PR INJECTION,THERAP/PROPH/DIAG2ST, IM OR SUBCUT: ICD-10-PCS | Mod: S$GLB,,, | Performed by: FAMILY MEDICINE

## 2020-08-14 RX ORDER — CYANOCOBALAMIN 1000 UG/ML
1000 INJECTION, SOLUTION INTRAMUSCULAR; SUBCUTANEOUS
Status: COMPLETED | OUTPATIENT
Start: 2020-08-14 | End: 2020-08-14

## 2020-08-14 RX ADMIN — CYANOCOBALAMIN 1000 MCG: 1000 INJECTION, SOLUTION INTRAMUSCULAR; SUBCUTANEOUS at 09:08

## 2020-08-18 ENCOUNTER — CLINICAL SUPPORT (OUTPATIENT)
Dept: CARDIAC REHAB | Facility: HOSPITAL | Age: 80
End: 2020-08-18
Attending: INTERNAL MEDICINE
Payer: MEDICARE

## 2020-08-18 VITALS
HEART RATE: 70 BPM | SYSTOLIC BLOOD PRESSURE: 143 MMHG | OXYGEN SATURATION: 100 % | DIASTOLIC BLOOD PRESSURE: 73 MMHG | RESPIRATION RATE: 20 BRPM

## 2020-08-18 DIAGNOSIS — Z95.2 S/P TAVR (TRANSCATHETER AORTIC VALVE REPLACEMENT): Primary | ICD-10-CM

## 2020-08-18 PROCEDURE — 93798 PHYS/QHP OP CAR RHAB W/ECG: CPT

## 2020-08-20 ENCOUNTER — CLINICAL SUPPORT (OUTPATIENT)
Dept: CARDIAC REHAB | Facility: HOSPITAL | Age: 80
End: 2020-08-20
Attending: INTERNAL MEDICINE
Payer: MEDICARE

## 2020-08-20 VITALS
RESPIRATION RATE: 20 BRPM | SYSTOLIC BLOOD PRESSURE: 125 MMHG | HEART RATE: 71 BPM | OXYGEN SATURATION: 98 % | DIASTOLIC BLOOD PRESSURE: 64 MMHG | BODY MASS INDEX: 24.75 KG/M2 | WEIGHT: 158 LBS

## 2020-08-20 DIAGNOSIS — Z95.2 S/P TAVR (TRANSCATHETER AORTIC VALVE REPLACEMENT): Primary | ICD-10-CM

## 2020-08-20 PROCEDURE — 93798 PHYS/QHP OP CAR RHAB W/ECG: CPT

## 2020-08-20 RX ORDER — DONEPEZIL HYDROCHLORIDE 5 MG/1
5 TABLET, FILM COATED ORAL DAILY
COMMUNITY
Start: 2020-08-12 | End: 2020-12-08

## 2020-08-25 ENCOUNTER — CLINICAL SUPPORT (OUTPATIENT)
Dept: CARDIAC REHAB | Facility: HOSPITAL | Age: 80
End: 2020-08-25
Attending: INTERNAL MEDICINE
Payer: MEDICARE

## 2020-08-25 VITALS
RESPIRATION RATE: 18 BRPM | DIASTOLIC BLOOD PRESSURE: 74 MMHG | SYSTOLIC BLOOD PRESSURE: 147 MMHG | HEART RATE: 72 BPM | OXYGEN SATURATION: 99 %

## 2020-08-25 DIAGNOSIS — Z95.2 S/P TAVR (TRANSCATHETER AORTIC VALVE REPLACEMENT): Primary | ICD-10-CM

## 2020-08-25 PROCEDURE — 93798 PHYS/QHP OP CAR RHAB W/ECG: CPT

## 2020-08-31 ENCOUNTER — TELEPHONE (OUTPATIENT)
Dept: CARDIOLOGY | Facility: CLINIC | Age: 80
End: 2020-08-31

## 2020-08-31 NOTE — TELEPHONE ENCOUNTER
Left voice mail to return patient's call.  Awaiting return call.        ----- Message from Ledy Chan sent at 8/31/2020  8:02 AM CDT -----  Contact: Pat  Type: Needs Medical Advice  Who Called:  Patient  Symptoms (please be specific):    How long has patient had these symptoms:    Pharmacy name and phone #:    Best Call Back Number: [phone]  Additional Information: requesting a call back this morning regarding medications have questions? And sooner appt than 09/08

## 2020-09-01 ENCOUNTER — CLINICAL SUPPORT (OUTPATIENT)
Dept: CARDIAC REHAB | Facility: HOSPITAL | Age: 80
End: 2020-09-01
Attending: INTERNAL MEDICINE
Payer: MEDICARE

## 2020-09-01 VITALS
WEIGHT: 158 LBS | DIASTOLIC BLOOD PRESSURE: 84 MMHG | BODY MASS INDEX: 24.75 KG/M2 | HEART RATE: 62 BPM | SYSTOLIC BLOOD PRESSURE: 154 MMHG | RESPIRATION RATE: 20 BRPM | OXYGEN SATURATION: 100 %

## 2020-09-01 DIAGNOSIS — Z95.2 S/P TAVR (TRANSCATHETER AORTIC VALVE REPLACEMENT): Primary | ICD-10-CM

## 2020-09-01 PROCEDURE — 93798 PHYS/QHP OP CAR RHAB W/ECG: CPT

## 2020-09-04 ENCOUNTER — TELEPHONE (OUTPATIENT)
Dept: CARDIOLOGY | Facility: CLINIC | Age: 80
End: 2020-09-04

## 2020-09-04 NOTE — TELEPHONE ENCOUNTER
Spoke with wife and scheduled an appointment on  09.29.2020 @ 3pm  No other complaints were voiced during this telephone encounter.        ----- Message from Tash Lee sent at 9/4/2020 12:57 PM CDT -----  Regarding: return call  Contact: wife  Type:  Patient Returning Call    Who Called:  wife- Pat   Who Left Message for Patient:    Does the patient know what this is regarding?:    Best Call Back Number:  [phone]  Additional Information:

## 2020-09-24 NOTE — PROGRESS NOTES
Patient ID:  Lucho Delong is a 79 y.o. male who presents to Follow-Up    Health literacy: Medium  Activities:   Nicotine:   Alcohol:   Illicit drugs: Denies  Cardiac symptoms:   Home BP: Yes - WNL  Medication compliance: Yes  Diet: regular  Caffeine:   Labs: Na 140;  K+ 4.6;  GFR 23.4L;  Hemoglobin 9.5L;  CHO 95L;  Trig 135;  HDL 33L;  LDL 35.0;  TSH 2.514;    Last Echo: 06.15.2020  Last stress test: 01.14.2020  Cardiovascular angiogram: 02.18.2020  ECG:   Fundoscopic exam:     EPWORTH SLEEPINESS SCALE 7/12/2018   Sitting and reading 3   Watching TV 3   Sitting, inactive in a public place (e.g. a theatre or a meeting) 2   As a passenger in a car for an hour without a break 2   Lying down to rest in the afternoon when circumstances permit 3   Sitting and talking to someone 0   Sitting quietly after a lunch without alcohol 3   In a car, while stopped for a few minutes in traffic 0   Total score 16         HPI    ROS     Objective:    Physical Exam      Assessment:       No diagnosis found.     Plan:         There are no diagnoses linked to this encounter.

## 2020-09-29 ENCOUNTER — PATIENT OUTREACH (OUTPATIENT)
Dept: ADMINISTRATIVE | Facility: OTHER | Age: 80
End: 2020-09-29

## 2020-09-29 ENCOUNTER — OFFICE VISIT (OUTPATIENT)
Dept: CARDIOLOGY | Facility: CLINIC | Age: 80
End: 2020-09-29
Payer: MEDICARE

## 2020-09-29 VITALS
HEART RATE: 59 BPM | DIASTOLIC BLOOD PRESSURE: 57 MMHG | RESPIRATION RATE: 16 BRPM | WEIGHT: 156 LBS | BODY MASS INDEX: 25.07 KG/M2 | TEMPERATURE: 98 F | OXYGEN SATURATION: 99 % | HEIGHT: 66 IN | SYSTOLIC BLOOD PRESSURE: 108 MMHG

## 2020-09-29 DIAGNOSIS — N18.4 STAGE 4 CHRONIC KIDNEY DISEASE: ICD-10-CM

## 2020-09-29 DIAGNOSIS — Z79.01 CURRENT USE OF LONG TERM ANTICOAGULATION: ICD-10-CM

## 2020-09-29 DIAGNOSIS — I48.19 PERSISTENT ATRIAL FIBRILLATION: ICD-10-CM

## 2020-09-29 DIAGNOSIS — Z91.89 SEDENTARY LIFESTYLE: ICD-10-CM

## 2020-09-29 DIAGNOSIS — D63.1 ANEMIA ASSOCIATED WITH CHRONIC RENAL FAILURE: ICD-10-CM

## 2020-09-29 DIAGNOSIS — Z95.5 STATUS POST INSERTION OF DRUG-ELUTING STENT INTO LEFT ANTERIOR DESCENDING (LAD) ARTERY: ICD-10-CM

## 2020-09-29 DIAGNOSIS — R06.09 DOE (DYSPNEA ON EXERTION): ICD-10-CM

## 2020-09-29 DIAGNOSIS — L29.9 ITCHING: Primary | ICD-10-CM

## 2020-09-29 DIAGNOSIS — N18.9 ANEMIA ASSOCIATED WITH CHRONIC RENAL FAILURE: ICD-10-CM

## 2020-09-29 DIAGNOSIS — Z86.73 HISTORY OF STROKE: ICD-10-CM

## 2020-09-29 DIAGNOSIS — Z95.2 S/P TAVR (TRANSCATHETER AORTIC VALVE REPLACEMENT): ICD-10-CM

## 2020-09-29 PROCEDURE — 1126F AMNT PAIN NOTED NONE PRSNT: CPT | Mod: S$GLB,,, | Performed by: INTERNAL MEDICINE

## 2020-09-29 PROCEDURE — 3078F DIAST BP <80 MM HG: CPT | Mod: CPTII,S$GLB,, | Performed by: INTERNAL MEDICINE

## 2020-09-29 PROCEDURE — 99214 PR OFFICE/OUTPT VISIT, EST, LEVL IV, 30-39 MIN: ICD-10-PCS | Mod: 25,S$GLB,, | Performed by: INTERNAL MEDICINE

## 2020-09-29 PROCEDURE — 3078F PR MOST RECENT DIASTOLIC BLOOD PRESSURE < 80 MM HG: ICD-10-PCS | Mod: CPTII,S$GLB,, | Performed by: INTERNAL MEDICINE

## 2020-09-29 PROCEDURE — 1101F PT FALLS ASSESS-DOCD LE1/YR: CPT | Mod: CPTII,S$GLB,, | Performed by: INTERNAL MEDICINE

## 2020-09-29 PROCEDURE — 1159F PR MEDICATION LIST DOCUMENTED IN MEDICAL RECORD: ICD-10-PCS | Mod: S$GLB,,, | Performed by: INTERNAL MEDICINE

## 2020-09-29 PROCEDURE — 1101F PR PT FALLS ASSESS DOC 0-1 FALLS W/OUT INJ PAST YR: ICD-10-PCS | Mod: CPTII,S$GLB,, | Performed by: INTERNAL MEDICINE

## 2020-09-29 PROCEDURE — 99999 PR PBB SHADOW E&M-EST. PATIENT-LVL V: ICD-10-PCS | Mod: PBBFAC,,, | Performed by: INTERNAL MEDICINE

## 2020-09-29 PROCEDURE — 3074F SYST BP LT 130 MM HG: CPT | Mod: CPTII,S$GLB,, | Performed by: INTERNAL MEDICINE

## 2020-09-29 PROCEDURE — 99214 OFFICE O/P EST MOD 30 MIN: CPT | Mod: 25,S$GLB,, | Performed by: INTERNAL MEDICINE

## 2020-09-29 PROCEDURE — 3074F PR MOST RECENT SYSTOLIC BLOOD PRESSURE < 130 MM HG: ICD-10-PCS | Mod: CPTII,S$GLB,, | Performed by: INTERNAL MEDICINE

## 2020-09-29 PROCEDURE — 93000 ELECTROCARDIOGRAM COMPLETE: CPT | Mod: S$GLB,,, | Performed by: INTERNAL MEDICINE

## 2020-09-29 PROCEDURE — 1126F PR PAIN SEVERITY QUANTIFIED, NO PAIN PRESENT: ICD-10-PCS | Mod: S$GLB,,, | Performed by: INTERNAL MEDICINE

## 2020-09-29 PROCEDURE — 99999 PR PBB SHADOW E&M-EST. PATIENT-LVL V: CPT | Mod: PBBFAC,,, | Performed by: INTERNAL MEDICINE

## 2020-09-29 PROCEDURE — 1159F MED LIST DOCD IN RCRD: CPT | Mod: S$GLB,,, | Performed by: INTERNAL MEDICINE

## 2020-09-29 PROCEDURE — 93000 EKG 12-LEAD: ICD-10-PCS | Mod: S$GLB,,, | Performed by: INTERNAL MEDICINE

## 2020-09-29 RX ORDER — NAPROXEN SODIUM 220 MG/1
81 TABLET, FILM COATED ORAL DAILY
Qty: 50 TABLET | Refills: 3 | Status: SHIPPED | OUTPATIENT
Start: 2020-09-29 | End: 2022-03-29 | Stop reason: SDUPTHER

## 2020-09-29 NOTE — PROGRESS NOTES
Subjective:    Patient ID:  Lucho Delong is a 79 y.o. male who presents for evaluation of Follow-up  For post TAVR, 6 SARAI, HFpEF with persistent COLON, persistent AF on NOAC and Plavix, persistent itching  Came on own, attended my talk at [facility].  PCP: Dr. CHELSEA Nolan  Prior cardiologist: Dr. Sosa, last seen 12/2019  Intervention: Dr. Zhang  Dermatologist: Dr. García in [facility]  Renal: Dr. Guevara  Hematologist: Dr. Bauer  Lives with wife, Pat, here with patient, non-smoker  Retired  for the [employer]    Difficult historian with memory difficulties post stroke    Health literacy: high  Vaccinations: up-to-date  Activities: had PT post stoke not very helpful, no cardiac rehab post procedures, sedentary lifestyle especially for last 3 years, played daily golf until having to stop with COLON  Nicotine: never  Alcohol: occasional, once a month  Illicit drugs: none  Cardiac symptoms: COLON  Home BP: 120 to 150/60  Medication compliance: yes, do not like the bruising and the itching despite care under dermatologist, wants to cut down on medication, challenging to Pat  Diet: regular  Caffeine: 1 cpd  Labs:   Lab Results   Component Value Date    TSH 2.514 03/18/2020      No results found for: LABA1C, HGBA1C    Lab Results   Component Value Date    WBC 9.09 02/19/2020    HGB 9.9 (L) 02/19/2020    HCT 31.0 (L) 02/19/2020    MCV 99 (H) 02/19/2020     (L) 02/19/2020       CMP  Sodium   Date Value Ref Range Status   07/22/2020 140 136 - 145 mmol/L Final     Potassium   Date Value Ref Range Status   07/22/2020 4.6 3.5 - 5.1 mmol/L Final     Chloride   Date Value Ref Range Status   07/22/2020 115 (H) 95 - 110 mmol/L Final     CO2   Date Value Ref Range Status   07/22/2020 19 (L) 23 - 29 mmol/L Final     Glucose   Date Value Ref Range Status   07/22/2020 83 70 - 110 mg/dL Final     BUN, Bld   Date Value Ref Range Status   07/22/2020 51 (H) 8 - 23 mg/dL Final     Creatinine   Date  Value Ref Range Status   07/22/2020 2.5 (H) 0.5 - 1.4 mg/dL Final     Calcium   Date Value Ref Range Status   07/22/2020 8.7 8.7 - 10.5 mg/dL Final     Total Protein   Date Value Ref Range Status   07/22/2020 6.2 6.0 - 8.4 g/dL Final     Albumin   Date Value Ref Range Status   07/22/2020 3.8 3.5 - 5.2 g/dL Final     Total Bilirubin   Date Value Ref Range Status   07/22/2020 0.5 0.1 - 1.0 mg/dL Final     Comment:     For infants and newborns, interpretation of results should be based  on gestational age, weight and in agreement with clinical  observations.  Premature Infant recommended reference ranges:  Up to 24 hours.............<8.0 mg/dL  Up to 48 hours............<12.0 mg/dL  3-5 days..................<15.0 mg/dL  6-29 days.................<15.0 mg/dL       Alkaline Phosphatase   Date Value Ref Range Status   07/22/2020 63 55 - 135 U/L Final     AST   Date Value Ref Range Status   07/22/2020 17 10 - 40 U/L Final     ALT   Date Value Ref Range Status   07/22/2020 24 10 - 44 U/L Final     Anion Gap   Date Value Ref Range Status   07/22/2020 6 (L) 8 - 16 mmol/L Final     eGFR if    Date Value Ref Range Status   07/22/2020 27.0 (A) >60 mL/min/1.73 m^2 Final     eGFR if non    Date Value Ref Range Status   07/22/2020 23.4 (A) >60 mL/min/1.73 m^2 Final     Comment:     Calculation used to obtain the estimated glomerular filtration  rate (eGFR) is the CKD-EPI equation.        @labrcntip(troponini)@  No results found for: BNP}   Lab Results   Component Value Date    CHOL 95 (L) 03/18/2020    CHOL 245 (A) 05/29/2015    CHOL 193 04/11/2006     Lab Results   Component Value Date    HDL 33 (L) 03/18/2020    HDL 55 05/29/2015    HDL 36.0 (L) 04/11/2006     Lab Results   Component Value Date    LDLCALC 35.0 (L) 03/18/2020    LDLCALC 145 05/29/2015    LDLCALC 87.8 04/11/2006     Lab Results   Component Value Date    TRIG 135 03/18/2020    TRIG 227 05/29/2015    TRIG 346 (H) 04/11/2006     Lab  Results   Component Value Date    CHOLHDL 34.7 03/18/2020    CHOLHDL 18.7 (L) 04/11/2006    CHOLHDL 23.2 07/27/2005      LDL outside lab 9/2019 result 46    Last Echo: 1/2020 HYACINTH, DSE  Last stress test: 1/2020  Cardiovascular angiogram: 1/2020, 6 SARAI placed  ECG: AF, rate 61  Fundoscopic exam: within the past year, negative for retinopathy    In 3/2020:  WM here to establish cardiac care. Major complaint of COLON for the past few years, had to give up golfing and had a number of CV event since 4/2019. Completed 4-6 weeks of PT post stroke, did not find much benefit, and did not proceed with any Cardiac Rehab post procedures. Had review with Dr. Zhang     Chart reviewed -  CHADS VASC 5 (8%/year) and HAS BLED 4 (7%/year)      Successful right transfemoral 34 mm evolute Pro TAVR.  - No PVL, mean gradient 4.0, peak velocity 1.6 post TAVR.  - Follow up with [facility] 1 month and 1 year with labs and Echo   - ASA/Effient/Pradaxa daily (s/p SARAI on 1/8/20)  - Avoid non sterile procedures which could cause endocarditis for 6 months including dental work, endoscopy, colonoscopy, and  procedures.   - SBE prophylaxis for life     Follow up with [facility] 1 month and 1 year with labs and Echo   - ASA/Effient/Pradaxa daily (s/p SARAI on 1/8/20)  Transient Acute on Chronic Diastolic CHF, expected after the TAVR procedure, not a complication of the procedure.   Patient is a Watchman Device candidate. Will discuss at 1 month follow up appointment.     HYACINTH 1/2020 - Severe aortic valve stenosis. By 3D HYACINTH, LVOT diameter is 2.4cm x 3.0cm, area is 5.7cm2.  Mildly decreased left ventricular systolic function. The estimated ejection fraction is 45%.  Mild aortic regurgitation.  Mild mitral regurgitation.  Normal right ventricular systolic function.  Mild tricuspid regurgitation.  Grade 3 plaque present.     1/2020 DOBUTAMINE STRESS TEST PERFORMED FOR AORTIC STENOSIS.  · Mildly decreased left ventricular systolic function. The  estimated ejection fraction is 45%.  · Left ventricular diastolic dysfunction.  · Local segmental wall motion abnormalities.  · The quantitatively dervived ejection fraction is 46%.  · There were no arrhythmias during stress.  · The ECG portion of this study is negative for myocardial ischemia.  · The patient's exercise capacity was normal.  · Severe aortic valve stenosis.  · Aortic valve area is 1.09 cm2; peak velocity is 4.3 m/s; mean gradient is 43 mmHg with stress. Prior LVOT diameter undermeasured. DUDLEY indexed for BSA 0.57/m2, consistent with severe AS.    CXR - The lungs are clear. Costophrenic angles are seen without effusion. No pneumothorax is identified. The heart is normal in size. The mediastinum is within normal limits. Osseous structures show degenerative changes in the spine. The visualized upper abdomen is unremarkable.     Follow-up  Associated symptoms include chills, neck pain and numbness. Pertinent negatives include no chest pain, coughing, diaphoresis, fever, headaches, joint swelling, myalgias, nausea, vertigo or weakness.     In 6/2020, little change but gotten off Xeralto at end of 3/2020 due to concern of itching and bleeding with scratching. OKayed by MA with Dr. Miles. Now on DAPT. On a number of potential candidate to cause itching    HPI comments: in 9/2020, follow up, not much changed. Tried off allopurinol for 3 weeks an no change. Question on Plavix reviewed. Had SARAI in 1/2020, need at least one year of antiplatelet Rx. Major problem still the itching.    Review of Systems   Constitution: Positive for chills, malaise/fatigue and weight loss (30 lbs over 3 years, 7 lbs since 6/2020). Negative for diaphoresis, fever, night sweats and weight gain.   HENT: Negative for nosebleeds and tinnitus.    Eyes: Positive for blurred vision. Negative for visual disturbance.   Cardiovascular: Positive for dyspnea on exertion. Negative for chest pain, claudication, cyanosis, irregular heartbeat, leg  swelling, near-syncope, orthopnea, palpitations and paroxysmal nocturnal dyspnea.   Respiratory: Positive for shortness of breath. Negative for cough, sleep disturbances due to breathing, snoring and wheezing.         Whately score 17, had inconclusive sleep study in 2019   Endocrine: Positive for cold intolerance. Negative for polydipsia and polyuria.   Hematologic/Lymphatic: Bruises/bleeds easily.   Skin: Positive for itching (major). Negative for color change, flushing, nail changes, poor wound healing and suspicious lesions.   Musculoskeletal: Positive for arthritis, gout, muscle weakness and neck pain. Negative for falls, joint pain, joint swelling, muscle cramps and myalgias.   Gastrointestinal: Positive for diarrhea. Negative for heartburn, hematemesis, hematochezia, melena and nausea.   Neurological: Positive for difficulty with concentration, disturbances in coordination, excessive daytime sleepiness, focal weakness, loss of balance, numbness and paresthesias. Negative for dizziness, headaches, light-headedness, vertigo and weakness.        Dysphasia   Psychiatric/Behavioral: Positive for depression and memory loss. Negative for substance abuse. The patient does not have insomnia and is not nervous/anxious.    Allergic/Immunologic: Positive for environmental allergies.        Objective:    Physical Exam   Constitutional: He is oriented to person, place, and time. He appears well-developed and well-nourished.   HENT:   Head: Normocephalic.   Eyes: Pupils are equal, round, and reactive to light. Conjunctivae and EOM are normal.   Neck: Normal range of motion. Neck supple. No JVD present. No thyromegaly present.   Cardiovascular: Normal rate and intact distal pulses. An irregularly irregular rhythm present. Exam reveals distant heart sounds. Exam reveals no gallop and no friction rub.   No murmur heard.  Pulses:       Carotid pulses are 2+ on the right side and 2+ on the left side.       Radial pulses are 2+  "on the right side and 2+ on the left side.        Femoral pulses are 2+ on the right side and 2+ on the left side.       Popliteal pulses are 2+ on the right side and 2+ on the left side.        Dorsalis pedis pulses are 1+ on the right side and 1+ on the left side.        Posterior tibial pulses are 1+ on the right side and 1+ on the left side.   Pulmonary/Chest: Effort normal and breath sounds normal. He has no rales. He exhibits no tenderness.   Abdominal: Soft. Bowel sounds are normal. There is no abdominal tenderness.   Waist 39" down to 36"   Musculoskeletal: Normal range of motion.         General: No edema.   Lymphadenopathy:     He has no cervical adenopathy.   Neurological: He is alert and oriented to person, place, and time.   Skin: Skin is warm and dry. No rash noted.   ecchymosis         Assessment:       1. Itching, onset 12/2019    2. History of stroke, 4/2019, right-sided weakness, dysphasia, memory difficulties     3. Anemia associated with chronic renal failure    4. Persistent atrial fibrillation, onset 2018    5. 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 Sedentary lifestyle    7. Stage 4 chronic kidney disease    8. Status post insertion of drug-eluting stent into left anterior descending (LAD) artery and RCA, 6 in 1/2020,     9. COLON (dyspnea on exertion), onset 2017    10. Current use of long term anticoagulation         Plan:       Lucho was seen today for follow-up.    Diagnoses and all orders for this visit:    Itching, onset 12/2019  -     Ambulatory referral/consult to Hematology / Oncology; Future    History of stroke, 4/2019, right-sided weakness, dysphasia, memory difficulties   -     aspirin 81 MG Chew; Take 1 tablet (81 mg total) by mouth once daily.    Anemia associated with chronic renal failure  -     Ambulatory referral/consult to Hematology / Oncology; Future    Persistent atrial fibrillation, onset 2018    S/P TAVR (transcatheter aortic valve " replacement), 2/19/2020    Sedentary lifestyle    Stage 4 chronic kidney disease    Status post insertion of drug-eluting stent into left anterior descending (LAD) artery and RCA, 6 in 1/2020,   -     aspirin 81 MG Chew; Take 1 tablet (81 mg total) by mouth once daily.    COLON (dyspnea on exertion), onset 2017    Current use of long term anticoagulation  -     Ambulatory referral/consult to Hematology / Oncology; Future    - All medical issues reviewed, will restart ASA and stop Plavix with NOAC  - Highly recommend cardiac rehab  - Need help with Allergist / dermatologist  - CV status stable, all medications reviewed, patient acknowledge good understanding.  - Recommend healthy living: no nicotine, moderate alcohol, healthy diet and regular exercise aiming for fitness, and weight control  - Need good exercise program, 4 key elements: 1. Aerobic (walking, swimming, dancing, jogging, biking, etc, 2. Muscle strengthening / resistance exercise, need to do 2-3 times weekly, 3. Stretching daily, good stretch takes a whole  total minute. 4. Balance exercise daily.   - Encourage activities as much as tolerated. Any activity is better than none!  - Instruction for Mediterranean diet and heart healthy exercise given.  - Highly recommend 30 minutes of exercise / activities daily, can have Sunday off, with 2-3 sessions of muscle strengthening weekly. A  would be very helpful.  - Recommend at least biannual cardiovascular evaluation in view of patient's significant risk factors. Family preference.      Greater than 50% of the time was spent in counseling and coordination of care. The above assessment and plan have been discussed at length. Labs and procedure over the last 6 months reviewed. Problem List updated. Asked to bring in all active medications / pills bottles with next visit.

## 2020-09-29 NOTE — PROGRESS NOTES
Health Maintenance Due   Topic Date Due    TETANUS VACCINE  12/27/1958    Shingles Vaccine (1 of 2) 12/27/1989    Pneumococcal Vaccine (65+ High/Highest Risk) (1 of 2 - PCV13) 12/27/2004    Influenza Vaccine (1) 08/01/2020     Updates were requested from care everywhere.  Chart was reviewed for overdue Proactive [facility] Encounters ([facility]) topics (CRS, Breast Cancer Screening, Eye exam)  Health Maintenance has been updated.  LINKS immunization registry triggered.  Immunizations were not able to be reconciled. Patient not found in LINKS.

## 2020-09-30 ENCOUNTER — CLINICAL SUPPORT (OUTPATIENT)
Dept: FAMILY MEDICINE | Facility: CLINIC | Age: 80
End: 2020-09-30
Payer: MEDICARE

## 2020-09-30 DIAGNOSIS — R53.83 FATIGUE, UNSPECIFIED TYPE: Primary | ICD-10-CM

## 2020-09-30 PROCEDURE — 96372 PR INJECTION,THERAP/PROPH/DIAG2ST, IM OR SUBCUT: ICD-10-PCS | Mod: 59,S$GLB,, | Performed by: FAMILY MEDICINE

## 2020-09-30 PROCEDURE — 90694 FLU VACCINE - QUADRIVALENT - ADJUVANTED: ICD-10-PCS | Mod: S$GLB,,, | Performed by: FAMILY MEDICINE

## 2020-09-30 PROCEDURE — 96372 THER/PROPH/DIAG INJ SC/IM: CPT | Mod: 59,S$GLB,, | Performed by: FAMILY MEDICINE

## 2020-09-30 PROCEDURE — G0008 ADMIN INFLUENZA VIRUS VAC: HCPCS | Mod: S$GLB,,, | Performed by: FAMILY MEDICINE

## 2020-09-30 PROCEDURE — 90694 VACC AIIV4 NO PRSRV 0.5ML IM: CPT | Mod: S$GLB,,, | Performed by: FAMILY MEDICINE

## 2020-09-30 PROCEDURE — G0008 FLU VACCINE - QUADRIVALENT - ADJUVANTED: ICD-10-PCS | Mod: S$GLB,,, | Performed by: FAMILY MEDICINE

## 2020-09-30 RX ORDER — CYANOCOBALAMIN 1000 UG/ML
1000 INJECTION, SOLUTION INTRAMUSCULAR; SUBCUTANEOUS
Status: COMPLETED | OUTPATIENT
Start: 2020-09-30 | End: 2020-09-30

## 2020-09-30 RX ADMIN — CYANOCOBALAMIN 1000 MCG: 1000 INJECTION, SOLUTION INTRAMUSCULAR; SUBCUTANEOUS at 02:09

## 2020-10-01 ENCOUNTER — TELEPHONE (OUTPATIENT)
Dept: HEMATOLOGY/ONCOLOGY | Facility: CLINIC | Age: 80
End: 2020-10-01

## 2020-10-01 NOTE — TELEPHONE ENCOUNTER
Spoke to patient's wife and scheduled f/u visit for patient's anemia.  Referral placed by Dr. Arana, but patient is an established patient of Dr. Bauer's, last seen in 03/2020.  Attempted to schedule blood work for visit scheduled 10/15/20 in [place], per wife's request, but wife reports patient recently had labs at [facility].  Wife to get copy of labs and fax them to our office prior to visit on 10/15/20.

## 2020-10-03 DIAGNOSIS — M10.9 GOUT, UNSPECIFIED CAUSE, UNSPECIFIED CHRONICITY, UNSPECIFIED SITE: ICD-10-CM

## 2020-10-05 ENCOUNTER — TELEPHONE (OUTPATIENT)
Dept: HEMATOLOGY/ONCOLOGY | Facility: CLINIC | Age: 80
End: 2020-10-05

## 2020-10-05 RX ORDER — ALLOPURINOL 100 MG/1
TABLET ORAL
Qty: 90 TABLET | Refills: 3 | Status: SHIPPED | OUTPATIENT
Start: 2020-10-05 | End: 2021-11-11

## 2020-10-05 NOTE — TELEPHONE ENCOUNTER
Spoke to patient's wife and informed that labs received from [facility] (scanned into media 10/2/2020).  No additional labs needed at this time for upcoming appt.  Wife verbalized understanding.  No further questions/concerns noted at this time.      ----- Message from Oc Summers sent at 10/5/2020  3:17 PM CDT -----  Contact: Wife/Asia Betancourt called in and stated patient has an appointment on 10/15/20 and wanted to make sure that there is no additional labs needed prior to appointment?    Pat's call back number is [phone]

## 2020-10-06 ENCOUNTER — PATIENT MESSAGE (OUTPATIENT)
Dept: FAMILY MEDICINE | Facility: CLINIC | Age: 80
End: 2020-10-06

## 2020-10-06 RX ORDER — SERTRALINE HYDROCHLORIDE 50 MG/1
50 TABLET, FILM COATED ORAL DAILY
Qty: 90 TABLET | Refills: 3 | Status: SHIPPED | OUTPATIENT
Start: 2020-10-06 | End: 2021-04-16 | Stop reason: SDUPTHER

## 2020-10-15 ENCOUNTER — OFFICE VISIT (OUTPATIENT)
Dept: HEMATOLOGY/ONCOLOGY | Facility: CLINIC | Age: 80
End: 2020-10-15
Payer: MEDICARE

## 2020-10-15 VITALS
WEIGHT: 156.75 LBS | RESPIRATION RATE: 18 BRPM | SYSTOLIC BLOOD PRESSURE: 129 MMHG | OXYGEN SATURATION: 99 % | HEART RATE: 73 BPM | TEMPERATURE: 97 F | BODY MASS INDEX: 25.3 KG/M2 | DIASTOLIC BLOOD PRESSURE: 66 MMHG

## 2020-10-15 DIAGNOSIS — N18.9 ANEMIA ASSOCIATED WITH CHRONIC RENAL FAILURE: ICD-10-CM

## 2020-10-15 DIAGNOSIS — Z79.01 CURRENT USE OF LONG TERM ANTICOAGULATION: ICD-10-CM

## 2020-10-15 DIAGNOSIS — D63.1 ANEMIA ASSOCIATED WITH CHRONIC RENAL FAILURE: ICD-10-CM

## 2020-10-15 PROCEDURE — 3078F DIAST BP <80 MM HG: CPT | Mod: CPTII,S$GLB,, | Performed by: INTERNAL MEDICINE

## 2020-10-15 PROCEDURE — 3078F PR MOST RECENT DIASTOLIC BLOOD PRESSURE < 80 MM HG: ICD-10-PCS | Mod: CPTII,S$GLB,, | Performed by: INTERNAL MEDICINE

## 2020-10-15 PROCEDURE — 1101F PT FALLS ASSESS-DOCD LE1/YR: CPT | Mod: CPTII,S$GLB,, | Performed by: INTERNAL MEDICINE

## 2020-10-15 PROCEDURE — 99214 OFFICE O/P EST MOD 30 MIN: CPT | Mod: S$GLB,,, | Performed by: INTERNAL MEDICINE

## 2020-10-15 PROCEDURE — 99999 PR PBB SHADOW E&M-EST. PATIENT-LVL V: CPT | Mod: PBBFAC,,, | Performed by: INTERNAL MEDICINE

## 2020-10-15 PROCEDURE — 1159F PR MEDICATION LIST DOCUMENTED IN MEDICAL RECORD: ICD-10-PCS | Mod: S$GLB,,, | Performed by: INTERNAL MEDICINE

## 2020-10-15 PROCEDURE — 3074F SYST BP LT 130 MM HG: CPT | Mod: CPTII,S$GLB,, | Performed by: INTERNAL MEDICINE

## 2020-10-15 PROCEDURE — 99999 PR PBB SHADOW E&M-EST. PATIENT-LVL V: ICD-10-PCS | Mod: PBBFAC,,, | Performed by: INTERNAL MEDICINE

## 2020-10-15 PROCEDURE — 1101F PR PT FALLS ASSESS DOC 0-1 FALLS W/OUT INJ PAST YR: ICD-10-PCS | Mod: CPTII,S$GLB,, | Performed by: INTERNAL MEDICINE

## 2020-10-15 PROCEDURE — 3288F PR FALLS RISK ASSESSMENT DOCUMENTED: ICD-10-PCS | Mod: CPTII,S$GLB,, | Performed by: INTERNAL MEDICINE

## 2020-10-15 PROCEDURE — 3288F FALL RISK ASSESSMENT DOCD: CPT | Mod: CPTII,S$GLB,, | Performed by: INTERNAL MEDICINE

## 2020-10-15 PROCEDURE — 1159F MED LIST DOCD IN RCRD: CPT | Mod: S$GLB,,, | Performed by: INTERNAL MEDICINE

## 2020-10-15 PROCEDURE — 3074F PR MOST RECENT SYSTOLIC BLOOD PRESSURE < 130 MM HG: ICD-10-PCS | Mod: CPTII,S$GLB,, | Performed by: INTERNAL MEDICINE

## 2020-10-15 PROCEDURE — 99214 PR OFFICE/OUTPT VISIT, EST, LEVL IV, 30-39 MIN: ICD-10-PCS | Mod: S$GLB,,, | Performed by: INTERNAL MEDICINE

## 2020-10-15 NOTE — Clinical Note
Please tie al the old orders of mine in epic to Aarti :) then we need to follow up on results when they come through and call him with recs prety please

## 2020-10-15 NOTE — PROGRESS NOTES
Cc I had labs for my kidney doctor     Lucho Delong is a 79 y.o.  This is a pleasant 79 yr old gentleman with anemia and thrombocytopenia. He is tolerating anticoagulation for a fib, He has undergone an AVR and has a history of prostate cancer. He is extremely fatigued and worried he may need blood.  He reports no melena , no hematochezia. He did not get the labs I ordered done he had the nephrologists labs drawn       Past Medical History:   Diagnosis Date    Anticoagulant long-term use     Atrial fibrillation     Bursitis of hip     Chronic back pain     Chronic kidney disease, stage 3     Gastroesophageal reflux disease     Gout     Hyperlipemia     Hypertension     Prostate cancer 1997    Stroke 04/2019     Past Surgical History:   Procedure Laterality Date    CATHETERIZATION OF BOTH LEFT AND RIGHT HEART N/A 12/2/2019    Procedure: CATHETERIZATION, HEART, BOTH LEFT AND RIGHT;  Surgeon: Everett Sosa MD;  Location: [facility] CATH/EP LAB;  Service: Cardiology;  Laterality: N/A;    COLONOSCOPY  1997    CORONARY ANGIOGRAPHY N/A 1/8/2020    Procedure: ANGIOGRAM, CORONARY ARTERY;  Surgeon: Bryan Zhang MD;  Location: [facility] CATH LAB;  Service: Cardiology;  Laterality: N/A;    CORONARY STENT PLACEMENT N/A 1/8/2020    Procedure: INSERTION, STENT, CORONARY ARTERY;  Surgeon: Bryan Zhang MD;  Location: [facility] CATH LAB;  Service: Cardiology;  Laterality: N/A;    PERCUTANEOUS TRANSLUMINAL ANGIOPLASTY (PTA) OF PERIPHERAL VESSEL N/A 1/23/2020    Procedure: PTA, PERIPHERAL VESSEL;  Surgeon: Bryan Zhang MD;  Location: [facility] CATH LAB;  Service: Cardiology;  Laterality: N/A;    PROSTATE SURGERY  1997    TONSILLECTOMY      TRANSCATHETER AORTIC VALVE REPLACEMENT (TAVR)  2/18/2020    Procedure: REPLACEMENT, AORTIC VALVE, TRANSCATHETER (TAVR);  Surgeon: Bryan Zhang MD;  Location: [facility] CATH LAB;  Service: Cardiology;;    TRANSESOPHAGEAL ECHOCARDIOGRAPHY N/A 1/23/2020    Procedure:  ECHOCARDIOGRAM, TRANSESOPHAGEAL;  Surgeon: [facility] Diagnostic Provider;  Location: [facility] EP LAB;  Service: Anesthesiology;  Laterality: N/A;       Current Outpatient Medications:     allopurinoL (ZYLOPRIM) 100 MG tablet, Take 1 tablet by mouth once daily, Disp: 90 tablet, Rfl: 3    aspirin 81 MG Chew, Take 1 tablet (81 mg total) by mouth once daily., Disp: 50 tablet, Rfl: 3    atorvastatin (LIPITOR) 40 MG tablet, Take 1 tablet (40 mg total) by mouth Daily., Disp: 90 tablet, Rfl: 3    cyanocobalamin 1,000 mcg/mL injection, INJECT 1 ML INTRAMUSCULARLY ONCE EVERY 30 DAYS, Disp: , Rfl: 1    hydrocortisone 2.5 % cream, Apply topically 2 (two) times daily., Disp: 20 g, Rfl: 0    lisinopriL (PRINIVIL,ZESTRIL) 20 MG tablet, Take 1 tablet (20 mg total) by mouth once daily., Disp: 90 tablet, Rfl: 3    metoprolol succinate (TOPROL-XL) 100 MG 24 hr tablet, Take 1 tablet (100 mg total) by mouth once daily., Disp: 90 tablet, Rfl: 3    NIFEdipine (PROCARDIA-XL) 60 MG (OSM) 24 hr tablet, Take 1 tablet (60 mg total) by mouth once daily., Disp: 90 tablet, Rfl: 3    predniSONE (DELTASONE) 5 MG tablet, Take 1 tablet by mouth once daily, Disp: 90 tablet, Rfl: 0    rivaroxaban (XARELTO) 20 mg Tab, Take 1 tablet (20 mg total) by mouth daily with dinner or evening meal., Disp: 90 tablet, Rfl: 3    sertraline (ZOLOFT) 50 MG tablet, Take 1 tablet (50 mg total) by mouth once daily., Disp: 90 tablet, Rfl: 3    donepeziL (ARICEPT) 5 MG tablet, Take 5 mg by mouth Daily. Take at night, Disp: , Rfl:     levocetirizine (XYZAL) 5 MG tablet, Take 1 tablet (5 mg total) by mouth every evening., Disp: 30 tablet, Rfl: 11  Review of patient's allergies indicates:   Allergen Reactions    Amiodarone analogues Other (See Comments)     Dizziness    Tramadol Itching and Swelling     Began medication, two doses into this treatment patient experiences tongue swelling and severe itching. Patient MD advised to not take this medication anymore     Social  History     Tobacco Use    Smoking status: Never Smoker    Smokeless tobacco: Never Used   Substance Use Topics    Alcohol use: Not Currently     Alcohol/week: 3.3 standard drinks     Types: 4 Standard drinks or equivalent per week     Frequency: 2-4 times a month     Comment: bourbon or beer    Drug use: No     Family History   Problem Relation Age of Onset    Cancer Father 86        colon    Alzheimer's disease Mother 86    Alzheimer's disease Sister         has not known fm for 15 yrs    Melanoma Neg Hx     Psoriasis Neg Hx     Lupus Neg Hx     Eczema Neg Hx        CONSTITUTIONAL: No fevers, chills, night sweats, wt. loss, appetite changes  SKIN: no rashes or itching  ENT: No headaches, head trauma, vision changes, or eye pain  LYMPH NODES: None noticed   CV: No chest pain, palpitations.   RESP: No dyspnea on exertion, cough, wheezing, or hemoptysis  GI: No nausea, emesis, diarrhea, constipation, melena, hematochezia, pain.   : No dysuria, hematuria, urgency, or frequency   HEME: No easy bruising, bleeding problems  PSYCHIATRIC: No depression, anxiety, psychosis, hallucinations.  NEURO: No seizures, memory loss, dizziness or difficulty sleeping  MSK: No arthralgias or joint swelling    /66 (BP Location: Right arm, Patient Position: Sitting, BP Method: Medium (Automatic))   Pulse 73   Temp 97.3 °F (36.3 °C) (Temporal)   Resp 18   Wt 71.1 kg (156 lb 12 oz)   SpO2 99%   BMI 25.30 kg/m²       General patient is in no distress well developed well nourished  Psych pleasant affect no evidence of depression no evidence of anxiety  Head normocephalic atraumatic, no hoarseness,  well-spoken speech intact  Eyes noninjected sclera conjunctiva appear pink  Face is symmetric  Skin intact no lesions noted no ecchymosis no rash  Neck with no limited range of motion no JVD evident  Chest with no use of accessory muscles no labored breathing no evidence of tachypnea  No respiratory distress, effort normal    Abdomen appears to be nondistended  Extremities with no cyanosis no evidence of edema  Neuro muscular cranial nerves appear grossly intact  Gait appears steady  No joint effusions  Behavior normal judgment normal      Lab Results   Component Value Date    WBC 9.09 02/19/2020    HGB 9.9 (L) 02/19/2020    HCT 31.0 (L) 02/19/2020    MCV 99 (H) 02/19/2020     (L) 02/19/2020       Counts reviewed from [place]  Hemoglobin up to 10.9     CMP      Anemia associated with chronic renal failure  -     Ambulatory referral/consult to Hematology / Oncology    Current use of long term anticoagulation  -     Ambulatory referral/consult to Hematology / Oncology        No further need for retacrit at this time   He has kindly agreed to get them drawn in [place] and we will call him with recs   Next OV to be determined at that time   He is to continue lisinopril for HTN   He is to continue statin for dyslipidemia   Cont to focus on caloric intake     Current Outpatient Medications:     allopurinoL (ZYLOPRIM) 100 MG tablet, Take 1 tablet by mouth once daily, Disp: 90 tablet, Rfl: 3    aspirin 81 MG Chew, Take 1 tablet (81 mg total) by mouth once daily., Disp: 50 tablet, Rfl: 3    atorvastatin (LIPITOR) 40 MG tablet, Take 1 tablet (40 mg total) by mouth Daily., Disp: 90 tablet, Rfl: 3    cyanocobalamin 1,000 mcg/mL injection, INJECT 1 ML INTRAMUSCULARLY ONCE EVERY 30 DAYS, Disp: , Rfl: 1    hydrocortisone 2.5 % cream, Apply topically 2 (two) times daily., Disp: 20 g, Rfl: 0    lisinopriL (PRINIVIL,ZESTRIL) 20 MG tablet, Take 1 tablet (20 mg total) by mouth once daily., Disp: 90 tablet, Rfl: 3    metoprolol succinate (TOPROL-XL) 100 MG 24 hr tablet, Take 1 tablet (100 mg total) by mouth once daily., Disp: 90 tablet, Rfl: 3    NIFEdipine (PROCARDIA-XL) 60 MG (OSM) 24 hr tablet, Take 1 tablet (60 mg total) by mouth once daily., Disp: 90 tablet, Rfl: 3    predniSONE (DELTASONE) 5 MG tablet, Take 1 tablet by mouth once  daily, Disp: 90 tablet, Rfl: 0    rivaroxaban (XARELTO) 20 mg Tab, Take 1 tablet (20 mg total) by mouth daily with dinner or evening meal., Disp: 90 tablet, Rfl: 3    sertraline (ZOLOFT) 50 MG tablet, Take 1 tablet (50 mg total) by mouth once daily., Disp: 90 tablet, Rfl: 3    donepeziL (ARICEPT) 5 MG tablet, Take 5 mg by mouth Daily. Take at night, Disp: , Rfl:     levocetirizine (XYZAL) 5 MG tablet, Take 1 tablet (5 mg total) by mouth every evening., Disp: 30 tablet, Rfl: 11    Thank you for allowing me to evaluate and participate in the care of this pleasant patient. Please fell free to call me with any questions or concerns.    Warmly,  Verito Bauer MD    This note was dictated with Dragon and briefly proofread. Please excuse any sentences that may be unclear or words misspelled

## 2020-10-16 ENCOUNTER — TELEPHONE (OUTPATIENT)
Dept: HEMATOLOGY/ONCOLOGY | Facility: CLINIC | Age: 80
End: 2020-10-16

## 2020-10-16 NOTE — TELEPHONE ENCOUNTER
I am asking staff to reschedule labs that were not scheduled since his last visit hence I need them scheduled and OV for pt after. Please call the patient and let him know when he go for another lab draw if this has not been done yet . Thank you

## 2020-10-16 NOTE — TELEPHONE ENCOUNTER
Contacted patient to schedule outstanding labs at the  location--LM with call back number--Dr Bauer would like to have an OV 1 week after labs to ensure we have all results

## 2020-10-20 ENCOUNTER — TELEPHONE (OUTPATIENT)
Dept: HEMATOLOGY/ONCOLOGY | Facility: CLINIC | Age: 80
End: 2020-10-20

## 2020-10-20 NOTE — TELEPHONE ENCOUNTER
Spoke to pt wife, scheduled labs and f/up per pt wife request. Pt and wife prefer to be seen in [place]. Pt wife confirmed appts.     ----- Message from Verito Bauer MD sent at 10/15/2020 11:43 AM CDT -----  Please tie al the old orders of mine in epic to Aarti :) then we need to follow up on results when they come through and call him with recs prety please

## 2020-10-27 ENCOUNTER — TELEPHONE (OUTPATIENT)
Dept: FAMILY MEDICINE | Facility: CLINIC | Age: 80
End: 2020-10-27

## 2020-10-27 RX ORDER — PREDNISONE 20 MG/1
20 TABLET ORAL 2 TIMES DAILY
Qty: 10 TABLET | Refills: 0 | Status: SHIPPED | OUTPATIENT
Start: 2020-10-27 | End: 2020-11-06

## 2020-10-27 NOTE — TELEPHONE ENCOUNTER
Spoke to pt's wife, pt is having a gout flare up in his L foot. It has been hurting for 3 days. She would like to know if medication can be sent in for him to [facility] in [place]?

## 2020-10-27 NOTE — TELEPHONE ENCOUNTER
----- Message from Jennie Perry MA sent at 10/27/2020  1:41 PM CDT -----  PT is calling in regards to some medication ( allopurinoL (ZYLOPRIM) 100 MG tablet)   Call back # [phone] Pat ( Wife )

## 2020-10-28 ENCOUNTER — LAB VISIT (OUTPATIENT)
Dept: LAB | Facility: HOSPITAL | Age: 80
End: 2020-10-28
Attending: INTERNAL MEDICINE
Payer: MEDICARE

## 2020-10-28 DIAGNOSIS — D69.6 THROMBOCYTOPENIA: ICD-10-CM

## 2020-10-28 DIAGNOSIS — D64.9 ANEMIA, UNSPECIFIED TYPE: ICD-10-CM

## 2020-10-28 LAB
B2 MICROGLOB SERPL-MCNC: 5.8 UG/ML (ref 0–2.5)
BASOPHILS # BLD AUTO: 0.02 K/UL (ref 0–0.2)
BASOPHILS NFR BLD: 0.3 % (ref 0–1.9)
DIFFERENTIAL METHOD: ABNORMAL
DIRECT COOMBS (DAT): NORMAL
EOSINOPHIL # BLD AUTO: 0 K/UL (ref 0–0.5)
EOSINOPHIL NFR BLD: 0.1 % (ref 0–8)
ERYTHROCYTE [DISTWIDTH] IN BLOOD BY AUTOMATED COUNT: 12.4 % (ref 11.5–14.5)
HCT VFR BLD AUTO: 36.4 % (ref 40–54)
HGB BLD-MCNC: 11.6 G/DL (ref 14–18)
IGA SERPL-MCNC: 244 MG/DL (ref 40–350)
IGG SERPL-MCNC: 937 MG/DL (ref 650–1600)
IGM SERPL-MCNC: 61 MG/DL (ref 50–300)
IMM GRANULOCYTES # BLD AUTO: 0.03 K/UL (ref 0–0.04)
IMM GRANULOCYTES NFR BLD AUTO: 0.4 % (ref 0–0.5)
LDH SERPL L TO P-CCNC: 174 U/L (ref 110–260)
LYMPHOCYTES # BLD AUTO: 0.8 K/UL (ref 1–4.8)
LYMPHOCYTES NFR BLD: 10.8 % (ref 18–48)
MCH RBC QN AUTO: 31.2 PG (ref 27–31)
MCHC RBC AUTO-ENTMCNC: 31.9 G/DL (ref 32–36)
MCV RBC AUTO: 98 FL (ref 82–98)
MONOCYTES # BLD AUTO: 0.2 K/UL (ref 0.3–1)
MONOCYTES NFR BLD: 3.3 % (ref 4–15)
NEUTROPHILS # BLD AUTO: 6.1 K/UL (ref 1.8–7.7)
NEUTROPHILS NFR BLD: 85.1 % (ref 38–73)
NRBC BLD-RTO: 0 /100 WBC
PLATELET # BLD AUTO: 194 K/UL (ref 150–350)
PMV BLD AUTO: 10.8 FL (ref 9.2–12.9)
RBC # BLD AUTO: 3.72 M/UL (ref 4.6–6.2)
WBC # BLD AUTO: 7.2 K/UL (ref 3.9–12.7)

## 2020-10-28 PROCEDURE — 84165 PATHOLOGIST INTERPRETATION SPE: ICD-10-PCS | Mod: 26,,, | Performed by: PATHOLOGY

## 2020-10-28 PROCEDURE — 82232 ASSAY OF BETA-2 PROTEIN: CPT

## 2020-10-28 PROCEDURE — 84165 PROTEIN E-PHORESIS SERUM: CPT

## 2020-10-28 PROCEDURE — 82784 ASSAY IGA/IGD/IGG/IGM EACH: CPT | Mod: 59

## 2020-10-28 PROCEDURE — 83520 IMMUNOASSAY QUANT NOS NONAB: CPT | Mod: 59

## 2020-10-28 PROCEDURE — 84220 ASSAY OF PYRUVATE KINASE: CPT

## 2020-10-28 PROCEDURE — 86880 COOMBS TEST DIRECT: CPT

## 2020-10-28 PROCEDURE — 83921 ORGANIC ACID SINGLE QUANT: CPT

## 2020-10-28 PROCEDURE — 84425 ASSAY OF VITAMIN B-1: CPT

## 2020-10-28 PROCEDURE — 84165 PROTEIN E-PHORESIS SERUM: CPT | Mod: 26,,, | Performed by: PATHOLOGY

## 2020-10-28 PROCEDURE — 84207 ASSAY OF VITAMIN B-6: CPT

## 2020-10-28 PROCEDURE — 86334 IMMUNOFIX E-PHORESIS SERUM: CPT

## 2020-10-28 PROCEDURE — 83615 LACTATE (LD) (LDH) ENZYME: CPT

## 2020-10-28 PROCEDURE — 85025 COMPLETE CBC W/AUTO DIFF WBC: CPT

## 2020-10-28 PROCEDURE — 86334 PATHOLOGIST INTERPRETATION IFE: ICD-10-PCS | Mod: 26,,, | Performed by: PATHOLOGY

## 2020-10-28 PROCEDURE — 86334 IMMUNOFIX E-PHORESIS SERUM: CPT | Mod: 26,,, | Performed by: PATHOLOGY

## 2020-10-29 LAB
ALBUMIN SERPL ELPH-MCNC: 4.05 G/DL (ref 3.35–5.55)
ALPHA1 GLOB SERPL ELPH-MCNC: 0.35 G/DL (ref 0.17–0.41)
ALPHA2 GLOB SERPL ELPH-MCNC: 0.72 G/DL (ref 0.43–0.99)
B-GLOBULIN SERPL ELPH-MCNC: 0.73 G/DL (ref 0.5–1.1)
GAMMA GLOB SERPL ELPH-MCNC: 0.85 G/DL (ref 0.67–1.58)
INTERPRETATION SERPL IFE-IMP: NORMAL
KAPPA LC SER QL IA: 3.62 MG/DL (ref 0.33–1.94)
KAPPA LC/LAMBDA SER IA: 1.21 (ref 0.26–1.65)
LAMBDA LC SER QL IA: 3 MG/DL (ref 0.57–2.63)
PATHOLOGIST INTERPRETATION IFE: NORMAL
PATHOLOGIST INTERPRETATION SPE: NORMAL
PROT SERPL-MCNC: 6.7 G/DL (ref 6–8.4)

## 2020-11-01 LAB — PK RBC-CCNT: 12.6 U/G HB (ref 6.7–14.3)

## 2020-11-03 LAB
METHYLMALONATE SERPL-SCNC: 0.27 UMOL/L
PYRIDOXAL SERPL-MCNC: 6 UG/L (ref 5–50)

## 2020-11-04 LAB — VIT B1 BLD-MCNC: 61 UG/L (ref 38–122)

## 2020-11-05 ENCOUNTER — OFFICE VISIT (OUTPATIENT)
Dept: HEMATOLOGY/ONCOLOGY | Facility: CLINIC | Age: 80
End: 2020-11-05
Payer: MEDICARE

## 2020-11-05 VITALS
BODY MASS INDEX: 23.46 KG/M2 | SYSTOLIC BLOOD PRESSURE: 146 MMHG | HEIGHT: 66 IN | HEART RATE: 60 BPM | DIASTOLIC BLOOD PRESSURE: 77 MMHG | RESPIRATION RATE: 16 BRPM | OXYGEN SATURATION: 98 % | TEMPERATURE: 98 F | WEIGHT: 146 LBS

## 2020-11-05 DIAGNOSIS — E53.1 VITAMIN B6 DEFICIENCY: ICD-10-CM

## 2020-11-05 DIAGNOSIS — E83.59 OTHER DISORDERS OF CALCIUM METABOLISM: ICD-10-CM

## 2020-11-05 DIAGNOSIS — N18.9 ANEMIA ASSOCIATED WITH CHRONIC RENAL FAILURE: Primary | ICD-10-CM

## 2020-11-05 DIAGNOSIS — R93.89 ABNORMAL FINDINGS ON DIAGNOSTIC IMAGING OF OTHER SPECIFIED BODY STRUCTURES: ICD-10-CM

## 2020-11-05 DIAGNOSIS — Z79.01 CURRENT USE OF LONG TERM ANTICOAGULATION: ICD-10-CM

## 2020-11-05 DIAGNOSIS — I10 ESSENTIAL HYPERTENSION: ICD-10-CM

## 2020-11-05 DIAGNOSIS — N18.4 STAGE 4 CHRONIC KIDNEY DISEASE: ICD-10-CM

## 2020-11-05 DIAGNOSIS — D63.1 ANEMIA ASSOCIATED WITH CHRONIC RENAL FAILURE: Primary | ICD-10-CM

## 2020-11-05 DIAGNOSIS — E78.2 MIXED HYPERLIPIDEMIA: ICD-10-CM

## 2020-11-05 PROCEDURE — 3078F PR MOST RECENT DIASTOLIC BLOOD PRESSURE < 80 MM HG: ICD-10-PCS | Mod: CPTII,S$GLB,, | Performed by: INTERNAL MEDICINE

## 2020-11-05 PROCEDURE — 3078F DIAST BP <80 MM HG: CPT | Mod: CPTII,S$GLB,, | Performed by: INTERNAL MEDICINE

## 2020-11-05 PROCEDURE — 1126F AMNT PAIN NOTED NONE PRSNT: CPT | Mod: S$GLB,,, | Performed by: INTERNAL MEDICINE

## 2020-11-05 PROCEDURE — 99999 PR PBB SHADOW E&M-EST. PATIENT-LVL IV: CPT | Mod: PBBFAC,,, | Performed by: INTERNAL MEDICINE

## 2020-11-05 PROCEDURE — 1101F PR PT FALLS ASSESS DOC 0-1 FALLS W/OUT INJ PAST YR: ICD-10-PCS | Mod: CPTII,S$GLB,, | Performed by: INTERNAL MEDICINE

## 2020-11-05 PROCEDURE — 1126F PR PAIN SEVERITY QUANTIFIED, NO PAIN PRESENT: ICD-10-PCS | Mod: S$GLB,,, | Performed by: INTERNAL MEDICINE

## 2020-11-05 PROCEDURE — 99214 PR OFFICE/OUTPT VISIT, EST, LEVL IV, 30-39 MIN: ICD-10-PCS | Mod: S$GLB,,, | Performed by: INTERNAL MEDICINE

## 2020-11-05 PROCEDURE — 1159F MED LIST DOCD IN RCRD: CPT | Mod: S$GLB,,, | Performed by: INTERNAL MEDICINE

## 2020-11-05 PROCEDURE — 99999 PR PBB SHADOW E&M-EST. PATIENT-LVL IV: ICD-10-PCS | Mod: PBBFAC,,, | Performed by: INTERNAL MEDICINE

## 2020-11-05 PROCEDURE — 3077F SYST BP >= 140 MM HG: CPT | Mod: CPTII,S$GLB,, | Performed by: INTERNAL MEDICINE

## 2020-11-05 PROCEDURE — 99214 OFFICE O/P EST MOD 30 MIN: CPT | Mod: S$GLB,,, | Performed by: INTERNAL MEDICINE

## 2020-11-05 PROCEDURE — 1101F PT FALLS ASSESS-DOCD LE1/YR: CPT | Mod: CPTII,S$GLB,, | Performed by: INTERNAL MEDICINE

## 2020-11-05 PROCEDURE — 1159F PR MEDICATION LIST DOCUMENTED IN MEDICAL RECORD: ICD-10-PCS | Mod: S$GLB,,, | Performed by: INTERNAL MEDICINE

## 2020-11-05 PROCEDURE — 3077F PR MOST RECENT SYSTOLIC BLOOD PRESSURE >= 140 MM HG: ICD-10-PCS | Mod: CPTII,S$GLB,, | Performed by: INTERNAL MEDICINE

## 2020-11-05 RX ORDER — LANOLIN ALCOHOL/MO/W.PET/CERES
50 CREAM (GRAM) TOPICAL DAILY
Qty: 90 TABLET | Refills: 1 | Status: SHIPPED | OUTPATIENT
Start: 2020-11-05 | End: 2021-11-05

## 2020-11-05 NOTE — PROGRESS NOTES
Cc I had labs for my kidney doctor at [place]     Lucho Delong is a 79 y.o.  This is a pleasant 79 yr old gentleman with anemia and thrombocytopenia. He is tolerating anticoagulation for a fib, He has undergone an AVR and has a history of prostate cancer. He is extremely fatigued and worried he may need blood.  He reports no melena , no hematochezia. He did not get the labs I ordered done he had the nephrologists labs drawn   He is extremely fatigued     Past Medical History:   Diagnosis Date    Anticoagulant long-term use     Atrial fibrillation     Bursitis of hip     Chronic back pain     Chronic kidney disease, stage 3     Gastroesophageal reflux disease     Gout     Hyperlipemia     Hypertension     Prostate cancer 1997    Stroke 04/2019     Past Surgical History:   Procedure Laterality Date    CATHETERIZATION OF BOTH LEFT AND RIGHT HEART N/A 12/2/2019    Procedure: CATHETERIZATION, HEART, BOTH LEFT AND RIGHT;  Surgeon: Everett Sosa MD;  Location: [facility] CATH/EP LAB;  Service: Cardiology;  Laterality: N/A;    COLONOSCOPY  1997    CORONARY ANGIOGRAPHY N/A 1/8/2020    Procedure: ANGIOGRAM, CORONARY ARTERY;  Surgeon: Bryan Zhang MD;  Location: [facility] CATH LAB;  Service: Cardiology;  Laterality: N/A;    CORONARY STENT PLACEMENT N/A 1/8/2020    Procedure: INSERTION, STENT, CORONARY ARTERY;  Surgeon: Bryan Zhang MD;  Location: [facility] CATH LAB;  Service: Cardiology;  Laterality: N/A;    PERCUTANEOUS TRANSLUMINAL ANGIOPLASTY (PTA) OF PERIPHERAL VESSEL N/A 1/23/2020    Procedure: PTA, PERIPHERAL VESSEL;  Surgeon: Bryan Zhang MD;  Location: [facility] CATH LAB;  Service: Cardiology;  Laterality: N/A;    PROSTATE SURGERY  1997    TONSILLECTOMY      TRANSCATHETER AORTIC VALVE REPLACEMENT (TAVR)  2/18/2020    Procedure: REPLACEMENT, AORTIC VALVE, TRANSCATHETER (TAVR);  Surgeon: Bryan Zhang MD;  Location: [facility] CATH LAB;  Service: Cardiology;;    TRANSESOPHAGEAL ECHOCARDIOGRAPHY N/A  1/23/2020    Procedure: ECHOCARDIOGRAM, TRANSESOPHAGEAL;  Surgeon: Bhavani Diagnostic Provider;  [facility]: [facility];  Service: Anesthesiology;  Laterality: N/A;       Current Outpatient Medications:     allopurinoL (ZYLOPRIM) 100 MG tablet, Take 1 tablet by mouth once daily, Disp: 90 tablet, Rfl: 3    aspirin 81 MG Chew, Take 1 tablet (81 mg total) by mouth once daily., Disp: 50 tablet, Rfl: 3    atorvastatin (LIPITOR) 40 MG tablet, Take 1 tablet (40 mg total) by mouth Daily., Disp: 90 tablet, Rfl: 3    cyanocobalamin 1,000 mcg/mL injection, INJECT 1 ML INTRAMUSCULARLY ONCE EVERY 30 DAYS, Disp: , Rfl: 1    hydrocortisone 2.5 % cream, Apply topically 2 (two) times daily., Disp: 20 g, Rfl: 0    lisinopriL (PRINIVIL,ZESTRIL) 20 MG tablet, Take 1 tablet (20 mg total) by mouth once daily., Disp: 90 tablet, Rfl: 3    metoprolol succinate (TOPROL-XL) 100 MG 24 hr tablet, Take 1 tablet (100 mg total) by mouth once daily., Disp: 90 tablet, Rfl: 3    NIFEdipine (PROCARDIA-XL) 60 MG (OSM) 24 hr tablet, Take 1 tablet (60 mg total) by mouth once daily., Disp: 90 tablet, Rfl: 3    predniSONE (DELTASONE) 5 MG tablet, Take 1 tablet by mouth once daily, Disp: 90 tablet, Rfl: 0    rivaroxaban (XARELTO) 20 mg Tab, Take 1 tablet (20 mg total) by mouth daily with dinner or evening meal., Disp: 90 tablet, Rfl: 3    sertraline (ZOLOFT) 50 MG tablet, Take 1 tablet (50 mg total) by mouth once daily., Disp: 90 tablet, Rfl: 3    donepeziL (ARICEPT) 5 MG tablet, Take 5 mg by mouth Daily. Take at night, Disp: , Rfl:     levocetirizine (XYZAL) 5 MG tablet, Take 1 tablet (5 mg total) by mouth every evening., Disp: 30 tablet, Rfl: 11  Review of patient's allergies indicates:   Allergen Reactions    Amiodarone analogues Other (See Comments)     Dizziness    Tramadol Itching and Swelling     Began medication, two doses into this treatment patient experiences tongue swelling and severe itching. Patient MD advised to not take this  "medication anymore     Social History     Tobacco Use    Smoking status: Never Smoker    Smokeless tobacco: Never Used   Substance Use Topics    Alcohol use: Not Currently     Alcohol/week: 3.3 standard drinks     Types: 4 Standard drinks or equivalent per week     Frequency: 2-4 times a month     Comment: bourbon or beer    Drug use: No     Family History   Problem Relation Age of Onset    Cancer Father 86        colon    Alzheimer's disease Mother 86    Alzheimer's disease Sister         has not known fm for 15 yrs    Melanoma Neg Hx     Psoriasis Neg Hx     Lupus Neg Hx     Eczema Neg Hx        CONSTITUTIONAL: No fevers, chills, night sweats, wt. loss, appetite changes  SKIN: no rashes or itching  ENT: No headaches, head trauma, vision changes, or eye pain  LYMPH NODES: None noticed   CV: No chest pain, palpitations.   RESP: No dyspnea on exertion, cough, wheezing, or hemoptysis  GI: No nausea, emesis, diarrhea, constipation, melena, hematochezia, pain.   : No dysuria, hematuria, urgency, or frequency   HEME: No easy bruising, bleeding problems  PSYCHIATRIC: No depression, anxiety, psychosis, hallucinations.  NEURO: No seizures, memory loss, dizziness or difficulty sleeping  MSK: No arthralgias or joint swelling    BP (!) 146/77   Pulse 60   Temp 97.5 °F (36.4 °C) (Temporal)   Resp 16   Ht 5' 6" (1.676 m)   Wt 66.2 kg (146 lb)   SpO2 98%   BMI 23.57 kg/m²       General patient is in no distress well developed well nourished  Psych pleasant affect no evidence of depression no evidence of anxiety  Head normocephalic atraumatic, no hoarseness,  well-spoken speech intact  Eyes noninjected sclera conjunctiva appear pink  Face is symmetric  Skin intact no lesions noted no ecchymosis no rash  Neck with no limited range of motion no JVD evident  Chest with no use of accessory muscles no labored breathing no evidence of tachypnea  No respiratory distress, effort normal   Abdomen appears to be " nondistended  Extremities with no cyanosis no evidence of edema  Neuro muscular cranial nerves appear grossly intact  Gait appears steady  No joint effusions  Behavior normal judgment normal      Lab Results   Component Value Date    WBC 7.20 10/28/2020    HGB 11.6 (L) 10/28/2020    HCT 36.4 (L) 10/28/2020    MCV 98 10/28/2020     10/28/2020     CMP  Sodium   Date Value Ref Range Status   07/22/2020 140 136 - 145 mmol/L Final     Potassium   Date Value Ref Range Status   07/22/2020 4.6 3.5 - 5.1 mmol/L Final     Chloride   Date Value Ref Range Status   07/22/2020 115 (H) 95 - 110 mmol/L Final     CO2   Date Value Ref Range Status   07/22/2020 19 (L) 23 - 29 mmol/L Final     Glucose   Date Value Ref Range Status   07/22/2020 83 70 - 110 mg/dL Final     BUN   Date Value Ref Range Status   07/22/2020 51 (H) 8 - 23 mg/dL Final     Creatinine   Date Value Ref Range Status   07/22/2020 2.5 (H) 0.5 - 1.4 mg/dL Final     Calcium   Date Value Ref Range Status   07/22/2020 8.7 8.7 - 10.5 mg/dL Final     Total Protein   Date Value Ref Range Status   07/22/2020 6.2 6.0 - 8.4 g/dL Final     Albumin   Date Value Ref Range Status   07/22/2020 3.8 3.5 - 5.2 g/dL Final     Total Bilirubin   Date Value Ref Range Status   07/22/2020 0.5 0.1 - 1.0 mg/dL Final     Comment:     For infants and newborns, interpretation of results should be based  on gestational age, weight and in agreement with clinical  observations.  Premature Infant recommended reference ranges:  Up to 24 hours.............<8.0 mg/dL  Up to 48 hours............<12.0 mg/dL  3-5 days..................<15.0 mg/dL  6-29 days.................<15.0 mg/dL       Alkaline Phosphatase   Date Value Ref Range Status   07/22/2020 63 55 - 135 U/L Final     AST   Date Value Ref Range Status   07/22/2020 17 10 - 40 U/L Final     ALT   Date Value Ref Range Status   07/22/2020 24 10 - 44 U/L Final     Anion Gap   Date Value Ref Range Status   07/22/2020 6 (L) 8 - 16 mmol/L Final      eGFR if    Date Value Ref Range Status   07/22/2020 27.0 (A) >60 mL/min/1.73 m^2 Final     eGFR if non    Date Value Ref Range Status   07/22/2020 23.4 (A) >60 mL/min/1.73 m^2 Final     Comment:     Calculation used to obtain the estimated glomerular filtration  rate (eGFR) is the CKD-EPI equation.          Counts reviewed from [place]  Hemoglobin up to 10.9     Anemia associated with chronic renal failure  -     pyridoxine, vitamin B6, (VITAMIN B-6) 50 MG Tab; Take 1 tablet (50 mg total) by mouth once daily.  Dispense: 90 tablet; Refill: 1  -     CBC Auto Differential; Future; Expected date: 11/05/2020  -     CMP; Future; Expected date: 11/05/2020  -     TSH; Future; Expected date: 11/05/2020  -     T4; Future; Expected date: 11/05/2020  -     T3; Future; Expected date: 11/05/2020  -     Iron and TIBC; Future; Expected date: 11/05/2020  -     Calcitriol; Future; Expected date: 11/05/2020    Vitamin B6 deficiency  -     pyridoxine, vitamin B6, (VITAMIN B-6) 50 MG Tab; Take 1 tablet (50 mg total) by mouth once daily.  Dispense: 90 tablet; Refill: 1  -     CBC Auto Differential; Future; Expected date: 11/05/2020  -     CMP; Future; Expected date: 11/05/2020  -     Calcitriol; Future; Expected date: 11/05/2020    Current use of long term anticoagulation    Stage 4 chronic kidney disease    Essential hypertension    Mixed hyperlipidemia  -     Calcitriol; Future; Expected date: 11/05/2020    Abnormal findings on diagnostic imaging of other specified body structures   -     TSH; Future; Expected date: 11/05/2020  -     T4; Future; Expected date: 11/05/2020    Other disorders of calcium metabolism   -     Calcitriol; Future; Expected date: 11/05/2020      No further need for retacrit at this time : his hemoglobin has much improved  He has kindly agreed to get them drawn in [place] and we will call him with recs   Cont B12 injections in [place]   To see Dr Nolan to assess  fatigue : ? Thyroid vs parathyroid disease   Start B6 50 mg po QD   RTC in  2 months with labs   He is to continue lisinopril for HTN   He is to continue statin for dyslipidemia   Cont to focus on caloric intake     Current Outpatient Medications:     allopurinoL (ZYLOPRIM) 100 MG tablet, Take 1 tablet by mouth once daily, Disp: 90 tablet, Rfl: 3    aspirin 81 MG Chew, Take 1 tablet (81 mg total) by mouth once daily., Disp: 50 tablet, Rfl: 3    atorvastatin (LIPITOR) 40 MG tablet, Take 1 tablet (40 mg total) by mouth Daily., Disp: 90 tablet, Rfl: 3    cyanocobalamin 1,000 mcg/mL injection, INJECT 1 ML INTRAMUSCULARLY ONCE EVERY 30 DAYS, Disp: , Rfl: 1    hydrocortisone 2.5 % cream, Apply topically 2 (two) times daily., Disp: 20 g, Rfl: 0    lisinopriL (PRINIVIL,ZESTRIL) 20 MG tablet, Take 1 tablet (20 mg total) by mouth once daily., Disp: 90 tablet, Rfl: 3    metoprolol succinate (TOPROL-XL) 100 MG 24 hr tablet, Take 1 tablet (100 mg total) by mouth once daily., Disp: 90 tablet, Rfl: 3    NIFEdipine (PROCARDIA-XL) 60 MG (OSM) 24 hr tablet, Take 1 tablet (60 mg total) by mouth once daily., Disp: 90 tablet, Rfl: 3    predniSONE (DELTASONE) 5 MG tablet, Take 1 tablet by mouth once daily, Disp: 90 tablet, Rfl: 0    rivaroxaban (XARELTO) 20 mg Tab, Take 1 tablet (20 mg total) by mouth daily with dinner or evening meal., Disp: 90 tablet, Rfl: 3    sertraline (ZOLOFT) 50 MG tablet, Take 1 tablet (50 mg total) by mouth once daily., Disp: 90 tablet, Rfl: 3    donepeziL (ARICEPT) 5 MG tablet, Take 5 mg by mouth Daily. Take at night, Disp: , Rfl:     levocetirizine (XYZAL) 5 MG tablet, Take 1 tablet (5 mg total) by mouth every evening., Disp: 30 tablet, Rfl: 11    Thank you for allowing me to evaluate and participate in the care of this pleasant patient. Please fell free to call me with any questions or concerns.    Warmly,  Verito Bauer MD    This note was dictated with Dragon and briefly proofread. Please excuse  any sentences that may be unclear or words misspelled

## 2020-11-06 ENCOUNTER — PATIENT MESSAGE (OUTPATIENT)
Dept: HEMATOLOGY/ONCOLOGY | Facility: CLINIC | Age: 80
End: 2020-11-06

## 2020-11-10 ENCOUNTER — TELEPHONE (OUTPATIENT)
Dept: FAMILY MEDICINE | Facility: CLINIC | Age: 80
End: 2020-11-10

## 2020-11-10 NOTE — TELEPHONE ENCOUNTER
----- Message from Connie Payne sent at 11/10/2020  2:29 PM CST -----  Contact: call  pt [phone]  Type:  Sooner Apoointment Request    Caller is requesting a sooner appointment.  Caller declined first available appointment listed below.  Caller will not accept being placed on the waitlist and is requesting a message be sent to doctor.    Name of Caller:  #pt  When is the first available appointment?   Nurse  visit  for a  B12 injection  Best Call Back Number: call   [phone]  Additional Information:    nurse  visit  for a  B12  shot

## 2020-11-10 NOTE — TELEPHONE ENCOUNTER
Nurse visit scheduled for B12 injection tomorrow at 10am per pts request.  No other concerns at this time.

## 2020-11-11 ENCOUNTER — PATIENT MESSAGE (OUTPATIENT)
Dept: HEMATOLOGY/ONCOLOGY | Facility: CLINIC | Age: 80
End: 2020-11-11

## 2020-11-11 ENCOUNTER — CLINICAL SUPPORT (OUTPATIENT)
Dept: FAMILY MEDICINE | Facility: CLINIC | Age: 80
End: 2020-11-11
Payer: MEDICARE

## 2020-11-11 ENCOUNTER — TELEPHONE (OUTPATIENT)
Dept: HEMATOLOGY/ONCOLOGY | Facility: CLINIC | Age: 80
End: 2020-11-11

## 2020-11-11 NOTE — PROGRESS NOTES
Pt presents to clinic for B12 injection.  Left delt.  Pt tolerated well.  No other concerns at this time.

## 2020-11-11 NOTE — TELEPHONE ENCOUNTER
Labs reviewed, patient did not have the labs I ordered but his anemia is due to renal disease and Dr ARGUETA his nephrologist is schooled in this arena and will take good care of him. Please let him know to follow Dr ARGUETA's recommendations

## 2020-12-06 ENCOUNTER — PATIENT OUTREACH (OUTPATIENT)
Dept: ADMINISTRATIVE | Facility: OTHER | Age: 80
End: 2020-12-06

## 2020-12-07 NOTE — PROGRESS NOTES
Chart was reviewed for overdue Proactive [facility] Encounters ([facility])  topics  Updates were requested from care everywhere  Health Maintenance was unable to be updated  [place] immunization registry triggered

## 2020-12-08 ENCOUNTER — OFFICE VISIT (OUTPATIENT)
Dept: CARDIOLOGY | Facility: CLINIC | Age: 80
End: 2020-12-08
Payer: MEDICARE

## 2020-12-08 VITALS
TEMPERATURE: 98 F | HEIGHT: 66 IN | DIASTOLIC BLOOD PRESSURE: 64 MMHG | SYSTOLIC BLOOD PRESSURE: 109 MMHG | RESPIRATION RATE: 15 BRPM | HEART RATE: 72 BPM | WEIGHT: 162.69 LBS | BODY MASS INDEX: 26.14 KG/M2 | OXYGEN SATURATION: 100 %

## 2020-12-08 DIAGNOSIS — Z95.2 S/P TAVR (TRANSCATHETER AORTIC VALVE REPLACEMENT): ICD-10-CM

## 2020-12-08 DIAGNOSIS — G47.19 EXCESSIVE DAYTIME SLEEPINESS: Primary | ICD-10-CM

## 2020-12-08 DIAGNOSIS — R53.82 CHRONIC FATIGUE: ICD-10-CM

## 2020-12-08 DIAGNOSIS — N18.4 STAGE 4 CHRONIC KIDNEY DISEASE: ICD-10-CM

## 2020-12-08 DIAGNOSIS — Z91.89 SEDENTARY LIFESTYLE: ICD-10-CM

## 2020-12-08 DIAGNOSIS — Z79.01 CURRENT USE OF LONG TERM ANTICOAGULATION: ICD-10-CM

## 2020-12-08 DIAGNOSIS — F32.9 REACTIVE DEPRESSION: ICD-10-CM

## 2020-12-08 DIAGNOSIS — I48.19 PERSISTENT ATRIAL FIBRILLATION: ICD-10-CM

## 2020-12-08 DIAGNOSIS — Z86.73 HISTORY OF STROKE: ICD-10-CM

## 2020-12-08 DIAGNOSIS — G47.33 OSA (OBSTRUCTIVE SLEEP APNEA): ICD-10-CM

## 2020-12-08 DIAGNOSIS — L29.9 ITCHING: ICD-10-CM

## 2020-12-08 DIAGNOSIS — I10 ESSENTIAL HYPERTENSION: ICD-10-CM

## 2020-12-08 DIAGNOSIS — Z95.5 STATUS POST INSERTION OF DRUG-ELUTING STENT INTO LEFT ANTERIOR DESCENDING (LAD) ARTERY: ICD-10-CM

## 2020-12-08 PROCEDURE — 1159F PR MEDICATION LIST DOCUMENTED IN MEDICAL RECORD: ICD-10-PCS | Mod: S$GLB,,, | Performed by: INTERNAL MEDICINE

## 2020-12-08 PROCEDURE — 99999 PR PBB SHADOW E&M-EST. PATIENT-LVL IV: CPT | Mod: PBBFAC,,, | Performed by: INTERNAL MEDICINE

## 2020-12-08 PROCEDURE — 1126F AMNT PAIN NOTED NONE PRSNT: CPT | Mod: S$GLB,,, | Performed by: INTERNAL MEDICINE

## 2020-12-08 PROCEDURE — 3074F PR MOST RECENT SYSTOLIC BLOOD PRESSURE < 130 MM HG: ICD-10-PCS | Mod: CPTII,S$GLB,, | Performed by: INTERNAL MEDICINE

## 2020-12-08 PROCEDURE — 1159F MED LIST DOCD IN RCRD: CPT | Mod: S$GLB,,, | Performed by: INTERNAL MEDICINE

## 2020-12-08 PROCEDURE — 1126F PR PAIN SEVERITY QUANTIFIED, NO PAIN PRESENT: ICD-10-PCS | Mod: S$GLB,,, | Performed by: INTERNAL MEDICINE

## 2020-12-08 PROCEDURE — 3078F DIAST BP <80 MM HG: CPT | Mod: CPTII,S$GLB,, | Performed by: INTERNAL MEDICINE

## 2020-12-08 PROCEDURE — 1101F PT FALLS ASSESS-DOCD LE1/YR: CPT | Mod: CPTII,S$GLB,, | Performed by: INTERNAL MEDICINE

## 2020-12-08 PROCEDURE — 99214 PR OFFICE/OUTPT VISIT, EST, LEVL IV, 30-39 MIN: ICD-10-PCS | Mod: S$GLB,,, | Performed by: INTERNAL MEDICINE

## 2020-12-08 PROCEDURE — 99214 OFFICE O/P EST MOD 30 MIN: CPT | Mod: S$GLB,,, | Performed by: INTERNAL MEDICINE

## 2020-12-08 PROCEDURE — 3078F PR MOST RECENT DIASTOLIC BLOOD PRESSURE < 80 MM HG: ICD-10-PCS | Mod: CPTII,S$GLB,, | Performed by: INTERNAL MEDICINE

## 2020-12-08 PROCEDURE — 3288F PR FALLS RISK ASSESSMENT DOCUMENTED: ICD-10-PCS | Mod: CPTII,S$GLB,, | Performed by: INTERNAL MEDICINE

## 2020-12-08 PROCEDURE — 3074F SYST BP LT 130 MM HG: CPT | Mod: CPTII,S$GLB,, | Performed by: INTERNAL MEDICINE

## 2020-12-08 PROCEDURE — 3288F FALL RISK ASSESSMENT DOCD: CPT | Mod: CPTII,S$GLB,, | Performed by: INTERNAL MEDICINE

## 2020-12-08 PROCEDURE — 1101F PR PT FALLS ASSESS DOC 0-1 FALLS W/OUT INJ PAST YR: ICD-10-PCS | Mod: CPTII,S$GLB,, | Performed by: INTERNAL MEDICINE

## 2020-12-08 PROCEDURE — 99999 PR PBB SHADOW E&M-EST. PATIENT-LVL IV: ICD-10-PCS | Mod: PBBFAC,,, | Performed by: INTERNAL MEDICINE

## 2020-12-08 NOTE — PROGRESS NOTES
Subjective:    Patient ID:  Lucho Delong is a 79 y.o. male who presents for evaluation of Follow-up (3 month)  For post TAVR, 6 SARAI, HFpEF with persistent COLON, persistent AF on NOAC and Plavix, persistent itching, reactive depression since stroke in 4/2019  Came on own, attended my talk at [employer].  PCP: Dr. CHELSEA Nolan  Prior cardiologist: Dr. Sosa, last seen 12/2019  Intervention: Dr. Zahng  Dermatologist: Dr. García in [facility]  Renal: Dr. Guevara  Hematologist: Dr. Bauer  Lives with wife, Pat, here with patient, non-smoker,  35 years  Retired  for the [employer]    SDOH: Difficult historian with memory difficulties post stroke, depression  Health literacy: high  Vaccinations: up-to-date  Activities: had PT post stoke not very helpful, no cardiac rehab post procedures, sedentary lifestyle especially for last 3 years, played daily golf until having to stop with COLON, then balance problem.  Nicotine: never  Alcohol: occasional, once a month  Illicit drugs: none  Cardiac symptoms: COLON  Home BP: 120 to 150/60  Medication compliance: yes, do not like the bruising and the itching despite care under dermatologist, wants to cut down on medication, challenging to Pat  Diet: regular  Caffeine: 1 cpd  Labs:   Lab Results   Component Value Date    TSH 2.514 03/18/2020      No results found for: LABA1C, HGBA1C    Lab Results   Component Value Date    WBC 9.09 02/19/2020    HGB 9.9 (L) 02/19/2020    HCT 31.0 (L) 02/19/2020    MCV 99 (H) 02/19/2020     (L) 02/19/2020     Lab Results   Component Value Date    WBC 7.20 10/28/2020    RBC 3.72 (L) 10/28/2020    HGB 11.6 (L) 10/28/2020    HCT 36.4 (L) 10/28/2020    MCV 98 10/28/2020    MCH 31.2 (H) 10/28/2020    MCHC 31.9 (L) 10/28/2020    RDW 12.4 10/28/2020     10/28/2020    MPV 10.8 10/28/2020    GRAN 6.1 10/28/2020    GRAN 85.1 (H) 10/28/2020    LYMPH 0.8 (L) 10/28/2020    LYMPH 10.8 (L) 10/28/2020    MONO 0.2  (L) 10/28/2020    MONO 3.3 (L) 10/28/2020    EOS 0.0 10/28/2020    BASO 0.02 10/28/2020    EOSINOPHIL 0.1 10/28/2020    BASOPHIL 0.3 10/28/2020         CMP  Sodium   Date Value Ref Range Status   07/22/2020 140 136 - 145 mmol/L Final     Potassium   Date Value Ref Range Status   07/22/2020 4.6 3.5 - 5.1 mmol/L Final     Chloride   Date Value Ref Range Status   07/22/2020 115 (H) 95 - 110 mmol/L Final     CO2   Date Value Ref Range Status   07/22/2020 19 (L) 23 - 29 mmol/L Final     Glucose   Date Value Ref Range Status   07/22/2020 83 70 - 110 mg/dL Final     BUN   Date Value Ref Range Status   07/22/2020 51 (H) 8 - 23 mg/dL Final     Creatinine   Date Value Ref Range Status   07/22/2020 2.5 (H) 0.5 - 1.4 mg/dL Final     Calcium   Date Value Ref Range Status   07/22/2020 8.7 8.7 - 10.5 mg/dL Final     Total Protein   Date Value Ref Range Status   07/22/2020 6.2 6.0 - 8.4 g/dL Final     Albumin   Date Value Ref Range Status   07/22/2020 3.8 3.5 - 5.2 g/dL Final     Total Bilirubin   Date Value Ref Range Status   07/22/2020 0.5 0.1 - 1.0 mg/dL Final     Comment:     For infants and newborns, interpretation of results should be based  on gestational age, weight and in agreement with clinical  observations.  Premature Infant recommended reference ranges:  Up to 24 hours.............<8.0 mg/dL  Up to 48 hours............<12.0 mg/dL  3-5 days..................<15.0 mg/dL  6-29 days.................<15.0 mg/dL       Alkaline Phosphatase   Date Value Ref Range Status   07/22/2020 63 55 - 135 U/L Final     AST   Date Value Ref Range Status   07/22/2020 17 10 - 40 U/L Final     ALT   Date Value Ref Range Status   07/22/2020 24 10 - 44 U/L Final     Anion Gap   Date Value Ref Range Status   07/22/2020 6 (L) 8 - 16 mmol/L Final     eGFR if    Date Value Ref Range Status   07/22/2020 27.0 (A) >60 mL/min/1.73 m^2 Final     eGFR if non    Date Value Ref Range Status   07/22/2020 23.4 (A) >60  mL/min/1.73 m^2 Final     Comment:     Calculation used to obtain the estimated glomerular filtration  rate (eGFR) is the CKD-EPI equation.        @labrcntip(troponini)@  No results found for: BNP}   Lab Results   Component Value Date    CHOL 95 (L) 2020    CHOL 245 (A) 2015    CHOL 193 2006     Lab Results   Component Value Date    HDL 33 (L) 2020    HDL 55 2015    HDL 36.0 (L) 2006     Lab Results   Component Value Date    LDLCALC 35.0 (L) 2020    LDLCALC 145 2015    LDLCALC 87.8 2006     Lab Results   Component Value Date    TRIG 135 2020    TRIG 227 2015    TRIG 346 (H) 2006     Lab Results   Component Value Date    CHOLHDL 34.7 2020    CHOLHDL 18.7 (L) 2006    CHOLHDL 23.2 2005      LDL outside lab 2019 result 46    Last Echo: 2020 HYACINTH, DSE  Last stress test: 2020  Cardiovascular angiogram: 2020, 6 SARAI placed  EC2020, AF, rate 61  Fundoscopic exam: within the past year, negative for retinopathy    In 3/2020:  WM here to establish cardiac care. Major complaint of COLON for the past few years, had to give up golfing and had a number of CV event since 2019. Completed 4-6 weeks of PT post stroke, did not find much benefit, and did not proceed with any Cardiac Rehab post procedures. Had review with Dr. Zhang     Chart reviewed -  CHADS VASC 5 (8%/year) and HAS BLED 4 (7%/year)      Successful right transfemoral 34 mm evolute Pro TAVR.  - No PVL, mean gradient 4.0, peak velocity 1.6 post TAVR.  - Follow up with [facility] 1 month and 1 year with labs and Echo   - ASA/Effient/Pradaxa daily (s/p SARAI on 20)  - Avoid non sterile procedures which could cause endocarditis for 6 months including dental work, endoscopy, colonoscopy, and  procedures.   - SBE prophylaxis for life     Follow up with [facility] 1 month and 1 year with labs and Echo   - ASA/Effient/Pradaxa daily (s/p SARAI on 20)  Transient  Acute on Chronic Diastolic CHF, expected after the TAVR procedure, not a complication of the procedure.   Patient is a Watchman Device candidate. Will discuss at 1 month follow up appointment.     HYACINTH 1/2020 - Severe aortic valve stenosis. By 3D HYACINTH, LVOT diameter is 2.4cm x 3.0cm, area is 5.7cm2.  Mildly decreased left ventricular systolic function. The estimated ejection fraction is 45%.  Mild aortic regurgitation.  Mild mitral regurgitation.  Normal right ventricular systolic function.  Mild tricuspid regurgitation.  Grade 3 plaque present.     1/2020 DOBUTAMINE STRESS TEST PERFORMED FOR AORTIC STENOSIS.  · Mildly decreased left ventricular systolic function. The estimated ejection fraction is 45%.  · Left ventricular diastolic dysfunction.  · Local segmental wall motion abnormalities.  · The quantitatively dervived ejection fraction is 46%.  · There were no arrhythmias during stress.  · The ECG portion of this study is negative for myocardial ischemia.  · The patient's exercise capacity was normal.  · Severe aortic valve stenosis.  · Aortic valve area is 1.09 cm2; peak velocity is 4.3 m/s; mean gradient is 43 mmHg with stress. Prior LVOT diameter undermeasured. DUDLEY indexed for BSA 0.57/m2, consistent with severe AS.    CXR - The lungs are clear. Costophrenic angles are seen without effusion. No pneumothorax is identified. The heart is normal in size. The mediastinum is within normal limits. Osseous structures show degenerative changes in the spine. The visualized upper abdomen is unremarkable.     Follow-up  Associated symptoms include chills, headaches, myalgias, neck pain and numbness. Pertinent negatives include no chest pain, coughing, diaphoresis, fever, joint swelling, nausea, vertigo or weakness.     In 6/2020, little change but gotten off Xeralto at end of 3/2020 due to concern of itching and bleeding with scratching. OKayed by MA with Dr. Miles. Now on DAPT. On a number of potential candidate to cause  itching    In 9/2020, follow up, not much changed. Tried off allopurinol for 3 weeks an no change. Question on Plavix reviewed. Had SARAI in 1/2020, need at least one year of antiplatelet Rx. Major problem still the itching.    HPI comments: in 12/2020, return for concern with continue itchiness, likely due to CKD stage 4, no treatment have been effective. Also problem with moderate reactive depression with no motivation, very sedentary and somnolent on sertraline. Long discussion, prefer not to see psychiatry.     Review of Systems   Constitution: Positive for chills, malaise/fatigue and weight gain (up 6 lbs from 9/2020). Negative for diaphoresis, fever, night sweats and weight loss.   HENT: Negative for nosebleeds and tinnitus.    Eyes: Positive for blurred vision. Negative for visual disturbance.   Cardiovascular: Positive for dyspnea on exertion. Negative for chest pain, claudication, cyanosis, irregular heartbeat, leg swelling, near-syncope, orthopnea, palpitations and paroxysmal nocturnal dyspnea.   Respiratory: Positive for shortness of breath. Negative for cough, sleep disturbances due to breathing, snoring and wheezing.         Hancock score 17, had inconclusive sleep study in 2019   Endocrine: Positive for cold intolerance. Negative for polydipsia and polyuria.   Hematologic/Lymphatic: Positive for bleeding problem (from scratching). Bruises/bleeds easily.   Skin: Positive for itching (major). Negative for color change, flushing, nail changes, poor wound healing and suspicious lesions.   Musculoskeletal: Positive for arthritis, back pain, falls, gout, muscle weakness, myalgias, neck pain and stiffness. Negative for joint pain, joint swelling and muscle cramps.   Gastrointestinal: Positive for diarrhea. Negative for heartburn, hematemesis, hematochezia, melena and nausea.   Neurological: Positive for difficulty with concentration, disturbances in coordination, excessive daytime sleepiness, focal weakness,  "headaches, loss of balance, numbness and paresthesias. Negative for dizziness, light-headedness, vertigo and weakness.        Dysphasia   Psychiatric/Behavioral: Positive for depression and memory loss. Negative for substance abuse. The patient does not have insomnia and is not nervous/anxious.    Allergic/Immunologic: Positive for environmental allergies.        Objective:    Physical Exam   Constitutional: He is oriented to person, place, and time. He appears well-developed and well-nourished.   Pyschomotor retardation.   HENT:   Head: Normocephalic.   Eyes: Pupils are equal, round, and reactive to light. Conjunctivae and EOM are normal.   Neck: Normal range of motion. Neck supple. No JVD present. No thyromegaly present.   Cardiovascular: Normal rate and intact distal pulses. An irregularly irregular rhythm present. Exam reveals distant heart sounds. Exam reveals no gallop and no friction rub.   No murmur heard.  Pulses:       Carotid pulses are 2+ on the right side and 2+ on the left side.       Radial pulses are 2+ on the right side and 2+ on the left side.        Femoral pulses are 2+ on the right side and 2+ on the left side.       Popliteal pulses are 2+ on the right side and 2+ on the left side.        Dorsalis pedis pulses are 1+ on the right side and 1+ on the left side.        Posterior tibial pulses are 1+ on the right side and 1+ on the left side.   Pulmonary/Chest: Effort normal and breath sounds normal. He has no rales. He exhibits no tenderness.   Abdominal: Soft. Bowel sounds are normal. There is no abdominal tenderness.   Waist 39" down to 36"   Musculoskeletal: Normal range of motion.         General: No edema.   Lymphadenopathy:     He has no cervical adenopathy.   Neurological: He is alert and oriented to person, place, and time.   Skin: Skin is warm and dry. No rash noted.   ecchymosis         Assessment:       1. Excessive daytime sleepiness    2. Persistent atrial fibrillation, onset 2018  "   3. Current use of long term anticoagulation    4. History of stroke, 4/2019, right-sided weakness, dysphasia, memory difficulties     5. Essential hypertension    6. PUSHPA (obstructive sleep apnea), non-conclusive 2019    7. 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 Itching, onset 12/2019    9. 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 Stage 4 chronic kidney disease    11. Status post insertion of drug-eluting stent into left anterior descending (LAD) artery and RCA, 6 in 1/2020,     12. Sedentary lifestyle    13. Chronic fatigue         Plan:       Lucho was seen today for follow-up.    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 Nolan and nephrologist. Need review of sertraline Rx.  - Need help with Allergist / dermatologist  - CV status stable, all medications reviewed, patient acknowledge good understanding.  - Recommend healthy living: no nicotine, moderate alcohol, healthy diet and regular exercise aiming for fitness, and weight control  - Need good exercise program, 4 key elements: 1. Aerobic (walking, swimming, dancing, jogging, biking, etc, 2. Muscle strengthening / resistance exercise, need to do 2-3 times weekly, 3. Stretching daily, good stretch takes a whole  total  minute. 4. Balance exercise daily.   - Encourage activities as much as tolerated. Any activity is better than none!  - Instruction for Mediterranean diet and heart healthy exercise given.  - Highly recommend 30 minutes of exercise / activities daily, can have Sunday off, with 2-3 sessions of muscle strengthening weekly. A  would be very helpful.  - Recommend at least biannual cardiovascular evaluation in view of patient's significant risk factors. Family preference.      Greater than 50% of the time was spent in counseling and coordination of care. The above assessment and plan have been discussed at length. Labs and procedure over the last 6 months reviewed. Problem List updated. Asked to bring in all active medications / pills bottles with next visit.

## 2020-12-08 NOTE — PATIENT INSTRUCTIONS
What You Can Do When a Loved One Is Depressed  If you know someone you think is depressed, there are ways you can help. Keep in mind that even though depression is a frustrating illness, with help, a depressed person can feel better.    Encourage treatment  Depression is a very treatable illness. Medicine, counseling, and self-help measures can help a depressed person feel better and get back to a normal life. The first step is to visit a doctor or other mental health professional for a thorough evaluation. If you can, be present at the evaluation to offer support and help answer the doctors questions. If the depressed person is reluctant to seek help, call the doctor yourself and ask for advice.  If the depressed person expresses suicidal thoughts, or if you are concerned that he or she is in immediate danger of hurting him- or herself, seek immediate help. Take the person to the closest emergency room or to a 24-hour crisis clinic. People in a suicidal crisis need immediate psychiatric help and continuous observation. Do not leave the person alone while you seek help.  Be supportive  Here are some other ways you can help:  · Be a sympathetic ear. Talking about their feelings often helps depressed people feel better.  · Realize that the person is in pain. Try to be understanding.  · Dont criticize or be negative. Never blame anyone. Depression is no ones fault.  · Encourage the person to join in enjoyable activities. Invite the person out for movies or walks. But realize that he or she may not be able to cope with too many activities at once.  · Do not ignore or discount comments about self-harm or suicide. Ask what the person is thinking about doing and if they have the means (for example a gun or medicines) to hurt themselves. Seek immediate help if he or she is at risk.  · Be patient. Appreciate progress, however slow.  Take time out  Dont feel guilty about spending time away. Here are some things to keep  in mind:  · Dont let the depressed person monopolize your time. Spend time alone or with other family members and friends.  · Keep a positive attitude. Dont be influenced by the persons negative thinking.  · If your advice or help isnt accepted, understand that depression clouds judgment.  · Consider joining a support or therapy group. Counseling can often help family and friends better cope with a loved ones depression. Children, in particular, may benefit from counseling.  · Dont feel responsible for solving the problem yourself. You cant. Do what you can, and feel good about your efforts.  Date Last Reviewed: 1/1/2017 © 2000-2017 [employer]. [address] [place] [address], [place] [address]. All rights reserved. This information is not intended as a substitute for professional medical care. Always follow your healthcare professional's instructions.        Depression Affects Your Mind and Body  Everyone feels sad or blue from time to time for a few days or weeks. Depression is when these feelings don't go away and they interfere with daily life.  Depression is a real illness that can develop at any age. It is one of the most common mental health problems in the [place] Depression makes you feel sad, helpless, and hopeless. It gets in the way of your life and relationships. It inhibits your ability to think and act. But, with help, you can feel better again.     When I was depressed, I felt awful. I was so tired all the time I could hardly think, but at night I couldnt fall asleep. My head hurt. My stomach hurt. I didnt know what was wrong with me.   Depression affects your whole body  Brain chemicals affect your body as well as your mood. So depression may do more than just make you feel low. You may also feel bad physically. Depression can:  · Cause trouble with mental tasks such as remembering, concentrating, or making decisions  · Make you feel nervous and jumpy  · Cause trouble sleeping. Or  "you may sleep too much  · Change your appetite  · Cause headaches, stomachaches, or other aches and pains  · Drain your body of energy  Depression and other illness  It is common for people who have chronic health problems to also have depression. It can often be hard to tell which one caused the other. A person might become depressed after finding out they have a health problem. But some studies suggest being depressed may make certain health problems more likely. And some depressed people stop taking care of themselves. This may make them more likely to get sick.  Date Last Reviewed: 1/1/2017 © 2000-2017 [employer]. [address] [place] [address]. All rights reserved. This information is not intended as a substitute for professional medical care. Always follow your healthcare professional's instructions.        Know the Signs and Symptoms of Depression  Everyone feels down at times. The blues are a natural part of life. But an unhappy period thats intense or lasts for more than a couple of weeks can be a sign of depression.  Depression is a serious illness. It is not a sign of weakness or a "character flaw," and it is not something you can "snap out of." In fact, most people with depression need treatment to get better. Depression can disrupt the lives of family and friends. If you know someone you think may be depressed, find out what you can do to help.    Recognizing signs of depression  People who are depressed may:  · Feel unhappy, sad, blue, down, or miserable nearly every day  · Feel helpless, hopeless, or worthless  · Lose interest in hobbies, friends, and activities that used to give pleasure  · Not sleep well or sleep too much  · Gain or lose weight  · Feel low on energy or constantly tired  · Have a hard time concentrating or making decisions  · Lose interest in sex  · Have physical symptoms, such as stomachaches, headaches, or backaches  Know the serious signals  Never " ignore a person's comments about suicide or behaviors that can lead to self-harm. Warning signals for suicide include:  · Threats or talk of suicide  · Statements such as I wont be a problem much longer or Nothing matters  · Giving away possessions or making a will or  arrangements  · Buying a gun or other weapon  · Sudden, unexplained cheerfulness or calm after a period of depression  If you notice any of these signs, get help right away. Call a healthcare professional, mental health clinic, or suicide hotline and ask what action to take. In an emergency, dont hesitate to call the police.  Resources:  · National Institutes of Mental Aqujli541-091-3304bzo.[facility].nih.gov  · National [place] on Mental Mjvsbqx340-922-3772nun.jannet.org   · Mental Health Houftui902-741-3823err.nmha.org  · National Suicide Fwuihiu276-594-1714 (800-SUICIDE)  · National Suicide Prevention Iosqskul515-378-5923 (122-959-HTHC)www.suicidepreventionlifeline.org   Date Last Reviewed: 2017  © 5946-6038 [employer]. [address] [place] [address]. All rights reserved. This information is not intended as a substitute for professional medical care. Always follow your healthcare professional's instructions.        Depression: Tips to Help Yourself  As your healthcare providers help treat your depression, you can also help yourself. Keep in mind that your illness affects you emotionally, physically, mentally, and socially. So full recovery will take time. Take care of your body and your soul, and be patient with yourself as you get better.    Self-care  · Educate yourself. Read about treatment and medicine options. If you have the energy, attend local conferences or support groups. Keep a list of useful websites and helpful books and use them as needed. This illness is not your fault. Dont blame yourself for your depression.  · Manage early symptoms. If you notice symptoms returning, experience triggers, or  identify other factors that may lead to a depressive episode, get help as soon as possible. Ask trusted friends and family to monitor your behavior and let you know if they see anything of concern.  · Work with your provider. Find a provider you can trust. Communicate honestly with that person and share information on your treatment for depression and your reaction to medicines.  · Be prepared for a crisis. Know what to do if you experience a crisis. Keep the phone number of a crisis hotline and know the location of your community's urgent care centers and the closest emergency department.  · Hold off on big decisions. Depression can cloud your judgment. So wait until you feel better before making major life decisions, such as changing jobs, moving, or getting  or .  · Be patient. Recovering from depression is a process. Dont be discouraged if it takes some time to feel better.  · Keep it simple. Depression saps your energy and concentration. So you wont be able to do all the things you used to do. Set small goals and do what you can.  · Be with others. Dont isolate yourself--youll only feel worse. Try to be with other people. And take part in fun activities when you can. Go to a movie, ballgame, Mosque service, or social event. Talk openly with people you can trust. And accept help when its offered.  Take care of your body  People with depression often lose the desire to take care of themselves. That only makes their problems worse. During treatment and afterward, make a point to:  · Exercise. Its a great way to take care of your body. And studies have shown that exercise helps fight depression.  · Avoid drugs and alcohol. These may ease the pain in the short term. But theyll only make your problems worse in the long run.  · Get relief from stress. Ask your healthcare provider for relaxation exercises and techniques to help relieve stress.  · Eat right. A balanced and healthy diet helps  keep your body healthy.  Date Last Reviewed: 1/1/2017  © 2547-0555 [employer]. [address] [place] [address], [place] [address]. All rights reserved. This information is not intended as a substitute for professional medical care. Always follow your healthcare professional's instructions.        Counseling for Depression  For some people, counseling, also called talk therapy, has been found to be as effective as medicine for mild to moderate depression. When done by a trained professional, this treatment is a powerful way to better understand your thoughts and feelings. Like medicine, it may take time before you notice how much counseling is helping.    Kinds of talk therapy  Different counselors use different methods for talk therapy. But all therapy aims to help change how you think about your problem. Most therapy for depression is often done one-on-one. But it may also be done in a group setting. You and your healthcare provider can discuss the type of therapy you think would work best for you. You can also discuss who the best person is to provide the therapy.  How therapy helps  Talking about your problems can help them seem less overwhelming. It can help work through problems you have with your life and your relationships. It can also help you understand how depression is clouding your thinking, not letting you see the world the way it really is. Therapy can give you:  · Insight about your emotions  · New tools for dealing with your problems  · Emotional support for making progress  Getting better takes time  Talk therapy can help you feel better. But change doesnt happen right away. Depression takes away your energy and motivation. So it can be hard to feel like going to therapy and sticking with it. But therapy has been proven to be very valuable in the treatment of depression. Therapy for depression is often done for a set number of sessions. In other cases, you and your therapist decide together  at what point you no longer need therapy.  Additional sources of help  In addition to a professional counselor, it may help to talk to other people in your life. You may find support and insight from:  · A close friend or family member  · A  trained in counseling  · A local support group or community group  · A 12-step program (such as Alcoholics Anonymous) for dealing with problems that can contribute to depression, such as alcohol or drug addiction  Date Last Reviewed: 1/1/2017 © 2000-2017 [employer]. [address] [place] [address], [place] [address]. All rights reserved. This information is not intended as a substitute for professional medical care. Always follow your healthcare professional's instructions.

## 2021-01-19 ENCOUNTER — LAB VISIT (OUTPATIENT)
Dept: LAB | Facility: HOSPITAL | Age: 81
End: 2021-01-19
Attending: INTERNAL MEDICINE
Payer: MEDICARE

## 2021-01-19 DIAGNOSIS — N18.4 CHRONIC KIDNEY DISEASE, STAGE IV (SEVERE): Primary | ICD-10-CM

## 2021-01-19 DIAGNOSIS — N25.81 SECONDARY HYPERPARATHYROIDISM OF RENAL ORIGIN: ICD-10-CM

## 2021-01-19 LAB
ALBUMIN SERPL BCP-MCNC: 3.9 G/DL (ref 3.5–5.2)
ANION GAP SERPL CALC-SCNC: 8 MMOL/L (ref 8–16)
BACTERIA #/AREA URNS HPF: ABNORMAL /HPF
BASOPHILS # BLD AUTO: 0.1 K/UL (ref 0–0.2)
BASOPHILS NFR BLD: 1.3 % (ref 0–1.9)
BILIRUB UR QL STRIP: NEGATIVE
BUN SERPL-MCNC: 38 MG/DL (ref 8–23)
CALCIUM SERPL-MCNC: 9.3 MG/DL (ref 8.7–10.5)
CHLORIDE SERPL-SCNC: 110 MMOL/L (ref 95–110)
CLARITY UR: CLEAR
CO2 SERPL-SCNC: 25 MMOL/L (ref 23–29)
COLOR UR: YELLOW
CREAT SERPL-MCNC: 2.2 MG/DL (ref 0.5–1.4)
DIFFERENTIAL METHOD: ABNORMAL
EOSINOPHIL # BLD AUTO: 0.6 K/UL (ref 0–0.5)
EOSINOPHIL NFR BLD: 8 % (ref 0–8)
ERYTHROCYTE [DISTWIDTH] IN BLOOD BY AUTOMATED COUNT: 12.5 % (ref 11.5–14.5)
EST. GFR  (AFRICAN AMERICAN): 31.3 ML/MIN/1.73 M^2
EST. GFR  (NON AFRICAN AMERICAN): 27.1 ML/MIN/1.73 M^2
GLUCOSE SERPL-MCNC: 112 MG/DL (ref 70–110)
GLUCOSE UR QL STRIP: NEGATIVE
HCT VFR BLD AUTO: 34.7 % (ref 40–54)
HGB BLD-MCNC: 11.3 G/DL (ref 14–18)
HGB UR QL STRIP: ABNORMAL
HYALINE CASTS #/AREA URNS LPF: 2 /LPF
IMM GRANULOCYTES # BLD AUTO: 0.02 K/UL (ref 0–0.04)
IMM GRANULOCYTES NFR BLD AUTO: 0.3 % (ref 0–0.5)
KETONES UR QL STRIP: NEGATIVE
LEUKOCYTE ESTERASE UR QL STRIP: NEGATIVE
LYMPHOCYTES # BLD AUTO: 1.9 K/UL (ref 1–4.8)
LYMPHOCYTES NFR BLD: 25.4 % (ref 18–48)
MCH RBC QN AUTO: 31.6 PG (ref 27–31)
MCHC RBC AUTO-ENTMCNC: 32.6 G/DL (ref 32–36)
MCV RBC AUTO: 97 FL (ref 82–98)
MICROSCOPIC COMMENT: ABNORMAL
MONOCYTES # BLD AUTO: 0.8 K/UL (ref 0.3–1)
MONOCYTES NFR BLD: 10.6 % (ref 4–15)
NEUTROPHILS # BLD AUTO: 4.1 K/UL (ref 1.8–7.7)
NEUTROPHILS NFR BLD: 54.4 % (ref 38–73)
NITRITE UR QL STRIP: NEGATIVE
NRBC BLD-RTO: 0 /100 WBC
PH UR STRIP: 6 [PH] (ref 5–8)
PHOSPHATE SERPL-MCNC: 3.3 MG/DL (ref 2.7–4.5)
PLATELET # BLD AUTO: 234 K/UL (ref 150–350)
PMV BLD AUTO: 10.3 FL (ref 9.2–12.9)
POTASSIUM SERPL-SCNC: 4.9 MMOL/L (ref 3.5–5.1)
PROT UR QL STRIP: ABNORMAL
PROT UR-MCNC: 45 MG/DL (ref 0–15)
RBC # BLD AUTO: 3.58 M/UL (ref 4.6–6.2)
RBC #/AREA URNS HPF: 4 /HPF (ref 0–4)
SODIUM SERPL-SCNC: 143 MMOL/L (ref 136–145)
SP GR UR STRIP: 1.02 (ref 1–1.03)
SQUAMOUS #/AREA URNS HPF: 3 /HPF
URATE SERPL-MCNC: 4.6 MG/DL (ref 3.4–7)
URN SPEC COLLECT METH UR: ABNORMAL
UROBILINOGEN UR STRIP-ACNC: NEGATIVE EU/DL
WBC # BLD AUTO: 7.61 K/UL (ref 3.9–12.7)
WBC #/AREA URNS HPF: 2 /HPF (ref 0–5)

## 2021-01-19 PROCEDURE — 85025 COMPLETE CBC W/AUTO DIFF WBC: CPT

## 2021-01-19 PROCEDURE — 84156 ASSAY OF PROTEIN URINE: CPT

## 2021-01-19 PROCEDURE — 80069 RENAL FUNCTION PANEL: CPT

## 2021-01-19 PROCEDURE — 84550 ASSAY OF BLOOD/URIC ACID: CPT

## 2021-01-19 PROCEDURE — 83970 ASSAY OF PARATHORMONE: CPT

## 2021-01-19 PROCEDURE — 36415 COLL VENOUS BLD VENIPUNCTURE: CPT

## 2021-01-19 PROCEDURE — 81000 URINALYSIS NONAUTO W/SCOPE: CPT

## 2021-01-19 PROCEDURE — 82306 VITAMIN D 25 HYDROXY: CPT

## 2021-01-20 LAB
25(OH)D3+25(OH)D2 SERPL-MCNC: 21 NG/ML (ref 30–96)
PTH-INTACT SERPL-MCNC: 133 PG/ML (ref 9–77)

## 2021-01-25 ENCOUNTER — TELEPHONE (OUTPATIENT)
Dept: FAMILY MEDICINE | Facility: CLINIC | Age: 81
End: 2021-01-25

## 2021-01-26 ENCOUNTER — CLINICAL SUPPORT (OUTPATIENT)
Dept: FAMILY MEDICINE | Facility: CLINIC | Age: 81
End: 2021-01-26
Payer: MEDICARE

## 2021-02-18 ENCOUNTER — OFFICE VISIT (OUTPATIENT)
Dept: CARDIOLOGY | Facility: CLINIC | Age: 81
End: 2021-02-18
Payer: MEDICARE

## 2021-02-18 ENCOUNTER — HOSPITAL ENCOUNTER (OUTPATIENT)
Dept: CARDIOLOGY | Facility: HOSPITAL | Age: 81
Discharge: HOME | End: 2021-02-18
Attending: INTERNAL MEDICINE
Payer: MEDICARE

## 2021-02-18 VITALS
DIASTOLIC BLOOD PRESSURE: 65 MMHG | HEART RATE: 70 BPM | SYSTOLIC BLOOD PRESSURE: 136 MMHG | WEIGHT: 166.25 LBS | HEIGHT: 66 IN | BODY MASS INDEX: 26.72 KG/M2 | OXYGEN SATURATION: 99 %

## 2021-02-18 VITALS
HEART RATE: 69 BPM | DIASTOLIC BLOOD PRESSURE: 74 MMHG | HEIGHT: 66 IN | SYSTOLIC BLOOD PRESSURE: 122 MMHG | BODY MASS INDEX: 26.03 KG/M2 | WEIGHT: 162 LBS

## 2021-02-18 DIAGNOSIS — L29.9 ITCHING: ICD-10-CM

## 2021-02-18 DIAGNOSIS — Z91.89 SEDENTARY LIFESTYLE: ICD-10-CM

## 2021-02-18 DIAGNOSIS — F32.9 REACTIVE DEPRESSION: ICD-10-CM

## 2021-02-18 DIAGNOSIS — I25.10 CORONARY ARTERY DISEASE, ANGINA PRESENCE UNSPECIFIED, UNSPECIFIED VESSEL OR LESION TYPE, UNSPECIFIED WHETHER NATIVE OR TRANSPLANTED HEART: ICD-10-CM

## 2021-02-18 DIAGNOSIS — I35.0 NODULAR CALCIFIC AORTIC VALVE STENOSIS: ICD-10-CM

## 2021-02-18 DIAGNOSIS — D63.1 ANEMIA ASSOCIATED WITH CHRONIC RENAL FAILURE: ICD-10-CM

## 2021-02-18 DIAGNOSIS — D69.6 THROMBOCYTOPENIA: ICD-10-CM

## 2021-02-18 DIAGNOSIS — Z79.01 CURRENT USE OF LONG TERM ANTICOAGULATION: ICD-10-CM

## 2021-02-18 DIAGNOSIS — I48.19 PERSISTENT ATRIAL FIBRILLATION: ICD-10-CM

## 2021-02-18 DIAGNOSIS — R06.09 DOE (DYSPNEA ON EXERTION): ICD-10-CM

## 2021-02-18 DIAGNOSIS — I48.11 LONGSTANDING PERSISTENT ATRIAL FIBRILLATION: Primary | ICD-10-CM

## 2021-02-18 DIAGNOSIS — N18.4 STAGE 4 CHRONIC KIDNEY DISEASE: ICD-10-CM

## 2021-02-18 DIAGNOSIS — N18.9 ANEMIA ASSOCIATED WITH CHRONIC RENAL FAILURE: ICD-10-CM

## 2021-02-18 DIAGNOSIS — Z95.5 STATUS POST INSERTION OF DRUG-ELUTING STENT INTO LEFT ANTERIOR DESCENDING (LAD) ARTERY: ICD-10-CM

## 2021-02-18 DIAGNOSIS — Z95.2 S/P TAVR (TRANSCATHETER AORTIC VALVE REPLACEMENT): ICD-10-CM

## 2021-02-18 DIAGNOSIS — I35.0 SEVERE AORTIC STENOSIS: ICD-10-CM

## 2021-02-18 DIAGNOSIS — I10 ESSENTIAL HYPERTENSION: ICD-10-CM

## 2021-02-18 DIAGNOSIS — G47.19 EXCESSIVE DAYTIME SLEEPINESS: Primary | ICD-10-CM

## 2021-02-18 DIAGNOSIS — E78.2 MIXED HYPERLIPIDEMIA: ICD-10-CM

## 2021-02-18 LAB
ASCENDING AORTA: 2.93 CM
AV INDEX (PROSTH): 0.5
AV MEAN GRADIENT: 5 MMHG
AV PEAK GRADIENT: 8 MMHG
AV VALVE AREA: 2.6 CM2
AV VELOCITY RATIO: 0.51
BSA FOR ECHO PROCEDURE: 1.85 M2
CV ECHO LV RWT: 0.36 CM
DOP CALC AO PEAK VEL: 1.44 M/S
DOP CALC AO VTI: 32.55 CM
DOP CALC LVOT AREA: 5.1 CM2
DOP CALC LVOT DIAMETER: 2.56 CM
DOP CALC LVOT PEAK VEL: 0.74 M/S
DOP CALC LVOT STROKE VOLUME: 84.47 CM3
DOP CALCLVOT PEAK VEL VTI: 16.42 CM
E/E' RATIO: 18.13 M/S
ECHO LV POSTERIOR WALL: 0.87 CM (ref 0.6–1.1)
FRACTIONAL SHORTENING: 21 % (ref 28–44)
INTERVENTRICULAR SEPTUM: 0.84 CM (ref 0.6–1.1)
LA MAJOR: 6.15 CM
LA MINOR: 5.97 CM
LA WIDTH: 4.08 CM
LEFT ATRIUM SIZE: 4.6 CM
LEFT ATRIUM VOLUME INDEX MOD: 39.9 ML/M2
LEFT ATRIUM VOLUME INDEX: 52.8 ML/M2
LEFT ATRIUM VOLUME MOD: 73.09 CM3
LEFT ATRIUM VOLUME: 96.65 CM3
LEFT INTERNAL DIMENSION IN SYSTOLE: 3.84 CM (ref 2.1–4)
LEFT VENTRICLE DIASTOLIC VOLUME INDEX: 60.92 ML/M2
LEFT VENTRICLE DIASTOLIC VOLUME: 111.48 ML
LEFT VENTRICLE MASS INDEX: 78 G/M2
LEFT VENTRICLE SYSTOLIC VOLUME INDEX: 34.8 ML/M2
LEFT VENTRICLE SYSTOLIC VOLUME: 63.66 ML
LEFT VENTRICULAR INTERNAL DIMENSION IN DIASTOLE: 4.88 CM (ref 3.5–6)
LEFT VENTRICULAR MASS: 142.02 G
LV LATERAL E/E' RATIO: 15.11 M/S
LV SEPTAL E/E' RATIO: 22.67 M/S
MV PEAK E VEL: 1.36 M/S
MV PEAK GRADIENT: 7 MMHG
PISA TR MAX VEL: 2.84 M/S
RA MAJOR: 5.71 CM
RA PRESSURE: 3 MMHG
RA WIDTH: 4.34 CM
RIGHT VENTRICULAR END-DIASTOLIC DIMENSION: 3.68 CM
RV TISSUE DOPPLER FREE WALL SYSTOLIC VELOCITY 1 (APICAL 4 CHAMBER VIEW): 10.6 CM/S
SINUS: 3.5 CM
STJ: 3 CM
TDI LATERAL: 0.09 M/S
TDI SEPTAL: 0.06 M/S
TDI: 0.08 M/S
TR MAX PG: 32 MMHG
TRICUSPID ANNULAR PLANE SYSTOLIC EXCURSION: 1.7 CM
TV REST PULMONARY ARTERY PRESSURE: 35 MMHG

## 2021-02-18 PROCEDURE — 99999 PR PBB SHADOW E&M-EST. PATIENT-LVL IV: CPT | Mod: PBBFAC,,,

## 2021-02-18 PROCEDURE — 3075F SYST BP GE 130 - 139MM HG: CPT | Mod: CPTII,S$GLB,, | Performed by: INTERNAL MEDICINE

## 2021-02-18 PROCEDURE — 99214 PR OFFICE/OUTPT VISIT, EST, LEVL IV, 30-39 MIN: ICD-10-PCS | Mod: S$GLB,,, | Performed by: INTERNAL MEDICINE

## 2021-02-18 PROCEDURE — 3078F PR MOST RECENT DIASTOLIC BLOOD PRESSURE < 80 MM HG: ICD-10-PCS | Mod: CPTII,S$GLB,, | Performed by: INTERNAL MEDICINE

## 2021-02-18 PROCEDURE — 3288F FALL RISK ASSESSMENT DOCD: CPT | Mod: CPTII,S$GLB,, | Performed by: INTERNAL MEDICINE

## 2021-02-18 PROCEDURE — 1126F PR PAIN SEVERITY QUANTIFIED, NO PAIN PRESENT: ICD-10-PCS | Mod: S$GLB,,, | Performed by: INTERNAL MEDICINE

## 2021-02-18 PROCEDURE — 93306 TTE W/DOPPLER COMPLETE: CPT

## 2021-02-18 PROCEDURE — 93306 TTE W/DOPPLER COMPLETE: CPT | Mod: 26,,, | Performed by: INTERNAL MEDICINE

## 2021-02-18 PROCEDURE — 3288F PR FALLS RISK ASSESSMENT DOCUMENTED: ICD-10-PCS | Mod: CPTII,S$GLB,, | Performed by: INTERNAL MEDICINE

## 2021-02-18 PROCEDURE — 1126F AMNT PAIN NOTED NONE PRSNT: CPT | Mod: S$GLB,,, | Performed by: INTERNAL MEDICINE

## 2021-02-18 PROCEDURE — 93306 ECHO (CUPID ONLY): ICD-10-PCS | Mod: 26,,, | Performed by: INTERNAL MEDICINE

## 2021-02-18 PROCEDURE — 1159F MED LIST DOCD IN RCRD: CPT | Mod: S$GLB,,, | Performed by: INTERNAL MEDICINE

## 2021-02-18 PROCEDURE — 1101F PT FALLS ASSESS-DOCD LE1/YR: CPT | Mod: CPTII,S$GLB,, | Performed by: INTERNAL MEDICINE

## 2021-02-18 PROCEDURE — 1159F PR MEDICATION LIST DOCUMENTED IN MEDICAL RECORD: ICD-10-PCS | Mod: S$GLB,,, | Performed by: INTERNAL MEDICINE

## 2021-02-18 PROCEDURE — 1101F PR PT FALLS ASSESS DOC 0-1 FALLS W/OUT INJ PAST YR: ICD-10-PCS | Mod: CPTII,S$GLB,, | Performed by: INTERNAL MEDICINE

## 2021-02-18 PROCEDURE — 3078F DIAST BP <80 MM HG: CPT | Mod: CPTII,S$GLB,, | Performed by: INTERNAL MEDICINE

## 2021-02-18 PROCEDURE — 3075F PR MOST RECENT SYSTOLIC BLOOD PRESS GE 130-139MM HG: ICD-10-PCS | Mod: CPTII,S$GLB,, | Performed by: INTERNAL MEDICINE

## 2021-02-18 PROCEDURE — 99214 OFFICE O/P EST MOD 30 MIN: CPT | Mod: S$GLB,,, | Performed by: INTERNAL MEDICINE

## 2021-02-18 PROCEDURE — 99999 PR PBB SHADOW E&M-EST. PATIENT-LVL IV: ICD-10-PCS | Mod: PBBFAC,,,

## 2021-02-18 RX ORDER — DIPHENHYDRAMINE HCL 50 MG
50 CAPSULE ORAL ONCE
Status: CANCELLED | OUTPATIENT
Start: 2021-02-18 | End: 2021-02-18

## 2021-02-18 RX ORDER — SODIUM CHLORIDE 9 MG/ML
INJECTION, SOLUTION INTRAVENOUS CONTINUOUS
Status: CANCELLED | OUTPATIENT
Start: 2021-02-18 | End: 2021-02-18

## 2021-02-19 ENCOUNTER — PATIENT MESSAGE (OUTPATIENT)
Dept: CARDIOLOGY | Facility: CLINIC | Age: 81
End: 2021-02-19

## 2021-02-19 ENCOUNTER — EDUCATION (OUTPATIENT)
Dept: CARDIOLOGY | Facility: CLINIC | Age: 81
End: 2021-02-19

## 2021-02-24 DIAGNOSIS — F09 COGNITIVE DYSFUNCTION: ICD-10-CM

## 2021-02-24 DIAGNOSIS — Z86.73 HISTORY OF STROKE: ICD-10-CM

## 2021-02-24 DIAGNOSIS — F32.9 REACTIVE DEPRESSION: Primary | ICD-10-CM

## 2021-03-01 ENCOUNTER — LAB VISIT (OUTPATIENT)
Dept: FAMILY MEDICINE | Facility: CLINIC | Age: 81
End: 2021-03-01
Payer: MEDICARE

## 2021-03-01 DIAGNOSIS — I48.91 ATRIAL FIBRILLATION: ICD-10-CM

## 2021-03-01 PROCEDURE — U0005 INFEC AGEN DETEC AMPLI PROBE: HCPCS

## 2021-03-01 PROCEDURE — U0003 INFECTIOUS AGENT DETECTION BY NUCLEIC ACID (DNA OR RNA); SEVERE ACUTE RESPIRATORY SYNDROME CORONAVIRUS 2 (SARS-COV-2) (CORONAVIRUS DISEASE [COVID-19]), AMPLIFIED PROBE TECHNIQUE, MAKING USE OF HIGH THROUGHPUT TECHNOLOGIES AS DESCRIBED BY CMS-2020-01-R: HCPCS

## 2021-03-02 ENCOUNTER — ANESTHESIA EVENT (OUTPATIENT)
Dept: MEDSURG UNIT | Facility: HOSPITAL | Age: 81
DRG: 274 | End: 2021-03-02
Payer: MEDICARE

## 2021-03-02 LAB — SARS-COV-2 RNA RESP QL NAA+PROBE: NOT DETECTED

## 2021-03-03 ENCOUNTER — ANESTHESIA (OUTPATIENT)
Dept: MEDSURG UNIT | Facility: HOSPITAL | Age: 81
DRG: 274 | End: 2021-03-03
Payer: MEDICARE

## 2021-03-03 ENCOUNTER — HOSPITAL ENCOUNTER (INPATIENT)
Facility: HOSPITAL | Age: 81
LOS: 1 days | Discharge: HOME | DRG: 274 | End: 2021-03-03
Attending: INTERNAL MEDICINE | Admitting: INTERNAL MEDICINE
Payer: MEDICARE

## 2021-03-03 VITALS
TEMPERATURE: 97 F | HEIGHT: 67 IN | SYSTOLIC BLOOD PRESSURE: 124 MMHG | BODY MASS INDEX: 25.74 KG/M2 | RESPIRATION RATE: 18 BRPM | HEART RATE: 61 BPM | WEIGHT: 164 LBS | OXYGEN SATURATION: 100 % | DIASTOLIC BLOOD PRESSURE: 58 MMHG

## 2021-03-03 DIAGNOSIS — I48.11 LONGSTANDING PERSISTENT ATRIAL FIBRILLATION: ICD-10-CM

## 2021-03-03 LAB
ABO + RH BLD: NORMAL
ANION GAP SERPL CALC-SCNC: 11 MMOL/L (ref 8–16)
APTT BLDCRRT: 34.1 SEC (ref 21–32)
BLD GP AB SCN CELLS X3 SERPL QL: NORMAL
BUN SERPL-MCNC: 45 MG/DL (ref 8–23)
CALCIUM SERPL-MCNC: 8.5 MG/DL (ref 8.7–10.5)
CHLORIDE SERPL-SCNC: 114 MMOL/L (ref 95–110)
CO2 SERPL-SCNC: 17 MMOL/L (ref 23–29)
CREAT SERPL-MCNC: 2.3 MG/DL (ref 0.5–1.4)
ERYTHROCYTE [DISTWIDTH] IN BLOOD BY AUTOMATED COUNT: 13.1 % (ref 11.5–14.5)
EST. GFR  (AFRICAN AMERICAN): 29.7 ML/MIN/1.73 M^2
EST. GFR  (NON AFRICAN AMERICAN): 25.7 ML/MIN/1.73 M^2
GLUCOSE SERPL-MCNC: 97 MG/DL (ref 70–110)
HCT VFR BLD AUTO: 31.8 % (ref 40–54)
HGB BLD-MCNC: 9.9 G/DL (ref 14–18)
INR PPP: 1.4 (ref 0.8–1.2)
MCH RBC QN AUTO: 31.5 PG (ref 27–31)
MCHC RBC AUTO-ENTMCNC: 31.1 G/DL (ref 32–36)
MCV RBC AUTO: 101 FL (ref 82–98)
PLATELET # BLD AUTO: 183 K/UL (ref 150–350)
PMV BLD AUTO: 10.6 FL (ref 9.2–12.9)
POCT GLUCOSE: 89 MG/DL (ref 70–110)
POTASSIUM SERPL-SCNC: 5 MMOL/L (ref 3.5–5.1)
PROTHROMBIN TIME: 14.8 SEC (ref 9–12.5)
RBC # BLD AUTO: 3.14 M/UL (ref 4.6–6.2)
SODIUM SERPL-SCNC: 142 MMOL/L (ref 136–145)
WBC # BLD AUTO: 7.55 K/UL (ref 3.9–12.7)

## 2021-03-03 PROCEDURE — 37000008 HC ANESTHESIA 1ST 15 MINUTES: Performed by: INTERNAL MEDICINE

## 2021-03-03 PROCEDURE — C1760 CLOSURE DEV, VASC: HCPCS | Performed by: INTERNAL MEDICINE

## 2021-03-03 PROCEDURE — 85730 THROMBOPLASTIN TIME PARTIAL: CPT | Performed by: INTERNAL MEDICINE

## 2021-03-03 PROCEDURE — 63600175 PHARM REV CODE 636 W HCPCS: Performed by: STUDENT IN AN ORGANIZED HEALTH CARE EDUCATION/TRAINING PROGRAM

## 2021-03-03 PROCEDURE — 27201423 OPTIME MED/SURG SUP & DEVICES STERILE SUPPLY: Performed by: INTERNAL MEDICINE

## 2021-03-03 PROCEDURE — 37000009 HC ANESTHESIA EA ADD 15 MINS: Performed by: INTERNAL MEDICINE

## 2021-03-03 PROCEDURE — A4216 STERILE WATER/SALINE, 10 ML: HCPCS | Performed by: STUDENT IN AN ORGANIZED HEALTH CARE EDUCATION/TRAINING PROGRAM

## 2021-03-03 PROCEDURE — C1751 CATH, INF, PER/CENT/MIDLINE: HCPCS | Performed by: ANESTHESIOLOGY

## 2021-03-03 PROCEDURE — 93010 EKG 12-LEAD: ICD-10-PCS | Mod: ,,, | Performed by: INTERNAL MEDICINE

## 2021-03-03 PROCEDURE — 93005 ELECTROCARDIOGRAM TRACING: CPT

## 2021-03-03 PROCEDURE — 25000003 PHARM REV CODE 250: Performed by: STUDENT IN AN ORGANIZED HEALTH CARE EDUCATION/TRAINING PROGRAM

## 2021-03-03 PROCEDURE — 36620 ARTERIAL: ICD-10-PCS | Mod: 59,Q0,, | Performed by: ANESTHESIOLOGY

## 2021-03-03 PROCEDURE — 86900 BLOOD TYPING SEROLOGIC ABO: CPT

## 2021-03-03 PROCEDURE — 85027 COMPLETE CBC AUTOMATED: CPT | Performed by: INTERNAL MEDICINE

## 2021-03-03 PROCEDURE — 33340 PERQ CLSR TCAT L ATR APNDGE: CPT | Performed by: INTERNAL MEDICINE

## 2021-03-03 PROCEDURE — 25500020 PHARM REV CODE 255: Performed by: INTERNAL MEDICINE

## 2021-03-03 PROCEDURE — D9220A PRA ANESTHESIA: Mod: Q0,,, | Performed by: ANESTHESIOLOGY

## 2021-03-03 PROCEDURE — C1769 GUIDE WIRE: HCPCS | Performed by: INTERNAL MEDICINE

## 2021-03-03 PROCEDURE — 27800903 OPTIME MED/SURG SUP & DEVICES OTHER IMPLANTS: Performed by: INTERNAL MEDICINE

## 2021-03-03 PROCEDURE — 25000003 PHARM REV CODE 250: Performed by: INTERNAL MEDICINE

## 2021-03-03 PROCEDURE — 85610 PROTHROMBIN TIME: CPT | Performed by: INTERNAL MEDICINE

## 2021-03-03 PROCEDURE — 33340 PR TRANSCATH CLOSURE, PERC, LEFT ATRIAL APPENDAGE, W/IMPLANT: ICD-10-PCS | Mod: Q0,,, | Performed by: INTERNAL MEDICINE

## 2021-03-03 PROCEDURE — 82962 GLUCOSE BLOOD TEST: CPT | Performed by: INTERNAL MEDICINE

## 2021-03-03 PROCEDURE — 11000001 HC ACUTE MED/SURG PRIVATE ROOM

## 2021-03-03 PROCEDURE — 93010 ELECTROCARDIOGRAM REPORT: CPT | Mod: ,,, | Performed by: INTERNAL MEDICINE

## 2021-03-03 PROCEDURE — 36620 INSERTION CATHETER ARTERY: CPT | Mod: 59,Q0,, | Performed by: ANESTHESIOLOGY

## 2021-03-03 PROCEDURE — D9220A PRA ANESTHESIA: ICD-10-PCS | Mod: Q0,,, | Performed by: ANESTHESIOLOGY

## 2021-03-03 PROCEDURE — C1894 INTRO/SHEATH, NON-LASER: HCPCS | Performed by: INTERNAL MEDICINE

## 2021-03-03 PROCEDURE — 80048 BASIC METABOLIC PNL TOTAL CA: CPT | Performed by: INTERNAL MEDICINE

## 2021-03-03 PROCEDURE — 33340 PERQ CLSR TCAT L ATR APNDGE: CPT | Mod: Q0,,, | Performed by: INTERNAL MEDICINE

## 2021-03-03 DEVICE — LEFT ATRIAL APPENDAGE CLOSURE DEVICE WITH DELIVERY SYSTEM
Type: IMPLANTABLE DEVICE | Site: HEART | Status: FUNCTIONAL
Brand: WATCHMAN FLX™

## 2021-03-03 RX ORDER — CEFAZOLIN SODIUM 1 G/3ML
INJECTION, POWDER, FOR SOLUTION INTRAMUSCULAR; INTRAVENOUS
Status: DISCONTINUED | OUTPATIENT
Start: 2021-03-03 | End: 2021-03-03

## 2021-03-03 RX ORDER — ONDANSETRON 8 MG/1
8 TABLET, ORALLY DISINTEGRATING ORAL EVERY 8 HOURS PRN
Status: DISCONTINUED | OUTPATIENT
Start: 2021-03-03 | End: 2021-03-03 | Stop reason: HOSPADM

## 2021-03-03 RX ORDER — SODIUM CHLORIDE 0.9 % (FLUSH) 0.9 %
10 SYRINGE (ML) INJECTION
Status: DISCONTINUED | OUTPATIENT
Start: 2021-03-03 | End: 2021-03-03 | Stop reason: HOSPADM

## 2021-03-03 RX ORDER — DIPHENHYDRAMINE HCL 50 MG
50 CAPSULE ORAL ONCE
Status: COMPLETED | OUTPATIENT
Start: 2021-03-03 | End: 2021-03-03

## 2021-03-03 RX ORDER — ACETAMINOPHEN 325 MG/1
650 TABLET ORAL EVERY 4 HOURS PRN
Status: DISCONTINUED | OUTPATIENT
Start: 2021-03-03 | End: 2021-03-03 | Stop reason: HOSPADM

## 2021-03-03 RX ORDER — DEXMEDETOMIDINE HYDROCHLORIDE 100 UG/ML
INJECTION, SOLUTION INTRAVENOUS
Status: DISCONTINUED | OUTPATIENT
Start: 2021-03-03 | End: 2021-03-03

## 2021-03-03 RX ORDER — SODIUM CHLORIDE 9 MG/ML
INJECTION, SOLUTION INTRAVENOUS CONTINUOUS
Status: DISCONTINUED | OUTPATIENT
Start: 2021-03-03 | End: 2021-03-03 | Stop reason: HOSPADM

## 2021-03-03 RX ORDER — LIDOCAINE HYDROCHLORIDE 20 MG/ML
INJECTION, SOLUTION INFILTRATION; PERINEURAL
Status: DISCONTINUED | OUTPATIENT
Start: 2021-03-03 | End: 2021-03-03 | Stop reason: HOSPADM

## 2021-03-03 RX ORDER — SODIUM CHLORIDE 9 MG/ML
INJECTION, SOLUTION INTRAVENOUS CONTINUOUS
Status: ACTIVE | OUTPATIENT
Start: 2021-03-03 | End: 2021-03-03

## 2021-03-03 RX ORDER — PROPOFOL 10 MG/ML
VIAL (ML) INTRAVENOUS
Status: DISCONTINUED | OUTPATIENT
Start: 2021-03-03 | End: 2021-03-03

## 2021-03-03 RX ORDER — HEPARIN SOD,PORCINE/0.9 % NACL 1000/500ML
INTRAVENOUS SOLUTION INTRAVENOUS
Status: DISCONTINUED | OUTPATIENT
Start: 2021-03-03 | End: 2021-03-03 | Stop reason: HOSPADM

## 2021-03-03 RX ORDER — PROTAMINE SULFATE 10 MG/ML
INJECTION, SOLUTION INTRAVENOUS
Status: DISCONTINUED | OUTPATIENT
Start: 2021-03-03 | End: 2021-03-03

## 2021-03-03 RX ORDER — FENTANYL CITRATE 50 UG/ML
INJECTION, SOLUTION INTRAMUSCULAR; INTRAVENOUS
Status: DISCONTINUED | OUTPATIENT
Start: 2021-03-03 | End: 2021-03-03

## 2021-03-03 RX ADMIN — PROTAMINE SULFATE 4 MG: 10 INJECTION, SOLUTION INTRAVENOUS at 09:03

## 2021-03-03 RX ADMIN — PROTAMINE SULFATE 1 MG: 10 INJECTION, SOLUTION INTRAVENOUS at 08:03

## 2021-03-03 RX ADMIN — PROPOFOL 10 MG: 10 INJECTION, EMULSION INTRAVENOUS at 08:03

## 2021-03-03 RX ADMIN — DEXMEDETOMIDINE HYDROCHLORIDE 18.6 MCG: 100 INJECTION, SOLUTION INTRAVENOUS at 08:03

## 2021-03-03 RX ADMIN — SODIUM CHLORIDE: 9 INJECTION, SOLUTION INTRAVENOUS at 07:03

## 2021-03-03 RX ADMIN — FENTANYL CITRATE 25 MCG: 50 INJECTION INTRAMUSCULAR; INTRAVENOUS at 08:03

## 2021-03-03 RX ADMIN — DEXMEDETOMIDINE HYDROCHLORIDE 1 MCG/KG/HR: 100 INJECTION, SOLUTION, CONCENTRATE INTRAVENOUS at 07:03

## 2021-03-03 RX ADMIN — DIPHENHYDRAMINE HYDROCHLORIDE 50 MG: 50 CAPSULE ORAL at 07:03

## 2021-03-03 RX ADMIN — SODIUM CHLORIDE: 0.9 INJECTION, SOLUTION INTRAVENOUS at 10:03

## 2021-03-03 RX ADMIN — CEFAZOLIN 2 G: 330 INJECTION, POWDER, FOR SOLUTION INTRAMUSCULAR; INTRAVENOUS at 08:03

## 2021-03-03 RX ADMIN — DEXMEDETOMIDINE HYDROCHLORIDE 18.6 MCG: 100 INJECTION, SOLUTION INTRAVENOUS at 07:03

## 2021-03-04 ENCOUNTER — PATIENT MESSAGE (OUTPATIENT)
Dept: CARDIOLOGY | Facility: CLINIC | Age: 81
End: 2021-03-04

## 2021-03-04 ENCOUNTER — EDUCATION (OUTPATIENT)
Dept: CARDIOLOGY | Facility: CLINIC | Age: 81
End: 2021-03-04

## 2021-03-04 DIAGNOSIS — I48.11 LONGSTANDING PERSISTENT ATRIAL FIBRILLATION: Primary | ICD-10-CM

## 2021-03-04 LAB
POC ACTIVATED CLOTTING TIME K: 125 SEC (ref 74–137)
POC ACTIVATED CLOTTING TIME K: 153 SEC (ref 74–137)
POC ACTIVATED CLOTTING TIME K: 274 SEC (ref 74–137)
SAMPLE: ABNORMAL
SAMPLE: ABNORMAL
SAMPLE: NORMAL

## 2021-03-09 ENCOUNTER — PATIENT MESSAGE (OUTPATIENT)
Dept: FAMILY MEDICINE | Facility: CLINIC | Age: 81
End: 2021-03-09

## 2021-03-11 ENCOUNTER — CLINICAL SUPPORT (OUTPATIENT)
Dept: FAMILY MEDICINE | Facility: CLINIC | Age: 81
End: 2021-03-11
Payer: MEDICARE

## 2021-04-06 ENCOUNTER — TELEPHONE (OUTPATIENT)
Dept: CARDIOLOGY | Facility: CLINIC | Age: 81
End: 2021-04-06

## 2021-04-06 RX ORDER — CLOPIDOGREL BISULFATE 75 MG/1
TABLET ORAL
Qty: 30 TABLET | Refills: 11 | Status: SHIPPED | OUTPATIENT
Start: 2021-04-06 | End: 2021-04-20

## 2021-04-09 ENCOUNTER — TELEPHONE (OUTPATIENT)
Dept: FAMILY MEDICINE | Facility: CLINIC | Age: 81
End: 2021-04-09

## 2021-04-16 ENCOUNTER — OFFICE VISIT (OUTPATIENT)
Dept: FAMILY MEDICINE | Facility: CLINIC | Age: 81
End: 2021-04-16
Payer: MEDICARE

## 2021-04-16 VITALS
SYSTOLIC BLOOD PRESSURE: 141 MMHG | RESPIRATION RATE: 20 BRPM | HEART RATE: 73 BPM | WEIGHT: 162.19 LBS | OXYGEN SATURATION: 98 % | DIASTOLIC BLOOD PRESSURE: 86 MMHG | HEIGHT: 67 IN | BODY MASS INDEX: 25.46 KG/M2

## 2021-04-16 DIAGNOSIS — I10 ESSENTIAL HYPERTENSION: ICD-10-CM

## 2021-04-16 DIAGNOSIS — I48.11 LONGSTANDING PERSISTENT ATRIAL FIBRILLATION: Primary | ICD-10-CM

## 2021-04-16 DIAGNOSIS — R53.83 FATIGUE, UNSPECIFIED TYPE: ICD-10-CM

## 2021-04-16 PROCEDURE — 3077F PR MOST RECENT SYSTOLIC BLOOD PRESSURE >= 140 MM HG: ICD-10-PCS | Mod: CPTII,S$GLB,, | Performed by: FAMILY MEDICINE

## 2021-04-16 PROCEDURE — 96372 THER/PROPH/DIAG INJ SC/IM: CPT | Mod: S$GLB,,, | Performed by: FAMILY MEDICINE

## 2021-04-16 PROCEDURE — 3079F DIAST BP 80-89 MM HG: CPT | Mod: CPTII,S$GLB,, | Performed by: FAMILY MEDICINE

## 2021-04-16 PROCEDURE — 1159F MED LIST DOCD IN RCRD: CPT | Mod: S$GLB,,, | Performed by: FAMILY MEDICINE

## 2021-04-16 PROCEDURE — 99214 OFFICE O/P EST MOD 30 MIN: CPT | Mod: 25,S$GLB,, | Performed by: FAMILY MEDICINE

## 2021-04-16 PROCEDURE — 99214 PR OFFICE/OUTPT VISIT, EST, LEVL IV, 30-39 MIN: ICD-10-PCS | Mod: 25,S$GLB,, | Performed by: FAMILY MEDICINE

## 2021-04-16 PROCEDURE — 99999 PR PBB SHADOW E&M-EST. PATIENT-LVL IV: ICD-10-PCS | Mod: PBBFAC,,, | Performed by: FAMILY MEDICINE

## 2021-04-16 PROCEDURE — 3077F SYST BP >= 140 MM HG: CPT | Mod: CPTII,S$GLB,, | Performed by: FAMILY MEDICINE

## 2021-04-16 PROCEDURE — 96372 PR INJECTION,THERAP/PROPH/DIAG2ST, IM OR SUBCUT: ICD-10-PCS | Mod: S$GLB,,, | Performed by: FAMILY MEDICINE

## 2021-04-16 PROCEDURE — 1159F PR MEDICATION LIST DOCUMENTED IN MEDICAL RECORD: ICD-10-PCS | Mod: S$GLB,,, | Performed by: FAMILY MEDICINE

## 2021-04-16 PROCEDURE — 99999 PR PBB SHADOW E&M-EST. PATIENT-LVL IV: CPT | Mod: PBBFAC,,, | Performed by: FAMILY MEDICINE

## 2021-04-16 PROCEDURE — 3079F PR MOST RECENT DIASTOLIC BLOOD PRESSURE 80-89 MM HG: ICD-10-PCS | Mod: CPTII,S$GLB,, | Performed by: FAMILY MEDICINE

## 2021-04-16 RX ORDER — CYANOCOBALAMIN 1000 UG/ML
1000 INJECTION, SOLUTION INTRAMUSCULAR; SUBCUTANEOUS ONCE
Status: COMPLETED | OUTPATIENT
Start: 2021-04-16 | End: 2021-04-16

## 2021-04-16 RX ORDER — SERTRALINE HYDROCHLORIDE 100 MG/1
100 TABLET, FILM COATED ORAL DAILY
Qty: 90 TABLET | Refills: 3 | Status: SHIPPED | OUTPATIENT
Start: 2021-04-16

## 2021-04-16 RX ORDER — PREDNISONE 5 MG/1
5 TABLET ORAL DAILY
Qty: 90 TABLET | Refills: 0 | Status: SHIPPED | OUTPATIENT
Start: 2021-04-16 | End: 2021-07-14

## 2021-04-16 RX ORDER — METOPROLOL SUCCINATE 100 MG/1
100 TABLET, EXTENDED RELEASE ORAL DAILY
Qty: 90 TABLET | Refills: 0 | Status: SHIPPED | OUTPATIENT
Start: 2021-04-16 | End: 2021-05-17

## 2021-04-16 RX ADMIN — CYANOCOBALAMIN 1000 MCG: 1000 INJECTION, SOLUTION INTRAMUSCULAR; SUBCUTANEOUS at 03:04

## 2021-04-20 ENCOUNTER — HOSPITAL ENCOUNTER (OUTPATIENT)
Dept: CARDIOLOGY | Facility: HOSPITAL | Age: 81
Discharge: HOME | End: 2021-04-20
Attending: INTERNAL MEDICINE | Admitting: INTERNAL MEDICINE
Payer: MEDICARE

## 2021-04-20 ENCOUNTER — OFFICE VISIT (OUTPATIENT)
Dept: CARDIOLOGY | Facility: CLINIC | Age: 81
End: 2021-04-20
Payer: MEDICARE

## 2021-04-20 ENCOUNTER — ANESTHESIA (OUTPATIENT)
Dept: MEDSURG UNIT | Facility: HOSPITAL | Age: 81
End: 2021-04-20
Payer: MEDICARE

## 2021-04-20 ENCOUNTER — HOSPITAL ENCOUNTER (OUTPATIENT)
Facility: HOSPITAL | Age: 81
Discharge: HOME | End: 2021-04-20
Attending: INTERNAL MEDICINE | Admitting: INTERNAL MEDICINE
Payer: MEDICARE

## 2021-04-20 ENCOUNTER — ANESTHESIA EVENT (OUTPATIENT)
Dept: MEDSURG UNIT | Facility: HOSPITAL | Age: 81
End: 2021-04-20
Payer: MEDICARE

## 2021-04-20 VITALS
HEIGHT: 67 IN | BODY MASS INDEX: 25.43 KG/M2 | WEIGHT: 162 LBS | SYSTOLIC BLOOD PRESSURE: 147 MMHG | DIASTOLIC BLOOD PRESSURE: 82 MMHG

## 2021-04-20 VITALS
HEART RATE: 67 BPM | DIASTOLIC BLOOD PRESSURE: 82 MMHG | BODY MASS INDEX: 26.06 KG/M2 | SYSTOLIC BLOOD PRESSURE: 155 MMHG | WEIGHT: 166 LBS | HEIGHT: 67 IN | OXYGEN SATURATION: 99 %

## 2021-04-20 VITALS
DIASTOLIC BLOOD PRESSURE: 73 MMHG | TEMPERATURE: 97 F | HEIGHT: 67 IN | SYSTOLIC BLOOD PRESSURE: 148 MMHG | HEART RATE: 62 BPM | BODY MASS INDEX: 25.43 KG/M2 | OXYGEN SATURATION: 100 % | RESPIRATION RATE: 20 BRPM | WEIGHT: 162 LBS

## 2021-04-20 DIAGNOSIS — N18.4 STAGE 4 CHRONIC KIDNEY DISEASE: ICD-10-CM

## 2021-04-20 DIAGNOSIS — E78.2 MIXED HYPERLIPIDEMIA: ICD-10-CM

## 2021-04-20 DIAGNOSIS — I10 ESSENTIAL HYPERTENSION: ICD-10-CM

## 2021-04-20 DIAGNOSIS — F32.9 REACTIVE DEPRESSION: Primary | ICD-10-CM

## 2021-04-20 DIAGNOSIS — I25.10 CORONARY ARTERY DISEASE, ANGINA PRESENCE UNSPECIFIED, UNSPECIFIED VESSEL OR LESION TYPE, UNSPECIFIED WHETHER NATIVE OR TRANSPLANTED HEART: ICD-10-CM

## 2021-04-20 DIAGNOSIS — I48.11 LONGSTANDING PERSISTENT ATRIAL FIBRILLATION: ICD-10-CM

## 2021-04-20 DIAGNOSIS — Z95.818 PRESENCE OF WATCHMAN LEFT ATRIAL APPENDAGE CLOSURE DEVICE: ICD-10-CM

## 2021-04-20 DIAGNOSIS — Z95.5 STATUS POST INSERTION OF DRUG-ELUTING STENT INTO LEFT ANTERIOR DESCENDING (LAD) ARTERY: ICD-10-CM

## 2021-04-20 DIAGNOSIS — I35.0 SEVERE AORTIC STENOSIS: ICD-10-CM

## 2021-04-20 DIAGNOSIS — L29.9 ITCHING: ICD-10-CM

## 2021-04-20 DIAGNOSIS — Z95.2 S/P TAVR (TRANSCATHETER AORTIC VALVE REPLACEMENT): ICD-10-CM

## 2021-04-20 DIAGNOSIS — I48.19 PERSISTENT ATRIAL FIBRILLATION: Primary | ICD-10-CM

## 2021-04-20 DIAGNOSIS — I48.19 PERSISTENT ATRIAL FIBRILLATION: ICD-10-CM

## 2021-04-20 LAB
BSA FOR ECHO PROCEDURE: 1.86 M2
EJECTION FRACTION: 50 %
PROX AORTA: 3.2 CM
SARS-COV-2 RDRP RESP QL NAA+PROBE: NEGATIVE

## 2021-04-20 PROCEDURE — D9220A PRA ANESTHESIA: Mod: ANES,,, | Performed by: ANESTHESIOLOGY

## 2021-04-20 PROCEDURE — 1101F PT FALLS ASSESS-DOCD LE1/YR: CPT | Mod: CPTII,S$GLB,, | Performed by: INTERNAL MEDICINE

## 2021-04-20 PROCEDURE — 99214 OFFICE O/P EST MOD 30 MIN: CPT | Mod: S$GLB,,, | Performed by: INTERNAL MEDICINE

## 2021-04-20 PROCEDURE — 93010 EKG 12-LEAD: ICD-10-PCS | Mod: ,,, | Performed by: INTERNAL MEDICINE

## 2021-04-20 PROCEDURE — 63600175 PHARM REV CODE 636 W HCPCS: Performed by: NURSE ANESTHETIST, CERTIFIED REGISTERED

## 2021-04-20 PROCEDURE — 99214 PR OFFICE/OUTPT VISIT, EST, LEVL IV, 30-39 MIN: ICD-10-PCS | Mod: S$GLB,,, | Performed by: INTERNAL MEDICINE

## 2021-04-20 PROCEDURE — 1159F MED LIST DOCD IN RCRD: CPT | Mod: S$GLB,,, | Performed by: INTERNAL MEDICINE

## 2021-04-20 PROCEDURE — D9220A PRA ANESTHESIA: ICD-10-PCS | Mod: CRNA,,, | Performed by: NURSE ANESTHETIST, CERTIFIED REGISTERED

## 2021-04-20 PROCEDURE — D9220A PRA ANESTHESIA: ICD-10-PCS | Mod: ANES,,, | Performed by: ANESTHESIOLOGY

## 2021-04-20 PROCEDURE — 93325 TRANSESOPHAGEAL ECHO (TEE) (CUPID ONLY): ICD-10-PCS | Mod: 26,,, | Performed by: INTERNAL MEDICINE

## 2021-04-20 PROCEDURE — 99999 PR PBB SHADOW E&M-EST. PATIENT-LVL IV: ICD-10-PCS | Mod: PBBFAC,,,

## 2021-04-20 PROCEDURE — 93312 ECHO TRANSESOPHAGEAL: CPT | Mod: 26,,, | Performed by: INTERNAL MEDICINE

## 2021-04-20 PROCEDURE — 93005 ELECTROCARDIOGRAM TRACING: CPT | Mod: 59

## 2021-04-20 PROCEDURE — 1101F PR PT FALLS ASSESS DOC 0-1 FALLS W/OUT INJ PAST YR: ICD-10-PCS | Mod: CPTII,S$GLB,, | Performed by: INTERNAL MEDICINE

## 2021-04-20 PROCEDURE — 25000003 PHARM REV CODE 250: Performed by: NURSE ANESTHETIST, CERTIFIED REGISTERED

## 2021-04-20 PROCEDURE — 99999 PR PBB SHADOW E&M-EST. PATIENT-LVL IV: CPT | Mod: PBBFAC,,,

## 2021-04-20 PROCEDURE — 93010 ELECTROCARDIOGRAM REPORT: CPT | Mod: ,,, | Performed by: INTERNAL MEDICINE

## 2021-04-20 PROCEDURE — D9220A PRA ANESTHESIA: Mod: CRNA,,, | Performed by: NURSE ANESTHETIST, CERTIFIED REGISTERED

## 2021-04-20 PROCEDURE — 1126F AMNT PAIN NOTED NONE PRSNT: CPT | Mod: S$GLB,,, | Performed by: INTERNAL MEDICINE

## 2021-04-20 PROCEDURE — 37000009 HC ANESTHESIA EA ADD 15 MINS

## 2021-04-20 PROCEDURE — 1159F PR MEDICATION LIST DOCUMENTED IN MEDICAL RECORD: ICD-10-PCS | Mod: S$GLB,,, | Performed by: INTERNAL MEDICINE

## 2021-04-20 PROCEDURE — 3288F FALL RISK ASSESSMENT DOCD: CPT | Mod: CPTII,S$GLB,, | Performed by: INTERNAL MEDICINE

## 2021-04-20 PROCEDURE — U0002 COVID-19 LAB TEST NON-CDC: HCPCS | Performed by: INTERNAL MEDICINE

## 2021-04-20 PROCEDURE — 37000008 HC ANESTHESIA 1ST 15 MINUTES

## 2021-04-20 PROCEDURE — 93312 TRANSESOPHAGEAL ECHO (TEE) (CUPID ONLY): ICD-10-PCS | Mod: 26,,, | Performed by: INTERNAL MEDICINE

## 2021-04-20 PROCEDURE — 1126F PR PAIN SEVERITY QUANTIFIED, NO PAIN PRESENT: ICD-10-PCS | Mod: S$GLB,,, | Performed by: INTERNAL MEDICINE

## 2021-04-20 PROCEDURE — 3288F PR FALLS RISK ASSESSMENT DOCUMENTED: ICD-10-PCS | Mod: CPTII,S$GLB,, | Performed by: INTERNAL MEDICINE

## 2021-04-20 PROCEDURE — 93325 DOPPLER ECHO COLOR FLOW MAPG: CPT | Mod: 26,,, | Performed by: INTERNAL MEDICINE

## 2021-04-20 PROCEDURE — 93320 DOPPLER ECHO COMPLETE: CPT

## 2021-04-20 RX ORDER — CLOPIDOGREL BISULFATE 75 MG/1
75 TABLET ORAL DAILY
Qty: 30 TABLET | Refills: 11
Start: 2021-04-20 | End: 2021-12-27

## 2021-04-20 RX ORDER — PROPOFOL 10 MG/ML
VIAL (ML) INTRAVENOUS
Status: DISCONTINUED | OUTPATIENT
Start: 2021-04-20 | End: 2021-04-20

## 2021-04-20 RX ORDER — PHENYLEPHRINE HYDROCHLORIDE 10 MG/ML
INJECTION INTRAVENOUS
Status: DISCONTINUED | OUTPATIENT
Start: 2021-04-20 | End: 2021-04-20

## 2021-04-20 RX ORDER — CETIRIZINE HYDROCHLORIDE 10 MG/1
10 TABLET ORAL NIGHTLY
Qty: 90 TABLET | Refills: 3 | Status: SHIPPED | OUTPATIENT
Start: 2021-04-20 | End: 2021-12-27

## 2021-04-20 RX ORDER — ONDANSETRON 2 MG/ML
4 INJECTION INTRAMUSCULAR; INTRAVENOUS DAILY PRN
Status: DISCONTINUED | OUTPATIENT
Start: 2021-04-20 | End: 2021-04-20 | Stop reason: HOSPADM

## 2021-04-20 RX ORDER — FENTANYL CITRATE 50 UG/ML
25 INJECTION, SOLUTION INTRAMUSCULAR; INTRAVENOUS EVERY 5 MIN PRN
Status: DISCONTINUED | OUTPATIENT
Start: 2021-04-20 | End: 2021-04-20 | Stop reason: HOSPADM

## 2021-04-20 RX ORDER — HYDROXYZINE HYDROCHLORIDE 50 MG/1
50 TABLET, FILM COATED ORAL 3 TIMES DAILY PRN
Qty: 90 TABLET | Refills: 3 | Status: SHIPPED | OUTPATIENT
Start: 2021-04-20 | End: 2021-12-27

## 2021-04-20 RX ORDER — LIDOCAINE HCL/PF 100 MG/5ML
SYRINGE (ML) INTRAVENOUS
Status: DISCONTINUED | OUTPATIENT
Start: 2021-04-20 | End: 2021-04-20

## 2021-04-20 RX ORDER — CLOPIDOGREL BISULFATE 75 MG/1
TABLET ORAL
Qty: 30 TABLET | Refills: 11 | Status: SHIPPED | OUTPATIENT
Start: 2021-04-20 | End: 2021-12-27

## 2021-04-20 RX ORDER — PROPOFOL 10 MG/ML
VIAL (ML) INTRAVENOUS CONTINUOUS PRN
Status: DISCONTINUED | OUTPATIENT
Start: 2021-04-20 | End: 2021-04-20

## 2021-04-20 RX ADMIN — PHENYLEPHRINE HYDROCHLORIDE 100 MCG: 10 INJECTION, SOLUTION INTRAMUSCULAR; INTRAVENOUS; SUBCUTANEOUS at 01:04

## 2021-04-20 RX ADMIN — SODIUM CHLORIDE: 9 INJECTION, SOLUTION INTRAVENOUS at 01:04

## 2021-04-20 RX ADMIN — PROPOFOL 150 MCG/KG/MIN: 10 INJECTION, EMULSION INTRAVENOUS at 01:04

## 2021-04-20 RX ADMIN — LIDOCAINE HYDROCHLORIDE 100 MG: 20 INJECTION, SOLUTION INTRAVENOUS at 01:04

## 2021-04-20 RX ADMIN — PROPOFOL 40 MG: 10 INJECTION, EMULSION INTRAVENOUS at 01:04

## 2021-05-13 ENCOUNTER — CLINICAL SUPPORT (OUTPATIENT)
Dept: FAMILY MEDICINE | Facility: CLINIC | Age: 81
End: 2021-05-13
Payer: MEDICARE

## 2021-05-13 DIAGNOSIS — E53.8 B12 DEFICIENCY: Primary | ICD-10-CM

## 2021-05-13 PROCEDURE — 96372 THER/PROPH/DIAG INJ SC/IM: CPT | Mod: S$GLB,,, | Performed by: FAMILY MEDICINE

## 2021-05-13 PROCEDURE — 99999 PR PBB SHADOW E&M-EST. PATIENT-LVL III: CPT | Mod: PBBFAC,,,

## 2021-05-13 PROCEDURE — 96372 PR INJECTION,THERAP/PROPH/DIAG2ST, IM OR SUBCUT: ICD-10-PCS | Mod: S$GLB,,, | Performed by: FAMILY MEDICINE

## 2021-05-13 PROCEDURE — 99999 PR PBB SHADOW E&M-EST. PATIENT-LVL III: ICD-10-PCS | Mod: PBBFAC,,,

## 2021-05-13 RX ORDER — CYANOCOBALAMIN 1000 UG/ML
1000 INJECTION, SOLUTION INTRAMUSCULAR; SUBCUTANEOUS
Status: COMPLETED | OUTPATIENT
Start: 2021-05-13 | End: 2021-05-13

## 2021-05-13 RX ADMIN — CYANOCOBALAMIN 1000 MCG: 1000 INJECTION, SOLUTION INTRAMUSCULAR; SUBCUTANEOUS at 08:05

## 2021-05-18 ENCOUNTER — LAB VISIT (OUTPATIENT)
Dept: LAB | Facility: HOSPITAL | Age: 81
End: 2021-05-18
Attending: INTERNAL MEDICINE
Payer: MEDICARE

## 2021-05-18 DIAGNOSIS — N25.81 SECONDARY HYPERPARATHYROIDISM OF RENAL ORIGIN: Primary | ICD-10-CM

## 2021-05-18 DIAGNOSIS — N18.4 CHRONIC KIDNEY DISEASE, STAGE IV (SEVERE): ICD-10-CM

## 2021-05-18 DIAGNOSIS — M10.9 GOUT, UNSPECIFIED: ICD-10-CM

## 2021-05-18 LAB
ALBUMIN SERPL BCP-MCNC: 4 G/DL (ref 3.5–5.2)
ANION GAP SERPL CALC-SCNC: 10 MMOL/L (ref 8–16)
BASOPHILS # BLD AUTO: 0.09 K/UL (ref 0–0.2)
BASOPHILS NFR BLD: 1 % (ref 0–1.9)
BUN SERPL-MCNC: 46 MG/DL (ref 8–23)
CALCIUM SERPL-MCNC: 9.2 MG/DL (ref 8.7–10.5)
CHLORIDE SERPL-SCNC: 111 MMOL/L (ref 95–110)
CO2 SERPL-SCNC: 21 MMOL/L (ref 23–29)
CREAT SERPL-MCNC: 2.5 MG/DL (ref 0.5–1.4)
DIFFERENTIAL METHOD: ABNORMAL
EOSINOPHIL # BLD AUTO: 0.8 K/UL (ref 0–0.5)
EOSINOPHIL NFR BLD: 8.5 % (ref 0–8)
ERYTHROCYTE [DISTWIDTH] IN BLOOD BY AUTOMATED COUNT: 12.8 % (ref 11.5–14.5)
EST. GFR  (AFRICAN AMERICAN): 26.8 ML/MIN/1.73 M^2
EST. GFR  (NON AFRICAN AMERICAN): 23.2 ML/MIN/1.73 M^2
GLUCOSE SERPL-MCNC: 104 MG/DL (ref 70–110)
HCT VFR BLD AUTO: 36.6 % (ref 40–54)
HGB BLD-MCNC: 11.7 G/DL (ref 14–18)
IMM GRANULOCYTES # BLD AUTO: 0.02 K/UL (ref 0–0.04)
IMM GRANULOCYTES NFR BLD AUTO: 0.2 % (ref 0–0.5)
LYMPHOCYTES # BLD AUTO: 1.3 K/UL (ref 1–4.8)
LYMPHOCYTES NFR BLD: 14.2 % (ref 18–48)
MCH RBC QN AUTO: 31 PG (ref 27–31)
MCHC RBC AUTO-ENTMCNC: 32 G/DL (ref 32–36)
MCV RBC AUTO: 97 FL (ref 82–98)
MONOCYTES # BLD AUTO: 0.7 K/UL (ref 0.3–1)
MONOCYTES NFR BLD: 7.5 % (ref 4–15)
NEUTROPHILS # BLD AUTO: 6.1 K/UL (ref 1.8–7.7)
NEUTROPHILS NFR BLD: 68.6 % (ref 38–73)
NRBC BLD-RTO: 0 /100 WBC
PHOSPHATE SERPL-MCNC: 3.2 MG/DL (ref 2.7–4.5)
PLATELET # BLD AUTO: 207 K/UL (ref 150–450)
PMV BLD AUTO: 10.3 FL (ref 9.2–12.9)
POTASSIUM SERPL-SCNC: 5.2 MMOL/L (ref 3.5–5.1)
PTH-INTACT SERPL-MCNC: 101 PG/ML (ref 9–77)
RBC # BLD AUTO: 3.78 M/UL (ref 4.6–6.2)
SODIUM SERPL-SCNC: 142 MMOL/L (ref 136–145)
URATE SERPL-MCNC: 4.5 MG/DL (ref 3.4–7)
WBC # BLD AUTO: 8.85 K/UL (ref 3.9–12.7)

## 2021-05-18 PROCEDURE — 82306 VITAMIN D 25 HYDROXY: CPT | Performed by: INTERNAL MEDICINE

## 2021-05-18 PROCEDURE — 84156 ASSAY OF PROTEIN URINE: CPT | Performed by: INTERNAL MEDICINE

## 2021-05-18 PROCEDURE — 80069 RENAL FUNCTION PANEL: CPT | Performed by: INTERNAL MEDICINE

## 2021-05-18 PROCEDURE — 83970 ASSAY OF PARATHORMONE: CPT | Performed by: INTERNAL MEDICINE

## 2021-05-18 PROCEDURE — 36415 COLL VENOUS BLD VENIPUNCTURE: CPT | Performed by: INTERNAL MEDICINE

## 2021-05-18 PROCEDURE — 85025 COMPLETE CBC W/AUTO DIFF WBC: CPT | Performed by: INTERNAL MEDICINE

## 2021-05-18 PROCEDURE — 84550 ASSAY OF BLOOD/URIC ACID: CPT | Performed by: INTERNAL MEDICINE

## 2021-05-18 PROCEDURE — 81003 URINALYSIS AUTO W/O SCOPE: CPT | Performed by: INTERNAL MEDICINE

## 2021-05-19 LAB
25(OH)D3+25(OH)D2 SERPL-MCNC: 21 NG/ML (ref 30–96)
BILIRUB UR QL STRIP: NEGATIVE
CLARITY UR: CLEAR
COLOR UR: YELLOW
GLUCOSE UR QL STRIP: NEGATIVE
HGB UR QL STRIP: NEGATIVE
KETONES UR QL STRIP: NEGATIVE
LEUKOCYTE ESTERASE UR QL STRIP: NEGATIVE
NITRITE UR QL STRIP: NEGATIVE
PH UR STRIP: 6 [PH] (ref 5–8)
PROT UR QL STRIP: NEGATIVE
PROT UR-MCNC: 10 MG/DL (ref 0–15)
SP GR UR STRIP: 1.02 (ref 1–1.03)
URN SPEC COLLECT METH UR: NORMAL
UROBILINOGEN UR STRIP-ACNC: NEGATIVE EU/DL

## 2021-06-10 ENCOUNTER — CLINICAL SUPPORT (OUTPATIENT)
Dept: FAMILY MEDICINE | Facility: CLINIC | Age: 81
End: 2021-06-10
Payer: MEDICARE

## 2021-06-10 DIAGNOSIS — E53.8 B12 DEFICIENCY: Primary | ICD-10-CM

## 2021-06-11 ENCOUNTER — DOCUMENT SCAN (OUTPATIENT)
Dept: HOME HEALTH SERVICES | Facility: HOSPITAL | Age: 81
End: 2021-06-11
Payer: MEDICARE

## 2021-06-14 RX ORDER — CYANOCOBALAMIN 1000 UG/ML
1000 INJECTION, SOLUTION INTRAMUSCULAR; SUBCUTANEOUS
Status: SHIPPED | OUTPATIENT
Start: 2021-06-14

## 2021-06-15 DIAGNOSIS — I10 ESSENTIAL HYPERTENSION: ICD-10-CM

## 2021-06-15 RX ORDER — NIFEDIPINE 60 MG/1
TABLET, EXTENDED RELEASE ORAL
Qty: 90 TABLET | Refills: 3 | Status: SHIPPED | OUTPATIENT
Start: 2021-06-15

## 2021-06-24 ENCOUNTER — DOCUMENT SCAN (OUTPATIENT)
Dept: HOME HEALTH SERVICES | Facility: HOSPITAL | Age: 81
End: 2021-06-24
Payer: MEDICARE

## 2021-07-08 ENCOUNTER — CLINICAL SUPPORT (OUTPATIENT)
Dept: FAMILY MEDICINE | Facility: CLINIC | Age: 81
End: 2021-07-08
Payer: MEDICARE

## 2021-07-08 PROCEDURE — 96372 PR INJECTION,THERAP/PROPH/DIAG2ST, IM OR SUBCUT: ICD-10-PCS | Mod: S$GLB,,, | Performed by: FAMILY MEDICINE

## 2021-07-08 PROCEDURE — 99999 PR PBB SHADOW E&M-EST. PATIENT-LVL III: CPT | Mod: PBBFAC,,,

## 2021-07-08 PROCEDURE — 96372 THER/PROPH/DIAG INJ SC/IM: CPT | Mod: S$GLB,,, | Performed by: FAMILY MEDICINE

## 2021-07-08 PROCEDURE — 99999 PR PBB SHADOW E&M-EST. PATIENT-LVL III: ICD-10-PCS | Mod: PBBFAC,,,

## 2021-07-08 RX ADMIN — CYANOCOBALAMIN 1000 MCG: 1000 INJECTION, SOLUTION INTRAMUSCULAR; SUBCUTANEOUS at 08:07

## 2021-07-21 ENCOUNTER — PATIENT OUTREACH (OUTPATIENT)
Dept: ADMINISTRATIVE | Facility: OTHER | Age: 81
End: 2021-07-21

## 2021-07-22 ENCOUNTER — OFFICE VISIT (OUTPATIENT)
Dept: CARDIOLOGY | Facility: CLINIC | Age: 81
End: 2021-07-22
Payer: MEDICARE

## 2021-07-22 VITALS
OXYGEN SATURATION: 98 % | SYSTOLIC BLOOD PRESSURE: 99 MMHG | RESPIRATION RATE: 17 BRPM | BODY MASS INDEX: 25.26 KG/M2 | HEIGHT: 67 IN | DIASTOLIC BLOOD PRESSURE: 65 MMHG | TEMPERATURE: 98 F | WEIGHT: 160.94 LBS | HEART RATE: 70 BPM

## 2021-07-22 DIAGNOSIS — I48.11 LONGSTANDING PERSISTENT ATRIAL FIBRILLATION: ICD-10-CM

## 2021-07-22 DIAGNOSIS — R06.09 DOE (DYSPNEA ON EXERTION): ICD-10-CM

## 2021-07-22 DIAGNOSIS — Z95.5 STATUS POST INSERTION OF DRUG-ELUTING STENT INTO LEFT ANTERIOR DESCENDING (LAD) ARTERY: ICD-10-CM

## 2021-07-22 DIAGNOSIS — I10 ESSENTIAL HYPERTENSION: ICD-10-CM

## 2021-07-22 DIAGNOSIS — I25.10 CORONARY ARTERY DISEASE INVOLVING NATIVE CORONARY ARTERY OF NATIVE HEART, ANGINA PRESENCE UNSPECIFIED: ICD-10-CM

## 2021-07-22 DIAGNOSIS — N18.4 STAGE 4 CHRONIC KIDNEY DISEASE: ICD-10-CM

## 2021-07-22 DIAGNOSIS — Z91.89 SEDENTARY LIFESTYLE: ICD-10-CM

## 2021-07-22 DIAGNOSIS — L29.9 ITCHING: ICD-10-CM

## 2021-07-22 DIAGNOSIS — G47.19 EXCESSIVE DAYTIME SLEEPINESS: ICD-10-CM

## 2021-07-22 DIAGNOSIS — D64.9 ANEMIA, UNSPECIFIED TYPE: ICD-10-CM

## 2021-07-22 DIAGNOSIS — E87.5 HYPERKALEMIA: ICD-10-CM

## 2021-07-22 DIAGNOSIS — G47.33 OSA (OBSTRUCTIVE SLEEP APNEA): ICD-10-CM

## 2021-07-22 DIAGNOSIS — Z95.818 PRESENCE OF WATCHMAN LEFT ATRIAL APPENDAGE CLOSURE DEVICE: Primary | ICD-10-CM

## 2021-07-22 DIAGNOSIS — E78.2 MIXED HYPERLIPIDEMIA: ICD-10-CM

## 2021-07-22 DIAGNOSIS — Z86.73 HISTORY OF STROKE: ICD-10-CM

## 2021-07-22 DIAGNOSIS — H53.9 VISUAL DISTURBANCES: ICD-10-CM

## 2021-07-22 DIAGNOSIS — Z91.81 AT RISK FOR FALLING: ICD-10-CM

## 2021-07-22 DIAGNOSIS — Z95.2 S/P TAVR (TRANSCATHETER AORTIC VALVE REPLACEMENT): ICD-10-CM

## 2021-07-22 PROBLEM — Z79.01 CURRENT USE OF LONG TERM ANTICOAGULATION: Status: RESOLVED | Noted: 2017-11-29 | Resolved: 2021-07-22

## 2021-07-22 PROCEDURE — 1126F PR PAIN SEVERITY QUANTIFIED, NO PAIN PRESENT: ICD-10-PCS | Mod: CPTII,S$GLB,, | Performed by: INTERNAL MEDICINE

## 2021-07-22 PROCEDURE — 1159F PR MEDICATION LIST DOCUMENTED IN MEDICAL RECORD: ICD-10-PCS | Mod: CPTII,S$GLB,, | Performed by: INTERNAL MEDICINE

## 2021-07-22 PROCEDURE — 3078F PR MOST RECENT DIASTOLIC BLOOD PRESSURE < 80 MM HG: ICD-10-PCS | Mod: CPTII,S$GLB,, | Performed by: INTERNAL MEDICINE

## 2021-07-22 PROCEDURE — 3074F SYST BP LT 130 MM HG: CPT | Mod: CPTII,S$GLB,, | Performed by: INTERNAL MEDICINE

## 2021-07-22 PROCEDURE — 1159F MED LIST DOCD IN RCRD: CPT | Mod: CPTII,S$GLB,, | Performed by: INTERNAL MEDICINE

## 2021-07-22 PROCEDURE — 1126F AMNT PAIN NOTED NONE PRSNT: CPT | Mod: CPTII,S$GLB,, | Performed by: INTERNAL MEDICINE

## 2021-07-22 PROCEDURE — 3074F PR MOST RECENT SYSTOLIC BLOOD PRESSURE < 130 MM HG: ICD-10-PCS | Mod: CPTII,S$GLB,, | Performed by: INTERNAL MEDICINE

## 2021-07-22 PROCEDURE — 99215 PR OFFICE/OUTPT VISIT, EST, LEVL V, 40-54 MIN: ICD-10-PCS | Mod: S$GLB,,, | Performed by: INTERNAL MEDICINE

## 2021-07-22 PROCEDURE — 1100F PTFALLS ASSESS-DOCD GE2>/YR: CPT | Mod: CPTII,S$GLB,, | Performed by: INTERNAL MEDICINE

## 2021-07-22 PROCEDURE — 1100F PR PT FALLS ASSESS DOC 2+ FALLS/FALL W/INJURY/YR: ICD-10-PCS | Mod: CPTII,S$GLB,, | Performed by: INTERNAL MEDICINE

## 2021-07-22 PROCEDURE — 99999 PR PBB SHADOW E&M-EST. PATIENT-LVL V: CPT | Mod: PBBFAC,,, | Performed by: INTERNAL MEDICINE

## 2021-07-22 PROCEDURE — 99999 PR PBB SHADOW E&M-EST. PATIENT-LVL V: ICD-10-PCS | Mod: PBBFAC,,, | Performed by: INTERNAL MEDICINE

## 2021-07-22 PROCEDURE — 3288F FALL RISK ASSESSMENT DOCD: CPT | Mod: CPTII,S$GLB,, | Performed by: INTERNAL MEDICINE

## 2021-07-22 PROCEDURE — 3078F DIAST BP <80 MM HG: CPT | Mod: CPTII,S$GLB,, | Performed by: INTERNAL MEDICINE

## 2021-07-22 PROCEDURE — 99215 OFFICE O/P EST HI 40 MIN: CPT | Mod: S$GLB,,, | Performed by: INTERNAL MEDICINE

## 2021-07-22 PROCEDURE — 3288F PR FALLS RISK ASSESSMENT DOCUMENTED: ICD-10-PCS | Mod: CPTII,S$GLB,, | Performed by: INTERNAL MEDICINE

## 2021-08-03 ENCOUNTER — PATIENT MESSAGE (OUTPATIENT)
Dept: CARDIOLOGY | Facility: CLINIC | Age: 81
End: 2021-08-03

## 2021-08-09 ENCOUNTER — CLINICAL SUPPORT (OUTPATIENT)
Dept: FAMILY MEDICINE | Facility: CLINIC | Age: 81
End: 2021-08-09
Payer: MEDICARE

## 2021-08-09 PROCEDURE — 96372 PR INJECTION,THERAP/PROPH/DIAG2ST, IM OR SUBCUT: ICD-10-PCS | Mod: S$GLB,,, | Performed by: FAMILY MEDICINE

## 2021-08-09 PROCEDURE — 96372 THER/PROPH/DIAG INJ SC/IM: CPT | Mod: S$GLB,,, | Performed by: FAMILY MEDICINE

## 2021-08-09 RX ADMIN — CYANOCOBALAMIN 1000 MCG: 1000 INJECTION, SOLUTION INTRAMUSCULAR; SUBCUTANEOUS at 08:08

## 2021-08-13 RX ORDER — ATORVASTATIN CALCIUM 40 MG/1
TABLET, FILM COATED ORAL
Qty: 90 TABLET | Refills: 3 | Status: SHIPPED | OUTPATIENT
Start: 2021-08-13

## 2021-09-14 ENCOUNTER — TELEPHONE (OUTPATIENT)
Dept: FAMILY MEDICINE | Facility: CLINIC | Age: 81
End: 2021-09-14

## 2021-09-14 ENCOUNTER — CLINICAL SUPPORT (OUTPATIENT)
Dept: FAMILY MEDICINE | Facility: CLINIC | Age: 81
End: 2021-09-14
Payer: MEDICARE

## 2021-09-14 PROCEDURE — 96372 THER/PROPH/DIAG INJ SC/IM: CPT | Mod: S$GLB,,, | Performed by: FAMILY MEDICINE

## 2021-09-14 PROCEDURE — 96372 PR INJECTION,THERAP/PROPH/DIAG2ST, IM OR SUBCUT: ICD-10-PCS | Mod: S$GLB,,, | Performed by: FAMILY MEDICINE

## 2021-09-14 RX ADMIN — CYANOCOBALAMIN 1000 MCG: 1000 INJECTION, SOLUTION INTRAMUSCULAR; SUBCUTANEOUS at 10:09

## 2021-09-15 ENCOUNTER — TELEPHONE (OUTPATIENT)
Dept: CARDIOLOGY | Facility: CLINIC | Age: 81
End: 2021-09-15

## 2021-09-22 ENCOUNTER — PATIENT MESSAGE (OUTPATIENT)
Dept: FAMILY MEDICINE | Facility: CLINIC | Age: 81
End: 2021-09-22

## 2021-09-22 DIAGNOSIS — R26.81 UNSTEADY GAIT: Primary | ICD-10-CM

## 2021-09-23 RX ORDER — CALCIUM CARBONATE 160(400)MG
TABLET,CHEWABLE ORAL
Qty: 1 EACH | Refills: 0 | Status: SHIPPED | OUTPATIENT
Start: 2021-09-23

## 2021-09-27 ENCOUNTER — TELEPHONE (OUTPATIENT)
Dept: OPHTHALMOLOGY | Facility: CLINIC | Age: 81
End: 2021-09-27

## 2021-09-30 NOTE — Clinical Note
Catheter is inserted into the ostium   right coronary artery. Angiography performed of the right coronary arteries. Initial / Assessment/Plan of Care Note     Baseline Assessment  55 year old admitted 9/29/2021 as Inpatient with a diagnosis of diabetic foot ulcer Lt foot. .   Prior to admission patient was living with Family members, Parent and residing at House.  . Patient’s Primary Care Provider is Rachael Rowe MD.     Medical History  Past Medical History:   Diagnosis Date   • Arthritis    • Bronchitis    • CHF (congestive heart failure) (CMS/[facility])    • COPD (chronic obstructive pulmonary disease) (CMS/[facility])    • Diabetes mellitus (CMS/[facility])    • Essential (primary) hypertension    • Spinal stenosis        Prior to Admission Status  Functional Status  Ambulation: Independent/Self, Cane  Bathing: Independent/Self  Dressing: Independent/Self  Toileting: Independent/Self  Meal Preparation: Independent/Self  Shopping: Other (comment) (Currently unable to drive to go to the store)  Medication Preparation: Independent/Self  Medication Administration: Independent/Self    Agency/Support  Type of Services Prior to Hospitalization: None  Support Systems: Parent, Family members  Home Devices/Equipment: Shower chair, Blood glucose monitor, Nebulizer (T), CPAP/BiPAP machine (T), Mobility assist device, Sensory assist device  Mobility Assist Devices: Cane, Standard walker  Sensory Support Devices: Eyeglasses    Current Status  PT Ambulation Tips: Use walker, Use gait belt, Walk to bathroom, Needs minimal assist  PT Transfer Tips:     OT Bathing Tips:    OT Dressing Tips:    OT Toileting Tips: Able toilet in bathroom  OT Feeding Tips:    SLP Swallow/Feeding Tips:    SLP Comm/Cog Tips:    Current Mental Status: Alert, Oriented to Person, Oriented to Place, Oriented to Reason for Hospitalization, Oriented to Time  Stressors:      Insurance  Primary: [employer]  Secondary: [place] MEDICAID    Barriers to Discharge  Identified Barriers to Discharge/Transition Planning: Assessment/stabilization in progress    Progress Note  Lives with  mother and sister. Was havng increased difficulty performing ADLS at home. Requesting homemaker services if possible. No active HH. No [facility] history. DME as above. Has Rx coverage. CM will follow for discharge needs.     Plan  SW/CM - Recommendations for Discharge: Home, Home care   PT - Recommendations for Discharge:   Patient requires  intermittent assistance to perform mobility and/or ADLs safely  OT - Recommendations for Discharge:   Patient requires  intermittent assistance to perform mobility and/or ADLs safely  SLP - Recommendations for Discharge:     Anticipate patient will need post-hospital services. Necessary services are available.  Anticipate patient can return to the environment from which patient entered the hospital.   Anticipate patient can provide self-care at discharge.    Refer to SW/CM Flowsheet for Goals and objective data.

## 2021-10-05 ENCOUNTER — PATIENT OUTREACH (OUTPATIENT)
Dept: ADMINISTRATIVE | Facility: OTHER | Age: 81
End: 2021-10-05

## 2021-10-07 ENCOUNTER — OFFICE VISIT (OUTPATIENT)
Dept: OPHTHALMOLOGY | Facility: CLINIC | Age: 81
End: 2021-10-07
Payer: MEDICARE

## 2021-10-07 DIAGNOSIS — H25.813 COMBINED FORMS OF AGE-RELATED CATARACT, BILATERAL: Primary | ICD-10-CM

## 2021-10-07 DIAGNOSIS — H35.89 RPE MOTTLING OF MACULA: ICD-10-CM

## 2021-10-07 DIAGNOSIS — H04.123 DRY EYE SYNDROME, BILATERAL: ICD-10-CM

## 2021-10-07 PROCEDURE — 1159F PR MEDICATION LIST DOCUMENTED IN MEDICAL RECORD: ICD-10-PCS | Mod: CPTII,S$GLB,, | Performed by: OPHTHALMOLOGY

## 2021-10-07 PROCEDURE — 99999 PR PBB SHADOW E&M-EST. PATIENT-LVL III: ICD-10-PCS | Mod: PBBFAC,,, | Performed by: OPHTHALMOLOGY

## 2021-10-07 PROCEDURE — 1100F PTFALLS ASSESS-DOCD GE2>/YR: CPT | Mod: CPTII,S$GLB,, | Performed by: OPHTHALMOLOGY

## 2021-10-07 PROCEDURE — 3288F PR FALLS RISK ASSESSMENT DOCUMENTED: ICD-10-PCS | Mod: CPTII,S$GLB,, | Performed by: OPHTHALMOLOGY

## 2021-10-07 PROCEDURE — 99999 PR PBB SHADOW E&M-EST. PATIENT-LVL III: CPT | Mod: PBBFAC,,, | Performed by: OPHTHALMOLOGY

## 2021-10-07 PROCEDURE — 1160F RVW MEDS BY RX/DR IN RCRD: CPT | Mod: CPTII,S$GLB,, | Performed by: OPHTHALMOLOGY

## 2021-10-07 PROCEDURE — 1159F MED LIST DOCD IN RCRD: CPT | Mod: CPTII,S$GLB,, | Performed by: OPHTHALMOLOGY

## 2021-10-07 PROCEDURE — 1126F AMNT PAIN NOTED NONE PRSNT: CPT | Mod: CPTII,S$GLB,, | Performed by: OPHTHALMOLOGY

## 2021-10-07 PROCEDURE — 3288F FALL RISK ASSESSMENT DOCD: CPT | Mod: CPTII,S$GLB,, | Performed by: OPHTHALMOLOGY

## 2021-10-07 PROCEDURE — 1126F PR PAIN SEVERITY QUANTIFIED, NO PAIN PRESENT: ICD-10-PCS | Mod: CPTII,S$GLB,, | Performed by: OPHTHALMOLOGY

## 2021-10-07 PROCEDURE — 1160F PR REVIEW ALL MEDS BY PRESCRIBER/CLIN PHARMACIST DOCUMENTED: ICD-10-PCS | Mod: CPTII,S$GLB,, | Performed by: OPHTHALMOLOGY

## 2021-10-07 PROCEDURE — 92004 COMPRE OPH EXAM NEW PT 1/>: CPT | Mod: S$GLB,,, | Performed by: OPHTHALMOLOGY

## 2021-10-07 PROCEDURE — 1100F PR PT FALLS ASSESS DOC 2+ FALLS/FALL W/INJURY/YR: ICD-10-PCS | Mod: CPTII,S$GLB,, | Performed by: OPHTHALMOLOGY

## 2021-10-07 PROCEDURE — 92004 PR EYE EXAM, NEW PATIENT,COMPREHESV: ICD-10-PCS | Mod: S$GLB,,, | Performed by: OPHTHALMOLOGY

## 2021-10-22 ENCOUNTER — PATIENT MESSAGE (OUTPATIENT)
Dept: FAMILY MEDICINE | Facility: CLINIC | Age: 81
End: 2021-10-22
Payer: MEDICARE

## 2021-10-28 ENCOUNTER — LAB VISIT (OUTPATIENT)
Dept: LAB | Facility: HOSPITAL | Age: 81
End: 2021-10-28
Attending: INTERNAL MEDICINE
Payer: MEDICARE

## 2021-10-28 DIAGNOSIS — N25.81 SECONDARY HYPERPARATHYROIDISM OF RENAL ORIGIN: Primary | ICD-10-CM

## 2021-10-28 DIAGNOSIS — M10.9 GOUT, UNSPECIFIED: ICD-10-CM

## 2021-10-28 DIAGNOSIS — N18.4 CHRONIC KIDNEY DISEASE, STAGE IV (SEVERE): ICD-10-CM

## 2021-10-28 LAB
ALBUMIN SERPL BCP-MCNC: 3.8 G/DL (ref 3.5–5.2)
ANION GAP SERPL CALC-SCNC: 11 MMOL/L (ref 8–16)
BACTERIA #/AREA URNS HPF: NORMAL /HPF
BASOPHILS # BLD AUTO: 0.08 K/UL (ref 0–0.2)
BASOPHILS NFR BLD: 0.9 % (ref 0–1.9)
BUN SERPL-MCNC: 49 MG/DL (ref 8–23)
CALCIUM SERPL-MCNC: 9 MG/DL (ref 8.7–10.5)
CHLORIDE SERPL-SCNC: 110 MMOL/L (ref 95–110)
CO2 SERPL-SCNC: 19 MMOL/L (ref 23–29)
CREAT SERPL-MCNC: 2.4 MG/DL (ref 0.5–1.4)
CREAT UR-MCNC: 103.9 MG/DL (ref 23–375)
DIFFERENTIAL METHOD: ABNORMAL
EOSINOPHIL # BLD AUTO: 0.6 K/UL (ref 0–0.5)
EOSINOPHIL NFR BLD: 7.4 % (ref 0–8)
ERYTHROCYTE [DISTWIDTH] IN BLOOD BY AUTOMATED COUNT: 12.7 % (ref 11.5–14.5)
EST. GFR  (AFRICAN AMERICAN): 28.2 ML/MIN/1.73 M^2
EST. GFR  (NON AFRICAN AMERICAN): 24.4 ML/MIN/1.73 M^2
GLUCOSE SERPL-MCNC: 82 MG/DL (ref 70–110)
HCT VFR BLD AUTO: 35.9 % (ref 40–54)
HGB BLD-MCNC: 11.8 G/DL (ref 14–18)
IMM GRANULOCYTES # BLD AUTO: 0.03 K/UL (ref 0–0.04)
IMM GRANULOCYTES NFR BLD AUTO: 0.3 % (ref 0–0.5)
LYMPHOCYTES # BLD AUTO: 1.4 K/UL (ref 1–4.8)
LYMPHOCYTES NFR BLD: 16.1 % (ref 18–48)
MCH RBC QN AUTO: 32.1 PG (ref 27–31)
MCHC RBC AUTO-ENTMCNC: 32.9 G/DL (ref 32–36)
MCV RBC AUTO: 98 FL (ref 82–98)
MICROSCOPIC COMMENT: NORMAL
MONOCYTES # BLD AUTO: 0.9 K/UL (ref 0.3–1)
MONOCYTES NFR BLD: 9.9 % (ref 4–15)
NEUTROPHILS # BLD AUTO: 5.7 K/UL (ref 1.8–7.7)
NEUTROPHILS NFR BLD: 65.4 % (ref 38–73)
NRBC BLD-RTO: 0 /100 WBC
PHOSPHATE SERPL-MCNC: 2.9 MG/DL (ref 2.7–4.5)
PLATELET # BLD AUTO: 178 K/UL (ref 150–450)
PMV BLD AUTO: 10.9 FL (ref 9.2–12.9)
POTASSIUM SERPL-SCNC: 5 MMOL/L (ref 3.5–5.1)
PROT UR-MCNC: 16 MG/DL (ref 0–15)
PTH-INTACT SERPL-MCNC: 144.9 PG/ML (ref 9–77)
RBC # BLD AUTO: 3.68 M/UL (ref 4.6–6.2)
SODIUM SERPL-SCNC: 140 MMOL/L (ref 136–145)
URATE SERPL-MCNC: 5.3 MG/DL (ref 3.4–7)
WBC # BLD AUTO: 8.65 K/UL (ref 3.9–12.7)
WBC #/AREA URNS HPF: 2 /HPF (ref 0–5)

## 2021-10-28 PROCEDURE — 36415 COLL VENOUS BLD VENIPUNCTURE: CPT | Performed by: INTERNAL MEDICINE

## 2021-10-28 PROCEDURE — 81000 URINALYSIS NONAUTO W/SCOPE: CPT | Performed by: INTERNAL MEDICINE

## 2021-10-28 PROCEDURE — 83970 ASSAY OF PARATHORMONE: CPT | Performed by: INTERNAL MEDICINE

## 2021-10-28 PROCEDURE — 82306 VITAMIN D 25 HYDROXY: CPT | Performed by: INTERNAL MEDICINE

## 2021-10-28 PROCEDURE — 84156 ASSAY OF PROTEIN URINE: CPT | Performed by: INTERNAL MEDICINE

## 2021-10-28 PROCEDURE — 84166 PROTEIN E-PHORESIS/URINE/CSF: CPT | Performed by: INTERNAL MEDICINE

## 2021-10-28 PROCEDURE — 84166 PROTEIN E-PHORESIS/URINE/CSF: CPT | Mod: 26,,, | Performed by: PATHOLOGY

## 2021-10-28 PROCEDURE — 82570 ASSAY OF URINE CREATININE: CPT | Performed by: INTERNAL MEDICINE

## 2021-10-28 PROCEDURE — 80069 RENAL FUNCTION PANEL: CPT | Performed by: INTERNAL MEDICINE

## 2021-10-28 PROCEDURE — 84550 ASSAY OF BLOOD/URIC ACID: CPT | Performed by: INTERNAL MEDICINE

## 2021-10-28 PROCEDURE — 84166 PATHOLOGIST INTERPRETATION UPE: ICD-10-PCS | Mod: 26,,, | Performed by: PATHOLOGY

## 2021-10-28 PROCEDURE — 85025 COMPLETE CBC W/AUTO DIFF WBC: CPT | Performed by: INTERNAL MEDICINE

## 2021-10-29 LAB — 25(OH)D3+25(OH)D2 SERPL-MCNC: 47 NG/ML (ref 30–96)

## 2021-11-01 LAB
PATHOLOGIST INTERPRETATION UPE: NORMAL
PROT PATTERN UR ELPH-IMP: NORMAL

## 2021-11-11 DIAGNOSIS — M10.9 GOUT, UNSPECIFIED CAUSE, UNSPECIFIED CHRONICITY, UNSPECIFIED SITE: ICD-10-CM

## 2021-11-11 RX ORDER — ALLOPURINOL 100 MG/1
TABLET ORAL
Qty: 90 TABLET | Refills: 3 | Status: SHIPPED | OUTPATIENT
Start: 2021-11-11

## 2021-12-03 DIAGNOSIS — I10 ESSENTIAL HYPERTENSION: ICD-10-CM

## 2021-12-03 RX ORDER — METOPROLOL SUCCINATE 100 MG/1
TABLET, EXTENDED RELEASE ORAL
Qty: 90 TABLET | Refills: 3 | Status: ON HOLD | OUTPATIENT
Start: 2021-12-03 | End: 2022-06-15 | Stop reason: SDUPTHER

## 2021-12-07 ENCOUNTER — TELEPHONE (OUTPATIENT)
Dept: FAMILY MEDICINE | Facility: CLINIC | Age: 81
End: 2021-12-07
Payer: MEDICARE

## 2021-12-07 DIAGNOSIS — I48.11 LONGSTANDING PERSISTENT ATRIAL FIBRILLATION: Primary | ICD-10-CM

## 2021-12-09 ENCOUNTER — CLINICAL SUPPORT (OUTPATIENT)
Dept: FAMILY MEDICINE | Facility: CLINIC | Age: 81
End: 2021-12-09
Payer: MEDICARE

## 2021-12-09 DIAGNOSIS — Z23 NEED FOR INFLUENZA VACCINATION: Primary | ICD-10-CM

## 2021-12-09 PROCEDURE — 99999 PR PBB SHADOW E&M-EST. PATIENT-LVL III: CPT | Mod: PBBFAC,,,

## 2021-12-09 PROCEDURE — 96372 THER/PROPH/DIAG INJ SC/IM: CPT | Mod: 59,S$GLB,, | Performed by: FAMILY MEDICINE

## 2021-12-09 PROCEDURE — G0008 ADMIN INFLUENZA VIRUS VAC: HCPCS | Mod: S$GLB,,, | Performed by: FAMILY MEDICINE

## 2021-12-09 PROCEDURE — 90694 VACC AIIV4 NO PRSRV 0.5ML IM: CPT | Mod: S$GLB,,, | Performed by: FAMILY MEDICINE

## 2021-12-09 PROCEDURE — G0008 FLU VACCINE - QUADRIVALENT - ADJUVANTED: ICD-10-PCS | Mod: S$GLB,,, | Performed by: FAMILY MEDICINE

## 2021-12-09 PROCEDURE — 96372 PR INJECTION,THERAP/PROPH/DIAG2ST, IM OR SUBCUT: ICD-10-PCS | Mod: 59,S$GLB,, | Performed by: FAMILY MEDICINE

## 2021-12-09 PROCEDURE — 90694 FLU VACCINE - QUADRIVALENT - ADJUVANTED: ICD-10-PCS | Mod: S$GLB,,, | Performed by: FAMILY MEDICINE

## 2021-12-09 PROCEDURE — 99999 PR PBB SHADOW E&M-EST. PATIENT-LVL III: ICD-10-PCS | Mod: PBBFAC,,,

## 2021-12-09 RX ADMIN — CYANOCOBALAMIN 1000 MCG: 1000 INJECTION, SOLUTION INTRAMUSCULAR; SUBCUTANEOUS at 10:12

## 2021-12-10 PROCEDURE — G0180 PR HOME HEALTH MD CERTIFICATION: ICD-10-PCS | Mod: ,,, | Performed by: FAMILY MEDICINE

## 2021-12-10 PROCEDURE — G0180 MD CERTIFICATION HHA PATIENT: HCPCS | Mod: ,,, | Performed by: FAMILY MEDICINE

## 2021-12-18 ENCOUNTER — HOSPITAL ENCOUNTER (EMERGENCY)
Facility: HOSPITAL | Age: 81
Discharge: HOME | End: 2021-12-18
Attending: EMERGENCY MEDICINE
Payer: MEDICARE

## 2021-12-18 VITALS
BODY MASS INDEX: 25.74 KG/M2 | DIASTOLIC BLOOD PRESSURE: 85 MMHG | WEIGHT: 164 LBS | RESPIRATION RATE: 16 BRPM | TEMPERATURE: 98 F | HEIGHT: 67 IN | SYSTOLIC BLOOD PRESSURE: 168 MMHG | OXYGEN SATURATION: 98 % | HEART RATE: 57 BPM

## 2021-12-18 DIAGNOSIS — S30.0XXA PELVIC CONTUSION, INITIAL ENCOUNTER: ICD-10-CM

## 2021-12-18 DIAGNOSIS — S09.90XA CLOSED HEAD INJURY, INITIAL ENCOUNTER: ICD-10-CM

## 2021-12-18 DIAGNOSIS — S31.821A: ICD-10-CM
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
## 2021-12-18 PROCEDURE — 99285 EMERGENCY DEPT VISIT HI MDM: CPT | Mod: 25

## 2021-12-18 PROCEDURE — 72170 X-RAY EXAM OF PELVIS: CPT | Mod: TC,FY

## 2021-12-18 PROCEDURE — 72170 XR PELVIS ROUTINE AP: ICD-10-PCS | Mod: 26,,, | Performed by: RADIOLOGY

## 2021-12-18 PROCEDURE — 70450 CT HEAD WITHOUT CONTRAST: ICD-10-PCS | Mod: 26,,, | Performed by: RADIOLOGY

## 2021-12-18 PROCEDURE — 70450 CT HEAD/BRAIN W/O DYE: CPT | Mod: TC

## 2021-12-18 PROCEDURE — 72170 X-RAY EXAM OF PELVIS: CPT | Mod: 26,,, | Performed by: RADIOLOGY

## 2021-12-18 PROCEDURE — 70450 CT HEAD/BRAIN W/O DYE: CPT | Mod: 26,,, | Performed by: RADIOLOGY

## 2021-12-18 PROCEDURE — 96372 THER/PROPH/DIAG INJ SC/IM: CPT

## 2021-12-18 PROCEDURE — 25000003 PHARM REV CODE 250: Performed by: EMERGENCY MEDICINE

## 2021-12-18 PROCEDURE — 63600175 PHARM REV CODE 636 W HCPCS: Performed by: EMERGENCY MEDICINE

## 2021-12-18 RX ORDER — ACETAMINOPHEN 325 MG/1
650 TABLET ORAL
Status: COMPLETED | OUTPATIENT
Start: 2021-12-18 | End: 2021-12-18

## 2021-12-18 RX ORDER — KETOROLAC TROMETHAMINE 30 MG/ML
15 INJECTION, SOLUTION INTRAMUSCULAR; INTRAVENOUS
Status: COMPLETED | OUTPATIENT
Start: 2021-12-18 | End: 2021-12-18

## 2021-12-18 RX ADMIN — ACETAMINOPHEN 650 MG: 325 TABLET ORAL at 10:12

## 2021-12-18 RX ADMIN — KETOROLAC TROMETHAMINE 15 MG: 30 INJECTION, SOLUTION INTRAMUSCULAR; INTRAVENOUS at 11:12

## 2021-12-21 ENCOUNTER — TELEPHONE (OUTPATIENT)
Dept: FAMILY MEDICINE | Facility: CLINIC | Age: 81
End: 2021-12-21
Payer: MEDICARE

## 2021-12-21 ENCOUNTER — DOCUMENT SCAN (OUTPATIENT)
Dept: HOME HEALTH SERVICES | Facility: HOSPITAL | Age: 81
End: 2021-12-21
Payer: MEDICARE

## 2021-12-22 ENCOUNTER — EXTERNAL HOME HEALTH (OUTPATIENT)
Dept: HOME HEALTH SERVICES | Facility: HOSPITAL | Age: 81
End: 2021-12-22
Payer: MEDICARE

## 2021-12-23 ENCOUNTER — DOCUMENT SCAN (OUTPATIENT)
Dept: HOME HEALTH SERVICES | Facility: HOSPITAL | Age: 81
End: 2021-12-23
Payer: MEDICARE

## 2021-12-27 ENCOUNTER — OFFICE VISIT (OUTPATIENT)
Dept: FAMILY MEDICINE | Facility: CLINIC | Age: 81
End: 2021-12-27
Payer: MEDICARE

## 2021-12-27 VITALS
HEART RATE: 74 BPM | WEIGHT: 158.5 LBS | SYSTOLIC BLOOD PRESSURE: 100 MMHG | DIASTOLIC BLOOD PRESSURE: 63 MMHG | RESPIRATION RATE: 15 BRPM | HEIGHT: 67 IN | BODY MASS INDEX: 24.88 KG/M2 | OXYGEN SATURATION: 99 %

## 2021-12-27 DIAGNOSIS — D69.6 THROMBOCYTOPENIA: ICD-10-CM

## 2021-12-27 DIAGNOSIS — G56.03 BILATERAL CARPAL TUNNEL SYNDROME: Primary | ICD-10-CM

## 2021-12-27 DIAGNOSIS — I73.9 CLAUDICATION: ICD-10-CM

## 2021-12-27 DIAGNOSIS — I50.33 ACUTE ON CHRONIC DIASTOLIC (CONGESTIVE) HEART FAILURE: ICD-10-CM

## 2021-12-27 DIAGNOSIS — I25.118 ATHEROSCLEROTIC HEART DISEASE OF NATIVE CORONARY ARTERY WITH OTHER FORMS OF ANGINA PECTORIS: ICD-10-CM

## 2021-12-27 PROCEDURE — 3078F DIAST BP <80 MM HG: CPT | Mod: CPTII,S$GLB,, | Performed by: FAMILY MEDICINE

## 2021-12-27 PROCEDURE — 1159F MED LIST DOCD IN RCRD: CPT | Mod: CPTII,S$GLB,, | Performed by: FAMILY MEDICINE

## 2021-12-27 PROCEDURE — 1159F PR MEDICATION LIST DOCUMENTED IN MEDICAL RECORD: ICD-10-PCS | Mod: CPTII,S$GLB,, | Performed by: FAMILY MEDICINE

## 2021-12-27 PROCEDURE — 1126F AMNT PAIN NOTED NONE PRSNT: CPT | Mod: CPTII,S$GLB,, | Performed by: FAMILY MEDICINE

## 2021-12-27 PROCEDURE — 99214 PR OFFICE/OUTPT VISIT, EST, LEVL IV, 30-39 MIN: ICD-10-PCS | Mod: S$GLB,,, | Performed by: FAMILY MEDICINE

## 2021-12-27 PROCEDURE — 1100F PTFALLS ASSESS-DOCD GE2>/YR: CPT | Mod: CPTII,S$GLB,, | Performed by: FAMILY MEDICINE

## 2021-12-27 PROCEDURE — 3074F PR MOST RECENT SYSTOLIC BLOOD PRESSURE < 130 MM HG: ICD-10-PCS | Mod: CPTII,S$GLB,, | Performed by: FAMILY MEDICINE

## 2021-12-27 PROCEDURE — 3288F PR FALLS RISK ASSESSMENT DOCUMENTED: ICD-10-PCS | Mod: CPTII,S$GLB,, | Performed by: FAMILY MEDICINE

## 2021-12-27 PROCEDURE — 99999 PR PBB SHADOW E&M-EST. PATIENT-LVL IV: ICD-10-PCS | Mod: PBBFAC,,, | Performed by: FAMILY MEDICINE

## 2021-12-27 PROCEDURE — 3078F PR MOST RECENT DIASTOLIC BLOOD PRESSURE < 80 MM HG: ICD-10-PCS | Mod: CPTII,S$GLB,, | Performed by: FAMILY MEDICINE

## 2021-12-27 PROCEDURE — 3074F SYST BP LT 130 MM HG: CPT | Mod: CPTII,S$GLB,, | Performed by: FAMILY MEDICINE

## 2021-12-27 PROCEDURE — 99999 PR PBB SHADOW E&M-EST. PATIENT-LVL IV: CPT | Mod: PBBFAC,,, | Performed by: FAMILY MEDICINE

## 2021-12-27 PROCEDURE — 3288F FALL RISK ASSESSMENT DOCD: CPT | Mod: CPTII,S$GLB,, | Performed by: FAMILY MEDICINE

## 2021-12-27 PROCEDURE — 1100F PR PT FALLS ASSESS DOC 2+ FALLS/FALL W/INJURY/YR: ICD-10-PCS | Mod: CPTII,S$GLB,, | Performed by: FAMILY MEDICINE

## 2021-12-27 PROCEDURE — 99214 OFFICE O/P EST MOD 30 MIN: CPT | Mod: S$GLB,,, | Performed by: FAMILY MEDICINE

## 2021-12-27 PROCEDURE — 1126F PR PAIN SEVERITY QUANTIFIED, NO PAIN PRESENT: ICD-10-PCS | Mod: CPTII,S$GLB,, | Performed by: FAMILY MEDICINE

## 2022-01-07 ENCOUNTER — CLINICAL SUPPORT (OUTPATIENT)
Dept: FAMILY MEDICINE | Facility: CLINIC | Age: 82
End: 2022-01-07
Payer: MEDICARE

## 2022-01-07 PROCEDURE — 96372 THER/PROPH/DIAG INJ SC/IM: CPT | Mod: S$GLB,,, | Performed by: FAMILY MEDICINE

## 2022-01-07 PROCEDURE — 96372 PR INJECTION,THERAP/PROPH/DIAG2ST, IM OR SUBCUT: ICD-10-PCS | Mod: S$GLB,,, | Performed by: FAMILY MEDICINE

## 2022-01-07 RX ADMIN — CYANOCOBALAMIN 1000 MCG: 1000 INJECTION, SOLUTION INTRAMUSCULAR; SUBCUTANEOUS at 01:01

## 2022-01-07 NOTE — PROGRESS NOTES
Patient presents to clinic for nurse visit to receive B12 injection. Orders in placed for B12. States name,  and allergies reviewed. B12 1000mcg administered IM to left deltoid muscle. NDC: 22454-887-45 Lot: [id number] Exp: 2022 Tolerated without complaints. Instructed to remain in clinic for 15 minutes post injection to monitor for adverse effects. Verbalized understanding. No immediate reactions noted.

## 2022-01-09 ENCOUNTER — PATIENT OUTREACH (OUTPATIENT)
Dept: ADMINISTRATIVE | Facility: OTHER | Age: 82
End: 2022-01-09
Payer: MEDICARE

## 2022-01-10 DIAGNOSIS — M25.532 PAIN IN BOTH WRISTS: Primary | ICD-10-CM

## 2022-01-10 DIAGNOSIS — M25.531 PAIN IN BOTH WRISTS: Primary | ICD-10-CM

## 2022-01-11 ENCOUNTER — OFFICE VISIT (OUTPATIENT)
Dept: ORTHOPEDICS | Facility: CLINIC | Age: 82
End: 2022-01-11
Payer: MEDICARE

## 2022-01-11 ENCOUNTER — HOSPITAL ENCOUNTER (OUTPATIENT)
Dept: RADIOLOGY | Facility: HOSPITAL | Age: 82
Discharge: HOME | End: 2022-01-11
Attending: ORTHOPAEDIC SURGERY
Payer: MEDICARE

## 2022-01-11 VITALS — HEIGHT: 67 IN | BODY MASS INDEX: 24.88 KG/M2 | RESPIRATION RATE: 19 BRPM | WEIGHT: 158.5 LBS

## 2022-01-11 DIAGNOSIS — G56.23 CUBITAL TUNNEL SYNDROME, BILATERAL: ICD-10-CM

## 2022-01-11 DIAGNOSIS — G56.23 CUBITAL TUNNEL SYNDROME, BILATERAL: Primary | ICD-10-CM

## 2022-01-11 DIAGNOSIS — M25.532 PAIN IN BOTH WRISTS: ICD-10-CM

## 2022-01-11 DIAGNOSIS — R20.0 NUMBNESS AND TINGLING IN BOTH HANDS: ICD-10-CM

## 2022-01-11 DIAGNOSIS — G56.03 BILATERAL CARPAL TUNNEL SYNDROME: Primary | ICD-10-CM

## 2022-01-11 DIAGNOSIS — M25.531 PAIN IN BOTH WRISTS: ICD-10-CM

## 2022-01-11 DIAGNOSIS — R20.2 NUMBNESS AND TINGLING IN BOTH HANDS: ICD-10-CM

## 2022-01-11 PROCEDURE — 99999 PR PBB SHADOW E&M-EST. PATIENT-LVL III: CPT | Mod: PBBFAC,,, | Performed by: ORTHOPAEDIC SURGERY

## 2022-01-11 PROCEDURE — 3288F PR FALLS RISK ASSESSMENT DOCUMENTED: ICD-10-PCS | Mod: CPTII,S$GLB,, | Performed by: ORTHOPAEDIC SURGERY

## 2022-01-11 PROCEDURE — 1100F PTFALLS ASSESS-DOCD GE2>/YR: CPT | Mod: CPTII,S$GLB,, | Performed by: ORTHOPAEDIC SURGERY

## 2022-01-11 PROCEDURE — 1125F PR PAIN SEVERITY QUANTIFIED, PAIN PRESENT: ICD-10-PCS | Mod: CPTII,S$GLB,, | Performed by: ORTHOPAEDIC SURGERY

## 2022-01-11 PROCEDURE — 1125F AMNT PAIN NOTED PAIN PRSNT: CPT | Mod: CPTII,S$GLB,, | Performed by: ORTHOPAEDIC SURGERY

## 2022-01-11 PROCEDURE — 1100F PR PT FALLS ASSESS DOC 2+ FALLS/FALL W/INJURY/YR: ICD-10-PCS | Mod: CPTII,S$GLB,, | Performed by: ORTHOPAEDIC SURGERY

## 2022-01-11 PROCEDURE — 99204 OFFICE O/P NEW MOD 45 MIN: CPT | Mod: 25,S$GLB,, | Performed by: ORTHOPAEDIC SURGERY

## 2022-01-11 PROCEDURE — 73110 X-RAY EXAM OF WRIST: CPT | Mod: TC,50,PN

## 2022-01-11 PROCEDURE — 99204 PR OFFICE/OUTPT VISIT, NEW, LEVL IV, 45-59 MIN: ICD-10-PCS | Mod: 25,S$GLB,, | Performed by: ORTHOPAEDIC SURGERY

## 2022-01-11 PROCEDURE — 20526 CARPAL TUNNEL: ICD-10-PCS | Mod: 50,S$GLB,, | Performed by: ORTHOPAEDIC SURGERY

## 2022-01-11 PROCEDURE — 73110 XR WRIST COMPLETE 3 VIEWS BILATERAL: ICD-10-PCS | Mod: 26,50,, | Performed by: RADIOLOGY

## 2022-01-11 PROCEDURE — 20526 THER INJECTION CARP TUNNEL: CPT | Mod: 50,S$GLB,, | Performed by: ORTHOPAEDIC SURGERY

## 2022-01-11 PROCEDURE — 1159F PR MEDICATION LIST DOCUMENTED IN MEDICAL RECORD: ICD-10-PCS | Mod: CPTII,S$GLB,, | Performed by: ORTHOPAEDIC SURGERY

## 2022-01-11 PROCEDURE — 1159F MED LIST DOCD IN RCRD: CPT | Mod: CPTII,S$GLB,, | Performed by: ORTHOPAEDIC SURGERY

## 2022-01-11 PROCEDURE — 3288F FALL RISK ASSESSMENT DOCD: CPT | Mod: CPTII,S$GLB,, | Performed by: ORTHOPAEDIC SURGERY

## 2022-01-11 PROCEDURE — 99999 PR PBB SHADOW E&M-EST. PATIENT-LVL III: ICD-10-PCS | Mod: PBBFAC,,, | Performed by: ORTHOPAEDIC SURGERY

## 2022-01-11 PROCEDURE — 73110 X-RAY EXAM OF WRIST: CPT | Mod: 26,50,, | Performed by: RADIOLOGY

## 2022-01-11 RX ORDER — TRIAMCINOLONE ACETONIDE 40 MG/ML
40 INJECTION, SUSPENSION INTRA-ARTICULAR; INTRAMUSCULAR
Status: DISCONTINUED | OUTPATIENT
Start: 2022-01-11 | End: 2022-01-11 | Stop reason: HOSPADM

## 2022-01-11 RX ADMIN — TRIAMCINOLONE ACETONIDE 40 MG: 40 INJECTION, SUSPENSION INTRA-ARTICULAR; INTRAMUSCULAR at 11:01

## 2022-01-11 NOTE — PROGRESS NOTES
Subjective:      Patient ID: Lucho Delong is a 81 y.o. male.    Chief Complaint: Pain of the Right Wrist and Pain of the Left Wrist    Referring Provider: Jose Nolan Md  [address] [place] [place] [id number]    HPI:  Mr. Delong is an 81-year-old right-hand-dominant male who presented today for evaluation of approximately 1 year of bilateral hand weakness with numbness and tingling with his left worse than his right.  His symptoms began insidiously and have been increasing intensity over the last year.  Now when he tries to grasp things he drops them.  Symptoms do not awaken him at night.  Grabbing items increases his symptoms while rest improves them.  He has to flick is hands to get the feeling back in them.  All the fingers of both hands are affected.  He has taken NSAIDs with help.  He has not done physical therapy, worn splints, nor had injections.    Past Medical History:   Diagnosis Date    Anticoagulant long-term use     Atrial fibrillation     Bursitis of hip     Chronic back pain     Chronic kidney disease, stage 3     Gastroesophageal reflux disease     Gout     Hyperlipemia     Hypertension     Prostate cancer 1997     *  *  *  *  *  * Stroke  Seasonal allergies  Depression  Anxiety  Headaches  Stomach ulcers  Coronary artery disease 04/2019     Past Surgical History:   Procedure Laterality Date    CARDIAC VALVE REPLACEMENT  Feb 18, 2020    CARDIAC VALVE SURGERY      CATHETERIZATION OF BOTH LEFT AND RIGHT HEART N/A 12/2/2019    Procedure: CATHETERIZATION, HEART, BOTH LEFT AND RIGHT;  Surgeon: Everett Sosa MD;  Location: [facility] CATH/EP LAB;  Service: Cardiology;  Laterality: N/A;    CLOSURE OF LEFT ATRIAL APPENDAGE USING DEVICE N/A 3/3/2021    Procedure: Left atrial appendage closure device;  Surgeon: Bryan Zhang MD;  Location: [facility] CATH LAB;  Service: Cardiology;  Laterality: N/A;    COLONOSCOPY  1997    CORONARY ANGIOGRAPHY N/A 1/8/2020    Procedure:  ANGIOGRAM, CORONARY ARTERY;  Surgeon: Bryan Zhang MD;  Location: [facility] CATH LAB;  Service: Cardiology;  Laterality: N/A;    CORONARY STENT PLACEMENT N/A 1/8/2020    Procedure: INSERTION, STENT, CORONARY ARTERY;  Surgeon: Bryan Zhang MD;  Location: [facility] CATH LAB;  Service: Cardiology;  Laterality: N/A;    PERCUTANEOUS TRANSLUMINAL ANGIOPLASTY (PTA) OF PERIPHERAL VESSEL N/A 1/23/2020    Procedure: PTA, PERIPHERAL VESSEL;  Surgeon: Bryan Zhang MD;  Location: [facility] CATH LAB;  Service: Cardiology;  Laterality: N/A;    PROSTATE SURGERY  1997    TONSILLECTOMY      TRANSCATHETER AORTIC VALVE REPLACEMENT (TAVR)  2/18/2020    Procedure: REPLACEMENT, AORTIC VALVE, TRANSCATHETER (TAVR);  Surgeon: Bryan Zhang MD;  Location: [facility] CATH LAB;  Service: Cardiology;;    TRANSESOPHAGEAL ECHOCARDIOGRAPHY N/A 1/23/2020    Procedure: ECHOCARDIOGRAM, TRANSESOPHAGEAL;  Surgeon: Bhavani Diagnostic Provider;  Location: [facility] EP LAB;  Service: Anesthesiology;  Laterality: N/A;    TRANSESOPHAGEAL ECHOCARDIOGRAPHY N/A 4/20/2021   *  * EGD  SINUS       Review of patient's allergies indicates:   Allergen Reactions    Amiodarone analogues Other (See Comments)     Dizziness    Tramadol Itching and Swelling     Began medication, two doses into this treatment patient experiences tongue swelling and severe itching. Patient MD advised to not take this medication anymore    Eliquis [apixaban] Other (See Comments)     Dizziness, ankle swelling, lips swelling       Social History     Occupational History    Retired commissioner of [employer]   Tobacco Use    Smoking status: Never Smoker    Smokeless tobacco: Never Used   Substance and Sexual Activity    Alcohol use: Not Currently     Alcohol/week: 3.3 standard drinks     Types: 4 Standard drinks or equivalent per week     Comment: occasionally bourbon or beer    Drug use: No    Sexual activity: Not Currently      Family History   Problem Relation  Age of Onset    Cancer Father 86        colon    Alzheimer's disease Mother 86    Alzheimer's disease Sister         has not known fm for 15 yrs    Melanoma Neg Hx     Psoriasis Neg Hx     Lupus Neg Hx     Eczema Neg Hx        Previous Hospitalizations:  Stroke, cardiac issues.    ROS:   Review of Systems   Constitutional: Negative for chills and fever.   HENT: Negative for congestion.    Eyes: Negative for discharge and pain.   Cardiovascular: Negative for chest pain and cyanosis.   Respiratory: Negative for cough and shortness of breath.    Endocrine: Negative for polydipsia.   Hematologic/Lymphatic: Negative for adenopathy.   Skin: Negative for flushing, itching and skin cancer.   Musculoskeletal: Positive for gout and muscle weakness.   Gastrointestinal: Negative for constipation, diarrhea and heartburn.   Genitourinary: Negative for nocturia.   Neurological: Positive for headaches. Negative for sensory change.   Psychiatric/Behavioral: Positive for depression. Negative for substance abuse. The patient is nervous/anxious.    Allergic/Immunologic: Positive for environmental allergies.           Objective:      Physical Exam:   General: AAOx3.  No acute distress  HEENT: Normocephalic, PEARLA EOMI.  Fair dentition  Neck: Supple, No JVD  Chest: Symetric, equal excursion on inspiration  Abdomen: Soft NTND  Vascular:  Pulses intact and equal bilaterally.  Capillary refill less than 3 seconds and equal bilaterally  Neurologic:  Pinprick and soft touch intact and equal bilaterally.  Tinel's positive both wrist.  Tinel's positive both elbows.  Phalen's positive bilaterally.  Integment:  No ecchymosis, no errythema  Extremity:  Wrist/hand:  Pronation/supination equal bilaterally 85/85 degrees.  Dorsiflexion/volar flexion equal bilaterally 70/65 degrees.  Nontender in the anatomic snuffbox bilaterally.  Nontender at the 1st dorsal compartment bilaterally.  No swelling at the 1st dorsal compartment bilaterally.   Finkelstein's negative bilaterally.  Grind test negative bilaterally.  Nontender at the scapholunate interval bilaterally.  Quintero's test negative bilaterally.  Nontender at the DRUJ/TFCC bilaterally.  Ulnar impaction test negative bilaterally.  Thenar atrophy present both hands.  Interosseous atrophy present both hands.  Durkan's test positive both hands.   strength decreased both hands good motion of the patient's fingers without pain.                     Elbow:  Pronation/supination equal bilaterally 85/85 degrees.  Extension/flexion equal bilaterally 5/125 degrees.  Nontender with palpation medial or lateral epicondyles.  Nontender with motion both elbows.  Nontender over the olecranon bilaterally.  No swelling over the olecranon bilaterally.  Nontender at the triceps insertion bilaterally.  Triceps palpable throughout full distribution bilaterally.  Radial/ulna stressing equal bilaterally with endpoint.  Radiography:  Personally reviewed x-rays of both wrist completed on 01/11/2022 showed scapholunate widening of the left wrist along with some radiocarpal and carpal carpal arthritis      Assessment:       Impression:      1. Bilateral carpal tunnel syndrome    2. Cubital tunnel syndrome, bilateral    3. Numbness and tingling in both hands          Plan:       1.  Discussed physical examination and radiographic findings with the patient. Lucho understands that he has carpal tunnel and cubital tunnel syndrome of both of his upper extremities.  Treatment alternatives and outcomes were discussed with the patient he understands he could continue with conservative management such as observation, activity modification, NSAIDs, bracing, physical therapy, injections, or he could consider surgical intervention such as carpal tunnel and cubital tunnel decompression.  Before any surgery can be recommended a nerve conduction study is warranted.  2. Offered a steroid injection to the carpal canal both wrist, he elected  to proceed.  3. Refer for a nerve conduction study of both upper extremities.  4. Place in a removable off-the-shelf cock-up splints, these were fitted to the patient.  5. Final recommendations after nerve conduction study is completed.  6. Follow up after nerve conduction study is completed.

## 2022-01-11 NOTE — PROCEDURES
Carpal Tunnel    Date/Time: 1/11/2022 11:00 AM  Performed by: Fredrick Villasenor DO  Authorized by: Fredrick Villasenor, DO     Consent Done?:  Yes (Verbal)  Indications:  Diagnostic evaluation  Site marked: the procedure site was marked    Timeout: prior to procedure the correct patient, procedure, and site was verified    Prep: patient was prepped and draped in usual sterile fashion      Location:  Wrist  Site:  L carpal tunnel and R carpal tunnel  Ultrasonic Guidance for Needle Placement?: No    Needle size:  25 G  Approach:  Volar  Medications:  40 mg triamcinolone acetonide 40 mg/mL; 40 mg triamcinolone acetonide 40 mg/mL  Patient tolerance:  Patient tolerated the procedure well with no immediate complications

## 2022-01-12 ENCOUNTER — TELEPHONE (OUTPATIENT)
Dept: ORTHOPEDICS | Facility: CLINIC | Age: 82
End: 2022-01-12
Payer: MEDICARE

## 2022-01-12 NOTE — TELEPHONE ENCOUNTER
----- Message from Imani Hoffman sent at 1/12/2022  7:44 AM CST -----  Contact: spouse  Type: Needs Medical Advice  Who Called:  Jerilyn Gan (Spouse)  Symptoms (please be specific):    How long has patient had these symptoms:    Pharmacy name and phone #:    Best Call Back Number: [phone] (home)     Additional Information: requesting to speak to Sydney  concerning scheduling she states something in [place]. She did not know what the test is.

## 2022-01-19 ENCOUNTER — TELEPHONE (OUTPATIENT)
Dept: FAMILY MEDICINE | Facility: CLINIC | Age: 82
End: 2022-01-19

## 2022-01-19 ENCOUNTER — DOCUMENT SCAN (OUTPATIENT)
Dept: HOME HEALTH SERVICES | Facility: HOSPITAL | Age: 82
End: 2022-01-19
Payer: MEDICARE

## 2022-01-19 NOTE — TELEPHONE ENCOUNTER
Home health PTA states pt was traveling home and fell on 1.15.22 resulting in a laceration on the brow bone- local ER placed sutures to be removed within 7-10 days.  Nurse visit for suture removal scheduled 1.24.22.  No other concerns at this time.

## 2022-01-19 NOTE — TELEPHONE ENCOUNTER
----- Message from Mattie Dickens sent at 1/19/2022  2:05 PM CST -----  Contact: Sona castro/ [facility]  Type:  Sooner Apoointment Request    Caller is requesting a sooner appointment.  Caller declined first available appointment listed below.  Caller will not accept being placed on the waitlist and is requesting a message be sent to doctor.    Name of Caller:  Sona  When is the first available appointment?  April  Symptoms:  Pt fell on 1/15 & has sutures above his right eye; needs suture removal in 7-10 days  Best Call Back Number:  [phone]  Additional Information:  Please call back.  Thanks.

## 2022-01-24 ENCOUNTER — CLINICAL SUPPORT (OUTPATIENT)
Dept: FAMILY MEDICINE | Facility: CLINIC | Age: 82
End: 2022-01-24
Payer: MEDICARE

## 2022-01-24 NOTE — PROGRESS NOTES
Patient presents to clinic for nurse visit. States name and . Laceration noted to right forehead. x2 sutures removed without incident. Wound edges well approximately and appears to be healing. No s/s of infection noted. Instructed to cleanse area with antibacterial soap 1-2 times daily and keep clean and dry. Verbalized understanding.

## 2022-01-25 ENCOUNTER — OFFICE VISIT (OUTPATIENT)
Dept: CARDIOLOGY | Facility: CLINIC | Age: 82
End: 2022-01-25
Payer: MEDICARE

## 2022-01-25 VITALS
BODY MASS INDEX: 24.85 KG/M2 | DIASTOLIC BLOOD PRESSURE: 60 MMHG | WEIGHT: 158.31 LBS | HEART RATE: 73 BPM | HEIGHT: 67 IN | SYSTOLIC BLOOD PRESSURE: 109 MMHG | RESPIRATION RATE: 16 BRPM

## 2022-01-25 DIAGNOSIS — M21.371 RIGHT FOOT DROP: ICD-10-CM

## 2022-01-25 DIAGNOSIS — R29.6 MULTIPLE FALLS: ICD-10-CM

## 2022-01-25 DIAGNOSIS — R00.1 BRADYCARDIA, DRUG INDUCED: ICD-10-CM

## 2022-01-25 DIAGNOSIS — E87.5 HYPERKALEMIA: ICD-10-CM

## 2022-01-25 DIAGNOSIS — T50.905A BRADYCARDIA, DRUG INDUCED: ICD-10-CM

## 2022-01-25 DIAGNOSIS — D64.9 ANEMIA, UNSPECIFIED TYPE: ICD-10-CM

## 2022-01-25 DIAGNOSIS — R79.89 PRERENAL AZOTEMIA: ICD-10-CM

## 2022-01-25 DIAGNOSIS — Z91.89 SEDENTARY LIFESTYLE: ICD-10-CM

## 2022-01-25 DIAGNOSIS — Z86.73 HISTORY OF STROKE: ICD-10-CM

## 2022-01-25 DIAGNOSIS — I48.19 PERSISTENT ATRIAL FIBRILLATION: ICD-10-CM

## 2022-01-25 DIAGNOSIS — Z95.5 STATUS POST INSERTION OF DRUG-ELUTING STENT INTO LEFT ANTERIOR DESCENDING (LAD) ARTERY: Primary | ICD-10-CM

## 2022-01-25 DIAGNOSIS — N18.4 STAGE 4 CHRONIC KIDNEY DISEASE: ICD-10-CM

## 2022-01-25 DIAGNOSIS — R74.01 ELEVATED ALT MEASUREMENT: ICD-10-CM

## 2022-01-25 DIAGNOSIS — Z95.2 S/P TAVR (TRANSCATHETER AORTIC VALVE REPLACEMENT): ICD-10-CM

## 2022-01-25 PROCEDURE — 3078F DIAST BP <80 MM HG: CPT | Mod: CPTII,S$GLB,, | Performed by: INTERNAL MEDICINE

## 2022-01-25 PROCEDURE — 3074F PR MOST RECENT SYSTOLIC BLOOD PRESSURE < 130 MM HG: ICD-10-PCS | Mod: CPTII,S$GLB,, | Performed by: INTERNAL MEDICINE

## 2022-01-25 PROCEDURE — 1159F MED LIST DOCD IN RCRD: CPT | Mod: CPTII,S$GLB,, | Performed by: INTERNAL MEDICINE

## 2022-01-25 PROCEDURE — 1126F PR PAIN SEVERITY QUANTIFIED, NO PAIN PRESENT: ICD-10-PCS | Mod: CPTII,S$GLB,, | Performed by: INTERNAL MEDICINE

## 2022-01-25 PROCEDURE — 1100F PR PT FALLS ASSESS DOC 2+ FALLS/FALL W/INJURY/YR: ICD-10-PCS | Mod: CPTII,S$GLB,, | Performed by: INTERNAL MEDICINE

## 2022-01-25 PROCEDURE — 3074F SYST BP LT 130 MM HG: CPT | Mod: CPTII,S$GLB,, | Performed by: INTERNAL MEDICINE

## 2022-01-25 PROCEDURE — 99999 PR PBB SHADOW E&M-EST. PATIENT-LVL IV: ICD-10-PCS | Mod: PBBFAC,,, | Performed by: INTERNAL MEDICINE

## 2022-01-25 PROCEDURE — 3288F FALL RISK ASSESSMENT DOCD: CPT | Mod: CPTII,S$GLB,, | Performed by: INTERNAL MEDICINE

## 2022-01-25 PROCEDURE — 99214 PR OFFICE/OUTPT VISIT, EST, LEVL IV, 30-39 MIN: ICD-10-PCS | Mod: S$GLB,,, | Performed by: INTERNAL MEDICINE

## 2022-01-25 PROCEDURE — 1159F PR MEDICATION LIST DOCUMENTED IN MEDICAL RECORD: ICD-10-PCS | Mod: CPTII,S$GLB,, | Performed by: INTERNAL MEDICINE

## 2022-01-25 PROCEDURE — 3288F PR FALLS RISK ASSESSMENT DOCUMENTED: ICD-10-PCS | Mod: CPTII,S$GLB,, | Performed by: INTERNAL MEDICINE

## 2022-01-25 PROCEDURE — 1126F AMNT PAIN NOTED NONE PRSNT: CPT | Mod: CPTII,S$GLB,, | Performed by: INTERNAL MEDICINE

## 2022-01-25 PROCEDURE — 3078F PR MOST RECENT DIASTOLIC BLOOD PRESSURE < 80 MM HG: ICD-10-PCS | Mod: CPTII,S$GLB,, | Performed by: INTERNAL MEDICINE

## 2022-01-25 PROCEDURE — 99999 PR PBB SHADOW E&M-EST. PATIENT-LVL IV: CPT | Mod: PBBFAC,,, | Performed by: INTERNAL MEDICINE

## 2022-01-25 PROCEDURE — 1100F PTFALLS ASSESS-DOCD GE2>/YR: CPT | Mod: CPTII,S$GLB,, | Performed by: INTERNAL MEDICINE

## 2022-01-25 PROCEDURE — 99214 OFFICE O/P EST MOD 30 MIN: CPT | Mod: S$GLB,,, | Performed by: INTERNAL MEDICINE

## 2022-01-25 RX ORDER — ASPIRIN 81 MG/1
81 TABLET ORAL
COMMUNITY
End: 2022-03-29 | Stop reason: SDUPTHER

## 2022-01-25 NOTE — PROGRESS NOTES
Subjective:    Patient ID:  Lucho Delong is a 81 y.o. male who presents for evaluation of Follow-up (Fall recently/saturday)  For post TAVR, 6 SARAI, HFpEF with persistent COLON, persistent AF off NOAC, on ASA and Plavix post Watchman 3/4/2021, persistent itching, reactive depression since stroke in 4/2019, multiple falls with right foot weakness post stroke.  Came on own, attended my talk at [place].  PCP: Dr. CHELSEA Nolan  Prior cardiologist: Dr. Sosa, last seen 12/2019  Intervention: Dr. Zhang  Dermatologist: Dr. Corado in [facility]  Renal: Dr. Guevara, have appointment in 3/2022, see q3 months.  Hematologist: Dr. Bauer  Lives with wife, Pat, here with patient, non-smoker,  36 years  Retired  for the [employer]    SDOH: Difficult historian with memory difficulties post stroke, depression, do not drive  Health literacy: high  Vaccinations: up-to-date, completed COVID  Activities: had PT post stoke not very helpful, no cardiac rehab post procedures, sedentary lifestyle especially for last 3 years, played daily golf until having to stop with COLON, then balance problem and multiple falls, 3 times in the last 3 weeks.  Nicotine: never  Alcohol: occasional, once a month, max <1 in any 24 hours.  Illicit drugs: none  Cardiac symptoms: COLON  Home BP: 120 to 150/60  Medication compliance: yes, do not like the bruising and the itching despite care under dermatologist, wants to cut down on medication, challenging to Pat  Diet: regular  Caffeine: 1 cpd  Labs:   Lab Results   Component Value Date    TSH 2.514 03/18/2020      No results found for: LABA1C, HGBA1C    Lab Results   Component Value Date    WBC 9.09 02/19/2020    HGB 9.9 (L) 02/19/2020    HCT 31.0 (L) 02/19/2020    MCV 99 (H) 02/19/2020     (L) 02/19/2020     Lab Results   Component Value Date    WBC 8.65 10/28/2021    RBC 3.68 (L) 10/28/2021    HGB 11.8 (L) 10/28/2021    HCT 35.9 (L) 10/28/2021    MCV 98 10/28/2021     MCH 32.1 (H) 10/28/2021    MCHC 32.9 10/28/2021    RDW 12.7 10/28/2021     10/28/2021    MPV 10.9 10/28/2021    GRAN 5.7 10/28/2021    GRAN 65.4 10/28/2021    LYMPH 1.4 10/28/2021    LYMPH 16.1 (L) 10/28/2021    MONO 0.9 10/28/2021    MONO 9.9 10/28/2021    EOS 0.6 (H) 10/28/2021    BASO 0.08 10/28/2021    EOSINOPHIL 7.4 10/28/2021    BASOPHIL 0.9 10/28/2021         CMP  Sodium   Date Value Ref Range Status   10/28/2021 140 136 - 145 mmol/L Final     Potassium   Date Value Ref Range Status   10/28/2021 5.0 3.5 - 5.1 mmol/L Final     Chloride   Date Value Ref Range Status   10/28/2021 110 95 - 110 mmol/L Final     CO2   Date Value Ref Range Status   10/28/2021 19 (L) 23 - 29 mmol/L Final     Glucose   Date Value Ref Range Status   10/28/2021 82 70 - 110 mg/dL Final     BUN   Date Value Ref Range Status   10/28/2021 49 (H) 8 - 23 mg/dL Final     Creatinine   Date Value Ref Range Status   10/28/2021 2.4 (H) 0.5 - 1.4 mg/dL Final     Calcium   Date Value Ref Range Status   10/28/2021 9.0 8.7 - 10.5 mg/dL Final     Total Protein   Date Value Ref Range Status   07/22/2020 6.2 6.0 - 8.4 g/dL Final     Albumin   Date Value Ref Range Status   10/28/2021 3.8 3.5 - 5.2 g/dL Final     Total Bilirubin   Date Value Ref Range Status   07/22/2020 0.5 0.1 - 1.0 mg/dL Final     Comment:     For infants and newborns, interpretation of results should be based  on gestational age, weight and in agreement with clinical  observations.  Premature Infant recommended reference ranges:  Up to 24 hours.............<8.0 mg/dL  Up to 48 hours............<12.0 mg/dL  3-5 days..................<15.0 mg/dL  6-29 days.................<15.0 mg/dL       Alkaline Phosphatase   Date Value Ref Range Status   07/22/2020 63 55 - 135 U/L Final     AST   Date Value Ref Range Status   07/22/2020 17 10 - 40 U/L Final     ALT   Date Value Ref Range Status   07/22/2020 24 10 - 44 U/L Final     Anion Gap   Date Value Ref Range Status   10/28/2021 11 8 -  16 mmol/L Final     eGFR if    Date Value Ref Range Status   10/28/2021 28.2 (A) >60 mL/min/1.73 m^2 Final     eGFR if non    Date Value Ref Range Status   10/28/2021 24.4 (A) >60 mL/min/1.73 m^2 Final     Comment:     Calculation used to obtain the estimated glomerular filtration  rate (eGFR) is the CKD-EPI equation.        @labrcntip(troponini)@  No results found for: BNP}   Lab Results   Component Value Date    CHOL 95 (L) 03/18/2020    CHOL 245 (A) 05/29/2015    CHOL 193 04/11/2006     Lab Results   Component Value Date    HDL 33 (L) 03/18/2020    HDL 55 05/29/2015    HDL 36.0 (L) 04/11/2006     Lab Results   Component Value Date    LDLCALC 35.0 (L) 03/18/2020    LDLCALC 145 05/29/2015    LDLCALC 87.8 04/11/2006     Lab Results   Component Value Date    TRIG 135 03/18/2020    TRIG 227 05/29/2015    TRIG 346 (H) 04/11/2006     Lab Results   Component Value Date    CHOLHDL 34.7 03/18/2020    CHOLHDL 18.7 (L) 04/11/2006    CHOLHDL 23.2 07/27/2005      LDL outside lab 9/2019 result 46  3/2020 ferritin 489, iron sat 30%    [facility] labs 1/15/2022 reviewed: H&H 11.4, 35, K+ 5.7, BUN 66, Scr 2.15, eGFR 30, ALT 77    Last Echo: 4/2021 [facility]  Last stress test: 1/2020  Cardiovascular angiogram: 1/2020, 6 SARAI placed  ECG: AF, rate 57  Fundoscopic exam: within the past year, negative for retinopathy    In 3/2020:  WM here to establish cardiac care. Major complaint of COLON for the past few years, had to give up golfing and had a number of CV event since 4/2019. Completed 4-6 weeks of PT post stroke, did not find much benefit, and did not proceed with any Cardiac Rehab post procedures. Had review with Dr. Zhang     Chart reviewed -  CHADS VASC 5 (8%/year) and HAS BLED 4 (7%/year)      Successful right transfemoral 34 mm evolute Pro TAVR.  - No PVL, mean gradient 4.0, peak velocity 1.6 post TAVR.  - Follow up with [facility] 1 month and 1 year with labs and Echo   - ASA/Effient/Pradaxa  daily (s/p SARAI on 1/8/20)  - Avoid non sterile procedures which could cause endocarditis for 6 months including dental work, endoscopy, colonoscopy, and  procedures.   - SBE prophylaxis for life     Follow up with [facility] 1 month and 1 year with labs and Echo   - ASA/Effient/Pradaxa daily (s/p SARAI on 1/8/20)  Transient Acute on Chronic Diastolic CHF, expected after the TAVR procedure, not a complication of the procedure.   Patient is a Watchman Device candidate. Will discuss at 1 month follow up appointment.     HYACINTH 1/2020 - Severe aortic valve stenosis. By 3D HYACINTH, LVOT diameter is 2.4cm x 3.0cm, area is 5.7cm2.  Mildly decreased left ventricular systolic function. The estimated ejection fraction is 45%.  Mild aortic regurgitation.  Mild mitral regurgitation.  Normal right ventricular systolic function.  Mild tricuspid regurgitation.  Grade 3 plaque present.     1/2020 DOBUTAMINE STRESS TEST PERFORMED FOR AORTIC STENOSIS.  · Mildly decreased left ventricular systolic function. The estimated ejection fraction is 45%.  · Left ventricular diastolic dysfunction.  · Local segmental wall motion abnormalities.  · The quantitatively dervived ejection fraction is 46%.  · There were no arrhythmias during stress.  · The ECG portion of this study is negative for myocardial ischemia.  · The patient's exercise capacity was normal.  · Severe aortic valve stenosis.  · Aortic valve area is 1.09 cm2; peak velocity is 4.3 m/s; mean gradient is 43 mmHg with stress. Prior LVOT diameter undermeasured. DUDLEY indexed for BSA 0.57/m2, consistent with severe AS.    CXR - The lungs are clear. Costophrenic angles are seen without effusion. No pneumothorax is identified. The heart is normal in size. The mediastinum is within normal limits. Osseous structures show degenerative changes in the spine. The visualized upper abdomen is unremarkable.     Follow-up  Associated symptoms include chills, headaches, myalgias, neck pain and  "numbness. Pertinent negatives include no chest pain, coughing, diaphoresis, fever, joint swelling, nausea, vertigo or weakness.     In 6/2020, little change but gotten off Xeralto at end of 3/2020 due to concern of itching and bleeding with scratching. OKayed by MA with Dr. Miles. Now on DAPT. On a number of potential candidate to cause itching    In 9/2020, follow up, not much changed. Tried off allopurinol for 3 weeks an no change. Question on Plavix reviewed. Had SARAI in 1/2020, need at least one year of antiplatelet Rx. Major problem still the itching.    In 12/2020, return for concern with continue itchiness, likely due to CKD stage 4, no treatment have been effective. Also problem with moderate reactive depression with no motivation, very sedentary and somnolent on sertraline. Long discussion, prefer not to see psychiatry.     In 7/2021, return for 6-months review. No significant cardiac except for post Watchman placement in 3/2021, now on ASA and Plavix but need dermatologic procedure, scheduled in 8/2021, advised to have review with Dr. Zhang.  Interventional cardiology noted 4/2021 "79 y/o M with HTN, DLPD, atrial fibrillation (on xarelto), CVA (March 2019), CKD 3, anemia, thrombocytopenia, depression, CAD (s/p PCI to LAD/RCA 1/8/20), and severe AS s/p 34 mm Evolut TAVR (2/2020), s/p 31 mm Watchman FLX placed 3/4/21 by Dr. Zhang who presents for 45-day HYACINTH to evaluate placement of Watchman. Pt currently on aspirin & plavix. Stopped rivaroxaban ~ 2 weeks ago.      On HYACINTH, pt was noted to have a small paravalvular leak and compression of ~ 13%. Plan to continue aspirin and clopidogrel. Plan of care discussed with patient and Dr. Zhang."    Post Watchman Recommendations:     1.  Postop care  2.  Aspirin oral anticoagulation for 45 days  3.  HYACINTH and follow-up with me in 45 days.  4.  If no leak at 45 days and no thrombus present will treat with aspirin and Plavix for the remainder of 6 months.  5.  No elective " "GI  or dental procedures for 6 months to reduce the risk of endocarditis.  6.  SBE prophylaxis for cause    HPI comments: in 1/2022, return for 6-months review. Had multiple falls and wife noted occasional right foot drop post stroke. Do not have any brace. Continue with stage 4 CKD and hyperkalemia.    ED in 12/2021 "Fall Skin tear to buttocks. Bruising to back of head. Denies LOC. Pt reports getting off the toilet at home and slipped and hit head on floor. Denies dizziness, weakness.     80-year-old male here for evaluation and treatment after a slip and fall incident in the bathroom at home.  Patient states he was getting up the toilet when he slipped and fell.  He states he landed 1st on his buttocks, then fell further backward and struck his head on the bathroom floor.  No LOC.     CT of the brain shows evidence of old infarcts, no evidence of acute hemorrhage, no evidence of acute infarct. Normal skull without fracture."    Last ED at [facility] on 1/15/2022, labs reviewed.     Review of Systems   Constitutional: Positive for chills, malaise/fatigue and weight loss (down 3 lbs from 7/2021). Negative for diaphoresis, fever, night sweats and weight gain.        Weight stable from 12/2020   HENT: Negative for nosebleeds and tinnitus.    Eyes: Positive for blurred vision and visual disturbance.   Cardiovascular: Positive for cyanosis, dyspnea on exertion and irregular heartbeat. Negative for chest pain, claudication, leg swelling, near-syncope, orthopnea, palpitations and paroxysmal nocturnal dyspnea.   Respiratory: Positive for shortness of breath and sleep disturbances due to breathing. Negative for cough, snoring and wheezing.         [place] score 17 today a 15, had inconclusive sleep study in 2019   Endocrine: Positive for cold intolerance. Negative for polydipsia and polyuria.   Hematologic/Lymphatic: Positive for bleeding problem (from scratching). Bruises/bleeds easily.   Skin: Positive for itching " "(major), poor wound healing and skin cancer. Negative for color change, flushing, nail changes and suspicious lesions.   Musculoskeletal: Positive for arthritis, back pain, gout, muscle weakness, myalgias, neck pain and stiffness. Negative for falls, joint pain, joint swelling and muscle cramps.   Gastrointestinal: Positive for diarrhea. Negative for heartburn, hematemesis, hematochezia, melena and nausea.   Neurological: Positive for difficulty with concentration, disturbances in coordination, excessive daytime sleepiness, dizziness, focal weakness, headaches, loss of balance, numbness, paresthesias and sensory change. Negative for light-headedness, vertigo and weakness.        Dysphasia   Psychiatric/Behavioral: Positive for depression, hallucinations and memory loss. Negative for substance abuse. The patient is nervous/anxious. The patient does not have insomnia.    Allergic/Immunologic: Positive for environmental allergies.        Objective:    Physical Exam  Constitutional:       Appearance: He is well-developed.      Comments: Less pyschomotor retardation.  RA O2 sat. 99%   HENT:      Head: Normocephalic.   Eyes:      Conjunctiva/sclera: Conjunctivae normal.      Pupils: Pupils are equal, round, and reactive to light.   Neck:      Thyroid: No thyromegaly.      Vascular: No JVD.      Comments: Circumference 16"  Cardiovascular:      Rate and Rhythm: Normal rate. Rhythm irregularly irregular.      Pulses: Intact distal pulses.           Carotid pulses are 2+ on the right side and 2+ on the left side.       Radial pulses are 2+ on the right side and 2+ on the left side.        Femoral pulses are 2+ on the right side and 2+ on the left side.       Popliteal pulses are 2+ on the right side and 2+ on the left side.        Dorsalis pedis pulses are 1+ on the right side and 1+ on the left side.        Posterior tibial pulses are 1+ on the right side and 1+ on the left side.      Heart sounds: Heart sounds are distant. " "No murmur heard.  No friction rub. No gallop.    Pulmonary:      Effort: Pulmonary effort is normal.      Breath sounds: Normal breath sounds. No rales.   Chest:      Chest wall: No tenderness.   Abdominal:      General: Bowel sounds are normal.      Palpations: Abdomen is soft.      Tenderness: There is no abdominal tenderness.      Comments: Waist 39" down to 38", hip 39"   Musculoskeletal:         General: Normal range of motion.      Cervical back: Normal range of motion and neck supple.   Lymphadenopathy:      Cervical: No cervical adenopathy.   Skin:     General: Skin is warm and dry.      Findings: No rash.      Comments: Ecchymosis around the right orbit.  Poor skin turgor.   Neurological:      Mental Status: He is alert and oriented to person, place, and time.           Assessment:       1. Status post insertion of drug-eluting stent into left anterior descending (LAD) artery and RCA, 6 in 1/2020,     2. Sedentary lifestyle, onset 2016 post stroke    3. Multiple falls    4. Right foot drop    5. Anemia, unspecified type    6. Persistent atrial fibrillation, onset 2018    7. 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 Stage 4 chronic kidney disease    9. Hyperkalemia on lisinopril    10. Prerenal azotemia    11. Elevated ALT measurement    12. Bradycardia, drug induced    13. History of stroke, 4/2019, right-sided weakness, dysphasia, memory difficulties          Plan:       Lucho was seen today for follow-up.    Diagnoses and all orders for this visit:    Status post insertion of drug-eluting stent into left anterior descending (LAD) artery and RCA, 6 in 1/2020,     Sedentary lifestyle, onset 2016 post stroke    Multiple falls  -     Ambulatory referral/consult to Physical Medicine Rehab; Future    Right foot drop  -     Ambulatory referral/consult to Physical Medicine Rehab; Future    Anemia, unspecified type    Persistent atrial fibrillation, onset 2018    S/P TAVR (transcatheter " aortic valve replacement), 2/19/2020    Stage 4 chronic kidney disease    Hyperkalemia on lisinopril    Prerenal azotemia    Elevated ALT measurement    Bradycardia, drug induced    History of stroke, 4/2019, right-sided weakness, dysphasia, memory difficulties   -     Ambulatory referral/consult to Physical Medicine Rehab; Future    - All medical issues reviewed, continue current cardiac Rx  - Warning signs of MI and stroke given, if symptoms last more than 5 minutes, stop immediately and call 911, then chew 2-4 low-dose ASA (81 mg).  - Need to remain well hydrated but avoid drinking within 4 hours of bedtime. Recommend at least 110 oz of fluid daily per IOM (institute of Medicine) suggestion.  - CV status and all medications reviewed, patient acknowledge good understanding.  - Recommend healthy living: healthy diet and regular exercise aiming for fitness, restorative sleep and weight control  - Need good exercise program, 4 key elements: 1. Aerobic (walking, swimming, dancing, jogging, biking, etc, 2. Muscle strengthening / resistance exercise, need to do 2-3 times weekly, 3. Stretching daily, good stretch takes a whole  total minute. 4. Balance exercise daily.   - Encourage activities as much as tolerated. Any activity is better than none!  - Instruction for Mediterranean, low potassium diet and heart healthy exercise given.  - Highly recommend 30-60 minutes of exercise / activities daily, can have Sunday off, with 2-3 sessions of muscle strengthening weekly. A  would be very helpful.  - Recommend at least biannual cardiovascular evaluation in view of patient's significant risk factors. Family preference.      Greater than 50% of the time was spent in counseling and coordination of care. The above assessment and plan have been discussed at length. Labs and procedure over the last 6 months reviewed. Problem List updated. Asked to bring in all active medications / pills bottles with next visit.

## 2022-01-25 NOTE — PATIENT INSTRUCTIONS
Patient Education       Foot Drop   About this topic   Foot drop happens when you have trouble lifting up the front part of your foot and toes. This can happen because of nerve damage or problems with the muscles that lift up the foot and toes. Other things may cause foot drop like strokes or multiple sclerosis. With foot drop, the foot may drag along the floor when walking. This may cause you to trip, lose your balance, and have a fall.  Foot drop happens when you have problems:  · Lifting your foot off the ground  · Raising your foot at the ankle  · Pointing your toes toward your body  · Moving your foot at the ankle inward or outward  · Walking normally in the heel-to-toe position  Foot drop is not a disease. It is a sign of some other health problems related to nerve damage. You may have foot drop in one or both feet. It may only last a short time or may last forever.  What are the causes?   · Nerve damage to the feet  · Muscle weakness  · Injury to the back or leg  · Damage to the part of the brain that controls the foot, like from a stroke  · An illness that:  ? Affects the brain and spine like multiple sclerosis or cerebral palsy  ? Weakens your muscle or muscle loss like muscular dystrophy  What can make this more likely to happen?   · If you often cross your legs at the knee  · You squat or kneel for a long period of time  · If you are wearing a cast that stops just below your knee on your lower leg  · Any past surgery on the hip, knee, or ankle joints  · Illnesses like a stroke, diabetes, multiple sclerosis, or Charcot-Nina-Tooth disease  · An injury to the back, leg, or brain  · Use of certain drugs  What are the main signs?   · Trouble lifting the front part of your foot  · Problems walking. You may slap or drag your foot on the ground when you walk. You may scrape, rub, or trip on your toes. You may need to lift your foot up higher than normal to avoid this problem.  · Toes that point away from the  body when your foot is relaxed  · Tingling or numbness of your foot and ankle  · Weak foot and legs  How does the doctor diagnose this health problem?   Your doctor will take your history and do an exam. Your doctor will check your foot and look for any deformities that may be pressing on your nerves or spinal cord. You may be asked to walk on your heels. If you have problems with this, you may have drop foot. The doctor may order tests like:   · X-rays  · Ultrasound  · CT or MRI scan  · Electromyelogram (EMG)  · Nerve conduction test  How does the doctor treat this health problem?   Your treatment will depend on what is causing your foot drop.  · You may need to wear a special brace to support your ankle and foot. It is called an ankle-foot orthosis (AFO). This fits inside your shoe and is worn when walking.  · Physical therapy may help strengthen your leg muscles. This may also help with your walking.  · You may need an electrical device to stimulate the nerves and foot muscles.  · You may need surgery. This is based on what is causing your foot drop. Ask your doctor if surgery is right for you.  Are there other health problems to treat?   You may have other health problems which are causing your foot drop. These may also need to be treated.  What drugs may be needed?   The doctor may order drugs to:  · Help with pain and swelling  · Treat muscle weakness and numbness  Will there be any other care needed?   · You may have trouble putting weight on your foot. You may use a walker, cane, or crutches or have someone help you when you walk. This may help you be safe when you walk and may prevent a fall.  · If your foot often gets numb, take good care of that foot. Do not walk barefoot. Wear well fitted-shoes and socks to protect your feet from injury. Check for any sores, calluses, or cracks each day. Tell your doctor right away if a sore gets bigger or shows signs of infection like redness, drainage, or pain.  Where  can I learn more?   [employer] [place] [employer]  https://www.ninds.nih.gov/Disorders/All-Disorders/Foot-Drop-Information-Page   [facility] Choices  http://www.nhs.uk/conditions/foot-drop/Pages/Introduction.aspx   Last Reviewed Date   2021-03-30  Consumer Information Use and Disclaimer   This information is not specific medical advice and does not replace information you receive from your health care provider. This is only a brief summary of general information. It does NOT include all information about conditions, illnesses, injuries, tests, procedures, treatments, therapies, discharge instructions or life-style choices that may apply to you. You must talk with your health care provider for complete information about your health and treatment options. This information should not be used to decide whether or not to accept your health care providers advice, instructions or recommendations. Only your health care provider has the knowledge and training to provide advice that is right for you.  Copyright   Copyright © 2021 [employer] and its affiliates and/or licensors. All rights reserved.  Patient Education       Preventing Falls in the Older Adult   About this topic   A fall is the sudden loss of balance that causes a person to drop to the ground or floor. Falls are a serious health risk and they happen more often as we get older. Many things may increase your risk of falling, like:  · Problems that come with getting older  ? Muscle weakness  ? Balance problems  ? More trouble seeing  · Personal health factors  ? Health conditions such as arthritis, Parkinson's disease, low blood pressure, or stroke  ? Medicines you take  ? Loss of feeling in your feet  ? Being less active  ? Taking drugs that makes you dizzy or drowsy  ? Habits like alcohol use  · Things around your house  ? Slippery floors  ? Unsecured rugs  ? Stairs  ? Wearing improper fitting shoes  ? Areas where it is dark and difficult  to see  ? Incorrect size or type of assistive devices  ? Clutter and items on the floor that block your walkway     What will the results be?   · Prevent future falls  · Avoid injuries and disabilities  · Improve overall health  Will there be any other care needed?   · Ask your doctor if you need to take vitamin D to help keep your bones strong.  · Make your home safer. Get rid of things that might make you trip or slip. These are things like loose rugs, electrical cords, or clutter. Add grab bars, a shower seat, and handrails.  · Wear sturdy shoes that fit well. Shoes should fit well, have a low heel, and the soles of the shoe should not be slippery. Walking in socks or with bare feet can raise your chance for falling.  · Stay active. Walk, garden, swim, or do something active on a regular basis. These activities may prevent you from getting hurt if you do fall. They also help with your strength and balance.  · Use a cane, walker, or other safety device. Be sure it is the right size for you and that you know how to use it safely. Be sure to wear your eyeglasses if they have been ordered for you.  · Get up slowly after you sit or lie down. Try to change positions slowly.  · What to do if you fall:  ? Stay calm and do not panic.  ? Look for signs to decide if you have been hurt or have an injury.  ? If you think you can get up safely, try to get up.  ? If you are hurt or cannot get up on your own, try to get help.  ? If no one is available to help, try to get comfortable and wait for someone to arrive who can help you.  ? Stay warm and move regularly as you are able. Avoid putting too much pressure on any one area.  ? After a fall, tell family and friends that you have fallen. It is also important to talk to your doctor about your fall right away.  What problems could happen?   A fall can lead to broken bones and other serious injuries in older adults. Problems that happen because of a fall may even lead to death in  older adults. Many people are not able to return to their former level of activity after a fall.  What can be done to prevent this health problem?   Lower Your Risk of Falling  · Wear your eyeglasses. Have regular eye checkups. Do not use reading glasses when you walk around.  · Quit smoking and limit alcohol intake. Smoking and too much alcohol can decrease bone mass and increase the chance of broken bones.  · Know the side effects of the drugs you are taking. Some drugs may affect your balance and cause confusion or sleepiness.  · Get up slowly after you sit or lie down. Do not change positions quickly. Do not rush when you need to go to the bathroom or to answer the phone.  Stay Physically Active  · Be physically active. This will help to improve your strength and balance.  · Fear of falling may lead you to avoid activities. Talk to your doctor. You may be sent to a physical therapist. This person can help you improve balance and build your confidence. Getting rid of your fear of falling can help you stay active and prevent future falls.  · Join an exercise program. Ask your doctor what exercise is safe for you. Be sure to ask before you do any exercises, especially if you have illnesses like arthritis. Exercise can help you keep muscles strong and help with your balance. It is also a good way to learn proper ways to do each activity or exercise.  Safety Tips at Home  · Keep your floors and walking areas clear from clutter. Remove furniture that blocks your way. Secure cords and wires near the wall to avoid tripping over them. Get rid of throw rugs.  · Be sure the lights in your house are working well and provide good lighting throughout your home. Make sure you can reach switches and lamps easily. Place a lamp close to your bed that is easy to reach.  · Fix all steps and sidewalks to make them smooth and even. Put handrails and lights on stairs.  · Keep all the things you use often on low shelves or in cabinets  that are at about waist level. Ask for help to move items off of high shelves. Do not use a chair as a step stool.  · Keep your bathroom area safe. Use nonslip rubber mats on the floor and in the tub or shower.  · Keep a phone near you in case of emergency. Keep a list of your emergency contact numbers in large print near your phone. Carry a phone with you when you go for a walk. Consider using a personal alarm device that could call for help in case you fall and cannot get up.  · Think about protecting your hip. Hip protectors may be needed if you have a higher chance for falling. Ask your doctor about this.  Where can I learn more?   [employer]  https://familydoctor.org/bbgmn-xwg-pb-lower-your-risk/   [facility]  https://www.nhs.uk/conditions/falls/prevention/   Last Reviewed Date   2021-06-07  Consumer Information Use and Disclaimer   This information is not specific medical advice and does not replace information you receive from your health care provider. This is only a brief summary of general information. It does NOT include all information about conditions, illnesses, injuries, tests, procedures, treatments, therapies, discharge instructions or life-style choices that may apply to you. You must talk with your health care provider for complete information about your health and treatment options. This information should not be used to decide whether or not to accept your health care providers advice, instructions or recommendations. Only your health care provider has the knowledge and training to provide advice that is right for you.  Copyright   Copyright © 2021 [employer] and its affiliates and/or licensors. All rights reserved.  Patient Education       Standing Balance Exercises, Beginner   About this topic   Having good balance is an important part of avoiding falls. Trouble with your balance can happen after an injury or surgery. It can also happen as you get older. Your eyesight  decreases, your muscles are weaker, and you are slower to react as you get older. These can all affect your balance. You can do exercises to help your balance and make your muscles stronger.  General   Before starting with a program, ask your doctor if you are healthy enough to do these exercises. Your doctor may have you work with a  or physical therapist to make a safe exercise program to meet your needs.  It is important to be safe when doing these exercises. Use a chair or counter to help you with your balance. Use both hands to keep you steady at first. Then, progress to using one hand. When you feel safer, just have your hands hovering over the chair or counter in case you need support. You may want to have a chair close to you if you need to rest. If you are really unsteady, have a helper close to you.  Strengthening Exercises   Strengthening exercises keep your muscles firm and strong. Start by repeating each exercise 2 to 3 times. Work up to doing each exercise 10 times. Try to do the exercises 2 to 3 times each day. Do all exercises slowly.  · Standing leg lifts / marching ? Slowly march in place, holding each leg up for 3 to 5 seconds.  · Sideways leg lifts ? Bring one leg out to the side. Hold 3 to 5 seconds. Repeat on the other leg.  · Backward leg lifts ? Bring your leg back, keeping the knee straight. Stand up tall and do not bend at the waist. Hold 3 to 5 seconds. Repeat on the other leg.  · Heel raises ? Lift your heels up and rise up onto your toes. Hold 3 to 5 seconds. Lower yourself back down.  · Toe lifts ? Lift your toes up and put your weight onto your heels. Hold 3 to 5 seconds.  · Single leg balance ? Lift one leg up and balance on the other leg for 10 to 30 seconds. Repeat on the other leg.  · Feet together and arms moving ? Stand with your feet together and touching each other. Move your arms up and down while keeping your elbows straight. You may want to try this with a small  weight in your hands.                 What will the results be?   · Stronger muscles  · Better balance  · Less chance of falling  · Better performance in sports  · Easier to walk  Helpful tips   · If you get dizzy, do not shut your eyes. Instead, focus on an object until your dizziness goes away. Do not exercise if you continue to be dizzy.  · If these exercises become too easy you may want to:  ? Stand and do them on an uneven or unstable surface. Try standing on a pillow or piece of foam as you exercise.  ? Try doing the exercises with your eyes closed.  Where can I learn more?   American Heart Association  https://www.heart.org/en/healthy-living/fitness/fitness-basics/balance-exercise   [facility] Choices  https://www.nhs.uk/live-well/exercise/balance-exercises/   Last Reviewed Date   2021-09-13  Consumer Information Use and Disclaimer   This information is not specific medical advice and does not replace information you receive from your health care provider. This is only a brief summary of general information. It does NOT include all information about conditions, illnesses, injuries, tests, procedures, treatments, therapies, discharge instructions or life-style choices that may apply to you. You must talk with your health care provider for complete information about your health and treatment options. This information should not be used to decide whether or not to accept your health care providers advice, instructions or recommendations. Only your health care provider has the knowledge and training to provide advice that is right for you.  Copyright   Copyright © 2021 [employer] and its affiliates and/or licensors. All rights reserved.  Patient Education       Standing Balance Exercises, Advanced   About this topic   Having good balance is an important part of avoiding falls. Trouble with your balance can happen after an injury or surgery. It can also happen as you get older. Your eyesight decreases, your muscles  are weaker, and you are slower to react as you get older. These can all affect your balance. You can do exercises to help your balance and make your muscles stronger.  General   Before starting with a program, ask your doctor if you are healthy enough to do these exercises. Your doctor may have you work with a  or physical therapist to make a safe exercise program to meet your needs.  Balance Exercises   Balance exercises help you to become steadier. Stay close to a wall for these walking balance exercises. If you are unsteady, have a helper next to you. Start with balance exercises where you are standing in one place. Then, move on to balance exercises where you are walking up and down a hallway.  · Heel to toe walking ? Imagine you are walking on a straight line or a tightrope. Put one foot in front of the other.  · Walking with head moving ? Walk straight ahead while moving your head from side to side. Next, walk straight ahead while moving your head up and down.  · Side stepping ? Keep your feet pointed straight ahead, then step sideways. Take a few steps, switch directions, and go to the other side.  · Toe walking ? Stand up on your toes and walk forward.  · Braiding balance ? Step in front of and over your left foot with your right foot. Now, bring your left foot behind and to the side. Then, step behind and across your left foot with your right foot. Continue to do this sideways. Switch directions and go to the other side.  Strengthening Exercises   Strengthening exercises keep your muscles firm and strong. Start by repeating each exercise 2 to 3 times. Work up to doing each exercise 10 times. Try to do the exercises 2 to 3 times each day. Do all exercises slowly.  · Step-ups:  ? Forward ? Face forward in front of a step stool or at the bottom of a staircase. Hold onto a rail or counter for safety. Step up and then bring your other leg up. Step back down one leg at a time. Change which foot you start  with.  ? Sideways ? Face sideways in front of a step stool or at the bottom of a staircase. Hold onto a rail or counter for safety. Step sideways onto the step with one leg. Then, bring your other leg up. Be sure there is enough room for the other leg to come up onto the step. Now, step back down one leg at a time. Turn so the other side is towards the stairs and repeat.               What will the results be?   · Stronger muscles  · Better balance  · Less chance of falling  · Better performance in sports  · Easier to walk  Helpful tips   · If you get dizzy, do not shut your eyes. Instead, focus on an object until your dizziness goes away. Do not exercise if you continue to be dizzy.  · If you have trouble with balance, have a helper around for safety.  · Wear shoes that will keep you from slipping or falling.  · If these exercises become too easy you may want to:  ? Stand and do them on an uneven or unstable surface. Try standing on a pillow or piece of foam as you exercise.  ? Try doing the exercises with your eyes closed.  · Exercise may be slightly uncomfortable, but you should not have sharp pains. If you do get sharp pains, stop what you are doing. If the sharp pains continue, call your doctor.  Where can I learn more?   [facility] Choices  https://www.nhs.uk/live-well/exercise/balance-exercises/   Last Reviewed Date   2021-09-13  Consumer Information Use and Disclaimer   This information is not specific medical advice and does not replace information you receive from your health care provider. This is only a brief summary of general information. It does NOT include all information about conditions, illnesses, injuries, tests, procedures, treatments, therapies, discharge instructions or life-style choices that may apply to you. You must talk with your health care provider for complete information about your health and treatment options. This information should not be used to decide whether or not to accept your  health care providers advice, instructions or recommendations. Only your health care provider has the knowledge and training to provide advice that is right for you.  Copyright   Copyright © 2021 [employer] and its affiliates and/or licensors. All rights reserved.

## 2022-01-26 ENCOUNTER — PATIENT MESSAGE (OUTPATIENT)
Dept: CARDIOLOGY | Facility: CLINIC | Age: 82
End: 2022-01-26
Payer: MEDICARE

## 2022-01-27 ENCOUNTER — OFFICE VISIT (OUTPATIENT)
Dept: PHYSICAL MEDICINE AND REHAB | Facility: CLINIC | Age: 82
End: 2022-01-27
Payer: MEDICARE

## 2022-01-27 DIAGNOSIS — G56.23 CUBITAL TUNNEL SYNDROME, BILATERAL: ICD-10-CM

## 2022-01-27 PROCEDURE — 99999 PR PBB SHADOW E&M-EST. PATIENT-LVL I: ICD-10-PCS | Mod: PBBFAC,,, | Performed by: PHYSICAL MEDICINE & REHABILITATION

## 2022-01-27 PROCEDURE — 99999 PR PBB SHADOW E&M-EST. PATIENT-LVL I: CPT | Mod: PBBFAC,,, | Performed by: PHYSICAL MEDICINE & REHABILITATION

## 2022-01-27 PROCEDURE — 95911 NRV CNDJ TEST 9-10 STUDIES: CPT | Mod: S$GLB,,, | Performed by: PHYSICAL MEDICINE & REHABILITATION

## 2022-01-27 PROCEDURE — 99499 NO LOS: ICD-10-PCS | Mod: S$GLB,,, | Performed by: PHYSICAL MEDICINE & REHABILITATION

## 2022-01-27 PROCEDURE — 99499 UNLISTED E&M SERVICE: CPT | Mod: S$GLB,,, | Performed by: PHYSICAL MEDICINE & REHABILITATION

## 2022-01-27 PROCEDURE — 95911 PR NERVE CONDUCTION STUDY; 9-10 STUDIES: ICD-10-PCS | Mod: S$GLB,,, | Performed by: PHYSICAL MEDICINE & REHABILITATION

## 2022-01-27 NOTE — PROCEDURES
Procedures       [facility]  Physical Medicine and Rehabilitation   [address] [place] [address], [address]  [place] [address]             Full Name: Lucho Choi YOB: 1940  Patient ID: [id number]      Visit Date: 1/27/2022 14:35  Age: 81 Years 1 Months Old  Examining Physician: Niraj Killian D.O.       Sensory NCS      Nerve / Sites Rec. Site Onset Lat Peak Lat NP Amp Segments Distance Velocity     ms ms µV  cm m/s   R Median - Digit II (Antidromic)      Wrist Dig II NR NR NR Wrist - Dig II 14 NR   L Median - Digit II (Antidromic)      Wrist Dig II NR NR NR Wrist - Dig II 14 NR   R Ulnar - Digit V (Antidromic)      Wrist Dig V 4.32 4.90 1.8 Wrist - Dig V 14 32   L Ulnar - Digit V (Antidromic)      Wrist Dig V NR NR NR Wrist - Dig V 14 NR   R Radial - Anatomical snuff box (Forearm)      Forearm Wrist 2.66 3.13 3.2 Forearm - Wrist 10 38       Motor NCS      Nerve / Sites Muscle Latency Amplitude Amp % Duration Segments Distance Lat Diff Velocity     ms mV % ms  cm ms m/s   R Median - APB      Wrist APB 5.73 3.1 100 7.92 Wrist - APB 8        Elbow APB 10.68 2.7 87.9 8.75 Elbow - Wrist 23 4.95 46   L Median - APB      Wrist APB 7.92 2.1 100 8.80 Wrist - APB 8        Elbow APB 13.59 1.1 51.9  Elbow - Wrist 23 5.68 41   R Ulnar - ADM      Wrist ADM 4.69 3.3 100 8.65 Wrist - ADM 8        B.Elbow ADM 7.76 3.3 97.7 7.81 B.Elbow - Wrist 18 3.07 59      A.Elbow ADM 10.47 2.8 84.2 9.38 A.Elbow - B.Elbow 8 2.71 30   L Ulnar - ADM      Wrist ADM 4.32 3.1 100 8.49 Wrist - ADM 8        B.Elbow ADM 8.23 3.4 108 9.22 B.Elbow - Wrist 19 3.91 49      A.Elbow ADM 11.20 3.4 109  A.Elbow - B.Elbow 8 2.97 27       Summary    The motor conduction test had results outside of the specified normal range in all 4 of the tested nerves:   In the R Median - APB study  o the take off latency result was increased for Wrist stimulation  o the peak amplitude result was reduced for Wrist stimulation  o the take off velocity  result was reduced for Elbow - Wrist segment   In the L Median - APB study  o the take off latency result was increased for Wrist stimulation  o the peak amplitude result was reduced for Wrist stimulation  o the take off velocity result was reduced for Elbow - Wrist segment   In the R Ulnar - ADM study  o the take off latency result was increased for Wrist stimulation  o the peak amplitude result was reduced for Wrist stimulation   In the L Ulnar - ADM study  o the take off latency result was increased for Wrist stimulation  o the peak amplitude result was reduced for Wrist stimulation  o the take off velocity result was reduced for B.Elbow - Wrist segment    The sensory conduction test had results outside of the specified normal range in all 5 of the tested nerves:   In the R Median - Digit II (Antidromic) study  o the response was considered absent for Wrist stimulation   In the L Median - Digit II (Antidromic) study  o the response was considered absent for Wrist stimulation   In the R Ulnar - Digit V (Antidromic) study  o the peak latency result was increased for Wrist stimulation  o the peak amplitude result was reduced for Wrist stimulation   In the L Ulnar - Digit V (Antidromic) study  o the response was considered absent for Wrist stimulation   In the R Radial - Anatomical snuff box (Forearm) study  o the peak latency result was increased for Forearm stimulation  o the peak amplitude result was reduced for Forearm stimulation          Impression:  Abnormal examination.  There is electrodiagnostic evidence of:  1. 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  The patient has  severe left greater than right carpal tunnel syndrome and severe left greater than right ulnar neuropathy at the elbow.  The patient and his wife were briefly counselled on the aforementioned diagnoses.  I will have them set up with a follow-up with Dr. Villasenor.  The patient also has poor balance with an ataxic gait.  There is no upper motor neuron signs or symptoms on history of physical examination.  He does have a history of a CVA.  I recommend a referral to Neurology for further workup.      ____________________________  Niraj Killian D.O.  American Board of Physical Medicine and Rehabilitation  American Board of Pain Medicine  American Board of Electrodiagnostic Medicine  Registered in Musculoskeletal Ultrasound

## 2022-02-14 ENCOUNTER — DOCUMENT SCAN (OUTPATIENT)
Dept: HOME HEALTH SERVICES | Facility: HOSPITAL | Age: 82
End: 2022-02-14
Payer: MEDICARE

## 2022-02-15 ENCOUNTER — TELEPHONE (OUTPATIENT)
Dept: FAMILY MEDICINE | Facility: CLINIC | Age: 82
End: 2022-02-15
Payer: MEDICARE

## 2022-02-15 ENCOUNTER — DOCUMENT SCAN (OUTPATIENT)
Dept: HOME HEALTH SERVICES | Facility: HOSPITAL | Age: 82
End: 2022-02-15
Payer: MEDICARE

## 2022-02-15 DIAGNOSIS — R26.89 BALANCE PROBLEM: Primary | ICD-10-CM

## 2022-02-15 NOTE — TELEPHONE ENCOUNTER
----- Message from Cathy Kowalski sent at 2/15/2022  1:24 PM CST -----  Contact: Jerilyn Gan (Spouse)  Caller: Jerilyn Gan (Spouse)  Phone: [phone]    Nature of call: caller requesting to schedule b12 inj, and requested to discuss other matters.   Please call upon request.  Thank you!

## 2022-02-15 NOTE — TELEPHONE ENCOUNTER
Spoke with patients wife. Patient has completed HH and wife feels patient needs to attend opt PT. Informed patient I would forward Dr. Nolan a message. Wife verbalized understanding.

## 2022-02-17 ENCOUNTER — CLINICAL SUPPORT (OUTPATIENT)
Dept: REHABILITATION | Facility: HOSPITAL | Age: 82
End: 2022-02-17
Attending: FAMILY MEDICINE
Payer: MEDICARE

## 2022-02-17 DIAGNOSIS — Z74.09 IMPAIRED FUNCTIONAL MOBILITY AND ACTIVITY TOLERANCE: ICD-10-CM

## 2022-02-17 DIAGNOSIS — R26.89 BALANCE PROBLEM: Primary | ICD-10-CM

## 2022-02-17 PROCEDURE — 97163 PT EVAL HIGH COMPLEX 45 MIN: CPT | Mod: PN

## 2022-02-17 NOTE — PLAN OF CARE
[facility]  Physical Therapy Neurological Rehabilitation Initial Evaluation    Name: Lucho Delong  Clinic Number: [id number]    Therapy Diagnosis:   Encounter Diagnoses   Name Primary?    Balance problem Yes    Impaired functional mobility and activity tolerance      Physician: Jose Nolan MD    Physician Orders: PT Eval and Treat   Medical Diagnosis from Referral: Balance problem  Evaluation Date: 2/17/2022  Authorization Period Expiration: 2/15/2023  Plan of Care Expiration: 4/1/2022  Progress Note Due: 3/17/2022  Visit # / Visits authorized: 1/ 1   FOTO: Next survey due week of 2/28/2022    Precautions: blood thinners, Fall and cancer     Time In: 1400  Time Out: 1446  Total Billable Time: 40 minutes    Subjective   Date of onset: 1/2022  History of current condition - Lucho reports: he had a stroke in 2018 that appeared to affect his R side.  This improved somewhat but he's had some progressive decline in his function over time.  According to his wife he developed R foot drop about 3 months ago.  His wife reports that he has had several falls (about 6) over the past 2 months with the most recent resulting in a laceration over R eye requiring stitches (x 3).  He did receive CT scan following this fall but was essentially negative.  He has been receiving home health (most recently was receiving nursing care) that ended at the end of January.  He is now referred to PT due to residual balance deficits.       Medical History:   Past Medical History:   Diagnosis Date    Anticoagulant long-term use     Atrial fibrillation     Bursitis of hip     Chronic back pain     Chronic kidney disease, stage 3     Gastroesophageal reflux disease     Gout     Hyperlipemia     Hypertension     Prostate cancer 1997    Stroke 04/2019       Surgical History:   Lucho Delong  has a past surgical history that includes Colonoscopy (1997); Prostate surgery (1997); Tonsillectomy;  "Catheterization of both left and right heart (N/A, 12/2/2019); Coronary stent placement (N/A, 1/8/2020); Coronary angiography (N/A, 1/8/2020); Transesophageal echocardiography (N/A, 1/23/2020); Percutaneous transluminal angioplasty (PTA) of peripheral vessel (N/A, 1/23/2020); Transcatheter aortic valve replacement (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    Medications:   Lucho has a current medication list which includes the following prescription(s): allopurinol, aspirin, aspirin, atorvastatin, cyanocobalamin, hydrocortisone, lisinopril, menthol/camphor, metoprolol succinate, nifedipine, prednisone, sertraline, and ultra-light rollator, and the following Facility-Administered Medications: cyanocobalamin.    Allergies:   Review of patient's allergies indicates:   Allergen Reactions    Amiodarone analogues Other (See Comments)     Dizziness    Tramadol Itching and Swelling     Began medication, two doses into this treatment patient experiences tongue swelling and severe itching. Patient MD advised to not take this medication anymore    Eliquis [apixaban] Other (See Comments)     Dizziness, ankle swelling, lips swelling        Imaging, CT scan films: Brain without contrast 12/18/2021: findings per report: "The brain is normally formed with preserved gray-white matter junction differentiation. No evidence of acute/recent major vascular territory cerebral infarction, parenchymal hemorrhage, or intra-axial mass.  There is a small focus of remote lacunar infarction noted within the anterior limb of the right internal capsule, similar to the prior exam.  There is encephalomalacia/gliosis present within the left frontal cortex and subcortical white matter on series 5, image 17 and series 2, image 19 compatible with an area of prior ischemia/infarction, " "unchanged.  There is a small focus of remote lacunar infarction present within the left cerebellar hemisphere on series 2, image 7.  There is a focus of remote lacunar infarction within the posterior right cerebellar hemisphere on series 2, image 10.  There is age-appropriate supratentorial cerebral volume loss with associated compensatory enlargement of the ventricular system and widening of the CSF spaces over both cerebral convexities.  There are confluent areas of periventricular white matter hypoattenuation within the corona radiata and centrum semiovale in a pattern which suggests underlying age-appropriate chronic microvascular ischemic change"    Prior Therapy: Yes following his stroke.    Social History:  lives with their spouse      Falls: Yes with the most recent being about 5 weeks ago resulting in laceration over his R eye and multiple contusions.     DME: Walker, Straight cane, Rollator, Tub bench, Shower chair and bars in tub    Home Environment: 1 story home that is mostly carpeted.  1 step to enter through the garage.  Doors to bathrooms are narrow.    Exercise Routine / History: nothing specific.     Family Present at time of Eval: Wife   Occupation: Retired  Prior Level of Function: Progressive problem over period of time   Current Level of Function: Unsteady gait, difficulty with sit <> stand transfers, increased fall risk,     Pain:  Current 0/10,    Patient's goals: improved balance, working on foot drop.      Objective     Posture: Standing: pelvis and shoulders level, decreased lumbar lordosis, increased thoracic kyphosis, moderate rounded shoulder posture and significant forward head posture.  Sitting:  Flattened lumbar lordosis, moderate to severe thoracic kyphosis, moderate to severe rounded shoulder and forward head posture.  Palpation: N/A  Sensation: Difficult to fully assess; however, patient reports intermittent paresthesias in B hands.  None reported in LE  DTRs:  Not tested this " date.    Range of Motion/Strength:   Trunk flexion 50%, ext 25%, SB 75%, rotation 25%  B LE grossly WFL.  External rotation limited B   Strength B hip flexion 4-/5, ext 3+/5, abd 3+/5-4-/5, add 4-/5    Knee ext 4-/5, flexion 3+/5    Ankle DF R 3+/5, L 4-/5  Flexibility: Hamstring length R 120*, L 115*; piriformis moderate tightness B; calves moderate tightness B.    Gait: Without AD  Analysis: CGA - Unsteady with occasional R toe drag, increased stance width, stumbling.   Patient/wife reports patient usually utilizes rollator at home and has multiple assistive devices but weren't sure which one to bring.    Bed Mobility:Independent  Transfers: Assistance - CGA for safety with increased UE support utilized.   Special Tests: Timed 5x sit <> stand 27.31 sec with minimal to moderate use of hands.    Other:     JULES  BALANCE ASSESSMENT TOOL  1. Sitting to Standing   3 - able to stand independely using hands  2. Standing Unsupported   3 - able to stand 2 minutes with supervision  3. Sitting Unsupported   4 - able to sit safely and securely 2 minutes  4. Standing to Sitting   3 - controls descent by using hands  5. Pivot Transfer   2 - able to transfer with verbal cuing and/or supervision  6. Standing with Eyes Closed   3 - able to stand 10 seconds with supervision  7. Standing with Feet Together   2 - able to place feet together independently but unable to hold for 30 seconds  8. Reaching Forward with Outstretched Arm   1 - reaches forward but needs supervision  9. Retrieving Object from Floor   3 - able to pick slipper but needs supervision  10. Turning to Look Behind   2 - turns sideways only but maintains balance  11. Turning 360 Degrees   1 - needs close supervision or verbal cueing  12. Placing Alternate Foot on Step   0 - needs assist to keep from falling/unable to try  13. Standing with One Foot in Front   0 - Looses balance while stepping or standing  14. Standing on One Foot   0 - unable to try or needs  assistance to prevent fall    TOTAL SCORE: 27  Maximum: 56  Score:   0-20= high fall risk   21-40 moderate fall risk   41-56 low fall risk     Fall risk cut-off scores:   Elderly and History of falls: <51/56   Elderly and No history of falls: <42/56   CVA: <45/56       CMS Impairment/Limitation/Restriction for FOTO Balance Survey    Therapist reviewed FOTO scores for Lucho Delong on 2/17/2022.   FOTO documents entered into [employer] - see Media section.    Limitation Score: Not given in report         TREATMENT     No treatment initiated this date    Home Exercises and Patient Education Provided    Education provided:   - Discussed role of PT and proposed POC with patient and his wife.  Also discussed recommendation for OT to address hand concerns/fine motor deficits     Written Home Exercises Provided: To be issued during subsequent visits. .  Exercises were reviewed and Lucho was able to demonstrate them prior to the end of the session.  Lucho demonstrated fair  understanding of the education provided.     See EMR under N/A for exercises provided N/A.    Assessment   Lucho is a 81 y.o. male referred to outpatient Physical Therapy with a medical diagnosis of balance problems. Patient presents with impaired posture, decreased trunk ROM, decreased lower quadrant flexibility, decreased LE and core strength, impaired balance.  These problems contribute to increased fall risk, impaired gait, and impaired functional mobility/activity tolerance. He should benefit from skilled PT services to address these problems in order to minimize functional deficits and improve safety.      Patient prognosis is Fair.   Patient will benefit from skilled outpatient Physical Therapy to address the deficits stated above and in the chart below, provide patient/family education, and to maximize patient's level of independence.     Plan of care discussed with patient: Yes  Patient's spiritual, cultural and educational needs considered  and patient is agreeable to the plan of care and goals as stated below:     Anticipated Barriers for therapy: None    Medical Necessity is demonstrated by the following  History  Co-morbidities and personal factors that may impact the plan of care Co-morbidities:   advanced age, CKD stage 3, history of cancer, history of CVA, HTN and a-fib    Personal Factors:   age     high   Examination  Body Structures and Functions, activity limitations and participation restrictions that may impact the plan of care Body Regions:   lower extremities  trunk    Body Systems:    gross symmetry  ROM  strength  balance  gait  transfers  transitions  blood pressure    Participation Restrictions:   None anticipated    Activity limitations:   Learning and applying knowledge  solving problems    General Tasks and Commands  undertaking multiple tasks    Communication  no deficits    Mobility  lifting and carrying objects  walking  using transportation (bus, train, plane, car)    Self care  washing oneself (bathing, drying, washing hands)  caring for body parts (brushing teeth, shaving, grooming)  toileting  dressing  looking after one's health    Domestic Life  shopping  cooking  doing house work (cleaning house, washing dishes, laundry)  assisting others    Interactions/Relationships  no deficits    Life Areas  no deficits    Community and Social Life  community life  recreation and leisure         high   Clinical Presentation unstable clinical presentation with unpredictable characteristics high   Decision Making/ Complexity Score: high     Goals:  Short Term Goals: 3 weeks    1) Facilitate improved LE flexibility   2) Facilitate improved posture in sitting   3) Patient will consistently utilize appropriate assistive device for community ambulation   4) Patient will consistently demonstrate correct technique for sit <> stand transfers when utilizing an assistive device    Long Term Goals: 6 weeks     1) Patient will consistently  ambulate using appropriate assistive device over level surfaces demonstrating safe technique when turning corners and navigating obstacles   2) Patient will consistently perform toilet transfers with safe technique   3) Improve timed 5x sit <> stand to < 22 sec with slight use of UE   4) Improve Louis Balance score to > 35 to improve his safety during standing/gait activities   5) Patient/family will be independent in complete HEP.      Plan   Plan of care Certification: 2/17/2022 to 4/1/2022.    Outpatient Physical Therapy 2 times weekly for 6 weeks to include the following interventions: Gait Training, Neuromuscular Re-ed, Patient Education, Therapeutic Activities and Therapeutic Exercise.    *Patient would benefit from skilled Occupational Therapy evaluation/treatment to address fine motor and ADL concerns.       Abena Kang, PT

## 2022-02-18 ENCOUNTER — CLINICAL SUPPORT (OUTPATIENT)
Dept: FAMILY MEDICINE | Facility: CLINIC | Age: 82
End: 2022-02-18
Payer: MEDICARE

## 2022-02-18 NOTE — PROGRESS NOTES
Patient verified by  and name. B-12 injection given in right deltoid per order. NDC 42895-880-09, lot #[id number], and exp. Date 2023. Patient tolerated well. No adverse reactions noted.

## 2022-02-21 ENCOUNTER — CLINICAL SUPPORT (OUTPATIENT)
Dept: REHABILITATION | Facility: HOSPITAL | Age: 82
End: 2022-02-21
Attending: FAMILY MEDICINE
Payer: MEDICARE

## 2022-02-21 DIAGNOSIS — R26.89 BALANCE PROBLEM: Primary | ICD-10-CM

## 2022-02-21 DIAGNOSIS — Z74.09 IMPAIRED FUNCTIONAL MOBILITY AND ACTIVITY TOLERANCE: ICD-10-CM

## 2022-02-21 PROCEDURE — 97110 THERAPEUTIC EXERCISES: CPT | Mod: PN

## 2022-02-21 NOTE — PROGRESS NOTES
"[facility]   Physical Therapy Treatment Note     Name: Lucho Delong  Clinic Number: [id number]    Therapy Diagnosis:   Encounter Diagnoses   Name Primary?    Balance problem Yes    Impaired functional mobility and activity tolerance      Physician: Jose Nolan MD    Visit Date: 2/21/2022    Physician Orders: PT Eval and Treat   Medical Diagnosis from Referral: Balance problem  Evaluation Date: 2/17/2022  Authorization Period Expiration: 2/15/2023  Plan of Care Expiration: 4/1/2022  Progress Note Due: 3/17/2022  Visit # / Visits authorized: 2/ 12   FOTO: Next survey due week of 2/28/2022     Precautions: blood thinners, Fall and cancer      PTA Visit #: 0/5     Time In: 1355  Time Out: 1443  Total Billable Time: 43 minutes    SUBJECTIVE     Pt reports: "I'm doing okay".  He was not compliant with home exercise program as one has not been issued yet".  Response to previous treatment: Tolerated evaluation well  Functional change: None yet noted.      Pain: 0/10  Location: N/A    OBJECTIVE     Objective Measures updated at progress report unless specified.     Treatment     Lucho received the treatments listed below:      therapeutic exercises to develop strength, endurance and flexibility for 43 minutes including:   Nu-step recumbent stepper L3 x 10'   Standing calf stretch 3x30 sec using wedge    Mini squats 2x10 with CGA   Standing toe taps using 4" step x 2'   Seated hamstring stretch 3x30 sec ea    Piriformis stretch 3x30 sec ea (modified using step stool)   Supine SLR 2x10   Hip add ball squeeze 2x10   Hip abd clamshell with medium theraloop 2x10   SAQ using small grey bolster 2x10    Hook lying bridge 2x10 with 3 sec hold     Patient Education and Home Exercises     Home Exercises Provided and Patient Education Provided     Education provided:   - Initiated exercise instruction, instructed in sit <> stand using walker    Written Home Exercises Provided: to be issued " during subsequent visits.  . Exercises were reviewed and Lucho was able to demonstrate them prior to the end of the session.  Lucho demonstrated fair  understanding of the education provided. See EMR under Patient Instructions for exercises provided during therapy sessions    ASSESSMENT     Patient demonstrates significant difficulty with correct technique for sit <> stand transfers and for exercises.  Required multiple verbal and tactile cues to achieve correct technique.  Tends to place walker too far away from seating surface and walk to surface.  He also reaches with both hands to walker instead of pushing up from seating surface.      Lucho Is progressing slowly towards his goals.   Pt prognosis is Fair.     Pt will continue to benefit from skilled outpatient physical therapy to address the deficits listed in the problem list box on initial evaluation, provide pt/family education and to maximize pt's level of independence in the home and community environment.     Pt's spiritual, cultural and educational needs considered and pt agreeable to plan of care and goals.     Anticipated barriers to physical therapy: None    Goals:   Short Term Goals:                1) Facilitate improved LE flexibility  Initiated              2) Facilitate improved posture in sitting Initiated              3) Patient will consistently utilize appropriate assistive device for community ambulation Initiated              4) Patient will consistently demonstrate correct technique for sit <> stand transfers when utilizing an assistive device Initiated     Long Term Goals:                 1) Patient will consistently ambulate using appropriate assistive device over level surfaces demonstrating safe technique when turning corners and navigating obstacles  Initiated              2) Patient will consistently perform toilet transfers with safe technique Initiated              3) Improve timed 5x sit <> stand to < 22 sec with slight use of UE  Ongoing              4) Improve Louis Balance score to > 35 to improve his safety during standing/gait activities Ongoing              5) Patient/family will be independent in complete HEP.  Initiated     PLAN     Progressive stretching and strengthening, balance activities.  Transfer and gait training.      Abena Kang, PT

## 2022-02-23 ENCOUNTER — CLINICAL SUPPORT (OUTPATIENT)
Dept: REHABILITATION | Facility: HOSPITAL | Age: 82
End: 2022-02-23
Attending: FAMILY MEDICINE
Payer: MEDICARE

## 2022-02-23 DIAGNOSIS — Z74.09 IMPAIRED FUNCTIONAL MOBILITY AND ACTIVITY TOLERANCE: ICD-10-CM

## 2022-02-23 DIAGNOSIS — R26.89 BALANCE PROBLEM: Primary | ICD-10-CM

## 2022-02-23 PROCEDURE — 97530 THERAPEUTIC ACTIVITIES: CPT | Mod: PN

## 2022-02-23 PROCEDURE — 97110 THERAPEUTIC EXERCISES: CPT | Mod: PN

## 2022-02-23 NOTE — PROGRESS NOTES
"[facility]   Physical Therapy Treatment Note     Name: Lucho Delong  Clinic Number: [id number]    Therapy Diagnosis:   Encounter Diagnoses   Name Primary?    Balance problem Yes    Impaired functional mobility and activity tolerance      Physician: Jose Nolan MD    Visit Date: 2/23/2022    Physician Orders: PT Eval and Treat   Medical Diagnosis from Referral: Balance problem  Evaluation Date: 2/17/2022  Authorization Period Expiration: 2/15/2023  Plan of Care Expiration: 4/1/2022  Progress Note Due: 3/17/2022  Visit # / Visits authorized: 3/ 12   FOTO: Next survey due week of 2/28/2022     Precautions: blood thinners, Fall and cancer      PTA Visit #: 0/5     Time In: 1402  Time Out: 1500  Total Billable Time: 53 minutes    SUBJECTIVE     Pt reports: "It's same as any other day"  He was not compliant with home exercise program as one has not been issued yet.  Response to previous treatment: A little sore.    Functional change: None yet noted.      Pain: 0/10  Location: N/A    OBJECTIVE     Objective Measures updated at progress report unless specified.     Treatment     Lucho received the treatments listed below:      therapeutic exercises to develop strength, endurance and flexibility for 45 minutes including:   Nu-step recumbent stepper L3 x 10'   Standing calf stretch 3x30 sec using wedge    Mini squats 2x10 with CGA   Standing toe taps using 4" step x 2'   Seated hamstring stretch 3x30 sec ea    Piriformis stretch 3x30 sec ea (modified using step stool)      Supine hip flexor stretch 3x30 sec ea.     Supine SLR 2x10   Hip add ball squeeze 2x10   Hip abd clamshell with medium theraloop 2x10   SAQ using small grey bolster 2x10    Hook lying bridge 2x10 with 3 sec hold     Therapeutic activity to improve safety and functional mobility for 8 minutes including:   Sit <> stand without device x 5 SBA/verbal cues   Sit <> stand with device (rolling walker) x 5 with verbal cues " for correct hand placement, CGA   Gait over short distances using rolling walker 4x25' with cues for erect posture, correct walker distance, and safety during turns     Patient Education and Home Exercises     Home Exercises Provided and Patient Education Provided     Education provided:   - Reviewed sit <> stand transfer instructions, safe use of rolling walker    Written Home Exercises Provided: Yes. Exercises were reviewed and Lucho was able to demonstrate them prior to the end of the session.  Lucho demonstrated fair  understanding of the education provided. See EMR under Patient Instructions for exercises provided during therapy sessions    ASSESSMENT     Patient requires frequent verbal and visual cues for correct hand placement during sit <> stand transfers, verbal cues to remain close to walker during ambulation, close supervision/contact guard assistance with changing surfaces during ambulation (smooth concrete to rough pavement)       Lucho Is progressing slowly towards his goals.   Pt prognosis is Fair.     Pt will continue to benefit from skilled outpatient physical therapy to address the deficits listed in the problem list box on initial evaluation, provide pt/family education and to maximize pt's level of independence in the home and community environment.     Pt's spiritual, cultural and educational needs considered and pt agreeable to plan of care and goals.     Anticipated barriers to physical therapy: None    Goals:   Short Term Goals:                1) Facilitate improved LE flexibility  Progressing              2) Facilitate improved posture in sitting Ongoing              3) Patient will consistently utilize appropriate assistive device for community ambulation Minimal progress              4) Patient will consistently demonstrate correct technique for sit <> stand transfers when utilizing an assistive device Ongoing     Long Term Goals:                 1) Patient will consistently ambulate  using appropriate assistive device over level surfaces demonstrating safe technique when turning corners and navigating obstacles  Ongoing              2) Patient will consistently perform toilet transfers with safe technique Ongoing              3) Improve timed 5x sit <> stand to < 22 sec with slight use of UE Minimal progress              4) Improve Louis Balance score to > 35 to improve his safety during standing/gait activities Ongoing              5) Patient/family will be independent in complete HEP.  Minimal progress    PLAN     Frequent review of exercises so that patient can perform them at home with family guidance, safety instruction for transfers and gait.  Balance activities.     Abena Kang, PT

## 2022-03-03 ENCOUNTER — LAB VISIT (OUTPATIENT)
Dept: LAB | Facility: HOSPITAL | Age: 82
End: 2022-03-03
Attending: INTERNAL MEDICINE
Payer: MEDICARE

## 2022-03-03 DIAGNOSIS — N18.4 CHRONIC KIDNEY DISEASE, STAGE IV (SEVERE): Primary | ICD-10-CM

## 2022-03-03 LAB
ALBUMIN SERPL BCP-MCNC: 3.6 G/DL (ref 3.5–5.2)
ANION GAP SERPL CALC-SCNC: 12 MMOL/L (ref 8–16)
BASOPHILS # BLD AUTO: 0.09 K/UL (ref 0–0.2)
BASOPHILS NFR BLD: 1.2 % (ref 0–1.9)
BUN SERPL-MCNC: 41 MG/DL (ref 8–23)
CALCIUM SERPL-MCNC: 9.2 MG/DL (ref 8.7–10.5)
CHLORIDE SERPL-SCNC: 112 MMOL/L (ref 95–110)
CO2 SERPL-SCNC: 16 MMOL/L (ref 23–29)
CREAT SERPL-MCNC: 2 MG/DL (ref 0.5–1.4)
DIFFERENTIAL METHOD: ABNORMAL
EOSINOPHIL # BLD AUTO: 0.2 K/UL (ref 0–0.5)
EOSINOPHIL NFR BLD: 3.1 % (ref 0–8)
ERYTHROCYTE [DISTWIDTH] IN BLOOD BY AUTOMATED COUNT: 13.2 % (ref 11.5–14.5)
EST. GFR  (AFRICAN AMERICAN): 34.9 ML/MIN/1.73 M^2
EST. GFR  (NON AFRICAN AMERICAN): 30.2 ML/MIN/1.73 M^2
GLUCOSE SERPL-MCNC: 106 MG/DL (ref 70–110)
HCT VFR BLD AUTO: 38 % (ref 40–54)
HGB BLD-MCNC: 12.5 G/DL (ref 14–18)
IMM GRANULOCYTES # BLD AUTO: 0.02 K/UL (ref 0–0.04)
IMM GRANULOCYTES NFR BLD AUTO: 0.3 % (ref 0–0.5)
LYMPHOCYTES # BLD AUTO: 2.2 K/UL (ref 1–4.8)
LYMPHOCYTES NFR BLD: 29.9 % (ref 18–48)
MCH RBC QN AUTO: 32.3 PG (ref 27–31)
MCHC RBC AUTO-ENTMCNC: 32.9 G/DL (ref 32–36)
MCV RBC AUTO: 98 FL (ref 82–98)
MONOCYTES # BLD AUTO: 0.8 K/UL (ref 0.3–1)
MONOCYTES NFR BLD: 10.8 % (ref 4–15)
NEUTROPHILS # BLD AUTO: 4.1 K/UL (ref 1.8–7.7)
NEUTROPHILS NFR BLD: 54.7 % (ref 38–73)
NRBC BLD-RTO: 0 /100 WBC
PHOSPHATE SERPL-MCNC: 3 MG/DL (ref 2.7–4.5)
PLATELET # BLD AUTO: 175 K/UL (ref 150–450)
PMV BLD AUTO: 10.5 FL (ref 9.2–12.9)
POTASSIUM SERPL-SCNC: 5 MMOL/L (ref 3.5–5.1)
RBC # BLD AUTO: 3.87 M/UL (ref 4.6–6.2)
SODIUM SERPL-SCNC: 140 MMOL/L (ref 136–145)
URATE SERPL-MCNC: 4.6 MG/DL (ref 3.4–7)
WBC # BLD AUTO: 7.43 K/UL (ref 3.9–12.7)

## 2022-03-03 PROCEDURE — 80069 RENAL FUNCTION PANEL: CPT | Performed by: INTERNAL MEDICINE

## 2022-03-03 PROCEDURE — 36415 COLL VENOUS BLD VENIPUNCTURE: CPT | Performed by: INTERNAL MEDICINE

## 2022-03-03 PROCEDURE — 82542 COL CHROMOTOGRAPHY QUAL/QUAN: CPT | Performed by: INTERNAL MEDICINE

## 2022-03-03 PROCEDURE — 84550 ASSAY OF BLOOD/URIC ACID: CPT | Performed by: INTERNAL MEDICINE

## 2022-03-03 PROCEDURE — 85025 COMPLETE CBC W/AUTO DIFF WBC: CPT | Performed by: INTERNAL MEDICINE

## 2022-03-03 PROCEDURE — 83970 ASSAY OF PARATHORMONE: CPT | Performed by: INTERNAL MEDICINE

## 2022-03-04 ENCOUNTER — CLINICAL SUPPORT (OUTPATIENT)
Dept: REHABILITATION | Facility: HOSPITAL | Age: 82
End: 2022-03-04
Attending: FAMILY MEDICINE
Payer: MEDICARE

## 2022-03-04 ENCOUNTER — DOCUMENTATION ONLY (OUTPATIENT)
Dept: CARDIOLOGY | Facility: CLINIC | Age: 82
End: 2022-03-04
Payer: MEDICARE

## 2022-03-04 DIAGNOSIS — R26.89 BALANCE PROBLEM: ICD-10-CM

## 2022-03-04 DIAGNOSIS — Z74.09 IMPAIRED FUNCTIONAL MOBILITY AND ACTIVITY TOLERANCE: Primary | ICD-10-CM

## 2022-03-04 LAB — PTH-INTACT SERPL-MCNC: 85 PG/ML (ref 9–77)

## 2022-03-04 PROCEDURE — 97110 THERAPEUTIC EXERCISES: CPT | Mod: PN,CQ

## 2022-03-04 PROCEDURE — 97530 THERAPEUTIC ACTIVITIES: CPT | Mod: PN,CQ

## 2022-03-04 RX ORDER — ASPIRIN 81 MG/1
81 TABLET ORAL DAILY
COMMUNITY

## 2022-03-04 NOTE — PROGRESS NOTES
"[facility]   Physical Therapy Treatment Note     Name: Lucho Delong  Clinic Number: [id number]    Therapy Diagnosis:   Encounter Diagnoses   Name Primary?    Balance problem     Impaired functional mobility and activity tolerance Yes     Physician: Jose Nolan MD    Visit Date: 3/4/2022    Physician Orders: PT Eval and Treat   Medical Diagnosis from Referral: Balance problem  Evaluation Date: 2/17/2022  Authorization Period Expiration: 2/15/2023  Plan of Care Expiration: 4/1/2022  Progress Note Due: 3/17/2022  Visit # / Visits authorized: 4 / 12   FOTO: Next survey due week of 2/28/2022     Precautions: blood thinners, Fall and cancer      PTA Visit #: 1/5     Time In: 1:20 PM  Time Out: 2:10 PM  Total Billable Time: 50 minutes    SUBJECTIVE     Pt reports: I had a fall on Wednesday.   He was not compliant with home exercise program as one has not been issued yet.  Response to previous treatment: A little sore.    Functional change: None yet noted.      Pain: 0/10  Location: N/A    OBJECTIVE     Objective Measures updated at progress report unless specified.     Treatment     Lucho received the treatments listed below:      therapeutic exercises to develop strength, endurance and flexibility for 35 minutes including:   Nu-step recumbent stepper L3 x 10'   Standing calf stretch 3x30 sec using wedge    Mini squats 2x10 with CGA   Standing toe taps using 4" step x 2'   Seated hamstring stretch 3x30 sec ea    Piriformis stretch 3x30 sec ea (modified using step stool)      Supine hip flexor stretch 3x30 sec ea.     Supine SLR 2x10   Hip add ball squeeze 2x10   Hip abd clamshell with medium theraloop 2x10   SAQ using small grey bolster x 2 min    Hook lying bridge 2x10 with 3 sec hold     Therapeutic activity to improve safety and functional mobility for 8 minutes including:   Sit <> stand without device x 5 SBA/verbal cues   Sit <> stand with device (rolling walker) x 5 with " verbal cues for correct hand placement, CGA   Gait over short distances using rolling walker 4x25' with cues for erect posture, correct walker distance, and safety during turns     Patient Education and Home Exercises     Home Exercises Provided and Patient Education Provided     Education provided:   - Reviewed sit <> stand transfer instructions, safe use of rolling walker    Written Home Exercises Provided: Yes. Exercises were reviewed and Lucho was able to demonstrate them prior to the end of the session.  Lucho demonstrated fair  understanding of the education provided. See EMR under Patient Instructions for exercises provided during therapy sessions    ASSESSMENT     Patient requires frequent verbal and visual cues for correct hand placement during sit <> stand transfers, verbal cues to remain close to walker during ambulation, close supervision/contact guard assistance with changing surfaces during ambulation (smooth concrete to rough pavement)       Lucho Is progressing slowly towards his goals.   Pt prognosis is Fair.     Pt will continue to benefit from skilled outpatient physical therapy to address the deficits listed in the problem list box on initial evaluation, provide pt/family education and to maximize pt's level of independence in the home and community environment.     Pt's spiritual, cultural and educational needs considered and pt agreeable to plan of care and goals.     Anticipated barriers to physical therapy: None    Goals:   Short Term Goals:                1) Facilitate improved LE flexibility  Progressing              2) Facilitate improved posture in sitting Ongoing              3) Patient will consistently utilize appropriate assistive device for community ambulation Minimal progress              4) Patient will consistently demonstrate correct technique for sit <> stand transfers when utilizing an assistive device Ongoing     Long Term Goals:                 1) Patient will  consistently ambulate using appropriate assistive device over level surfaces demonstrating safe technique when turning corners and navigating obstacles  Ongoing              2) Patient will consistently perform toilet transfers with safe technique Ongoing              3) Improve timed 5x sit <> stand to < 22 sec with slight use of UE Minimal progress              4) Improve Louis Balance score to > 35 to improve his safety during standing/gait activities Ongoing              5) Patient/family will be independent in complete HEP.  Minimal progress    PLAN     Frequent review of exercises so that patient can perform them at home with family guidance, safety instruction for transfers and gait.  Balance activities.     Jonathan Favre, PTA

## 2022-03-09 ENCOUNTER — OFFICE VISIT (OUTPATIENT)
Dept: CARDIOLOGY | Facility: CLINIC | Age: 82
End: 2022-03-09
Payer: MEDICARE

## 2022-03-09 DIAGNOSIS — Z95.2 S/P TAVR (TRANSCATHETER AORTIC VALVE REPLACEMENT): ICD-10-CM

## 2022-03-09 DIAGNOSIS — Z95.818 PRESENCE OF WATCHMAN LEFT ATRIAL APPENDAGE CLOSURE DEVICE: ICD-10-CM

## 2022-03-09 DIAGNOSIS — I50.32 CHRONIC DIASTOLIC HEART FAILURE: ICD-10-CM

## 2022-03-09 DIAGNOSIS — I48.19 PERSISTENT ATRIAL FIBRILLATION: ICD-10-CM

## 2022-03-09 PROCEDURE — 99214 PR OFFICE/OUTPT VISIT, EST, LEVL IV, 30-39 MIN: ICD-10-PCS | Mod: 95,,, | Performed by: PHYSICIAN ASSISTANT

## 2022-03-09 PROCEDURE — 99214 OFFICE O/P EST MOD 30 MIN: CPT | Mod: 95,,, | Performed by: PHYSICIAN ASSISTANT

## 2022-03-09 NOTE — ASSESSMENT & PLAN NOTE
Successful CANDELARIO closure with Watchman. HYACINTH showed adequate CANDELARIO occlusion. No need for OAC. Continue ASA indefinitely.

## 2022-03-09 NOTE — PROGRESS NOTES
The following visit is a virtual visit:  Patient location: Home  Visit type: Virtual visit with realtime audio and video.  Total time spent with patient: 35 min with more than 50% of this time being spent on direct counseling and coordination of care.     Each patient to whom he or she provides medical services by telemedicine is:  (1) informed of the relationship between the physician and patient and the respective role of any other health care provider with respect to management of the patient; and (2) notified that he or she may decline to receive medical services by telemedicine and may withdraw from such care at any time.    Reason for Visit: 1 yr Watchman Follow Up    Referring Physician: Dr Arana    HPI  Mr. Delong is an 81 YOM with PMHx of HTN, DLPD, CVA (March 2019), CKD 3, anemia, thrombocytopenia, depression, CAD s/p PCI to LAD/RCA 1/8/20), severe aortic stenosis s/p 34 mm Evolut TAVR via RTF 2/18/2020, and atrial fibrillation now s/p 31 mm Watchman FLX on 3/3/2021.      He presents today for 1 year Watchman follow up. He underwent a 45 day HYACINTH which showed adequate CANDELARIO occlusion. OAC was stopped that day. He completed 4.5 months of Plavix. He now continued on ASA alone.      HYACINTH 04/20/2021:  · 45-day watchman follow up. Device appears seated in CANDELARIO os with 12.9% compression and small maynor-device leak 2-3mm along inferior anterior/medial portion of device.  · The left ventricle is normal in size with low normal systolic function.  · The estimated ejection fraction is 50%.  · Normal right ventricular size with normal right ventricular systolic function.  · There is a 34 mm Evolut transcutaneously-placed aortic bioprosthesis present. There is no aortic insufficiency present.  · No interatrial septal defect present.  · Normal left ventricular diastolic function.      Review of Systems   Constitutional: Negative for chills and fever.   HENT: Negative for sore throat.    Eyes: Negative for blurred vision.    Cardiovascular: Negative for chest pain, claudication, cyanosis, dyspnea on exertion, irregular heartbeat, leg swelling, near-syncope, orthopnea, palpitations, paroxysmal nocturnal dyspnea and syncope.   Respiratory: Negative for cough and sputum production.    Hematologic/Lymphatic: Does not bruise/bleed easily.   Skin: Negative for itching, rash and suspicious lesions.   Musculoskeletal: Negative for falls.   Gastrointestinal: Negative for abdominal pain and change in bowel habit.   Genitourinary: Negative for dysuria.   Neurological: Negative for disturbances in coordination, dizziness and loss of balance.   Psychiatric/Behavioral: Negative for altered mental status.          Past Medical History:   Diagnosis Date    Anticoagulant long-term use     Atrial fibrillation     Bursitis of hip     Chronic back pain     Chronic kidney disease, stage 3     Gastroesophageal reflux disease     Gout     Hyperlipemia     Hypertension     Prostate cancer 1997    Stroke 04/2019     Current Outpatient Medications on File Prior to Visit   Medication Sig Dispense Refill    allopurinoL (ZYLOPRIM) 100 MG tablet Take 1 tablet by mouth once daily 90 tablet 3    aspirin (ECOTRIN) 81 MG EC tablet Take 81 mg by mouth.      aspirin (ECOTRIN) 81 MG EC tablet Take 81 mg by mouth once daily.      aspirin 81 MG Chew Take 1 tablet (81 mg total) by mouth once daily. (Patient not taking: Reported on 3/4/2022) 50 tablet 3    atorvastatin (LIPITOR) 40 MG tablet Take 1 tablet by mouth once daily 90 tablet 3    cyanocobalamin 1,000 mcg/mL injection INJECT 1 ML INTRAMUSCULARLY ONCE EVERY 30 DAYS  1    hydrocortisone 2.5 % cream Apply topically 2 (two) times daily. (Patient not taking: Reported on 3/4/2022) 20 g 0    lisinopriL (PRINIVIL,ZESTRIL) 20 MG tablet Take 1 tablet by mouth once daily 90 tablet 3    menthol/camphor (SARNA ORIGINAL TOP) Apply topically as needed.      metoprolol succinate (TOPROL-XL) 100 MG 24 hr tablet  Take 1 tablet by mouth once daily 90 tablet 3    NIFEdipine (PROCARDIA-XL) 60 MG (OSM) 24 hr tablet Take 1 tablet by mouth once daily 90 tablet 3    predniSONE (DELTASONE) 5 MG tablet Take 1 tablet by mouth once daily 90 tablet 0    sertraline (ZOLOFT) 100 MG tablet Take 1 tablet (100 mg total) by mouth once daily. (Patient taking differently: Take 50 mg by mouth once daily.) 90 tablet 3    vitamin B complex vit C no.3 (VITAMIN B COMP AND C NO.3 ORAL) Take 1 tablet by mouth Daily.      walker (ULTRA-LIGHT ROLLATOR) Misc Use as directed 1 each 0     Current Facility-Administered Medications on File Prior to Visit   Medication Dose Route Frequency Provider Last Rate Last Admin    cyanocobalamin injection 1,000 mcg  1,000 mcg Intramuscular Q30 Days Jose Nolan MD   1,000 mcg at 01/07/22 1323       Assessment:   Presence of Watchman left atrial appendage closure device, placed 3/4/2021  Successful CANDELARIO closure with Watchman. HYACINTH showed adequate CANDELARIO occlusion. No need for OAC. Continue ASA indefinitely.     Persistent atrial fibrillation, onset 2018  See above. F/u with cardiology.     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    Chronic diastolic heart failure  NYHA I. Educated on low salt diet and fluid restriction.     Plan:       1. Virtual follow up at 2 years.  2. SBE prophylaxis for life.   3. Continue ASA indefinitely.

## 2022-03-10 LAB
24R-OH-CALCIDIOL SERPL-MCNC: 2 NG/ML
25(OH)D2 SERPL-MCNC: <4 NG/ML
25(OH)D3 SERPL-MCNC: 48 NG/ML
25(OH)D3+25(OH)D2 SERPL-MCNC: 48 NG/ML
25HDN:24,25 DIHYDROXY VITD RATIO: 24

## 2022-03-11 ENCOUNTER — EXTERNAL HOME HEALTH (OUTPATIENT)
Dept: HOME HEALTH SERVICES | Facility: HOSPITAL | Age: 82
End: 2022-03-11
Payer: MEDICARE

## 2022-03-11 ENCOUNTER — DOCUMENT SCAN (OUTPATIENT)
Dept: HOME HEALTH SERVICES | Facility: HOSPITAL | Age: 82
End: 2022-03-11
Payer: MEDICARE

## 2022-03-15 ENCOUNTER — PATIENT MESSAGE (OUTPATIENT)
Dept: ADMINISTRATIVE | Facility: HOSPITAL | Age: 82
End: 2022-03-15
Payer: MEDICARE

## 2022-03-15 ENCOUNTER — PATIENT OUTREACH (OUTPATIENT)
Dept: ADMINISTRATIVE | Facility: HOSPITAL | Age: 82
End: 2022-03-15
Payer: MEDICARE

## 2022-03-17 ENCOUNTER — PATIENT MESSAGE (OUTPATIENT)
Dept: CARDIOLOGY | Facility: CLINIC | Age: 82
End: 2022-03-17
Payer: MEDICARE

## 2022-03-28 ENCOUNTER — PATIENT MESSAGE (OUTPATIENT)
Dept: CARDIOLOGY | Facility: CLINIC | Age: 82
End: 2022-03-28
Payer: MEDICARE

## 2022-03-29 ENCOUNTER — OFFICE VISIT (OUTPATIENT)
Dept: FAMILY MEDICINE | Facility: CLINIC | Age: 82
End: 2022-03-29
Payer: MEDICARE

## 2022-03-29 VITALS
HEART RATE: 61 BPM | DIASTOLIC BLOOD PRESSURE: 67 MMHG | HEIGHT: 67 IN | SYSTOLIC BLOOD PRESSURE: 123 MMHG | BODY MASS INDEX: 23.86 KG/M2 | OXYGEN SATURATION: 96 % | WEIGHT: 152 LBS | RESPIRATION RATE: 18 BRPM

## 2022-03-29 DIAGNOSIS — R53.1 WEAKNESS: ICD-10-CM

## 2022-03-29 DIAGNOSIS — E53.8 B12 DEFICIENCY: Primary | ICD-10-CM

## 2022-03-29 PROCEDURE — 1159F MED LIST DOCD IN RCRD: CPT | Mod: CPTII,S$GLB,, | Performed by: FAMILY MEDICINE

## 2022-03-29 PROCEDURE — 96372 PR INJECTION,THERAP/PROPH/DIAG2ST, IM OR SUBCUT: ICD-10-PCS | Mod: S$GLB,,, | Performed by: FAMILY MEDICINE

## 2022-03-29 PROCEDURE — 99214 PR OFFICE/OUTPT VISIT, EST, LEVL IV, 30-39 MIN: ICD-10-PCS | Mod: 25,S$GLB,, | Performed by: FAMILY MEDICINE

## 2022-03-29 PROCEDURE — 96372 THER/PROPH/DIAG INJ SC/IM: CPT | Mod: S$GLB,,, | Performed by: FAMILY MEDICINE

## 2022-03-29 PROCEDURE — 99999 PR PBB SHADOW E&M-EST. PATIENT-LVL IV: ICD-10-PCS | Mod: PBBFAC,,, | Performed by: FAMILY MEDICINE

## 2022-03-29 PROCEDURE — 99214 OFFICE O/P EST MOD 30 MIN: CPT | Mod: 25,S$GLB,, | Performed by: FAMILY MEDICINE

## 2022-03-29 PROCEDURE — 3074F SYST BP LT 130 MM HG: CPT | Mod: CPTII,S$GLB,, | Performed by: FAMILY MEDICINE

## 2022-03-29 PROCEDURE — 3078F PR MOST RECENT DIASTOLIC BLOOD PRESSURE < 80 MM HG: ICD-10-PCS | Mod: CPTII,S$GLB,, | Performed by: FAMILY MEDICINE

## 2022-03-29 PROCEDURE — 3074F PR MOST RECENT SYSTOLIC BLOOD PRESSURE < 130 MM HG: ICD-10-PCS | Mod: CPTII,S$GLB,, | Performed by: FAMILY MEDICINE

## 2022-03-29 PROCEDURE — 99999 PR PBB SHADOW E&M-EST. PATIENT-LVL IV: CPT | Mod: PBBFAC,,, | Performed by: FAMILY MEDICINE

## 2022-03-29 PROCEDURE — 3078F DIAST BP <80 MM HG: CPT | Mod: CPTII,S$GLB,, | Performed by: FAMILY MEDICINE

## 2022-03-29 PROCEDURE — 1159F PR MEDICATION LIST DOCUMENTED IN MEDICAL RECORD: ICD-10-PCS | Mod: CPTII,S$GLB,, | Performed by: FAMILY MEDICINE

## 2022-03-29 RX ORDER — ACETAMINOPHEN 500 MG
1 TABLET ORAL DAILY
COMMUNITY

## 2022-03-29 RX ORDER — CYANOCOBALAMIN 1000 UG/ML
1000 INJECTION, SOLUTION INTRAMUSCULAR; SUBCUTANEOUS ONCE
Status: COMPLETED | OUTPATIENT
Start: 2022-03-29 | End: 2022-03-29

## 2022-03-29 RX ORDER — PREDNISONE 5 MG/1
5 TABLET ORAL DAILY
Qty: 90 TABLET | Refills: 3 | Status: SHIPPED | OUTPATIENT
Start: 2022-03-29 | End: 2022-05-10

## 2022-03-29 RX ADMIN — CYANOCOBALAMIN 1000 MCG: 1000 INJECTION, SOLUTION INTRAMUSCULAR; SUBCUTANEOUS at 11:03

## 2022-03-29 NOTE — PROGRESS NOTES
Patient presents to clinic for nurse visit to receive B12 injection. Orders in placed for B12. States name,  and allergies reviewed. B12 1000mcg administered IM to left deltoid  muscle. NDC: 45306-324-50 Lot:  Exp: 2023. Tolerated without complaints. Instructed to remain in clinic for 15 minutes post injection to monitor for adverse effects. Verbalized understanding. No immediate reactions noted.

## 2022-03-29 NOTE — PROGRESS NOTES
" Patient ID: Lucho Delong is a 81 y.o. male.    Chief Complaint: Follow-up (will not use urinal, would like PT order to [facility], having trouble closing hands), Fatigue, Fall, B12 Injection, and Medication Refill      Reviewed family, medical, surgical, and social history.    No cp/sob  No change in mental status  No fever  No asymmetrical limb swelling    Objective:      /67 (BP Location: Left arm, Patient Position: Sitting, BP Method: Medium (Manual))   Pulse 61   Resp 18   Ht 5' 7" (1.702 m)   Wt 68.9 kg (152 lb)   SpO2 96%   BMI 23.81 kg/m²     Assessment:       1. B12 deficiency    2. Weakness        Plan:       B12 deficiency  -     cyanocobalamin injection 1,000 mcg    Weakness  -     Ambulatory referral/consult to Physical/Occupational Therapy; Future; Expected date: 04/05/2022  -     WHEELCHAIR FOR HOME USE    Other orders  -     predniSONE (DELTASONE) 5 MG tablet; Take 1 tablet (5 mg total) by mouth once daily.  Dispense: 90 tablet; Refill: 3            Reviewed, monitored, evaluated, and assessed chronic conditions as outlined above  Continue current medicines, any changes noted above  Will hold statin for two weeks, patient will provide update on how it is doing  Labs, radiology studies, and procedures/notes from the last 3 months were reviewed.    Risks, benefits, and side effects were discussed with the patient. All questions were answered to the fullest satisfaction of the patient, and pt verbalized understanding and agreement to treatment plan. Pt was to call with any new or worsening symptoms, or present to the ER.    "

## 2022-04-13 ENCOUNTER — CLINICAL SUPPORT (OUTPATIENT)
Dept: REHABILITATION | Facility: HOSPITAL | Age: 82
End: 2022-04-13
Attending: FAMILY MEDICINE
Payer: MEDICARE

## 2022-04-13 DIAGNOSIS — R53.1 WEAKNESS: ICD-10-CM

## 2022-04-13 PROCEDURE — 97163 PT EVAL HIGH COMPLEX 45 MIN: CPT | Mod: PN

## 2022-04-13 NOTE — PLAN OF CARE
[facility]   Physical Therapy Initial Evaluation     Date: 4/13/2022   Name: Lucho Delong  Clinic Number: [id number]    Therapy Diagnosis:   Encounter Diagnosis   Name Primary?    Weakness      Physician: Jose Nolan MD    Physician Orders: PT Eval and Treat   Medical Diagnosis from Referral: Weakness  Evaluation Date: 4/13/2022  Authorization Period Expiration: 6/30/2022  Plan of Care Expiration: 6/30/2022  Progress Note Due: 6/30/2022  Visit # / Visits authorized: 1/ 12   FOTO: 1/3    Precautions: blood thinners, Fall and Hx of CVA     Time In: 1400  Time Out: 1445  Total Appointment Time (timed & untimed codes): 45 minutes      SUBJECTIVE     Date of onset: CVA 2018, Falls more frequent over last several weeks    History of current condition - Lucho reports: he has been falling more frequently approx 2x/wk over the last several weeks. Most recent fall was yesterday. Pt has neuropathy and weakness in BLE and BUE. Pt mostly uses rollator walker at the house.     Falls:  Approx 2x/wk    Imaging, none:     Prior Therapy: PT for balance and post CVA  Social History:  lives with their spouse  Occupation: Retired  Prior Level of Function: Min-mod A  Current Level of Function: Max Assist, unsafe while ambulating    Pain:  Current 0/10, worst 0/10, best 0/10     Patients goals: Increase strength, walk with Rollator, reduce falls     Medical History:   Past Medical History:   Diagnosis Date    Anticoagulant long-term use     Atrial fibrillation     Bursitis of hip     Chronic back pain     Chronic kidney disease, stage 3     Gastroesophageal reflux disease     Gout     Hyperlipemia     Hypertension     Prostate cancer 1997    Stroke 04/2019       Surgical History:   Lucho Delong  has a past surgical history that includes Colonoscopy (1997); Prostate surgery (1997); Tonsillectomy; Catheterization of both left and right heart (N/A, 12/2/2019); Coronary stent  placement (N/A, 1/8/2020); Coronary angiography (N/A, 1/8/2020); Transesophageal echocardiography (N/A, 1/23/2020); Percutaneous transluminal angioplasty (PTA) of peripheral vessel (N/A, 1/23/2020); Transcatheter aortic valve replacement (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    Medications:   Lucho has a current medication list which includes the following prescription(s): allopurinol, aspirin, atorvastatin, cholecalciferol (vitamin d3), cyanocobalamin, hydrocortisone, lisinopril, menthol/camphor, metoprolol succinate, nifedipine, prednisone, sertraline, vitamin b complex vit c no.3, and ultra-light rollator, and the following Facility-Administered Medications: cyanocobalamin.    Allergies:   Review of patient's allergies indicates:   Allergen Reactions    Amiodarone analogues Other (See Comments)     Dizziness    Tramadol Itching and Swelling     Began medication, two doses into this treatment patient experiences tongue swelling and severe itching. Patient MD advised to not take this medication anymore    Eliquis [apixaban] Other (See Comments)     Dizziness, ankle swelling, lips swelling          OBJECTIVE     MMT BLE Grossly: 3+/5  5x STS: 21sec with UE use and CGA  Gait: Unable to perform safely      Limitation/Restriction for FOTO LE Survey    Therapist reviewed FOTO scores for Lucho Delong on 4/13/2022.   FOTO documents entered into EPIC - see Media section.    Limitation Score: 16%         TREATMENT     Total Treatment time (time-based codes) separate from Evaluation: 45 minutes      Lucho received the treatments listed below:      therapeutic exercises to develop  for  minutes including:      neuromuscular re-education activities to improve:for  minutes. The following activities were included:      therapeutic activities to  improve functional performance for   minutes, including:      gait training to improve functional mobility and safety for   minutes, including:        PATIENT EDUCATION AND HOME EXERCISES     Education provided:   - Pt and family education on safety, transfers, and emphasis on assistance and AD use.    Written Home Exercises Provided: No written HEP provided at this time.. Exercises were reviewed and Lucho was able to demonstrate them prior to the end of the session.  Lucho demonstrated poor understanding of the education provided. See EMR under Patient Instructions for exercises provided during therapy sessions.    ASSESSMENT     Lucho is a 81 y.o. male referred to outpatient Physical Therapy with a medical diagnosis of Weakness. Patient presents with decreased safety with ambulation, transfers, and ADLs. Decreased strength, impaired coordination, and poor decision making contributing to pt's current status and decrease in functional status.     Patient prognosis is Fair.   Patient will benefit from skilled outpatient Physical Therapy to address the deficits stated above and in the chart below, provide patient /family education, and to maximize patientt's level of independence.     Plan of care discussed with patient: Yes  Patient's spiritual, cultural and educational needs considered and patient is agreeable to the plan of care and goals as stated below:     Anticipated Barriers for therapy: Medical hx, poor decision making, recent decline, extent of deficits.    Medical Necessity is demonstrated by the following  History  Co-morbidities and personal factors that may impact the plan of care Co-morbidities:   advanced age, history of CVA and level of undertstanding of current condition    Personal Factors:   age     high   Examination  Body Structures and Functions, activity limitations and participation restrictions that may impact the plan of care Body Regions:   lower extremities  upper extremities    Body  Systems:    strength  gross coordinated movement  balance  gait  transfers  motor control    Participation Restrictions:   N/A    Activity limitations:   Learning and applying knowledge  no deficits    General Tasks and Commands  undertaking multiple tasks    Communication  communicating with/receiving spoken language    Mobility  lifting and carrying objects  fine hand use (grasping/picking up)  walking  moving around using equipment (WC)  driving (bike, car, motorcycle)    Self care  washing oneself (bathing, drying, washing hands)  toileting  dressing  looking after one's health    Domestic Life  cooking  doing house work (cleaning house, washing dishes, laundry)  assisting others    Interactions/Relationships  no deficits    Life Areas  no deficits    Community and Social Life  recreation and leisure         high   Clinical Presentation evolving clinical presentation with changing clinical characteristics high   Decision Making/ Complexity Score: high     Goals:  Short Term Goals: 3 weeks   1: Pt ind performing HEP to promote progress between sessions.  2: Pt able to safely and Ind perform STS with use of Rollator  3: No falls     Long Term Goals: 8 weeks   1: Improve score of outcome measure by 15% or greater  2: Pt improve B LE MMT to 4/5 grossly to improve overall function  3: Pt successfully perform a TUG safely  4: Pt improve 5x STS time to <14 sec to show progress in balance and functional strength    PLAN   Plan of care Certification: 4/13/2022 to 6/30/2022.    Outpatient Physical Therapy 2-3 times weekly for 10 weeks to include the following interventions: Gait Training, Neuromuscular Re-ed, Patient Education, Therapeutic Activities and Therapeutic Exercise.     Andre Herr, PT      I CERTIFY THE NEED FOR THESE SERVICES FURNISHED UNDER THIS PLAN OF TREATMENT AND WHILE UNDER MY CARE   Physician's comments:     Physician's Signature: ___________________________________________________

## 2022-04-14 ENCOUNTER — TELEPHONE (OUTPATIENT)
Dept: FAMILY MEDICINE | Facility: CLINIC | Age: 82
End: 2022-04-14
Payer: MEDICARE

## 2022-04-14 DIAGNOSIS — Z86.73 HISTORY OF STROKE: Primary | ICD-10-CM

## 2022-04-14 NOTE — TELEPHONE ENCOUNTER
----- Message from Andre Herr, PT sent at 4/13/2022  3:11 PM CDT -----  Regarding: OT  I just finished evaluating Lucho Delong, and think that he would benefit from OT in addition to PT. Could you send over some additional orders for OT as well.    Thank you,  Andre Herr, PT, DPT

## 2022-04-18 PROCEDURE — G0179 MD RECERTIFICATION HHA PT: HCPCS | Mod: ,,, | Performed by: FAMILY MEDICINE

## 2022-04-18 PROCEDURE — G0179 PR HOME HEALTH MD RECERTIFICATION: ICD-10-PCS | Mod: ,,, | Performed by: FAMILY MEDICINE

## 2022-04-22 ENCOUNTER — PATIENT MESSAGE (OUTPATIENT)
Dept: FAMILY MEDICINE | Facility: CLINIC | Age: 82
End: 2022-04-22
Payer: MEDICARE

## 2022-04-22 DIAGNOSIS — R26.81 UNSTEADY GAIT: Primary | ICD-10-CM

## 2022-04-24 ENCOUNTER — EXTERNAL HOME HEALTH (OUTPATIENT)
Dept: HOME HEALTH SERVICES | Facility: HOSPITAL | Age: 82
End: 2022-04-24
Payer: MEDICARE

## 2022-05-04 ENCOUNTER — PATIENT MESSAGE (OUTPATIENT)
Dept: FAMILY MEDICINE | Facility: CLINIC | Age: 82
End: 2022-05-04
Payer: MEDICARE

## 2022-05-06 NOTE — TELEPHONE ENCOUNTER
Placed patient call at number provided in chart. Spoke with Jerilyn, patient's wife. Verified patient name and date of birth. 2 weeks ankles swelling. States patient ambulates with rollator and has had approximately 6 falls in last two weeks. Instructed Jerilyn to monitor edema and if worsens or begins causing pain, visit nearest ED over weekend. Monitor fluid intake. Would like to know if possible to get a prescription for PRN furosemide to take as needed for edema.

## 2022-05-07 RX ORDER — POTASSIUM CHLORIDE 20 MEQ/1
TABLET, EXTENDED RELEASE ORAL
Qty: 30 TABLET | Refills: 0 | Status: ON HOLD | OUTPATIENT
Start: 2022-05-07 | End: 2022-06-15 | Stop reason: HOSPADM

## 2022-05-07 RX ORDER — FUROSEMIDE 20 MG/1
20 TABLET ORAL DAILY PRN
Qty: 30 TABLET | Refills: 0 | Status: SHIPPED | OUTPATIENT
Start: 2022-05-07 | End: 2022-06-11 | Stop reason: CLARIF

## 2022-05-10 ENCOUNTER — LAB VISIT (OUTPATIENT)
Dept: LAB | Facility: HOSPITAL | Age: 82
End: 2022-05-10
Attending: STUDENT IN AN ORGANIZED HEALTH CARE EDUCATION/TRAINING PROGRAM
Payer: MEDICARE

## 2022-05-10 DIAGNOSIS — R41.3 MEMORY LOSS: Primary | ICD-10-CM

## 2022-05-10 LAB
AMMONIA PLAS-SCNC: 20 UMOL/L (ref 10–50)
FOLATE SERPL-MCNC: 7.9 NG/ML (ref 4–24)
TSH SERPL DL<=0.005 MIU/L-ACNC: 1.31 UIU/ML (ref 0.4–4)
VIT B12 SERPL-MCNC: 644 PG/ML (ref 210–950)

## 2022-05-10 PROCEDURE — 82140 ASSAY OF AMMONIA: CPT | Performed by: STUDENT IN AN ORGANIZED HEALTH CARE EDUCATION/TRAINING PROGRAM

## 2022-05-10 PROCEDURE — 82746 ASSAY OF FOLIC ACID SERUM: CPT | Performed by: STUDENT IN AN ORGANIZED HEALTH CARE EDUCATION/TRAINING PROGRAM

## 2022-05-10 PROCEDURE — 82607 VITAMIN B-12: CPT | Performed by: STUDENT IN AN ORGANIZED HEALTH CARE EDUCATION/TRAINING PROGRAM

## 2022-05-10 PROCEDURE — 86592 SYPHILIS TEST NON-TREP QUAL: CPT | Performed by: STUDENT IN AN ORGANIZED HEALTH CARE EDUCATION/TRAINING PROGRAM

## 2022-05-10 PROCEDURE — 36415 COLL VENOUS BLD VENIPUNCTURE: CPT | Performed by: STUDENT IN AN ORGANIZED HEALTH CARE EDUCATION/TRAINING PROGRAM

## 2022-05-10 PROCEDURE — 84207 ASSAY OF VITAMIN B-6: CPT | Performed by: STUDENT IN AN ORGANIZED HEALTH CARE EDUCATION/TRAINING PROGRAM

## 2022-05-10 PROCEDURE — 84443 ASSAY THYROID STIM HORMONE: CPT | Performed by: STUDENT IN AN ORGANIZED HEALTH CARE EDUCATION/TRAINING PROGRAM

## 2022-05-10 PROCEDURE — 84425 ASSAY OF VITAMIN B-1: CPT | Performed by: STUDENT IN AN ORGANIZED HEALTH CARE EDUCATION/TRAINING PROGRAM

## 2022-05-11 LAB — RPR SER QL: NORMAL

## 2022-05-16 ENCOUNTER — HOSPITAL ENCOUNTER (EMERGENCY)
Facility: HOSPITAL | Age: 82
Discharge: HOME | End: 2022-05-16
Attending: FAMILY MEDICINE
Payer: MEDICARE

## 2022-05-16 ENCOUNTER — PATIENT MESSAGE (OUTPATIENT)
Dept: FAMILY MEDICINE | Facility: CLINIC | Age: 82
End: 2022-05-16
Payer: MEDICARE

## 2022-05-16 VITALS
HEART RATE: 88 BPM | BODY MASS INDEX: 24.33 KG/M2 | SYSTOLIC BLOOD PRESSURE: 144 MMHG | OXYGEN SATURATION: 98 % | TEMPERATURE: 99 F | RESPIRATION RATE: 18 BRPM | WEIGHT: 155 LBS | HEIGHT: 67 IN | DIASTOLIC BLOOD PRESSURE: 80 MMHG

## 2022-05-16 DIAGNOSIS — M75.51 BURSITIS OF RIGHT SHOULDER: ICD-10-CM

## 2022-05-16 DIAGNOSIS — R52 PAIN: ICD-10-CM

## 2022-05-16 DIAGNOSIS — S09.90XA INJURY OF HEAD, INITIAL ENCOUNTER: Primary | ICD-10-CM

## 2022-05-16 LAB
PYRIDOXAL SERPL-MCNC: 6 UG/L (ref 5–50)
VIT B1 BLD-MCNC: 75 UG/L (ref 38–122)

## 2022-05-16 PROCEDURE — 73030 X-RAY EXAM OF SHOULDER: CPT | Mod: TC,FY,RT

## 2022-05-16 PROCEDURE — 73030 X-RAY EXAM OF SHOULDER: CPT | Mod: 26,RT,, | Performed by: RADIOLOGY

## 2022-05-16 PROCEDURE — 99283 EMERGENCY DEPT VISIT LOW MDM: CPT | Mod: 25

## 2022-05-16 PROCEDURE — 73030 XR SHOULDER TRAUMA 3 VIEW RIGHT: ICD-10-PCS | Mod: 26,RT,, | Performed by: RADIOLOGY

## 2022-05-16 NOTE — ED TRIAGE NOTES
According to [facility] pt was at home. [facility] states that pt lives with his wife and has dementia. [facility] states that pt was in the living room when she told his wife that he had to go somewhere. [facility] states that pt got up and fell. [facility] states that pt hit the front of his head on the nasal area then flipped over the couch, hitting the back of the head. [facility] states that pts wife states he did not have LOC. Pt has generalized bruising noted to his bilateral legs. Pt has dried blood noted to the nasal area. No active bleeding pt states that he is having pain on the right side back of the head. Unable to feel any swelling in the head area. Pt denies any other pain at this time.

## 2022-05-18 ENCOUNTER — HOSPITAL ENCOUNTER (OUTPATIENT)
Dept: RADIOLOGY | Facility: HOSPITAL | Age: 82
Discharge: HOME | End: 2022-05-18
Attending: STUDENT IN AN ORGANIZED HEALTH CARE EDUCATION/TRAINING PROGRAM
Payer: MEDICARE

## 2022-05-18 DIAGNOSIS — R27.0 ATAXIA: ICD-10-CM

## 2022-05-18 DIAGNOSIS — Z86.73 HISTORY OF STROKE: ICD-10-CM

## 2022-05-18 DIAGNOSIS — R26.89 BALANCE PROBLEM: ICD-10-CM

## 2022-05-18 PROCEDURE — 70551 MRI BRAIN WITHOUT CONTRAST: ICD-10-PCS | Mod: 26,,, | Performed by: RADIOLOGY

## 2022-05-18 PROCEDURE — 70551 MRI BRAIN STEM W/O DYE: CPT | Mod: 26,,, | Performed by: RADIOLOGY

## 2022-05-18 PROCEDURE — 70551 MRI BRAIN STEM W/O DYE: CPT | Mod: TC

## 2022-05-23 ENCOUNTER — DOCUMENT SCAN (OUTPATIENT)
Dept: HOME HEALTH SERVICES | Facility: HOSPITAL | Age: 82
End: 2022-05-23
Payer: MEDICARE

## 2022-05-24 ENCOUNTER — TELEPHONE (OUTPATIENT)
Dept: FAMILY MEDICINE | Facility: CLINIC | Age: 82
End: 2022-05-24
Payer: MEDICARE

## 2022-05-24 ENCOUNTER — PATIENT MESSAGE (OUTPATIENT)
Dept: FAMILY MEDICINE | Facility: CLINIC | Age: 82
End: 2022-05-24
Payer: MEDICARE

## 2022-05-24 NOTE — TELEPHONE ENCOUNTER
Spoke with [facility] answering service. Left message for a return call to inquire about hospice services per Jerilyn's request (pt spouse).

## 2022-05-25 NOTE — ED PROVIDER NOTES
Encounter Date: 5/16/2022       History     Chief Complaint   Patient presents with    Fall     Pt was walking in his living room, without his walker fell and his his nose on the corner of the coffee table then rolled and his the back of his head on the table. No LOC hx of dementia      [age] year-old male presents the ED per EMS after falling was in his living room with his walker when he fell [place] striking the back of his head and is nose bridge on the way down denies any neck pain there was no loss of conscious he is not taking blood thinners        Review of patient's allergies indicates:   Allergen Reactions    Amiodarone analogues Other (See Comments)     Dizziness    Tramadol Itching and Swelling     Began medication, two doses into this treatment patient experiences tongue swelling and severe itching. Patient MD advised to not take this medication anymore    Eliquis [apixaban] Other (See Comments)     Dizziness, ankle swelling, lips swelling     Past Medical History:   Diagnosis Date    Anticoagulant long-term use     Atrial fibrillation     Bursitis of hip     Chronic back pain     Chronic kidney disease, stage 3     Gastroesophageal reflux disease     Gout     Hyperlipemia     Hypertension     Prostate cancer 1997    Stroke 04/2019     Past Surgical History:   Procedure Laterality Date    CARDIAC VALVE REPLACEMENT  Feb 18, 2020    CARDIAC VALVE SURGERY      CATHETERIZATION OF BOTH LEFT AND RIGHT HEART N/A 12/2/2019    Procedure: CATHETERIZATION, HEART, BOTH LEFT AND RIGHT;  Surgeon: Everett Sosa MD;  Location: [facility] CATH/EP LAB;  Service: Cardiology;  Laterality: N/A;    CLOSURE OF LEFT ATRIAL APPENDAGE USING DEVICE N/A 3/3/2021    Procedure: Left atrial appendage closure device;  Surgeon: Bryan Zhang MD;  Location: [facility] CATH LAB;  Service: Cardiology;  Laterality: N/A;    COLONOSCOPY  1997    CORONARY ANGIOGRAPHY N/A 1/8/2020    Procedure: ANGIOGRAM, CORONARY ARTERY;   Surgeon: Bryan Zhang MD;  Location: [facility] CATH LAB;  Service: Cardiology;  Laterality: N/A;    CORONARY STENT PLACEMENT N/A 1/8/2020    Procedure: INSERTION, STENT, CORONARY ARTERY;  Surgeon: Bryan Zhang MD;  Location: [facility] CATH LAB;  Service: Cardiology;  Laterality: N/A;    PERCUTANEOUS TRANSLUMINAL ANGIOPLASTY (PTA) OF PERIPHERAL VESSEL N/A 1/23/2020    Procedure: PTA, PERIPHERAL VESSEL;  Surgeon: Bryan Zhang MD;  Location: [facility] CATH LAB;  Service: Cardiology;  Laterality: N/A;    PROSTATE SURGERY  1997    TONSILLECTOMY      TRANSCATHETER AORTIC VALVE REPLACEMENT (TAVR)  2/18/2020    Procedure: REPLACEMENT, AORTIC VALVE, TRANSCATHETER (TAVR);  Surgeon: Bryan Zhang MD;  Location: [facility] CATH LAB;  Service: Cardiology;;    TRANSESOPHAGEAL ECHOCARDIOGRAPHY N/A 1/23/2020    Procedure: ECHOCARDIOGRAM, TRANSESOPHAGEAL;  Surgeon: Dos Diagnostic Provider;  Location: [facility] EP LAB;  Service: Anesthesiology;  Laterality: N/A;    TRANSESOPHAGEAL ECHOCARDIOGRAPHY N/A 4/20/2021    Procedure: ECHOCARDIOGRAM, TRANSESOPHAGEAL;  Surgeon: Dosc Diagnostic Provider;  Location: [facility] EP LAB;  Service: Anesthesiology;  Laterality: N/A;     Family History   Problem Relation Age of Onset    Cancer Father 86        colon    Alzheimer's disease Mother 86    Alzheimer's disease Sister         has not known fm for 15 yrs    Melanoma Neg Hx     Psoriasis Neg Hx     Lupus Neg Hx     Eczema Neg Hx      Social History     Tobacco Use    Smoking status: Never Smoker    Smokeless tobacco: Never Used   Substance Use Topics    Alcohol use: Not Currently     Alcohol/week: 3.3 standard drinks     Types: 4 Standard drinks or equivalent per week     Comment: occasionally bourbon or beer    Drug use: No     Review of Systems   Constitutional: Negative for fever.   HENT: Negative for sore throat.    Respiratory: Negative for shortness of breath.    Cardiovascular: Negative for chest pain.   Gastrointestinal: Negative  for nausea.   Genitourinary: Negative for dysuria.   Musculoskeletal: Negative for back pain.   Skin: Negative for rash.   Allergic/Immunologic: Negative for environmental allergies.   Neurological: Negative for weakness.   Hematological: Does not bruise/bleed easily.       Physical Exam     Initial Vitals   BP Pulse Resp Temp SpO2   05/16/22 0917 05/16/22 0918 05/16/22 0917 05/16/22 0917 05/16/22 0918   (!) 154/94 65 18 97.8 °F (36.6 °C) 100 %      MAP       --                Physical Exam    Nursing note and vitals reviewed.  Constitutional: He appears well-developed and well-nourished. He is not diaphoretic. No distress.   HENT:   Head: Normocephalic.   Nose: Nose normal.   Mouth/Throat: Oropharynx is clear and moist. No oropharyngeal exudate.   Superficial abrasion to the nose bridge there is no laceration, in addition there is a superficial contusion to the left occiput   Eyes: EOM are normal.   Neck: Neck supple. No tracheal deviation present.   Normal range of motion.  Cardiovascular: Normal rate and regular rhythm.   No murmur heard.  Pulmonary/Chest: Breath sounds normal. No stridor. No respiratory distress. He has no rales.   Abdominal: Abdomen is soft. He exhibits no distension and no mass. There is no abdominal tenderness. There is no rebound.   Musculoskeletal:         General: No edema. Normal range of motion.      Cervical back: Normal range of motion and neck supple.     Lymphadenopathy:     He has no cervical adenopathy.   Neurological: He is alert and oriented to person, place, and time. He has normal strength.   Skin: Skin is warm and dry. Capillary refill takes less than 2 seconds. No pallor.   Psychiatric: He has a normal mood and affect.         ED Course   Procedures  Labs Reviewed - No data to display       Imaging Results          X-Ray Shoulder Trauma Right (Final result)  Result time 05/16/22 10:18:12    Final result by Adarsh Naranjo MD (05/16/22 10:18:12)                 Impression:       Moderate degenerative osteoarthrosis.      Electronically signed by: Adarsh Naranjo  Date:    05/16/2022  Time:    10:18             Narrative:    EXAMINATION:  XR SHOULDER TRAUMA 3 VIEW RIGHT    CLINICAL HISTORY:  Pain, unspecified    TECHNIQUE:  Three or four views of the right shoulder were performed.    COMPARISON:  None    FINDINGS:  Moderate glenohumeral degenerative osteoarthrosis with joint space narrowing and osteophyte formation.  AC joint intact with moderate degenerative osteoarthrosis.    No significant overlying soft tissue swelling.                                 Medications - No data to display                       Clinical Impression:   Final diagnoses:  [R52] Pain  [S09.90XA] Injury of head, initial encounter (Primary)  [M75.51] Bursitis of right shoulder          ED Disposition Condition    Discharge Stable        ED Prescriptions     None        Follow-up Information    None          Leon Drake MD  05/25/22 0455       Leon Drake MD  05/25/22 0455

## 2022-05-27 ENCOUNTER — TELEPHONE (OUTPATIENT)
Dept: FAMILY MEDICINE | Facility: CLINIC | Age: 82
End: 2022-05-27
Payer: MEDICARE

## 2022-05-27 NOTE — TELEPHONE ENCOUNTER
----- Message from Connie Magdaleno sent at 5/27/2022 10:12 AM CDT -----  Contact: pt  Type: Needs Medical Advice    Who Called: pt  Best Call Back Number: [phone]    Inquiry/Question: pt's wife would like to speak to someone regarding home health for pt  Please call to advise   Thank you~

## 2022-06-03 ENCOUNTER — PATIENT MESSAGE (OUTPATIENT)
Dept: FAMILY MEDICINE | Facility: CLINIC | Age: 82
End: 2022-06-03
Payer: MEDICARE

## 2022-06-03 DIAGNOSIS — Z11.1 SCREENING-PULMONARY TB: Primary | ICD-10-CM

## 2022-06-06 ENCOUNTER — PATIENT MESSAGE (OUTPATIENT)
Dept: FAMILY MEDICINE | Facility: CLINIC | Age: 82
End: 2022-06-06

## 2022-06-06 ENCOUNTER — TELEPHONE (OUTPATIENT)
Dept: FAMILY MEDICINE | Facility: CLINIC | Age: 82
End: 2022-06-06
Payer: MEDICARE

## 2022-06-06 ENCOUNTER — HOSPITAL ENCOUNTER (OUTPATIENT)
Dept: RADIOLOGY | Facility: HOSPITAL | Age: 82
Discharge: HOME | End: 2022-06-06
Attending: FAMILY MEDICINE
Payer: MEDICARE

## 2022-06-06 DIAGNOSIS — I48.91 ATRIAL FIBRILLATION, UNSPECIFIED TYPE: ICD-10-CM

## 2022-06-06 DIAGNOSIS — Z11.1 SCREENING-PULMONARY TB: Primary | ICD-10-CM

## 2022-06-06 DIAGNOSIS — Z95.2 S/P TAVR (TRANSCATHETER AORTIC VALVE REPLACEMENT): ICD-10-CM

## 2022-06-06 DIAGNOSIS — Z11.1 SCREENING-PULMONARY TB: ICD-10-CM

## 2022-06-06 DIAGNOSIS — I25.118 ATHEROSCLEROTIC HEART DISEASE OF NATIVE CORONARY ARTERY WITH OTHER FORMS OF ANGINA PECTORIS: ICD-10-CM

## 2022-06-06 LAB
CTP QC/QA: YES
SARS-COV-2 RDRP RESP QL NAA+PROBE: NEGATIVE

## 2022-06-06 PROCEDURE — 71046 X-RAY EXAM CHEST 2 VIEWS: CPT | Mod: TC,PN

## 2022-06-06 PROCEDURE — 71046 XR CHEST PA AND LATERAL: ICD-10-PCS | Mod: 26,,, | Performed by: RADIOLOGY

## 2022-06-06 PROCEDURE — U0002 COVID-19 LAB TEST NON-CDC: HCPCS | Mod: QW,S$GLB,, | Performed by: FAMILY MEDICINE

## 2022-06-06 PROCEDURE — 71046 X-RAY EXAM CHEST 2 VIEWS: CPT | Mod: 26,,, | Performed by: RADIOLOGY

## 2022-06-06 PROCEDURE — U0002: ICD-10-PCS | Mod: QW,S$GLB,, | Performed by: FAMILY MEDICINE

## 2022-06-06 NOTE — TELEPHONE ENCOUNTER
----- Message from Carolyn Manuelito sent at 6/6/2022  7:06 AM CDT -----  Type:  Same Day Appointment Request    Caller is requesting a same day appointment.  Caller declined first available appointment listed below.      Name of Caller:  wifeAsia  When is the first available appointment?  9/20  Symptoms:  trying to be seen today to expedite admission to a nursing home.  His health has rapidly declined.  He needs to be seen, needs an xray and covid test.  Best Call Back Number:  [phone]  Additional Information:   thank you!

## 2022-06-06 NOTE — TELEPHONE ENCOUNTER
Spoke with [facility]. States pt needs order for [facility]. No fax number, will be picking up orders for facility. Needs all additional medical information when picking up orders. Informed clinic will call once orders have been placed. Verbalized an understanding.   Pt wife presented to clinic with information. Informed MD is out of clinic and clinic will call her once orders are placed as well. Verbalized an understanding.     ----- Message from Audrey Nolan sent at 6/6/2022  2:56 PM CDT -----  Regarding: Call Back  Who Called: Jerilyn- Wife         What is the reason for the call: elizabeth;fer asking to speak with Raquel Iglesias LPN. States need to contact [facility] at  [phone] for Home Health. Needs orders. Please contact.          Can patient be contacted on [employer]: Yes          Call back number: [phone]

## 2022-06-06 NOTE — TELEPHONE ENCOUNTER
Spoke with patient wife. Bed available and ready for patient at [facility], non traditional. Patient must have chest xray and covid test completed prior to admission. Please advise!

## 2022-06-07 ENCOUNTER — DOCUMENT SCAN (OUTPATIENT)
Dept: HOME HEALTH SERVICES | Facility: HOSPITAL | Age: 82
End: 2022-06-07
Payer: MEDICARE

## 2022-06-11 ENCOUNTER — HOSPITAL ENCOUNTER (OUTPATIENT)
Facility: HOSPITAL | Age: 82
Discharge: NURSING, REHAB OR LONG-TERM CARE | End: 2022-06-15
Attending: INTERNAL MEDICINE | Admitting: HOSPITALIST
Payer: MEDICARE

## 2022-06-11 DIAGNOSIS — R06.02 SHORTNESS OF BREATH: ICD-10-CM

## 2022-06-11 DIAGNOSIS — I95.9 HYPOTENSION, UNSPECIFIED HYPOTENSION TYPE: ICD-10-CM

## 2022-06-11 DIAGNOSIS — I25.10 CORONARY ARTERY DISEASE: ICD-10-CM

## 2022-06-11 DIAGNOSIS — R10.9 ABDOMINAL PAIN: ICD-10-CM

## 2022-06-11 DIAGNOSIS — E86.0 DEHYDRATION: Primary | ICD-10-CM

## 2022-06-11 DIAGNOSIS — I10 ESSENTIAL HYPERTENSION: ICD-10-CM

## 2022-06-11 DIAGNOSIS — I49.9 CARDIAC ARRHYTHMIA, UNSPECIFIED CARDIAC ARRHYTHMIA TYPE: ICD-10-CM

## 2022-06-11 PROBLEM — N18.30 CKD (CHRONIC KIDNEY DISEASE) STAGE 3, GFR 30-59 ML/MIN: Status: ACTIVE | Noted: 2022-06-11

## 2022-06-11 PROBLEM — N17.9 AKI (ACUTE KIDNEY INJURY): Status: ACTIVE | Noted: 2022-06-11

## 2022-06-11 PROBLEM — Z79.52 CURRENT CHRONIC USE OF SYSTEMIC STEROIDS: Status: ACTIVE | Noted: 2022-06-11

## 2022-06-11 PROBLEM — K59.00 CONSTIPATION: Status: ACTIVE | Noted: 2022-06-11

## 2022-06-11 PROBLEM — R41.82 AMS (ALTERED MENTAL STATUS): Status: ACTIVE | Noted: 2022-06-11

## 2022-06-11 LAB
ALBUMIN SERPL BCP-MCNC: 3.5 G/DL (ref 3.5–5.2)
ALP SERPL-CCNC: 98 U/L (ref 55–135)
ALT SERPL W/O P-5'-P-CCNC: 33 U/L (ref 10–44)
ANION GAP SERPL CALC-SCNC: 10 MMOL/L (ref 8–16)
AST SERPL-CCNC: 19 U/L (ref 10–40)
BASOPHILS # BLD AUTO: 0.05 K/UL (ref 0–0.2)
BASOPHILS NFR BLD: 0.5 % (ref 0–1.9)
BILIRUB SERPL-MCNC: 0.4 MG/DL (ref 0.1–1)
BILIRUB UR QL STRIP: NEGATIVE
BUN SERPL-MCNC: 49 MG/DL (ref 8–23)
CALCIUM SERPL-MCNC: 8.7 MG/DL (ref 8.7–10.5)
CHLORIDE SERPL-SCNC: 114 MMOL/L (ref 95–110)
CLARITY UR: CLEAR
CO2 SERPL-SCNC: 15 MMOL/L (ref 23–29)
COLOR UR: YELLOW
CREAT SERPL-MCNC: 2.6 MG/DL (ref 0.5–1.4)
DIFFERENTIAL METHOD: ABNORMAL
EOSINOPHIL # BLD AUTO: 0.1 K/UL (ref 0–0.5)
EOSINOPHIL NFR BLD: 0.5 % (ref 0–8)
ERYTHROCYTE [DISTWIDTH] IN BLOOD BY AUTOMATED COUNT: 12.7 % (ref 11.5–14.5)
EST. GFR  (AFRICAN AMERICAN): 25.4 ML/MIN/1.73 M^2
EST. GFR  (NON AFRICAN AMERICAN): 22 ML/MIN/1.73 M^2
GLUCOSE SERPL-MCNC: 135 MG/DL (ref 70–110)
GLUCOSE UR QL STRIP: NEGATIVE
HCT VFR BLD AUTO: 31.7 % (ref 40–54)
HGB BLD-MCNC: 10.6 G/DL (ref 14–18)
HGB UR QL STRIP: NEGATIVE
IMM GRANULOCYTES # BLD AUTO: 0.03 K/UL (ref 0–0.04)
IMM GRANULOCYTES NFR BLD AUTO: 0.3 % (ref 0–0.5)
KETONES UR QL STRIP: NEGATIVE
LACTATE SERPL-SCNC: 2.1 MMOL/L (ref 0.5–2.2)
LEUKOCYTE ESTERASE UR QL STRIP: NEGATIVE
LIPASE SERPL-CCNC: 58 U/L (ref 4–60)
LYMPHOCYTES # BLD AUTO: 1 K/UL (ref 1–4.8)
LYMPHOCYTES NFR BLD: 10.3 % (ref 18–48)
MCH RBC QN AUTO: 32.2 PG (ref 27–31)
MCHC RBC AUTO-ENTMCNC: 33.4 G/DL (ref 32–36)
MCV RBC AUTO: 96 FL (ref 82–98)
MONOCYTES # BLD AUTO: 1.1 K/UL (ref 0.3–1)
MONOCYTES NFR BLD: 11 % (ref 4–15)
NEUTROPHILS # BLD AUTO: 7.6 K/UL (ref 1.8–7.7)
NEUTROPHILS NFR BLD: 77.4 % (ref 38–73)
NITRITE UR QL STRIP: NEGATIVE
NRBC BLD-RTO: 0 /100 WBC
PH UR STRIP: 5 [PH] (ref 5–8)
PLATELET # BLD AUTO: 216 K/UL (ref 150–450)
PMV BLD AUTO: 10.3 FL (ref 9.2–12.9)
POTASSIUM SERPL-SCNC: 5.2 MMOL/L (ref 3.5–5.1)
PROT SERPL-MCNC: 6.6 G/DL (ref 6–8.4)
PROT UR QL STRIP: NEGATIVE
RBC # BLD AUTO: 3.29 M/UL (ref 4.6–6.2)
SODIUM SERPL-SCNC: 139 MMOL/L (ref 136–145)
SP GR UR STRIP: 1.01 (ref 1–1.03)
TROPONIN I SERPL DL<=0.01 NG/ML-MCNC: 0.02 NG/ML (ref 0–0.03)
URN SPEC COLLECT METH UR: NORMAL
UROBILINOGEN UR STRIP-ACNC: NEGATIVE EU/DL
WBC # BLD AUTO: 9.85 K/UL (ref 3.9–12.7)

## 2022-06-11 PROCEDURE — 74176 CT ABDOMEN PELVIS WITHOUT CONTRAST: ICD-10-PCS | Mod: 26,,, | Performed by: RADIOLOGY

## 2022-06-11 PROCEDURE — 83690 ASSAY OF LIPASE: CPT | Performed by: INTERNAL MEDICINE

## 2022-06-11 PROCEDURE — 85025 COMPLETE CBC W/AUTO DIFF WBC: CPT | Performed by: INTERNAL MEDICINE

## 2022-06-11 PROCEDURE — G0378 HOSPITAL OBSERVATION PER HR: HCPCS

## 2022-06-11 PROCEDURE — 25000003 PHARM REV CODE 250: Performed by: HOSPITALIST

## 2022-06-11 PROCEDURE — 93010 EKG 12-LEAD: ICD-10-PCS | Mod: ,,, | Performed by: INTERNAL MEDICINE

## 2022-06-11 PROCEDURE — 96360 HYDRATION IV INFUSION INIT: CPT | Mod: 59

## 2022-06-11 PROCEDURE — 93010 ELECTROCARDIOGRAM REPORT: CPT | Mod: ,,, | Performed by: INTERNAL MEDICINE

## 2022-06-11 PROCEDURE — 71045 X-RAY EXAM CHEST 1 VIEW: CPT | Mod: TC,FY

## 2022-06-11 PROCEDURE — 96361 HYDRATE IV INFUSION ADD-ON: CPT

## 2022-06-11 PROCEDURE — 84484 ASSAY OF TROPONIN QUANT: CPT | Performed by: INTERNAL MEDICINE

## 2022-06-11 PROCEDURE — 99220 PR INITIAL OBSERVATION CARE,LEVL III: ICD-10-PCS | Mod: 95,,, | Performed by: HOSPITALIST

## 2022-06-11 PROCEDURE — 71045 X-RAY EXAM CHEST 1 VIEW: CPT | Mod: 26,,, | Performed by: RADIOLOGY

## 2022-06-11 PROCEDURE — 74176 CT ABD & PELVIS W/O CONTRAST: CPT | Mod: 26,,, | Performed by: RADIOLOGY

## 2022-06-11 PROCEDURE — 99220 PR INITIAL OBSERVATION CARE,LEVL III: CPT | Mod: 95,,, | Performed by: HOSPITALIST

## 2022-06-11 PROCEDURE — 25000003 PHARM REV CODE 250: Performed by: INTERNAL MEDICINE

## 2022-06-11 PROCEDURE — 71045 XR CHEST AP PORTABLE: ICD-10-PCS | Mod: 26,,, | Performed by: RADIOLOGY

## 2022-06-11 PROCEDURE — 81003 URINALYSIS AUTO W/O SCOPE: CPT | Performed by: INTERNAL MEDICINE

## 2022-06-11 PROCEDURE — 83605 ASSAY OF LACTIC ACID: CPT | Performed by: INTERNAL MEDICINE

## 2022-06-11 PROCEDURE — 99285 EMERGENCY DEPT VISIT HI MDM: CPT | Mod: 25

## 2022-06-11 PROCEDURE — 80053 COMPREHEN METABOLIC PANEL: CPT | Performed by: INTERNAL MEDICINE

## 2022-06-11 PROCEDURE — 96361 HYDRATE IV INFUSION ADD-ON: CPT | Performed by: EMERGENCY MEDICINE

## 2022-06-11 PROCEDURE — 74176 CT ABD & PELVIS W/O CONTRAST: CPT | Mod: TC

## 2022-06-11 PROCEDURE — 25000003 PHARM REV CODE 250: Performed by: EMERGENCY MEDICINE

## 2022-06-11 PROCEDURE — 93005 ELECTROCARDIOGRAM TRACING: CPT

## 2022-06-11 RX ORDER — SERTRALINE HYDROCHLORIDE 25 MG/1
100 TABLET, FILM COATED ORAL DAILY
Status: DISCONTINUED | OUTPATIENT
Start: 2022-06-12 | End: 2022-06-15 | Stop reason: HOSPADM

## 2022-06-11 RX ORDER — HEPARIN SODIUM 5000 [USP'U]/ML
5000 INJECTION, SOLUTION INTRAVENOUS; SUBCUTANEOUS EVERY 8 HOURS
Status: DISCONTINUED | OUTPATIENT
Start: 2022-06-11 | End: 2022-06-15 | Stop reason: HOSPADM

## 2022-06-11 RX ORDER — ACETAMINOPHEN 325 MG/1
650 TABLET ORAL EVERY 4 HOURS PRN
Status: DISCONTINUED | OUTPATIENT
Start: 2022-06-11 | End: 2022-06-15 | Stop reason: HOSPADM

## 2022-06-11 RX ORDER — ASPIRIN 81 MG/1
81 TABLET ORAL DAILY
Status: DISCONTINUED | OUTPATIENT
Start: 2022-06-12 | End: 2022-06-15 | Stop reason: HOSPADM

## 2022-06-11 RX ORDER — PREDNISONE 1 MG/1
5 TABLET ORAL DAILY
Status: DISCONTINUED | OUTPATIENT
Start: 2022-06-12 | End: 2022-06-15 | Stop reason: HOSPADM

## 2022-06-11 RX ORDER — METOPROLOL SUCCINATE 25 MG/1
100 TABLET, EXTENDED RELEASE ORAL DAILY
Status: DISCONTINUED | OUTPATIENT
Start: 2022-06-12 | End: 2022-06-13

## 2022-06-11 RX ORDER — ALLOPURINOL 100 MG/1
100 TABLET ORAL DAILY
Status: DISCONTINUED | OUTPATIENT
Start: 2022-06-12 | End: 2022-06-15 | Stop reason: HOSPADM

## 2022-06-11 RX ORDER — IBUPROFEN 200 MG
24 TABLET ORAL
Status: DISCONTINUED | OUTPATIENT
Start: 2022-06-11 | End: 2022-06-15 | Stop reason: HOSPADM

## 2022-06-11 RX ORDER — AMOXICILLIN 250 MG
1 CAPSULE ORAL 2 TIMES DAILY
Status: DISCONTINUED | OUTPATIENT
Start: 2022-06-12 | End: 2022-06-15 | Stop reason: HOSPADM

## 2022-06-11 RX ORDER — NALOXONE HCL 0.4 MG/ML
0.02 VIAL (ML) INJECTION
Status: DISCONTINUED | OUTPATIENT
Start: 2022-06-11 | End: 2022-06-15 | Stop reason: HOSPADM

## 2022-06-11 RX ORDER — ATORVASTATIN CALCIUM 40 MG/1
40 TABLET, FILM COATED ORAL DAILY
Status: DISCONTINUED | OUTPATIENT
Start: 2022-06-12 | End: 2022-06-15 | Stop reason: HOSPADM

## 2022-06-11 RX ORDER — SODIUM CHLORIDE 0.9 % (FLUSH) 0.9 %
10 SYRINGE (ML) INJECTION
Status: DISCONTINUED | OUTPATIENT
Start: 2022-06-11 | End: 2022-06-15 | Stop reason: HOSPADM

## 2022-06-11 RX ORDER — ONDANSETRON 4 MG/1
4 TABLET, ORALLY DISINTEGRATING ORAL EVERY 6 HOURS PRN
Status: DISCONTINUED | OUTPATIENT
Start: 2022-06-11 | End: 2022-06-15 | Stop reason: HOSPADM

## 2022-06-11 RX ORDER — NIFEDIPINE 30 MG/1
60 TABLET, EXTENDED RELEASE ORAL DAILY
Status: DISCONTINUED | OUTPATIENT
Start: 2022-06-12 | End: 2022-06-15 | Stop reason: HOSPADM

## 2022-06-11 RX ORDER — GLUCAGON 1 MG
1 KIT INJECTION
Status: DISCONTINUED | OUTPATIENT
Start: 2022-06-11 | End: 2022-06-15 | Stop reason: HOSPADM

## 2022-06-11 RX ORDER — SODIUM CHLORIDE 9 MG/ML
INJECTION, SOLUTION INTRAVENOUS CONTINUOUS
Status: DISCONTINUED | OUTPATIENT
Start: 2022-06-11 | End: 2022-06-15 | Stop reason: HOSPADM

## 2022-06-11 RX ORDER — IPRATROPIUM BROMIDE AND ALBUTEROL SULFATE 2.5; .5 MG/3ML; MG/3ML
3 SOLUTION RESPIRATORY (INHALATION) EVERY 4 HOURS PRN
Status: DISCONTINUED | OUTPATIENT
Start: 2022-06-11 | End: 2022-06-15 | Stop reason: HOSPADM

## 2022-06-11 RX ORDER — IBUPROFEN 200 MG
16 TABLET ORAL
Status: DISCONTINUED | OUTPATIENT
Start: 2022-06-11 | End: 2022-06-15 | Stop reason: HOSPADM

## 2022-06-11 RX ADMIN — SODIUM CHLORIDE 500 ML: 0.9 INJECTION, SOLUTION INTRAVENOUS at 05:06

## 2022-06-11 RX ADMIN — SODIUM CHLORIDE 1000 ML: 0.9 INJECTION, SOLUTION INTRAVENOUS at 06:06

## 2022-06-11 RX ADMIN — SODIUM CHLORIDE: 0.9 INJECTION, SOLUTION INTRAVENOUS at 10:06

## 2022-06-11 NOTE — ED PROVIDER NOTES
Encounter Date: 6/11/2022       History     Chief Complaint   Patient presents with    Abdominal Pain     Agitated frequent urination and abd pain      Patient come in by car for abdominal pain that started today.  Had a bowel movement 2 days ago.  Denies any nausea vomiting chest pain fever chills.  Patient had prostate surgery years ago in the lower abdomen.    Patient recent placed in nursing home 1 week ago for frequent falls and neurological complaints and problems and generalized weakness.        Review of patient's allergies indicates:   Allergen Reactions    Amiodarone analogues Other (See Comments)     Dizziness    Tramadol Itching and Swelling     Began medication, two doses into this treatment patient experiences tongue swelling and severe itching. Patient MD advised to not take this medication anymore    Eliquis [apixaban] Other (See Comments)     Dizziness, ankle swelling, lips swelling     Past Medical History:   Diagnosis Date    Anticoagulant long-term use     Atrial fibrillation     Bursitis of hip     Chronic back pain     Chronic kidney disease, stage 3     Gastroesophageal reflux disease     Gout     Hyperlipemia     Hypertension     Prostate cancer 1997    Stroke 04/2019     Past Surgical History:   Procedure Laterality Date    CARDIAC VALVE REPLACEMENT  Feb 18, 2020    CARDIAC VALVE SURGERY      CATHETERIZATION OF BOTH LEFT AND RIGHT HEART N/A 12/2/2019    Procedure: CATHETERIZATION, HEART, BOTH LEFT AND RIGHT;  Surgeon: Everett Sosa MD;  Location: [facility] CATH/EP LAB;  Service: Cardiology;  Laterality: N/A;    CLOSURE OF LEFT ATRIAL APPENDAGE USING DEVICE N/A 3/3/2021    Procedure: Left atrial appendage closure device;  Surgeon: Bryan Zhang MD;  Location: [facility] CATH LAB;  Service: Cardiology;  Laterality: N/A;    COLONOSCOPY  1997    CORONARY ANGIOGRAPHY N/A 1/8/2020    Procedure: ANGIOGRAM, CORONARY ARTERY;  Surgeon: Bryan Zhang MD;  Location: [facility] CATH LAB;   Service: Cardiology;  Laterality: N/A;    CORONARY STENT PLACEMENT N/A 1/8/2020    Procedure: INSERTION, STENT, CORONARY ARTERY;  Surgeon: Bryan Zhang MD;  Location: [facility] CATH LAB;  Service: Cardiology;  Laterality: N/A;    PERCUTANEOUS TRANSLUMINAL ANGIOPLASTY (PTA) OF PERIPHERAL VESSEL N/A 1/23/2020    Procedure: PTA, PERIPHERAL VESSEL;  Surgeon: Bryan Zhang MD;  Location: [facility] CATH LAB;  Service: Cardiology;  Laterality: N/A;    PROSTATE SURGERY  1997    TONSILLECTOMY      TRANSCATHETER AORTIC VALVE REPLACEMENT (TAVR)  2/18/2020    Procedure: REPLACEMENT, AORTIC VALVE, TRANSCATHETER (TAVR);  Surgeon: Bryan Zhang MD;  Location: [facility] CATH LAB;  Service: Cardiology;;    TRANSESOPHAGEAL ECHOCARDIOGRAPHY N/A 1/23/2020    Procedure: ECHOCARDIOGRAM, TRANSESOPHAGEAL;  Surgeon: [facility] Diagnostic Provider;  Location: [facility] EP LAB;  Service: Anesthesiology;  Laterality: N/A;    TRANSESOPHAGEAL ECHOCARDIOGRAPHY N/A 4/20/2021    Procedure: ECHOCARDIOGRAM, TRANSESOPHAGEAL;  Surgeon: Dos Diagnostic Provider;  Location: [facility] EP LAB;  Service: Anesthesiology;  Laterality: N/A;     Family History   Problem Relation Age of Onset    Cancer Father 86        colon    Alzheimer's disease Mother 86    Alzheimer's disease Sister         has not known fm for 15 yrs    Melanoma Neg Hx     Psoriasis Neg Hx     Lupus Neg Hx     Eczema Neg Hx      Social History     Tobacco Use    Smoking status: Never Smoker    Smokeless tobacco: Never Used   Substance Use Topics    Alcohol use: Not Currently     Alcohol/week: 3.3 standard drinks     Types: 4 Standard drinks or equivalent per week     Comment: occasionally bourbon or beer    Drug use: No     Review of Systems   Constitutional: Negative for fever.   HENT: Negative for sore throat.    Respiratory: Negative for shortness of breath.    Cardiovascular: Negative for chest pain.   Gastrointestinal: Negative for nausea.   Genitourinary: Negative for dysuria.    Musculoskeletal: Negative for back pain.   Skin: Negative for rash.   Neurological: Negative for weakness.   Hematological: Does not bruise/bleed easily.       Physical Exam     Initial Vitals [06/11/22 1642]   BP Pulse Resp Temp SpO2   (!) 89/47 (!) 52 20 97.4 °F (36.3 °C) 100 %      MAP       --         Physical Exam    Constitutional:   Patient awake alert and answers questions but appears poor memory   Cardiovascular: Regular rhythm, normal heart sounds and normal pulses.   Pulmonary/Chest: Effort normal. No accessory muscle usage.   Abdominal: Abdomen is soft and protuberant. Bowel sounds are decreased. There is generalized abdominal tenderness. There is guarding.     Neurological: No cranial nerve deficit. GCS eye subscore is 4. GCS verbal subscore is 5. GCS motor subscore is 6.   Patient has no focal neurologic deficits         ED Course   Procedures  Labs Reviewed   CBC W/ AUTO DIFFERENTIAL - Abnormal; Notable for the following components:       Result Value    RBC 3.29 (*)     Hemoglobin 10.6 (*)     Hematocrit 31.7 (*)     MCH 32.2 (*)     Mono # 1.1 (*)     Gran % 77.4 (*)     Lymph % 10.3 (*)     All other components within normal limits   COMPREHENSIVE METABOLIC PANEL - Abnormal; Notable for the following components:    Potassium 5.2 (*)     Chloride 114 (*)     CO2 15 (*)     Glucose 135 (*)     BUN 49 (*)     Creatinine 2.6 (*)     eGFR if  25.4 (*)     eGFR if non  22.0 (*)     All other components within normal limits   LIPASE   URINALYSIS, REFLEX TO URINE CULTURE    Narrative:     Preferred Collection Type->Urine, Clean Catch  Specimen Source->Urine   TROPONIN I   LACTIC ACID, PLASMA        ECG Results          EKG 12-lead (Preliminary result)  Result time 06/11/22 17:09:22    ED Interpretation by Bert Flowers MD (06/11/22 17:09:22, [facility], [place], operated by [facility], Emergency Medicine)    Atrial fibrillation with rate of 63, no acute ischemic changes, no change from  previous EKG with a rate of 57.                             Imaging Results          X-Ray Chest AP Portable (Preliminary result)  Result time 06/11/22 17:34:43    ED Interpretation by Bert Flowers MD (06/11/22 17:34:43, [facility] [place] [facility], Emergency Medicine)    No acute findings on chest x-ray                             CT Abdomen Pelvis  Without Contrast (In process)                  Medications   sodium chloride 0.9% bolus 500 mL (500 mLs Intravenous New Bag 6/11/22 1750)   sodium chloride 0.9% bolus 1,000 mL (1,000 mLs Intravenous New Bag 6/11/22 1841)     Medical Decision Making:   Clinical Tests:   Lab Tests: Reviewed  Radiological Study: Reviewed  ED Management:  Pt presented from NH with gen weakness and low BP and abd pain. He was hypotensive initially but responded to IVFs. Labs unremarkable including neg UA. Baseline creatinine but BUN slightly higher than previous. Neg CT abd other than possible impaction. Pt to be admitted for IVFs and SSEs. D/w dr Kevin.             ED Course as of 06/11/22 1952   Sat Jun 11, 2022   1709 EKG 12-lead [PW]   1734 X-Ray Chest AP Portable [PW]   1753 Signed out to me by Dr BAUTISTA. Pt here with gen weakness and LAP. BP initially low. Plan was to f/u labs and CT and dispo. He has mild lower abd ttp.  [DC]   1837 Old labs reviewed. Pt appears to be at baseline renal function compared to previous year. [DC]   1925 CT abd nap per rads.  [DC]      ED Course User Index  [DC] Pollo Alanis Jr., MD  [PW] Bert Flowers MD             Clinical Impression:   Final diagnoses:  [R10.9] Abdominal pain  [E86.0] Dehydration (Primary)  [I95.9] Hypotension, unspecified hypotension type          ED Disposition Condition    Observation               Pollo Alanis Jr., MD  06/11/22 1952

## 2022-06-12 ENCOUNTER — PATIENT MESSAGE (OUTPATIENT)
Dept: CARDIOLOGY | Facility: CLINIC | Age: 82
End: 2022-06-12
Payer: MEDICARE

## 2022-06-12 ENCOUNTER — PATIENT MESSAGE (OUTPATIENT)
Dept: FAMILY MEDICINE | Facility: CLINIC | Age: 82
End: 2022-06-12
Payer: MEDICARE

## 2022-06-12 LAB
ALBUMIN SERPL BCP-MCNC: 3.1 G/DL (ref 3.5–5.2)
ALP SERPL-CCNC: 85 U/L (ref 55–135)
ALT SERPL W/O P-5'-P-CCNC: 28 U/L (ref 10–44)
ANION GAP SERPL CALC-SCNC: 9 MMOL/L (ref 8–16)
AST SERPL-CCNC: 19 U/L (ref 10–40)
BASOPHILS # BLD AUTO: 0.04 K/UL (ref 0–0.2)
BASOPHILS NFR BLD: 0.5 % (ref 0–1.9)
BILIRUB SERPL-MCNC: 0.5 MG/DL (ref 0.1–1)
BUN SERPL-MCNC: 43 MG/DL (ref 8–23)
CALCIUM SERPL-MCNC: 8.5 MG/DL (ref 8.7–10.5)
CHLORIDE SERPL-SCNC: 115 MMOL/L (ref 95–110)
CO2 SERPL-SCNC: 17 MMOL/L (ref 23–29)
CREAT SERPL-MCNC: 2.1 MG/DL (ref 0.5–1.4)
DIFFERENTIAL METHOD: ABNORMAL
EOSINOPHIL # BLD AUTO: 0.2 K/UL (ref 0–0.5)
EOSINOPHIL NFR BLD: 2.2 % (ref 0–8)
ERYTHROCYTE [DISTWIDTH] IN BLOOD BY AUTOMATED COUNT: 12.8 % (ref 11.5–14.5)
EST. GFR  (AFRICAN AMERICAN): 32.9 ML/MIN/1.73 M^2
EST. GFR  (NON AFRICAN AMERICAN): 28.5 ML/MIN/1.73 M^2
GLUCOSE SERPL-MCNC: 89 MG/DL (ref 70–110)
HCT VFR BLD AUTO: 28.8 % (ref 40–54)
HGB BLD-MCNC: 9.6 G/DL (ref 14–18)
IMM GRANULOCYTES # BLD AUTO: 0.03 K/UL (ref 0–0.04)
IMM GRANULOCYTES NFR BLD AUTO: 0.4 % (ref 0–0.5)
LYMPHOCYTES # BLD AUTO: 1.7 K/UL (ref 1–4.8)
LYMPHOCYTES NFR BLD: 20.5 % (ref 18–48)
MAGNESIUM SERPL-MCNC: 1.6 MG/DL (ref 1.6–2.6)
MCH RBC QN AUTO: 32.3 PG (ref 27–31)
MCHC RBC AUTO-ENTMCNC: 33.3 G/DL (ref 32–36)
MCV RBC AUTO: 97 FL (ref 82–98)
MONOCYTES # BLD AUTO: 1.2 K/UL (ref 0.3–1)
MONOCYTES NFR BLD: 15.1 % (ref 4–15)
NEUTROPHILS # BLD AUTO: 5 K/UL (ref 1.8–7.7)
NEUTROPHILS NFR BLD: 61.3 % (ref 38–73)
NRBC BLD-RTO: 0 /100 WBC
PHOSPHATE SERPL-MCNC: 2.6 MG/DL (ref 2.7–4.5)
PLATELET # BLD AUTO: 191 K/UL (ref 150–450)
PMV BLD AUTO: 10.2 FL (ref 9.2–12.9)
POTASSIUM SERPL-SCNC: 4.5 MMOL/L (ref 3.5–5.1)
PROT SERPL-MCNC: 5.8 G/DL (ref 6–8.4)
RBC # BLD AUTO: 2.97 M/UL (ref 4.6–6.2)
SODIUM SERPL-SCNC: 141 MMOL/L (ref 136–145)
WBC # BLD AUTO: 8.21 K/UL (ref 3.9–12.7)

## 2022-06-12 PROCEDURE — 84100 ASSAY OF PHOSPHORUS: CPT | Performed by: HOSPITALIST

## 2022-06-12 PROCEDURE — 85025 COMPLETE CBC W/AUTO DIFF WBC: CPT | Performed by: HOSPITALIST

## 2022-06-12 PROCEDURE — 36415 COLL VENOUS BLD VENIPUNCTURE: CPT | Performed by: HOSPITALIST

## 2022-06-12 PROCEDURE — 80053 COMPREHEN METABOLIC PANEL: CPT | Performed by: HOSPITALIST

## 2022-06-12 PROCEDURE — 96361 HYDRATE IV INFUSION ADD-ON: CPT | Performed by: EMERGENCY MEDICINE

## 2022-06-12 PROCEDURE — 96372 THER/PROPH/DIAG INJ SC/IM: CPT | Performed by: HOSPITALIST

## 2022-06-12 PROCEDURE — 99226 PR SUBSEQUENT OBSERVATION CARE,LEVEL III: CPT | Mod: ,,, | Performed by: FAMILY MEDICINE

## 2022-06-12 PROCEDURE — 63600175 PHARM REV CODE 636 W HCPCS: Performed by: HOSPITALIST

## 2022-06-12 PROCEDURE — 83735 ASSAY OF MAGNESIUM: CPT | Performed by: HOSPITALIST

## 2022-06-12 PROCEDURE — 99226 PR SUBSEQUENT OBSERVATION CARE,LEVEL III: ICD-10-PCS | Mod: ,,, | Performed by: FAMILY MEDICINE

## 2022-06-12 PROCEDURE — 25000003 PHARM REV CODE 250: Performed by: HOSPITALIST

## 2022-06-12 PROCEDURE — G0378 HOSPITAL OBSERVATION PER HR: HCPCS

## 2022-06-12 RX ORDER — HYDROXYZINE PAMOATE 25 MG/1
25 CAPSULE ORAL EVERY 8 HOURS PRN
Status: DISCONTINUED | OUTPATIENT
Start: 2022-06-12 | End: 2022-06-15 | Stop reason: HOSPADM

## 2022-06-12 RX ADMIN — HEPARIN SODIUM 5000 UNITS: 5000 INJECTION, SOLUTION INTRAVENOUS; SUBCUTANEOUS at 12:06

## 2022-06-12 RX ADMIN — ATORVASTATIN CALCIUM 40 MG: 40 TABLET, FILM COATED ORAL at 08:06

## 2022-06-12 RX ADMIN — SODIUM CHLORIDE: 0.9 INJECTION, SOLUTION INTRAVENOUS at 08:06

## 2022-06-12 RX ADMIN — STANDARDIZED SENNA CONCENTRATE AND DOCUSATE SODIUM 1 TABLET: 8.6; 5 TABLET ORAL at 08:06

## 2022-06-12 RX ADMIN — NIFEDIPINE 60 MG: 30 TABLET, FILM COATED, EXTENDED RELEASE ORAL at 08:06

## 2022-06-12 RX ADMIN — HEPARIN SODIUM 5000 UNITS: 5000 INJECTION, SOLUTION INTRAVENOUS; SUBCUTANEOUS at 02:06

## 2022-06-12 RX ADMIN — PREDNISONE 5 MG: 1 TABLET ORAL at 08:06

## 2022-06-12 RX ADMIN — HYDROXYZINE PAMOATE 25 MG: 25 CAPSULE ORAL at 12:06

## 2022-06-12 RX ADMIN — SERTRALINE HYDROCHLORIDE 100 MG: 25 TABLET ORAL at 08:06

## 2022-06-12 RX ADMIN — ASPIRIN 81 MG: 81 TABLET, COATED ORAL at 08:06

## 2022-06-12 RX ADMIN — HEPARIN SODIUM 5000 UNITS: 5000 INJECTION, SOLUTION INTRAVENOUS; SUBCUTANEOUS at 08:06

## 2022-06-12 RX ADMIN — METOPROLOL SUCCINATE 100 MG: 25 TABLET, EXTENDED RELEASE ORAL at 08:06

## 2022-06-12 RX ADMIN — SODIUM CHLORIDE: 0.9 INJECTION, SOLUTION INTRAVENOUS at 06:06

## 2022-06-12 RX ADMIN — ALLOPURINOL 100 MG: 100 TABLET ORAL at 08:06

## 2022-06-12 NOTE — HPI
The patient is an 82 y/o male with PMH of atrial fibrillation, CKD 3, HTN, gout, HLP, prostate ca, and stroke. History is provided from patient's wife and ER reports. He resides in a nursing home in which he was placed a week ago for frequent falls. He was feeling agitated and having frequent urination and reporting abdominal pain over the past day. He has been having bowel movements but no blood noted. Wife reports no fevers, chills, nausea, vomiting, or other symptoms. When a powers was placed and he started to void, wife reported that his agitation improved. She reported a history of prostate surgery about 25 years ago. No UTI noted on UA.

## 2022-06-12 NOTE — ASSESSMENT & PLAN NOTE
CT without acute findings  Stool noted; will treat as per below  Wife reported improvement in symptoms and agitation after powers placement; possible urinary retention?  Will d/c powers once having bowel movements and monitor for retention. Check PVR  UA negative for infection

## 2022-06-12 NOTE — NURSING
Confused. Pt Attempted to remove his powers cath. He removed IV cath, cath intact.  20 ga cath inserted into left forearm, blood return, flushed. IV infusing per orders. Visteril administered per EMar.  Bed alarm and Avys in use.   0330--Moderate formed bowel movement, incontinent. Full bath and linen change.

## 2022-06-12 NOTE — PLAN OF CARE
Problem: Adult Inpatient Plan of Care  Goal: Plan of Care Review  6/11/2022 2232 by Laura Dixon RN  Outcome: Ongoing, Progressing  6/11/2022 2231 by Laura Dixon RN  Outcome: Ongoing, Progressing     Problem: Adult Inpatient Plan of Care  Goal: Patient-Specific Goal (Individualized)  6/11/2022 2232 by Laura Dixon RN  Outcome: Ongoing, Progressing  6/11/2022 2231 by Laura Dixon RN  Outcome: Ongoing, Progressing     Problem: Infection  Goal: Absence of Infection Signs and Symptoms  6/11/2022 2232 by Laura Dixon RN  Outcome: Ongoing, Progressing  6/11/2022 2231 by Laura Dixon RN  Outcome: Ongoing, Progressing     Problem: Fluid and Electrolyte Imbalance (Acute Kidney Injury/Impairment)  Goal: Fluid and Electrolyte Balance  6/11/2022 2232 by Laura Dixon RN  Outcome: Ongoing, Progressing  6/11/2022 2231 by Laura Dixon RN  Outcome: Ongoing, Progressing     Problem: Oral Intake Inadequate (Acute Kidney Injury/Impairment)  Goal: Optimal Nutrition Intake  6/11/2022 2232 by Laura Dixon RN  Outcome: Ongoing, Progressing  6/11/2022 2231 by Laura Dixon RN  Outcome: Ongoing, Progressing     Problem: Renal Function Impairment (Acute Kidney Injury/Impairment)  Goal: Effective Renal Function  6/11/2022 2232 by Laura Dixon RN  Outcome: Ongoing, Progressing  6/11/2022 2231 by Laura Dixon RN  Outcome: Ongoing, Progressing     Problem: Skin Injury Risk Increased  Goal: Skin Health and Integrity  6/11/2022 2232 by Laura Dixon RN  Outcome: Ongoing, Progressing  6/11/2022 2231 by Laura Dixon RN  Outcome: Ongoing, Progressing     Problem: Constipation  Goal: Effective Bowel Elimination  Outcome: Ongoing, Not Progressing

## 2022-06-12 NOTE — ASSESSMENT & PLAN NOTE
Possibly due to urinary retention, improving now  Continue to monitor  Delirium and fall precautions

## 2022-06-12 NOTE — H&P
[facility] Medicine  History & Physical    Patient Name: Lucho Delong  MRN: [id number]  Admission Date: 6/11/2022  Attending Physician: Laura Kevin MD   Primary Care Provider: Jose Nolan MD         Patient information was obtained from spouse/SO and ER records.       Subjective:     Principal Problem:Abdominal pain    Chief Complaint:   Chief Complaint   Patient presents with    Abdominal Pain     Agitated frequent urination and abd pain         HPI: The patient is an 80 y/o male with PMH of atrial fibrillation, CKD 3, HTN, gout, HLP, prostate ca, and stroke. History is provided from patient's wife and ER reports. He resides in a nursing home in which he was placed a week ago for frequent falls. He was feeling agitated and having frequent urination and reporting abdominal pain over the past day. He has been having bowel movements but no blood noted. Wife reports no fevers, chills, nausea, vomiting, or other symptoms. When a powers was placed and he started to void, wife reported that his agitation improved. She reported a history of prostate surgery about 25 years ago. No UTI noted on UA.       Past Medical History:   Diagnosis Date    Anticoagulant long-term use     Atrial fibrillation     Bursitis of hip     Chronic back pain     Chronic kidney disease, stage 3     Gastroesophageal reflux disease     Gout     Hyperlipemia     Hypertension     Prostate cancer 1997    Stroke 04/2019       Past Surgical History:   Procedure Laterality Date    CARDIAC VALVE REPLACEMENT  Feb 18, 2020    CARDIAC VALVE SURGERY      CATHETERIZATION OF BOTH LEFT AND RIGHT HEART N/A 12/2/2019    Procedure: CATHETERIZATION, HEART, BOTH LEFT AND RIGHT;  Surgeon: Everett Sosa MD;  Location: [facility] CATH/EP LAB;  Service: Cardiology;  Laterality: N/A;    CLOSURE OF LEFT ATRIAL APPENDAGE USING DEVICE N/A 3/3/2021    Procedure: Left atrial appendage closure device;  Surgeon: Bryan Zhang MD;   Location: [facility] CATH LAB;  Service: Cardiology;  Laterality: N/A;    COLONOSCOPY  1997    CORONARY ANGIOGRAPHY N/A 1/8/2020    Procedure: ANGIOGRAM, CORONARY ARTERY;  Surgeon: Bryan Zhang MD;  Location: [facility] CATH LAB;  Service: Cardiology;  Laterality: N/A;    CORONARY STENT PLACEMENT N/A 1/8/2020    Procedure: INSERTION, STENT, CORONARY ARTERY;  Surgeon: Bryan Zhang MD;  Location: [facility] CATH LAB;  Service: Cardiology;  Laterality: N/A;    PERCUTANEOUS TRANSLUMINAL ANGIOPLASTY (PTA) OF PERIPHERAL VESSEL N/A 1/23/2020    Procedure: PTA, PERIPHERAL VESSEL;  Surgeon: Bryan Zhang MD;  Location: [facility] CATH LAB;  Service: Cardiology;  Laterality: N/A;    PROSTATE SURGERY  1997    TONSILLECTOMY      TRANSCATHETER AORTIC VALVE REPLACEMENT (TAVR)  2/18/2020    Procedure: REPLACEMENT, AORTIC VALVE, TRANSCATHETER (TAVR);  Surgeon: Bryan Zhang MD;  Location: [facility] CATH LAB;  Service: Cardiology;;    TRANSESOPHAGEAL ECHOCARDIOGRAPHY N/A 1/23/2020    Procedure: ECHOCARDIOGRAM, TRANSESOPHAGEAL;  Surgeon: Dos Diagnostic Provider;  Location: [facility] EP LAB;  Service: Anesthesiology;  Laterality: N/A;    TRANSESOPHAGEAL ECHOCARDIOGRAPHY N/A 4/20/2021    Procedure: ECHOCARDIOGRAM, TRANSESOPHAGEAL;  Surgeon: Dos Diagnostic Provider;  Location: [facility] EP LAB;  Service: Anesthesiology;  Laterality: N/A;       Review of patient's allergies indicates:   Allergen Reactions    Amiodarone analogues Other (See Comments)     Dizziness    Tramadol Itching and Swelling     Began medication, two doses into this treatment patient experiences tongue swelling and severe itching. Patient MD advised to not take this medication anymore    Eliquis [apixaban] Other (See Comments)     Dizziness, ankle swelling, lips swelling       No current facility-administered medications on file prior to encounter.     Current Outpatient Medications on File Prior to Encounter   Medication Sig    allopurinoL (ZYLOPRIM) 100 MG tablet Take 1  tablet by mouth once daily    aspirin (ECOTRIN) 81 MG EC tablet Take 81 mg by mouth once daily.    atorvastatin (LIPITOR) 40 MG tablet Take 1 tablet by mouth once daily    cholecalciferol, vitamin D3, (VITAMIN D3) 50 mcg (2,000 unit) Cap capsule Take 1 capsule by mouth once daily.    cyanocobalamin 1,000 mcg/mL injection INJECT 1 ML INTRAMUSCULARLY ONCE EVERY 30 DAYS    lisinopriL (PRINIVIL,ZESTRIL) 20 MG tablet Take 1 tablet by mouth once daily    metoprolol succinate (TOPROL-XL) 100 MG 24 hr tablet Take 1 tablet by mouth once daily    NIFEdipine (PROCARDIA-XL) 60 MG (OSM) 24 hr tablet Take 1 tablet by mouth once daily    predniSONE (DELTASONE) 5 MG tablet Take 1 tablet by mouth once daily    sertraline (ZOLOFT) 100 MG tablet Take 1 tablet (100 mg total) by mouth once daily.    vitamin B complex vit C no.3 (VITAMIN B COMP AND C NO.3 ORAL) Take 1 tablet by mouth Daily.    menthol/camphor (SARNA ORIGINAL TOP) Apply topically as needed.    potassium chloride SA (K-DUR,KLOR-CON) 20 MEQ tablet Take one po daily with furosemide prn edema    walker (ULTRA-LIGHT ROLLATOR) Misc Use as directed    [DISCONTINUED] furosemide (LASIX) 20 MG tablet Take 1 tablet (20 mg total) by mouth daily as needed.    [DISCONTINUED] hydrocortisone 2.5 % cream Apply topically 2 (two) times daily.     Family History       Problem Relation (Age of Onset)    Alzheimer's disease Mother (86), Sister    Cancer Father (86)          Tobacco Use    Smoking status: Never Smoker    Smokeless tobacco: Never Used   Substance and Sexual Activity    Alcohol use: Not Currently     Alcohol/week: 3.3 standard drinks     Types: 4 Standard drinks or equivalent per week     Comment: occasionally bourbon or beer    Drug use: No    Sexual activity: Not Currently     Review of Systems   Unable to perform ROS: Mental status change   Objective:     Vital Signs (Most Recent):  Temp: 97.5 °F (36.4 °C) (06/11/22 2115)  Pulse: 70 (06/11/22 2115)  Resp:  18 (06/11/22 2115)  BP: 134/67 (06/11/22 2115)  SpO2: 95 % (06/11/22 2115) Vital Signs (24h Range):  Temp:  [97.4 °F (36.3 °C)-97.5 °F (36.4 °C)] 97.5 °F (36.4 °C)  Pulse:  [52-74] 70  Resp:  [11-20] 18  SpO2:  [95 %-100 %] 95 %  BP: ()/(42-77) 134/67     Weight: 69.9 kg (154 lb)  Body mass index is 24.12 kg/m².    Physical Exam  Vitals reviewed.   Constitutional:       General: He is not in acute distress.  HENT:      Head: Normocephalic and atraumatic.   Eyes:      General: No scleral icterus.  Cardiovascular:      Rate and Rhythm: Normal rate.   Pulmonary:      Effort: Pulmonary effort is normal. No respiratory distress.   Musculoskeletal:         General: No swelling.      Right lower leg: No edema.      Left lower leg: No edema.   Skin:     Coloration: Skin is not jaundiced.      Findings: No erythema.      Comments: Multiple skin tears noted to BL LE   Neurological:      Mental Status: He is alert.           Significant Labs: All pertinent labs within the past 24 hours have been reviewed.    Significant Imaging: I have reviewed all pertinent imaging results/findings within the past 24 hours.    Assessment/Plan:     * Abdominal pain  CT without acute findings  Stool noted; will treat as per below  Wife reported improvement in symptoms and agitation after powers placement; possible urinary retention?  Will keep powers for now   UA negative for infection       AMS (altered mental status)  Possibly due to urinary retention   Continue to monitor  Delirium and fall precautions       Constipation  Noted on CT  Will give kayexalate for hyperkalemia and then start senna-doc  Continue IV hydration       CKD (chronic kidney disease) stage 3, GFR 30-59 ml/min  Per selvin        SELVIN (acute kidney injury)  Lab Results   Component Value Date    CREATININE 2.6 (H) 06/11/2022     Sr Cr slightly elevated from baseline  Continue IVF  Cont to monitor with daily labs   Avoid nephrotoxins.   Renally dose all medications   Monitor  events that may lead to decreased renal perfusion (hypovolemia, hypotension, sepsis).   Monitor urine output (goal 0.5 mL/kg/hr) to assure that no obstruction precipitates worsening in GFR.  Serum bicarb level 15. Chronically low; most likely due to CKD. Not far from baseline. Anticipate improvement with improvement in renal function.             B12 deficiency  Receives monthly injections       Gout  Continue allopurinol       HLD (hyperlipidemia)  Atorvastatin       HTN (hypertension)  Continue metoprolol and nifedipine       Persistent atrial fibrillation, onset 2018  Telemetry  Continue metoprolol       Chronic Use of Steroids     On prednisone therapy at 5 mg daily     Unclear indication; will need verification     VTE Risk Mitigation (From admission, onward)         Ordered     heparin (porcine) injection 5,000 Units  Every 8 hours         06/11/22 2142                     The attending portion of this evaluation, treatment, and documentation was performed per Laura Kevin MD via Telemedicine AudioVisual using the secure AgentBridge software platform with 2 way audio/video. The provider was located off-site and the patient is located in the hospital. The aforementioned video software was utilized to document the relevant history and physical exam      Laura Kevin MD  Department of Hospital Medicine   [facility]

## 2022-06-12 NOTE — ASSESSMENT & PLAN NOTE
Noted on CT  Given kayexalate for hyperkalemia and then started senna-doc  Continue IV hydration   K+ improved to normal, continue to monitor

## 2022-06-12 NOTE — ASSESSMENT & PLAN NOTE
Lab Results   Component Value Date    CREATININE 2.1 (H) 06/12/2022     Sr Cr slightly elevated from baseline on admission, improving with IVFs  Continue IVF  Cont to monitor with daily labs   Avoid nephrotoxins.   Renally dose all medications   Monitor events that may lead to decreased renal perfusion (hypovolemia, hypotension, sepsis).   Monitor urine output (goal 0.5 mL/kg/hr) to assure that no obstruction precipitates worsening in GFR.  Serum bicarb level 15. Chronically low; most likely due to CKD. Not far from baseline. Anticipate improvement with improvement in renal function.

## 2022-06-12 NOTE — SUBJECTIVE & OBJECTIVE
Past Medical History:   Diagnosis Date    Anticoagulant long-term use     Atrial fibrillation     Bursitis of hip     Chronic back pain     Chronic kidney disease, stage 3     Gastroesophageal reflux disease     Gout     Hyperlipemia     Hypertension     Prostate cancer 1997    Stroke 04/2019       Past Surgical History:   Procedure Laterality Date    CARDIAC VALVE REPLACEMENT  Feb 18, 2020    CARDIAC VALVE SURGERY      CATHETERIZATION OF BOTH LEFT AND RIGHT HEART N/A 12/2/2019    Procedure: CATHETERIZATION, HEART, BOTH LEFT AND RIGHT;  Surgeon: Everett Sosa MD;  Location: [facility] CATH/EP LAB;  Service: Cardiology;  Laterality: N/A;    CLOSURE OF LEFT ATRIAL APPENDAGE USING DEVICE N/A 3/3/2021    Procedure: Left atrial appendage closure device;  Surgeon: Bryan Zhang MD;  Location: [facility] CATH LAB;  Service: Cardiology;  Laterality: N/A;    COLONOSCOPY  1997    CORONARY ANGIOGRAPHY N/A 1/8/2020    Procedure: ANGIOGRAM, CORONARY ARTERY;  Surgeon: Bryan Zhang MD;  Location: [facility] CATH LAB;  Service: Cardiology;  Laterality: N/A;    CORONARY STENT PLACEMENT N/A 1/8/2020    Procedure: INSERTION, STENT, CORONARY ARTERY;  Surgeon: Bryan Zhang MD;  Location: [facility] CATH LAB;  Service: Cardiology;  Laterality: N/A;    PERCUTANEOUS TRANSLUMINAL ANGIOPLASTY (PTA) OF PERIPHERAL VESSEL N/A 1/23/2020    Procedure: PTA, PERIPHERAL VESSEL;  Surgeon: Bryan Zhang MD;  Location: [facility] CATH LAB;  Service: Cardiology;  Laterality: N/A;    PROSTATE SURGERY  1997    TONSILLECTOMY      TRANSCATHETER AORTIC VALVE REPLACEMENT (TAVR)  2/18/2020    Procedure: REPLACEMENT, AORTIC VALVE, TRANSCATHETER (TAVR);  Surgeon: Bryan Zhang MD;  Location: [facility] CATH LAB;  Service: Cardiology;;    TRANSESOPHAGEAL ECHOCARDIOGRAPHY N/A 1/23/2020    Procedure: ECHOCARDIOGRAM, TRANSESOPHAGEAL;  Surgeon: Km Diagnostic Provider;  Location: [facility] EP LAB;  Service: Anesthesiology;  Laterality: N/A;    TRANSESOPHAGEAL ECHOCARDIOGRAPHY N/A  4/20/2021    Procedure: ECHOCARDIOGRAM, TRANSESOPHAGEAL;  Surgeon: Bhavani Diagnostic Provider;  Location: [facility] LAB;  Service: Anesthesiology;  Laterality: N/A;       Review of patient's allergies indicates:   Allergen Reactions    Amiodarone analogues Other (See Comments)     Dizziness    Tramadol Itching and Swelling     Began medication, two doses into this treatment patient experiences tongue swelling and severe itching. Patient MD advised to not take this medication anymore    Eliquis [apixaban] Other (See Comments)     Dizziness, ankle swelling, lips swelling       No current facility-administered medications on file prior to encounter.     Current Outpatient Medications on File Prior to Encounter   Medication Sig    allopurinoL (ZYLOPRIM) 100 MG tablet Take 1 tablet by mouth once daily    aspirin (ECOTRIN) 81 MG EC tablet Take 81 mg by mouth once daily.    atorvastatin (LIPITOR) 40 MG tablet Take 1 tablet by mouth once daily    cholecalciferol, vitamin D3, (VITAMIN D3) 50 mcg (2,000 unit) Cap capsule Take 1 capsule by mouth once daily.    cyanocobalamin 1,000 mcg/mL injection INJECT 1 ML INTRAMUSCULARLY ONCE EVERY 30 DAYS    lisinopriL (PRINIVIL,ZESTRIL) 20 MG tablet Take 1 tablet by mouth once daily    metoprolol succinate (TOPROL-XL) 100 MG 24 hr tablet Take 1 tablet by mouth once daily    NIFEdipine (PROCARDIA-XL) 60 MG (OSM) 24 hr tablet Take 1 tablet by mouth once daily    predniSONE (DELTASONE) 5 MG tablet Take 1 tablet by mouth once daily    sertraline (ZOLOFT) 100 MG tablet Take 1 tablet (100 mg total) by mouth once daily.    vitamin B complex vit C no.3 (VITAMIN B COMP AND C NO.3 ORAL) Take 1 tablet by mouth Daily.    menthol/camphor (SARNA ORIGINAL TOP) Apply topically as needed.    potassium chloride SA (K-DUR,KLOR-CON) 20 MEQ tablet Take one po daily with furosemide prn edema    walker (ULTRA-LIGHT ROLLATOR) Misc Use as directed    [DISCONTINUED] furosemide (LASIX) 20 MG tablet Take 1 tablet (20  mg total) by mouth daily as needed.    [DISCONTINUED] hydrocortisone 2.5 % cream Apply topically 2 (two) times daily.     Family History       Problem Relation (Age of Onset)    Alzheimer's disease Mother (86), Sister    Cancer Father (86)          Tobacco Use    Smoking status: Never Smoker    Smokeless tobacco: Never Used   Substance and Sexual Activity    Alcohol use: Not Currently     Alcohol/week: 3.3 standard drinks     Types: 4 Standard drinks or equivalent per week     Comment: occasionally bourbon or beer    Drug use: No    Sexual activity: Not Currently     Review of Systems   Unable to perform ROS: Mental status change   Objective:     Vital Signs (Most Recent):  Temp: 97.5 °F (36.4 °C) (06/11/22 2115)  Pulse: 70 (06/11/22 2115)  Resp: 18 (06/11/22 2115)  BP: 134/67 (06/11/22 2115)  SpO2: 95 % (06/11/22 2115) Vital Signs (24h Range):  Temp:  [97.4 °F (36.3 °C)-97.5 °F (36.4 °C)] 97.5 °F (36.4 °C)  Pulse:  [52-74] 70  Resp:  [11-20] 18  SpO2:  [95 %-100 %] 95 %  BP: ()/(42-77) 134/67     Weight: 69.9 kg (154 lb)  Body mass index is 24.12 kg/m².    Physical Exam  Vitals reviewed.   Constitutional:       General: He is not in acute distress.  HENT:      Head: Normocephalic and atraumatic.   Eyes:      General: No scleral icterus.  Cardiovascular:      Rate and Rhythm: Normal rate.   Pulmonary:      Effort: Pulmonary effort is normal. No respiratory distress.   Musculoskeletal:         General: No swelling.      Right lower leg: No edema.      Left lower leg: No edema.   Skin:     Coloration: Skin is not jaundiced.      Findings: No erythema.      Comments: Multiple skin tears noted to BL LE   Neurological:      Mental Status: He is alert.           Significant Labs: All pertinent labs within the past 24 hours have been reviewed.    Significant Imaging: I have reviewed all pertinent imaging results/findings within the past 24 hours.

## 2022-06-12 NOTE — ASSESSMENT & PLAN NOTE
Noted on CT  Will give kayexalate for hyperkalemia and then start senna-doc  Continue IV hydration

## 2022-06-12 NOTE — ASSESSMENT & PLAN NOTE
Lab Results   Component Value Date    CREATININE 2.6 (H) 06/11/2022     Sr Cr slightly elevated from baseline  Continue IVF  Cont to monitor with daily labs   Avoid nephrotoxins.   Renally dose all medications   Monitor events that may lead to decreased renal perfusion (hypovolemia, hypotension, sepsis).   Monitor urine output (goal 0.5 mL/kg/hr) to assure that no obstruction precipitates worsening in GFR.  Serum bicarb level 15. Chronically low; most likely due to CKD. Not far from baseline. Anticipate improvement with improvement in renal function.

## 2022-06-12 NOTE — ED NOTES
Report called to CRISTINA Fisher on med/surg wing. Patient resting comfortably. V/S wnl. Will prepare for transfer to room 117

## 2022-06-12 NOTE — PLAN OF CARE
Problem: Adult Inpatient Plan of Care  Goal: Plan of Care Review  Outcome: Ongoing, Progressing  Goal: Patient-Specific Goal (Individualized)  Outcome: Ongoing, Progressing  Goal: Absence of Hospital-Acquired Illness or Injury  Outcome: Ongoing, Progressing  Goal: Optimal Comfort and Wellbeing  Outcome: Ongoing, Progressing  Goal: Readiness for Transition of Care  Outcome: Ongoing, Progressing     Problem: Infection  Goal: Absence of Infection Signs and Symptoms  Outcome: Ongoing, Progressing     Problem: Fluid and Electrolyte Imbalance (Acute Kidney Injury/Impairment)  Goal: Fluid and Electrolyte Balance  Outcome: Ongoing, Progressing     Problem: Oral Intake Inadequate (Acute Kidney Injury/Impairment)  Goal: Optimal Nutrition Intake  Outcome: Ongoing, Progressing     Problem: Renal Function Impairment (Acute Kidney Injury/Impairment)  Goal: Effective Renal Function  Outcome: Ongoing, Progressing     Problem: Skin Injury Risk Increased  Goal: Skin Health and Integrity  Outcome: Ongoing, Progressing     Problem: Constipation  Goal: Effective Bowel Elimination  Outcome: Ongoing, Progressing

## 2022-06-12 NOTE — ED NOTES
Patient changed and cleaned due to soiled linens. Patient was found to have stage one pressure ulcer at sacral area. Zinc cream/Barrier applied. 16 fr powers catheter inserted. Approx 600ml of clear yellow urine obtained. V/S stable. NAD.

## 2022-06-12 NOTE — SUBJECTIVE & OBJECTIVE
Interval History: no acute events, had has a bowel movement    Review of Systems   Unable to perform ROS: Mental status change   Objective:     Vital Signs (Most Recent):  Temp: 96.5 °F (35.8 °C) (06/12/22 1129)  Pulse: 85 (06/12/22 1129)  Resp: 18 (06/12/22 1129)  BP: 124/62 (06/12/22 1129)  SpO2: 95 % (06/12/22 1129) Vital Signs (24h Range):  Temp:  [96.5 °F (35.8 °C)-97.5 °F (36.4 °C)] 96.5 °F (35.8 °C)  Pulse:  [52-85] 85  Resp:  [11-20] 18  SpO2:  [93 %-100 %] 95 %  BP: ()/(42-77) 124/62     Weight: 72.2 kg (159 lb 1.6 oz)  Body mass index is 24.92 kg/m².    Intake/Output Summary (Last 24 hours) at 6/12/2022 1255  Last data filed at 6/12/2022 1200  Gross per 24 hour   Intake 3400.86 ml   Output --   Net 3400.86 ml      Physical Exam  Vitals reviewed.   Constitutional:       General: He is not in acute distress.     Appearance: He is not toxic-appearing or diaphoretic.   HENT:      Head: Normocephalic and atraumatic.      Right Ear: External ear normal.      Left Ear: External ear normal.      Mouth/Throat:      Mouth: Mucous membranes are moist.   Eyes:      General: No scleral icterus.     Conjunctiva/sclera: Conjunctivae normal.   Cardiovascular:      Rate and Rhythm: Normal rate.   Pulmonary:      Effort: Pulmonary effort is normal. No respiratory distress.   Abdominal:      General: Bowel sounds are normal. There is no distension.      Palpations: Abdomen is soft.      Tenderness: There is no abdominal tenderness.   Musculoskeletal:         General: No swelling.      Right lower leg: No edema.      Left lower leg: No edema.   Skin:     Coloration: Skin is not jaundiced.      Findings: No erythema.   Neurological:      General: No focal deficit present.      Mental Status: He is alert.   Psychiatric:         Mood and Affect: Mood normal.       Significant Labs: All pertinent labs within the past 24 hours have been reviewed.  CBC:   Recent Labs   Lab 06/11/22  1747 06/12/22  0635   WBC 9.85 8.21   HGB  10.6* 9.6*   HCT 31.7* 28.8*    191     CMP:   Recent Labs   Lab 06/11/22  1747 06/12/22  0635    141   K 5.2* 4.5   * 115*   CO2 15* 17*   * 89   BUN 49* 43*   CREATININE 2.6* 2.1*   CALCIUM 8.7 8.5*   PROT 6.6 5.8*   ALBUMIN 3.5 3.1*   BILITOT 0.4 0.5   ALKPHOS 98 85   AST 19 19   ALT 33 28   ANIONGAP 10 9   EGFRNONAA 22.0* 28.5*       Significant Imaging: I have reviewed all pertinent imaging results/findings within the past 24 hours.  X-Ray Chest AP Portable   ED Interpretation   No acute findings on chest x-ray      Final Result      No acute abnormality.         Electronically signed by: Marge Vinson   Date:    06/12/2022   Time:    08:15      CT Abdomen Pelvis  Without Contrast   Final Result      Findings which may indicate constipation, otherwise no acute abnormality.  Status post prostatectomy.  Atherosclerosis.  Renal cyst.  Compression fracture L1.         Electronically signed by: Marge Vinson   Date:    06/12/2022   Time:    08:14

## 2022-06-12 NOTE — PROGRESS NOTES
"   06/11/22 2115   Vital Signs   Temp 97.5 °F (36.4 °C)   Temp src Oral   Pulse 70   Heart Rate Source Monitor   Resp 18   SpO2 95 %   /67   MAP (mmHg) 94   BP Location Left arm   Patient Position Lying   Admit vitals. Full lift to bed. Weller cath in place with clear yellow urine in bag. Oriented to self and place. Difficulty forming thoughts. Multiple abrasions to extremities. Appears to be bites. Pt states, "I itch." Scaral area with redness, barrier cream in use. Fall Risk. All alarms activated. Call light in hand.  "

## 2022-06-12 NOTE — PROGRESS NOTES
[facility] Medicine  Progress Note    Patient Name: Lucho Delong  MRN: [id number]  Patient Class: OP- Observation   Admission Date: 6/11/2022  Length of Stay: 0 days  Attending Physician: Laura Kevin MD  Primary Care Provider: Jose Nolan MD        Subjective:     Principal Problem:Abdominal pain        HPI:  The patient is an 82 y/o male with PMH of atrial fibrillation, CKD 3, HTN, gout, HLP, prostate ca, and stroke. History is provided from patient's wife and ER reports. He resides in a nursing home in which he was placed a week ago for frequent falls. He was feeling agitated and having frequent urination and reporting abdominal pain over the past day. He has been having bowel movements but no blood noted. Wife reports no fevers, chills, nausea, vomiting, or other symptoms. When a powers was placed and he started to void, wife reported that his agitation improved. She reported a history of prostate surgery about 25 years ago. No UTI noted on UA.       Overview/Hospital Course:  No notes on file    Interval History: no acute events, had has a bowel movement    Review of Systems   Unable to perform ROS: Mental status change   Objective:     Vital Signs (Most Recent):  Temp: 96.5 °F (35.8 °C) (06/12/22 1129)  Pulse: 85 (06/12/22 1129)  Resp: 18 (06/12/22 1129)  BP: 124/62 (06/12/22 1129)  SpO2: 95 % (06/12/22 1129) Vital Signs (24h Range):  Temp:  [96.5 °F (35.8 °C)-97.5 °F (36.4 °C)] 96.5 °F (35.8 °C)  Pulse:  [52-85] 85  Resp:  [11-20] 18  SpO2:  [93 %-100 %] 95 %  BP: ()/(42-77) 124/62     Weight: 72.2 kg (159 lb 1.6 oz)  Body mass index is 24.92 kg/m².    Intake/Output Summary (Last 24 hours) at 6/12/2022 1255  Last data filed at 6/12/2022 1200  Gross per 24 hour   Intake 3400.86 ml   Output --   Net 3400.86 ml      Physical Exam  Vitals reviewed.   Constitutional:       General: He is not in acute distress.     Appearance: He is not toxic-appearing or diaphoretic.   HENT:       Head: Normocephalic and atraumatic.      Right Ear: External ear normal.      Left Ear: External ear normal.      Mouth/Throat:      Mouth: Mucous membranes are moist.   Eyes:      General: No scleral icterus.     Conjunctiva/sclera: Conjunctivae normal.   Cardiovascular:      Rate and Rhythm: Normal rate.   Pulmonary:      Effort: Pulmonary effort is normal. No respiratory distress.   Abdominal:      General: Bowel sounds are normal. There is no distension.      Palpations: Abdomen is soft.      Tenderness: There is no abdominal tenderness.   Musculoskeletal:         General: No swelling.      Right lower leg: No edema.      Left lower leg: No edema.   Skin:     Coloration: Skin is not jaundiced.      Findings: No erythema.   Neurological:      General: No focal deficit present.      Mental Status: He is alert.   Psychiatric:         Mood and Affect: Mood normal.       Significant Labs: All pertinent labs within the past 24 hours have been reviewed.  CBC:   Recent Labs   Lab 06/11/22 1747 06/12/22  0635   WBC 9.85 8.21   HGB 10.6* 9.6*   HCT 31.7* 28.8*    191     CMP:   Recent Labs   Lab 06/11/22 1747 06/12/22  0635    141   K 5.2* 4.5   * 115*   CO2 15* 17*   * 89   BUN 49* 43*   CREATININE 2.6* 2.1*   CALCIUM 8.7 8.5*   PROT 6.6 5.8*   ALBUMIN 3.5 3.1*   BILITOT 0.4 0.5   ALKPHOS 98 85   AST 19 19   ALT 33 28   ANIONGAP 10 9   EGFRNONAA 22.0* 28.5*       Significant Imaging: I have reviewed all pertinent imaging results/findings within the past 24 hours.  X-Ray Chest AP Portable   ED Interpretation   No acute findings on chest x-ray      Final Result      No acute abnormality.         Electronically signed by: Marge Vinson   Date:    06/12/2022   Time:    08:15      CT Abdomen Pelvis  Without Contrast   Final Result      Findings which may indicate constipation, otherwise no acute abnormality.  Status post prostatectomy.  Atherosclerosis.  Renal cyst.  Compression fracture  L1.         Electronically signed by: Marge Vinson   Date:    06/12/2022   Time:    08:14              Assessment/Plan:      * Abdominal pain  CT without acute findings  Stool noted; will treat as per below  Wife reported improvement in symptoms and agitation after powers placement; possible urinary retention?  Will d/c powers once having bowel movements and monitor for retention. Check PVR  UA negative for infection       Current chronic use of systemic steroids  Unclear indication for chronic prednisone therapy       AMS (altered mental status)  Possibly due to urinary retention, improving now  Continue to monitor  Delirium and fall precautions       Constipation  Noted on CT  Given kayexalate for hyperkalemia and then started senna-doc  Continue IV hydration   K+ improved to normal, continue to monitor      CKD (chronic kidney disease) stage 3, GFR 30-59 ml/min  Per selvin        SELVIN (acute kidney injury)  Lab Results   Component Value Date    CREATININE 2.1 (H) 06/12/2022     Sr Cr slightly elevated from baseline on admission, improving with IVFs  Continue IVF  Cont to monitor with daily labs   Avoid nephrotoxins.   Renally dose all medications   Monitor events that may lead to decreased renal perfusion (hypovolemia, hypotension, sepsis).   Monitor urine output (goal 0.5 mL/kg/hr) to assure that no obstruction precipitates worsening in GFR.  Serum bicarb level 15. Chronically low; most likely due to CKD. Not far from baseline. Anticipate improvement with improvement in renal function.             B12 deficiency  Receives monthly injections       Gout  Continue allopurinol       HLD (hyperlipidemia)  Atorvastatin       HTN (hypertension)  Continue metoprolol and nifedipine       Persistent atrial fibrillation, onset 2018  Telemetry  Continue metoprolol   Has watchman device in place      VTE Risk Mitigation (From admission, onward)         Ordered     heparin (porcine) injection 5,000 Units  Every 8 hours          06/11/22 2142                Discharge Planning   GRICEL:      Code Status: Full Code   Is the patient medically ready for discharge?:     Reason for patient still in hospital (select all that apply): Treatment                     Audelia Mabry MD  Department of [facility] Medicine   [facility]

## 2022-06-12 NOTE — ED NOTES
tele visit complete. Medications reconciled per wife. V/S stable. No acute changes. Will prepare for admit.

## 2022-06-13 PROBLEM — I49.9 CARDIAC ARRHYTHMIA: Status: ACTIVE | Noted: 2022-06-13

## 2022-06-13 PROCEDURE — 97166 OT EVAL MOD COMPLEX 45 MIN: CPT

## 2022-06-13 PROCEDURE — 97530 THERAPEUTIC ACTIVITIES: CPT

## 2022-06-13 PROCEDURE — G0378 HOSPITAL OBSERVATION PER HR: HCPCS

## 2022-06-13 PROCEDURE — 99225 PR SUBSEQUENT OBSERVATION CARE,LEVEL II: CPT | Mod: ,,, | Performed by: HOSPITALIST

## 2022-06-13 PROCEDURE — 25000003 PHARM REV CODE 250: Performed by: HOSPITALIST

## 2022-06-13 PROCEDURE — 96372 THER/PROPH/DIAG INJ SC/IM: CPT | Performed by: HOSPITALIST

## 2022-06-13 PROCEDURE — 63600175 PHARM REV CODE 636 W HCPCS: Performed by: HOSPITALIST

## 2022-06-13 PROCEDURE — 99225 PR SUBSEQUENT OBSERVATION CARE,LEVEL II: ICD-10-PCS | Mod: ,,, | Performed by: HOSPITALIST

## 2022-06-13 PROCEDURE — 96361 HYDRATE IV INFUSION ADD-ON: CPT | Performed by: EMERGENCY MEDICINE

## 2022-06-13 PROCEDURE — 96375 TX/PRO/DX INJ NEW DRUG ADDON: CPT | Performed by: EMERGENCY MEDICINE

## 2022-06-13 RX ORDER — METOPROLOL SUCCINATE 25 MG/1
50 TABLET, EXTENDED RELEASE ORAL DAILY
Status: DISCONTINUED | OUTPATIENT
Start: 2022-06-14 | End: 2022-06-15 | Stop reason: HOSPADM

## 2022-06-13 RX ORDER — SODIUM BICARBONATE 1 MEQ/ML
25 SYRINGE (ML) INTRAVENOUS ONCE
Status: DISCONTINUED | OUTPATIENT
Start: 2022-06-13 | End: 2022-06-13

## 2022-06-13 RX ORDER — SODIUM BICARBONATE 1 MEQ/ML
25 SYRINGE (ML) INTRAVENOUS ONCE
Status: COMPLETED | OUTPATIENT
Start: 2022-06-13 | End: 2022-06-13

## 2022-06-13 RX ORDER — TAMSULOSIN HYDROCHLORIDE 0.4 MG/1
0.4 CAPSULE ORAL DAILY
Status: DISCONTINUED | OUTPATIENT
Start: 2022-06-13 | End: 2022-06-15 | Stop reason: HOSPADM

## 2022-06-13 RX ADMIN — STANDARDIZED SENNA CONCENTRATE AND DOCUSATE SODIUM 1 TABLET: 8.6; 5 TABLET ORAL at 09:06

## 2022-06-13 RX ADMIN — HYDROXYZINE PAMOATE 25 MG: 25 CAPSULE ORAL at 11:06

## 2022-06-13 RX ADMIN — TAMSULOSIN HYDROCHLORIDE 0.4 MG: 0.4 CAPSULE ORAL at 09:06

## 2022-06-13 RX ADMIN — SODIUM CHLORIDE: 0.9 INJECTION, SOLUTION INTRAVENOUS at 10:06

## 2022-06-13 RX ADMIN — ALLOPURINOL 100 MG: 100 TABLET ORAL at 08:06

## 2022-06-13 RX ADMIN — PREDNISONE 5 MG: 1 TABLET ORAL at 08:06

## 2022-06-13 RX ADMIN — HEPARIN SODIUM 5000 UNITS: 5000 INJECTION, SOLUTION INTRAVENOUS; SUBCUTANEOUS at 02:06

## 2022-06-13 RX ADMIN — ATORVASTATIN CALCIUM 40 MG: 40 TABLET, FILM COATED ORAL at 09:06

## 2022-06-13 RX ADMIN — METOPROLOL SUCCINATE 100 MG: 25 TABLET, EXTENDED RELEASE ORAL at 09:06

## 2022-06-13 RX ADMIN — ASPIRIN 81 MG: 81 TABLET, COATED ORAL at 08:06

## 2022-06-13 RX ADMIN — HEPARIN SODIUM 5000 UNITS: 5000 INJECTION, SOLUTION INTRAVENOUS; SUBCUTANEOUS at 06:06

## 2022-06-13 RX ADMIN — NIFEDIPINE 60 MG: 30 TABLET, FILM COATED, EXTENDED RELEASE ORAL at 09:06

## 2022-06-13 RX ADMIN — SODIUM BICARBONATE 25 MEQ: 84 INJECTION, SOLUTION INTRAVENOUS at 09:06

## 2022-06-13 RX ADMIN — HEPARIN SODIUM 5000 UNITS: 5000 INJECTION, SOLUTION INTRAVENOUS; SUBCUTANEOUS at 09:06

## 2022-06-13 RX ADMIN — SERTRALINE HYDROCHLORIDE 100 MG: 25 TABLET ORAL at 08:06

## 2022-06-13 RX ADMIN — SODIUM CHLORIDE: 0.9 INJECTION, SOLUTION INTRAVENOUS at 01:06

## 2022-06-13 NOTE — PT/OT/SLP EVAL
Occupational Therapy   Evaluation    Name: Lucho Delong  MRN: [id number]  Admitting Diagnosis:  Abdominal pain  Recent Surgery: * No surgery found *      Recommendations:     Discharge Recommendations:  dc to skilled nursing    Discharge Equipment Recommendations:   none if dc to skilled   Barriers to discharge:   none if dc to skilled    Assessment:     Lucho Delong is a 81 y.o. male with a medical diagnosis of Abdominal pain.  He presents with decreased ind w/all adls. Performance deficits affecting function:  decreased functional activity tolerance, decreased balance , decreased strength.       Rehab Prognosis: Good; patient would benefit from acute skilled OT services to address these deficits and reach maximum level of function.       Plan:     Patient to be seen  3-5x's per week  to address the above listed problems via  ot intervention   · Plan of Care Expires:  upon dc    · Plan of Care Reviewed with:  patient/wife, case management     Subjective     Chief Complaint: abd pain  Patient/Family Comments/goals: to get stronger to go home     Occupational Profile:  Living Environment: patient was living at a nursing home for one week,previously was living at home w/ wife.   Previous level of function: mod ind to sba w/ all adls and functional transfers and functional mobility    Roles and Routines: retired  Equipment Used at Home:   rw, bsc, shower chair.   Assistance upon Discharge:  patient will have limited assistance from wife upon dc     Pain/Comfort:  ·  none     Patients cultural, spiritual, Christianity conflicts given the current situation:  will not interfere with his care here     Objective:     Communicated with: nursing prior to session.  Patient found supine with   upon OT entry to room.    General Precautions: Standard,     Orthopedic Precautions:    Braces:    Respiratory Status: Room air    Occupational Performance:    Bed Mobility:    · Patient completed Rolling/Turning to Left with   contact guard assistance  · Patient completed Rolling/Turning to Right with contact guard assistance  · Patient completed Scooting/Bridging with stand by assistance  · Patient completed Supine to Sit with contact guard assistance  · Patient completed Sit to Supine with contact guard assistance    Functional Mobility/Transfers:  · Patient completed Sit <> Stand Transfer with minimum assistance  with  rolling walker   · Patient completed Bed <> Chair Transfer using Stand Pivot technique with minimum assistance with rolling walker  ·     Activities of Daily Living:  · Self feed w/ sua  · G/h w/ sua to cga  · ub dressing w/ min a  · Lb dressing w/ mod a\  · toileting w/ mod a    Cognitive/Visual Perceptual:  Cognitive/Psychosocial Skills:     -       Oriented to: Person and Place     Physical Exam:  bue arom rt ue wfls all planes ; lue arom limited to approx 90 degrees shoulder flexion and abd. Strength rue grossly 3+/5; strength lue grossly 2/5; static/dynamic sit balance is fair/poor+; static/dynamic stand balance is poor+/poor       Treatment & Education:  Patient performed initial ot eval this date.  Patient performed static/dynamic stand balance activities demonstrating poor+/poor static/dynamic stand balance.     Education:    Patient left supine with all lines intact    GOALS:   1. Patient will perform lb dressing w/ sba   2. Patient will perform toileting w/ sba  3. Patient will perform ub dressing w/ sba      History:     Past Medical History:   Diagnosis Date    Anticoagulant long-term use     Atrial fibrillation     Bursitis of hip     Chronic back pain     Chronic kidney disease, stage 3     Gastroesophageal reflux disease     Gout     Hyperlipemia     Hypertension     Prostate cancer 1997    Stroke 04/2019       Past Surgical History:   Procedure Laterality Date    CARDIAC VALVE REPLACEMENT  Feb 18, 2020    CARDIAC VALVE SURGERY      CATHETERIZATION OF BOTH LEFT AND RIGHT HEART N/A 12/2/2019     Procedure: CATHETERIZATION, HEART, BOTH LEFT AND RIGHT;  Surgeon: Everett Sosa MD;  Location: [facility] CATH/EP LAB;  Service: Cardiology;  Laterality: N/A;    CLOSURE OF LEFT ATRIAL APPENDAGE USING DEVICE N/A 3/3/2021    Procedure: Left atrial appendage closure device;  Surgeon: Bryan Zhang MD;  Location: [facility] CATH LAB;  Service: Cardiology;  Laterality: N/A;    COLONOSCOPY  1997    CORONARY ANGIOGRAPHY N/A 1/8/2020    Procedure: ANGIOGRAM, CORONARY ARTERY;  Surgeon: Bryan Zhang MD;  Location: [facility] CATH LAB;  Service: Cardiology;  Laterality: N/A;    CORONARY STENT PLACEMENT N/A 1/8/2020    Procedure: INSERTION, STENT, CORONARY ARTERY;  Surgeon: Bryan Zhang MD;  Location: [facility] CATH LAB;  Service: Cardiology;  Laterality: N/A;    PERCUTANEOUS TRANSLUMINAL ANGIOPLASTY (PTA) OF PERIPHERAL VESSEL N/A 1/23/2020    Procedure: PTA, PERIPHERAL VESSEL;  Surgeon: Bryan Zhang MD;  Location: [facility] CATH LAB;  Service: Cardiology;  Laterality: N/A;    PROSTATE SURGERY  1997    TONSILLECTOMY      TRANSCATHETER AORTIC VALVE REPLACEMENT (TAVR)  2/18/2020    Procedure: REPLACEMENT, AORTIC VALVE, TRANSCATHETER (TAVR);  Surgeon: Bryan Zhang MD;  Location: [facility] CATH LAB;  Service: Cardiology;;    TRANSESOPHAGEAL ECHOCARDIOGRAPHY N/A 1/23/2020    Procedure: ECHOCARDIOGRAM, TRANSESOPHAGEAL;  Surgeon: Km Diagnostic Provider;  Location: [facility] EP LAB;  Service: Anesthesiology;  Laterality: N/A;    TRANSESOPHAGEAL ECHOCARDIOGRAPHY N/A 4/20/2021    Procedure: ECHOCARDIOGRAM, TRANSESOPHAGEAL;  Surgeon: Dos Diagnostic Provider;  Location: [facility] EP LAB;  Service: Anesthesiology;  Laterality: N/A;       Time Tracking:     OT Date of Treatment:  06/13/2022  OT Start Time:  12:15  OT Stop Time:  12:42  OT Total Time (min):  27    Billable Minutes:eval 15   theract 12  6/13/2022

## 2022-06-13 NOTE — NURSING
1030-notify dr. Kyle of pt having frequent pauses on telemetry. Pt is asymptomatic. Orders received to consult cardiology.   1045-spoke with dr. Avilez. Will see patient.

## 2022-06-13 NOTE — PT/OT/SLP EVAL
PT attempted to see patient twice this afternoon but he was unavailable both time having testing done at bedside. PT spoke with patients wife to obtain medical and mobility history. PT to follow up tomorrow.

## 2022-06-13 NOTE — PROGRESS NOTES
[facility] Medicine  Progress Note    Patient Name: Lucho Delong  MRN: [id number]  Patient Class: OP- Observation   Admission Date: 6/11/2022  Length of Stay: 0 days  Attending Physician: Audelia Mabry MD  Primary Care Provider: Jose Nolan MD        Subjective:     Principal Problem:Abdominal pain        HPI:  The patient is an 82 y/o male with PMH of atrial fibrillation, CKD 3, HTN, gout, HLP, prostate ca, and stroke. History is provided from patient's wife and ER reports. He resides in a nursing home in which he was placed a week ago for frequent falls. He was feeling agitated and having frequent urination and reporting abdominal pain over the past day. He has been having bowel movements but no blood noted. Wife reports no fevers, chills, nausea, vomiting, or other symptoms. When a powers was placed and he started to void, wife reported that his agitation improved. She reported a history of prostate surgery about 25 years ago. No UTI noted on UA.       Overview/Hospital Course:  No notes on file    Interval History:  Is feeling better today.  Nursing staff states that he has had 3 bowel movements.  Bowel movements have been semi solid.  He has not been irritated today.  His wife wants to know will he be going back to his personal care home.  This  will check on this.  He also had a pause noted on telemetry with heart rate.  No chest pain or dizziness.    Review of Systems   Constitutional: Negative.    HENT: Negative.     Respiratory: Negative.     Cardiovascular: Negative.    Gastrointestinal: Negative.    Endocrine: Negative.    Genitourinary: Negative.    Musculoskeletal: Negative.    Skin: Negative.    Allergic/Immunologic: Negative.    Neurological: Negative.    Hematological: Negative.    Psychiatric/Behavioral: Negative.     Objective:     Vital Signs (Most Recent):  Temp: 97.2 °F (36.2 °C) (06/13/22 1046)  Pulse: 67 (06/13/22 1046)  Resp: 17 (06/13/22 1046)  BP:  (!) 152/70 (06/13/22 1046)  SpO2: 97 % (06/13/22 1046)   Vital Signs (24h Range):  Temp:  [97 °F (36.1 °C)-98.6 °F (37 °C)] 97.2 °F (36.2 °C)  Pulse:  [56-76] 67  Resp:  [17-20] 17  SpO2:  [95 %-99 %] 97 %  BP: (113-152)/(58-86) 152/70     Weight: 72.2 kg (159 lb 1.6 oz)  Body mass index is 24.92 kg/m².    Intake/Output Summary (Last 24 hours) at 6/13/2022 1326  Last data filed at 6/13/2022 1100  Gross per 24 hour   Intake 240 ml   Output 1377 ml   Net -1137 ml      Physical Exam  Vitals and nursing note reviewed.   HENT:      Head: Normocephalic and atraumatic.      Right Ear: External ear normal.      Left Ear: External ear normal.      Nose: Nose normal.      Mouth/Throat:      Mouth: Mucous membranes are moist.   Eyes:      Extraocular Movements: Extraocular movements intact.   Cardiovascular:      Rate and Rhythm: Normal rate. Rhythm irregular.      Pulses: Normal pulses.      Heart sounds: Normal heart sounds.   Pulmonary:      Effort: Pulmonary effort is normal.      Breath sounds: Normal breath sounds.   Abdominal:      General: Abdomen is flat. Bowel sounds are normal.      Palpations: Abdomen is soft.   Musculoskeletal:         General: Normal range of motion.      Cervical back: Normal range of motion and neck supple.   Skin:     General: Skin is warm.      Capillary Refill: Capillary refill takes 2 to 3 seconds.   Neurological:      General: No focal deficit present.      Mental Status: He is alert. Mental status is at baseline.   Psychiatric:         Mood and Affect: Mood normal.         Behavior: Behavior normal.       Significant Labs: All pertinent labs within the past 24 hours have been reviewed.    Significant Imaging: I have reviewed all pertinent imaging results/findings within the past 24 hours.      Assessment/Plan:      * Abdominal pain  CT without acute findings  Stool noted; will treat as per below  Wife reported improvement in symptoms and agitation after powers placement; possible urinary  retention?  Will d/c powers once having bowel movements and monitor for retention. Check PVR  UA negative for infection       Cardiac arrhythmia  Cardiology will adjust metoprolol dose      Current chronic use of systemic steroids  Unclear indication for chronic prednisone therapy       AMS (altered mental status)  Possibly due to urinary retention, improving now  Continue to monitor  Delirium and fall precautions       Constipation  Noted on CT  Given kayexalate for hyperkalemia and then started senna-doc  Continue IV hydration   K+ improved to normal, continue to monitor      CKD (chronic kidney disease) stage 3, GFR 30-59 ml/min  Per selvin        SELVIN (acute kidney injury)  Lab Results   Component Value Date    CREATININE 2.1 (H) 06/12/2022     Sr Cr slightly elevated from baseline on admission, improving with IVFs  Continue IVF  Cont to monitor with daily labs   Avoid nephrotoxins.   Renally dose all medications   Monitor events that may lead to decreased renal perfusion (hypovolemia, hypotension, sepsis).   Monitor urine output (goal 0.5 mL/kg/hr) to assure that no obstruction precipitates worsening in GFR.  Serum bicarb level 15. Chronically low; most likely due to CKD. Not far from baseline. Anticipate improvement with improvement in renal function.   Patient has chronic kidney disease            B12 deficiency  Receives monthly injections       Gout  Continue allopurinol       HLD (hyperlipidemia)  Atorvastatin       HTN (hypertension)  Continue metoprolol and nifedipine       Persistent atrial fibrillation, onset 2018  Telemetry  Continue metoprolol   Has watchman device in place  Cardiology consult      VTE Risk Mitigation (From admission, onward)         Ordered     heparin (porcine) injection 5,000 Units  Every 8 hours         06/11/22 2142                Discharge Planning   GRICEL:      Code Status: Full Code   Is the patient medically ready for discharge?:     Reason for patient still in hospital (select all  that apply): Treatment                     Reinaldo Kyle MD  Department of [facility] Medicine   [facility]

## 2022-06-13 NOTE — ASSESSMENT & PLAN NOTE
Lab Results   Component Value Date    CREATININE 2.1 (H) 06/12/2022     Sr Cr slightly elevated from baseline on admission, improving with IVFs  Continue IVF  Cont to monitor with daily labs   Avoid nephrotoxins.   Renally dose all medications   Monitor events that may lead to decreased renal perfusion (hypovolemia, hypotension, sepsis).   Monitor urine output (goal 0.5 mL/kg/hr) to assure that no obstruction precipitates worsening in GFR.  Serum bicarb level 15. Chronically low; most likely due to CKD. Not far from baseline. Anticipate improvement with improvement in renal function.   Patient has chronic kidney disease

## 2022-06-13 NOTE — CONSULTS
CARDIOLOGY CONSULTATION    Patient Name:  Lucho Delong    :  1940    Medical Record:  [id number]    DATE OF SERVICE: 2022    ATTENDING PHYSICIAN: Audelia Mabry MD    REASON FOR CONSULT:     HISTORY OF PRESENT ILLNESS  The patient is a 81 y.o. y/o male who is hospitalized for Abdominal pain.  Nursing home resident admitted with frequent falls and increasing weakness status post increased urination UA negative coronary disease history of  and  history of CVA in  with left swallowed weakness residual unable to walk history of atrial fibrillation chronic history of Watchman cardiology consult for pauses 2.2  3:46 a.m. , occasional shortness of breath cardiology consult for pauses      PAST MEDICAL HISTORY  Past Medical History:   Diagnosis Date    Anticoagulant long-term use     Atrial fibrillation     Bursitis of hip     Chronic back pain     Chronic kidney disease, stage 3     Gastroesophageal reflux disease     Gout     Hyperlipemia     Hypertension     Prostate cancer     Stroke 2019       PAST SURGICAL HISTORY  Past Surgical History:   Procedure Laterality Date    CARDIAC VALVE REPLACEMENT  2020    CARDIAC VALVE SURGERY      CATHETERIZATION OF BOTH LEFT AND RIGHT HEART N/A 2019    Procedure: CATHETERIZATION, HEART, BOTH LEFT AND RIGHT;  Surgeon: Everett Sosa MD;  Location: [facility] CATH/EP LAB;  Service: Cardiology;  Laterality: N/A;    CLOSURE OF LEFT ATRIAL APPENDAGE USING DEVICE N/A 3/3/2021    Procedure: Left atrial appendage closure device;  Surgeon: Bryan Zhang MD;  Location: [facility] CATH LAB;  Service: Cardiology;  Laterality: N/A;    COLONOSCOPY      CORONARY ANGIOGRAPHY N/A 2020    Procedure: ANGIOGRAM, CORONARY ARTERY;  Surgeon: Bryan Zhang MD;  Location: [facility] CATH LAB;  Service: Cardiology;  Laterality: N/A;    CORONARY STENT PLACEMENT N/A 2020    Procedure: INSERTION, STENT, CORONARY ARTERY;  Surgeon:  Bryan Zhang MD;  Location: [facility] CATH LAB;  Service: Cardiology;  Laterality: N/A;    PERCUTANEOUS TRANSLUMINAL ANGIOPLASTY (PTA) OF PERIPHERAL VESSEL N/A 1/23/2020    Procedure: PTA, PERIPHERAL VESSEL;  Surgeon: Bryan Zhang MD;  Location: [facility] CATH LAB;  Service: Cardiology;  Laterality: N/A;    PROSTATE SURGERY  1997    TONSILLECTOMY      TRANSCATHETER AORTIC VALVE REPLACEMENT (TAVR)  2/18/2020    Procedure: REPLACEMENT, AORTIC VALVE, TRANSCATHETER (TAVR);  Surgeon: Bryan Zhang MD;  Location: [facility] CATH LAB;  Service: Cardiology;;    TRANSESOPHAGEAL ECHOCARDIOGRAPHY N/A 1/23/2020    Procedure: ECHOCARDIOGRAM, TRANSESOPHAGEAL;  Surgeon: [facility] Diagnostic Provider;  Location: [facility] EP LAB;  Service: Anesthesiology;  Laterality: N/A;    TRANSESOPHAGEAL ECHOCARDIOGRAPHY N/A 4/20/2021    Procedure: ECHOCARDIOGRAM, TRANSESOPHAGEAL;  Surgeon: Dos Diagnostic Provider;  Location: [facility] EP LAB;  Service: Anesthesiology;  Laterality: N/A;       SOCIAL HISTORY   reports that he has never smoked. He has never used smokeless tobacco. He reports previous alcohol use of about 3.3 standard drinks of alcohol per week. He reports that he does not use drugs.      FAMILY HISTORY  Family History   Problem Relation Age of Onset    Cancer Father 86        colon    Alzheimer's disease Mother 86    Alzheimer's disease Sister         has not known fm for 15 yrs    Melanoma Neg Hx     Psoriasis Neg Hx     Lupus Neg Hx     Eczema Neg Hx          ALLERGIES   Review of patient's allergies indicates:   Allergen Reactions    Amiodarone analogues Other (See Comments)     Dizziness    Tramadol Itching and Swelling     Began medication, two doses into this treatment patient experiences tongue swelling and severe itching. Patient MD advised to not take this medication anymore    Eliquis [apixaban] Other (See Comments)     Dizziness, ankle swelling, lips swelling       HOME  MEDICATIONS  Prior to Admission medications     Medication Sig Start Date End Date Taking? Authorizing Provider   allopurinoL (ZYLOPRIM) 100 MG tablet Take 1 tablet by mouth once daily 11/11/21  Yes Jose Nolan MD   aspirin (ECOTRIN) 81 MG EC tablet Take 81 mg by mouth once daily.   Yes Historical Provider   atorvastatin (LIPITOR) 40 MG tablet Take 1 tablet by mouth once daily 8/13/21  Yes Jose Nolan MD   cholecalciferol, vitamin D3, (VITAMIN D3) 50 mcg (2,000 unit) Cap capsule Take 1 capsule by mouth once daily.   Yes Historical Provider   cyanocobalamin 1,000 mcg/mL injection INJECT 1 ML INTRAMUSCULARLY ONCE EVERY 30 DAYS 6/8/19  Yes Historical Provider   lisinopriL (PRINIVIL,ZESTRIL) 20 MG tablet Take 1 tablet by mouth once daily 5/18/21  Yes Albertina Muhammad,    metoprolol succinate (TOPROL-XL) 100 MG 24 hr tablet Take 1 tablet by mouth once daily 12/3/21  Yes Jose Nolan MD   NIFEdipine (PROCARDIA-XL) 60 MG (OSM) 24 hr tablet Take 1 tablet by mouth once daily 6/15/21  Yes Jose Nolan MD   predniSONE (DELTASONE) 5 MG tablet Take 1 tablet by mouth once daily 5/20/22  Yes Jose Nolan MD   sertraline (ZOLOFT) 100 MG tablet Take 1 tablet (100 mg total) by mouth once daily. 4/16/21  Yes Jose Nolan MD   vitamin B complex vit C no.3 (VITAMIN B COMP AND C NO.3 ORAL) Take 1 tablet by mouth Daily.   Yes Historical Provider   menthol/camphor (SARNA ORIGINAL TOP) Apply topically as needed.    Historical Provider   potassium chloride SA (K-DUR,KLOR-CON) 20 MEQ tablet Take one po daily with furosemide prn edema 5/7/22   Jose Nolan MD   walker (ULTRA-LIGHT ROLLATOR) Misc Use as directed 9/23/21   Jose Nolan MD     CURRENT MEDICATIONS   allopurinoL  100 mg Oral Daily    aspirin  81 mg Oral Daily    atorvastatin  40 mg Oral Daily    heparin (porcine)  5,000 Units Subcutaneous Q8H    metoprolol succinate  100 mg Oral Daily    NIFEdipine  60 mg Oral Daily    predniSONE  5 mg Oral Daily    senna-docusate 8.6-50 mg  1 tablet  Oral BID    sertraline  100 mg Oral Daily    sodium polystyrene  15 g Oral Once    tamsulosin  0.4 mg Oral Daily     acetaminophen, albuterol-ipratropium, dextrose 10%, dextrose 10%, glucagon (human recombinant), glucose, glucose, hydrOXYzine pamoate, naloxone, ondansetron, sodium chloride 0.9%      REVIEW OF SYSTEMS  General: no weight loss, no fever, no chills, no anorexia  Skin: no rash  Eyes: no blurry vision  Ears/Nose/Throat: no nasal congestion, no sore throat  Respiratory: no cough, = dyspnea, no wheezing  Cardiovascular: no chest pain, no ankle swelling  Gastrointestinal: no nausea, no vomiting, no diarrhea, no constipation, no abdominal pain. No melena, no bright red blood per rectum  Genitourinary: no dysuria, no hematuria  Musculoskeletal: no joint pain, no myalgia, no muscle weakness  Neurologic: no seizures, no tremors, no fainting + left-sided weakness  Hematologic/Lymphatic: no abnormal bleeding, no abnormal bruising  Endocrine: no heat or cold intolerance, no polydipsia, no polyuria  Psychiatric: no anxiety, no depression  Allergy/Immunology: no seasonal allergies, no joint stiffness in morning      PHYSICAL EXAM  Vital Signs:  Temp:  [97 °F (36.1 °C)-98.6 °F (37 °C)] 97.2 °F (36.2 °C)  Pulse:  [56-76] 67  Resp:  [17-20] 17  SpO2:  [95 %-99 %] 97 %  BP: (113-152)/(58-86) 152/70  Constitutional:  Healthy, no distress  HENT:  Normocephalic, Atraumatic, Bilateral external ears normal, Oropharynx moist, No oral exudates, No tenderness, neck supple.  Eyes:  PERRL, EOMI, Conjunctiva normal, No discharge.   Lymphatic:  No cervical or supraclavicular lymphadenopathy noted.   Cardiovascular:  Irregularly irregular, 2/6 systolic ejection murmur left sternal border No rubs, No gallops.   Respiratory:  Normal breath sounds, No respiratory distress, No wheezing, No rhonchi, No rales, No chest tenderness.   GI:  Bowel sounds normal, Soft, No tenderness, No rebound or guarding, No masses.   Integument:  Warm,  Dry, No erythema, No rash.   Musculoskeletal/Extremities:  Intact distal pulses, No edema, No tenderness, No cyanosis, No clubbing. Good range of motion in all major joints.   Neurologic:  Alert & oriented x 3, cranial nerves grossly intact, motor 4/5 left upper extremity 3/5 left lower extremity.    LABS    CBC  Recent Labs   Lab 06/11/22 1747 06/12/22  0635   WBC 9.85 8.21   RBC 3.29* 2.97*   HGB 10.6* 9.6*   HCT 31.7* 28.8*   MCV 96 97   MCH 32.2* 32.3*   MCHC 33.4 33.3   RDW 12.7 12.8   MPV 10.3 10.2    191       Chemistry  Recent Labs   Lab 06/11/22 1747 06/12/22  0635    141   K 5.2* 4.5   * 115*   CO2 15* 17*   BUN 49* 43*   CREATININE 2.6* 2.1*   * 89   PROT 6.6 5.8*   CALCIUM 8.7 8.5*   BILITOT 0.4 0.5   AST 19 19   ALT 33 28       ABG  No results for input(s): PHART, BHX0ORF, PO2ART, LVK1AON, T6LRVISY, BEART, P0LNSNYJ in the last 168 hours.    IMAGING STUDIES & OTHER STUDIES  Reviewed          ASSESSMENT  Active Hospital Problems    Diagnosis  POA    *Abdominal pain [R10.9]  Yes    SELVIN (acute kidney injury) [N17.9]  Yes    CKD (chronic kidney disease) stage 3, GFR 30-59 ml/min [N18.30]  Yes    Constipation [K59.00]  Yes    AMS (altered mental status) [R41.82]  Yes    Current chronic use of systemic steroids [Z79.52]  Not Applicable    B12 deficiency [E53.8]  Yes    Gout [M10.9]  Yes    HLD (hyperlipidemia) [E78.5]  Yes    HTN (hypertension) [I10]  Yes    Persistent atrial fibrillation, onset 2018 [I48.19]  Yes      Resolved Hospital Problems   No resolved problems to display.       PLAN    Chronic atrial fibrillation  Since 2018  Pause 6/13 2.2 seconds  Metoprolol 100 mg daily  Watchman risk of falls    Coronary artery disease  No chest pain  Occasional shortness of breath  Stent 2020    Occasional shortness of breath    History CVA 2018  Left-sided weakness unable walk    Hypertension    Anemia    Hemoglobin 9.6 6/12  Treated medicine    Acute renal failure  BUN 43  creatinine 2.1 6/12 improving    Plan  Echo AFib coronary disease  Venous no shortness of breath  Decrease metoprolol 50 mg daily  TSH  CBC BMP BNP EKG in a.m.        Monitor blood pressure    The plan was discussed with the patient and/or family.    Thank you for the Consult.    Electronically signed by: Nick Avilez, 6/13/2022 12:55 PM     Time spent on counseling/coordination of care:   Total time spent with patient:

## 2022-06-13 NOTE — PLAN OF CARE
Problem: Adult Inpatient Plan of Care  Goal: Plan of Care Review  Outcome: Ongoing, Progressing  Goal: Patient-Specific Goal (Individualized)  Outcome: Ongoing, Progressing  Goal: Absence of Hospital-Acquired Illness or Injury  Outcome: Ongoing, Progressing  Goal: Optimal Comfort and Wellbeing  Outcome: Ongoing, Progressing  Goal: Readiness for Transition of Care  Outcome: Ongoing, Progressing     Problem: Infection  Goal: Absence of Infection Signs and Symptoms  Outcome: Ongoing, Progressing     Problem: Fluid and Electrolyte Imbalance (Acute Kidney Injury/Impairment)  Goal: Fluid and Electrolyte Balance  Outcome: Ongoing, Progressing     Problem: Oral Intake Inadequate (Acute Kidney Injury/Impairment)  Goal: Optimal Nutrition Intake  Outcome: Ongoing, Progressing     Problem: Renal Function Impairment (Acute Kidney Injury/Impairment)  Goal: Effective Renal Function  Outcome: Ongoing, Progressing     Problem: Skin Injury Risk Increased  Goal: Skin Health and Integrity  Outcome: Ongoing, Progressing     Problem: Fall Injury Risk  Goal: Absence of Fall and Fall-Related Injury  Outcome: Ongoing, Progressing     Problem: Dysrhythmia  Goal: Normalized Cardiac Rhythm  Outcome: Ongoing, Progressing

## 2022-06-13 NOTE — PLAN OF CARE
06/13/22 0940   LALA Message   Medicare Outpatient and Observation Notification regarding financial responsibility Given to patient/caregiver;Explained to patient/caregiver;Signed/date by patient/caregiver   Date LALA was signed 06/13/22   Time LALA was signed 0940

## 2022-06-13 NOTE — PLAN OF CARE
Problem: Adult Inpatient Plan of Care  Goal: Plan of Care Review  Outcome: Ongoing, Progressing     Problem: Adult Inpatient Plan of Care  Goal: Patient-Specific Goal (Individualized)  Outcome: Ongoing, Progressing     Problem: Infection  Goal: Absence of Infection Signs and Symptoms  Outcome: Ongoing, Progressing     Problem: Fluid and Electrolyte Imbalance (Acute Kidney Injury/Impairment)  Goal: Fluid and Electrolyte Balance  Outcome: Ongoing, Progressing     Problem: Oral Intake Inadequate (Acute Kidney Injury/Impairment)  Goal: Optimal Nutrition Intake  Outcome: Ongoing, Progressing     Problem: Renal Function Impairment (Acute Kidney Injury/Impairment)  Goal: Effective Renal Function  Outcome: Ongoing, Progressing     Problem: Skin Injury Risk Increased  Goal: Skin Health and Integrity  Outcome: Ongoing, Progressing

## 2022-06-13 NOTE — PLAN OF CARE
06/13/22 1240   Discharge Assessment   Assessment Type Discharge Planning Assessment   Confirmed/corrected address, phone number and insurance Yes   Confirmed Demographics Correct on Facesheet   Source of Information family   When was your last doctors appointment?   (last week)   Does patient/caregiver understand observation status Yes   Communicated GRICEL with patient/caregiver Yes   Reason For Admission couldn't urinate or have BM   Lives With facility resident  (lives at [facility])   Do you expect to return to your current living situation? Yes   Do you have help at home or someone to help you manage your care at home? Yes   Who are your caregiver(s) and their phone number(s)? Jerilyn Delong spouse [phone]   Prior to hospitilization cognitive status: Alert/Oriented   Current cognitive status: Not Oriented to Time   Walking or Climbing Stairs Difficulty ambulation difficulty, requires equipment   Mobility Management uses a wheelchair or rollator   Dressing/Bathing Difficulty bathing difficulty, assistance 1 person   Dressing/Bathing Management requires assistance   Do you have any problems with: Errands/Grocery  (spouse does errands)   Home Accessibility stairs to enter home   Number of Stairs, Main Entrance one   Home Layout Able to live on 1st floor   Equipment Currently Used at Home rollator;wheelchair   Readmission within 30 days? No   Patient currently being followed by outpatient case management? No   Do you currently have service(s) that help you manage your care at home? Yes   Name and Contact number of agency [facility]   Is the pt/caregiver preference to resume services with current agency Yes   Do you take prescription medications? Yes   Do you have prescription coverage? Yes   Coverage [employer]   Do you have any problems affording any of your prescribed medications? No   Is the patient taking medications as prescribed? yes   Who is going to help you get home at discharge? Jerilyn  Sindi spouse [phone]   How do you get to doctors appointments? family or friend will provide   Are you on dialysis? No   Do you take coumadin? No   Discharge Plan A Home Health   Discharge Plan B Skilled Nursing Facility   DME Needed Upon Discharge  none   Discharge Plan discussed with: Spouse/sig other   Name(s) and Number(s) Jerilyn Delong spouse [phone]   Discharge Barriers Identified None   Relationship/Environment   Name(s) of Who Lives With Patient Jerilyn Delong spouse [phone]; last week moved into [facility]   Patient awake & able to answer some questions appropriately before dozing off to sleep. Patient's spouse Jerilyn at bedside. Patient was recently placed at [facility] last week. She is concerned if he will be able to return. Per Park Esquivel she is holding his bed & she will take him back. Patient was receiving home health through [facility] but now is receiving home health through [facility]. He has had CVA in the past & has some weakness. He is able to get into a wheelchair with assistance. OT recommendations are for skilled. Per Park he can go as long as she continues to pay for his room as he has her last room. Per Jerilyn she will definitely pay for the room & will think about going to skilled. If she decides to go she would like to go to [facility] as closer to her home. She will let me know tomorrow. Will continue to follow.

## 2022-06-13 NOTE — SUBJECTIVE & OBJECTIVE
Interval History:  Is feeling better today.  Nursing staff states that he has had 3 bowel movements.  Bowel movements have been semi solid.  He has not been irritated today.  His wife wants to know will he be going back to his personal care home.  This  will check on this.  He also had a pause noted on telemetry with heart rate.  No chest pain or dizziness.    Review of Systems   Constitutional: Negative.    HENT: Negative.     Respiratory: Negative.     Cardiovascular: Negative.    Gastrointestinal: Negative.    Endocrine: Negative.    Genitourinary: Negative.    Musculoskeletal: Negative.    Skin: Negative.    Allergic/Immunologic: Negative.    Neurological: Negative.    Hematological: Negative.    Psychiatric/Behavioral: Negative.     Objective:     Vital Signs (Most Recent):  Temp: 97.2 °F (36.2 °C) (06/13/22 1046)  Pulse: 67 (06/13/22 1046)  Resp: 17 (06/13/22 1046)  BP: (!) 152/70 (06/13/22 1046)  SpO2: 97 % (06/13/22 1046)   Vital Signs (24h Range):  Temp:  [97 °F (36.1 °C)-98.6 °F (37 °C)] 97.2 °F (36.2 °C)  Pulse:  [56-76] 67  Resp:  [17-20] 17  SpO2:  [95 %-99 %] 97 %  BP: (113-152)/(58-86) 152/70     Weight: 72.2 kg (159 lb 1.6 oz)  Body mass index is 24.92 kg/m².    Intake/Output Summary (Last 24 hours) at 6/13/2022 1326  Last data filed at 6/13/2022 1100  Gross per 24 hour   Intake 240 ml   Output 1377 ml   Net -1137 ml      Physical Exam  Vitals and nursing note reviewed.   HENT:      Head: Normocephalic and atraumatic.      Right Ear: External ear normal.      Left Ear: External ear normal.      Nose: Nose normal.      Mouth/Throat:      Mouth: Mucous membranes are moist.   Eyes:      Extraocular Movements: Extraocular movements intact.   Cardiovascular:      Rate and Rhythm: Normal rate. Rhythm irregular.      Pulses: Normal pulses.      Heart sounds: Normal heart sounds.   Pulmonary:      Effort: Pulmonary effort is normal.      Breath sounds: Normal breath sounds.   Abdominal:       General: Abdomen is flat. Bowel sounds are normal.      Palpations: Abdomen is soft.   Musculoskeletal:         General: Normal range of motion.      Cervical back: Normal range of motion and neck supple.   Skin:     General: Skin is warm.      Capillary Refill: Capillary refill takes 2 to 3 seconds.   Neurological:      General: No focal deficit present.      Mental Status: He is alert. Mental status is at baseline.   Psychiatric:         Mood and Affect: Mood normal.         Behavior: Behavior normal.       Significant Labs: All pertinent labs within the past 24 hours have been reviewed.    Significant Imaging: I have reviewed all pertinent imaging results/findings within the past 24 hours.

## 2022-06-14 LAB
ANION GAP SERPL CALC-SCNC: 9 MMOL/L (ref 8–16)
AORTIC ROOT ANNULUS: 2.16 CM
AORTIC VALVE CUSP SEPERATION: 1.3 CM
AV INDEX (PROSTH): 1.28
AV MEAN GRADIENT: 2 MMHG
AV PEAK GRADIENT: 3 MMHG
AV VALVE AREA: 4.7 CM2
AV VELOCITY RATIO: 1.1
BASOPHILS # BLD AUTO: 0.05 K/UL (ref 0–0.2)
BASOPHILS NFR BLD: 0.5 % (ref 0–1.9)
BNP SERPL-MCNC: 1022 PG/ML (ref 0–99)
BSA FOR ECHO PROCEDURE: 1.85 M2
BUN SERPL-MCNC: 33 MG/DL (ref 8–23)
CALCIUM SERPL-MCNC: 8.1 MG/DL (ref 8.7–10.5)
CHLORIDE SERPL-SCNC: 117 MMOL/L (ref 95–110)
CO2 SERPL-SCNC: 15 MMOL/L (ref 23–29)
CREAT SERPL-MCNC: 1.7 MG/DL (ref 0.5–1.4)
CV ECHO LV RWT: 0.52 CM
DIFFERENTIAL METHOD: ABNORMAL
DOP CALC AO PEAK VEL: 0.8 M/S
DOP CALC AO VTI: 14.82 CM
DOP CALC LVOT AREA: 3.7 CM2
DOP CALC LVOT DIAMETER: 2.16 CM
DOP CALC LVOT PEAK VEL: 0.88 M/S
DOP CALC LVOT STROKE VOLUME: 69.7 CM3
DOP CALCLVOT PEAK VEL VTI: 19.03 CM
E WAVE DECELERATION TIME: 316.29 MSEC
ECHO LV POSTERIOR WALL: 1.18 CM (ref 0.6–1.1)
EJECTION FRACTION: 55 %
EOSINOPHIL # BLD AUTO: 0.3 K/UL (ref 0–0.5)
EOSINOPHIL NFR BLD: 3.2 % (ref 0–8)
ERYTHROCYTE [DISTWIDTH] IN BLOOD BY AUTOMATED COUNT: 12.6 % (ref 11.5–14.5)
EST. GFR  (AFRICAN AMERICAN): 42.5 ML/MIN/1.73 M^2
EST. GFR  (NON AFRICAN AMERICAN): 36.7 ML/MIN/1.73 M^2
FRACTIONAL SHORTENING: 26 % (ref 28–44)
GLUCOSE SERPL-MCNC: 86 MG/DL (ref 70–110)
HCT VFR BLD AUTO: 29 % (ref 40–54)
HGB BLD-MCNC: 9.6 G/DL (ref 14–18)
IMM GRANULOCYTES # BLD AUTO: 0.04 K/UL (ref 0–0.04)
IMM GRANULOCYTES NFR BLD AUTO: 0.4 % (ref 0–0.5)
INTERVENTRICULAR SEPTUM: 0.92 CM (ref 0.6–1.1)
LA MAJOR: 4.48 CM
LA MINOR: 3.97 CM
LEFT ATRIUM SIZE: 3.75 CM
LEFT INTERNAL DIMENSION IN SYSTOLE: 3.36 CM (ref 2.1–4)
LEFT VENTRICLE DIASTOLIC VOLUME INDEX: 51.81 ML/M2
LEFT VENTRICLE DIASTOLIC VOLUME: 94.82 ML
LEFT VENTRICLE MASS INDEX: 91 G/M2
LEFT VENTRICLE SYSTOLIC VOLUME INDEX: 25.2 ML/M2
LEFT VENTRICLE SYSTOLIC VOLUME: 46.18 ML
LEFT VENTRICULAR INTERNAL DIMENSION IN DIASTOLE: 4.55 CM (ref 3.5–6)
LEFT VENTRICULAR MASS: 166.9 G
LYMPHOCYTES # BLD AUTO: 1.3 K/UL (ref 1–4.8)
LYMPHOCYTES NFR BLD: 13.3 % (ref 18–48)
MAGNESIUM SERPL-MCNC: 1.4 MG/DL (ref 1.6–2.6)
MCH RBC QN AUTO: 32.3 PG (ref 27–31)
MCHC RBC AUTO-ENTMCNC: 33.1 G/DL (ref 32–36)
MCV RBC AUTO: 98 FL (ref 82–98)
MONOCYTES # BLD AUTO: 1.2 K/UL (ref 0.3–1)
MONOCYTES NFR BLD: 12.5 % (ref 4–15)
MV STENOSIS PRESSURE HALF TIME: 91.72 MS
MV VALVE AREA P 1/2 METHOD: 2.4 CM2
NEUTROPHILS # BLD AUTO: 6.6 K/UL (ref 1.8–7.7)
NEUTROPHILS NFR BLD: 70.1 % (ref 38–73)
NRBC BLD-RTO: 0 /100 WBC
PISA MRMAX VEL: 0.03 M/S
PISA TR MAX VEL: 2.36 M/S
PLATELET # BLD AUTO: 196 K/UL (ref 150–450)
PMV BLD AUTO: 10.5 FL (ref 9.2–12.9)
POTASSIUM SERPL-SCNC: 4.3 MMOL/L (ref 3.5–5.1)
PV PEAK VELOCITY: 0.78 CM/S
RA MAJOR: 4.2 CM
RBC # BLD AUTO: 2.97 M/UL (ref 4.6–6.2)
SODIUM SERPL-SCNC: 141 MMOL/L (ref 136–145)
TR MAX PG: 22 MMHG
TSH SERPL DL<=0.005 MIU/L-ACNC: 1.08 UIU/ML (ref 0.4–4)
WBC # BLD AUTO: 9.42 K/UL (ref 3.9–12.7)

## 2022-06-14 PROCEDURE — 84443 ASSAY THYROID STIM HORMONE: CPT | Performed by: HOSPITALIST

## 2022-06-14 PROCEDURE — 96366 THER/PROPH/DIAG IV INF ADDON: CPT | Performed by: EMERGENCY MEDICINE

## 2022-06-14 PROCEDURE — 83735 ASSAY OF MAGNESIUM: CPT | Performed by: INTERNAL MEDICINE

## 2022-06-14 PROCEDURE — 86580 TB INTRADERMAL TEST: CPT | Performed by: HOSPITALIST

## 2022-06-14 PROCEDURE — G0378 HOSPITAL OBSERVATION PER HR: HCPCS

## 2022-06-14 PROCEDURE — 25000003 PHARM REV CODE 250: Performed by: HOSPITALIST

## 2022-06-14 PROCEDURE — 83880 ASSAY OF NATRIURETIC PEPTIDE: CPT | Performed by: INTERNAL MEDICINE

## 2022-06-14 PROCEDURE — 96365 THER/PROPH/DIAG IV INF INIT: CPT | Performed by: EMERGENCY MEDICINE

## 2022-06-14 PROCEDURE — 36415 COLL VENOUS BLD VENIPUNCTURE: CPT | Performed by: HOSPITALIST

## 2022-06-14 PROCEDURE — 63600175 PHARM REV CODE 636 W HCPCS: Performed by: HOSPITALIST

## 2022-06-14 PROCEDURE — 99225 PR SUBSEQUENT OBSERVATION CARE,LEVEL II: ICD-10-PCS | Mod: ,,, | Performed by: HOSPITALIST

## 2022-06-14 PROCEDURE — 97530 THERAPEUTIC ACTIVITIES: CPT

## 2022-06-14 PROCEDURE — 25000003 PHARM REV CODE 250: Performed by: INTERNAL MEDICINE

## 2022-06-14 PROCEDURE — 97162 PT EVAL MOD COMPLEX 30 MIN: CPT

## 2022-06-14 PROCEDURE — 97110 THERAPEUTIC EXERCISES: CPT

## 2022-06-14 PROCEDURE — 80048 BASIC METABOLIC PNL TOTAL CA: CPT | Performed by: INTERNAL MEDICINE

## 2022-06-14 PROCEDURE — 96361 HYDRATE IV INFUSION ADD-ON: CPT | Performed by: EMERGENCY MEDICINE

## 2022-06-14 PROCEDURE — 30200315 PPD INTRADERMAL TEST REV CODE 302: Performed by: HOSPITALIST

## 2022-06-14 PROCEDURE — 85025 COMPLETE CBC W/AUTO DIFF WBC: CPT | Performed by: INTERNAL MEDICINE

## 2022-06-14 PROCEDURE — 63600175 PHARM REV CODE 636 W HCPCS: Performed by: INTERNAL MEDICINE

## 2022-06-14 PROCEDURE — 99225 PR SUBSEQUENT OBSERVATION CARE,LEVEL II: CPT | Mod: ,,, | Performed by: HOSPITALIST

## 2022-06-14 PROCEDURE — 96372 THER/PROPH/DIAG INJ SC/IM: CPT | Performed by: HOSPITALIST

## 2022-06-14 RX ORDER — MAGNESIUM SULFATE HEPTAHYDRATE 40 MG/ML
2 INJECTION, SOLUTION INTRAVENOUS ONCE
Status: COMPLETED | OUTPATIENT
Start: 2022-06-14 | End: 2022-06-14

## 2022-06-14 RX ORDER — HYDRALAZINE HYDROCHLORIDE 25 MG/1
25 TABLET, FILM COATED ORAL DAILY
Status: DISCONTINUED | OUTPATIENT
Start: 2022-06-14 | End: 2022-06-15 | Stop reason: HOSPADM

## 2022-06-14 RX ADMIN — HYDROXYZINE PAMOATE 25 MG: 25 CAPSULE ORAL at 09:06

## 2022-06-14 RX ADMIN — SERTRALINE HYDROCHLORIDE 100 MG: 25 TABLET ORAL at 08:06

## 2022-06-14 RX ADMIN — ASPIRIN 81 MG: 81 TABLET, COATED ORAL at 08:06

## 2022-06-14 RX ADMIN — MAGNESIUM SULFATE IN WATER 2 G: 40 INJECTION, SOLUTION INTRAVENOUS at 12:06

## 2022-06-14 RX ADMIN — HEPARIN SODIUM 5000 UNITS: 5000 INJECTION, SOLUTION INTRAVENOUS; SUBCUTANEOUS at 01:06

## 2022-06-14 RX ADMIN — NIFEDIPINE 60 MG: 30 TABLET, FILM COATED, EXTENDED RELEASE ORAL at 08:06

## 2022-06-14 RX ADMIN — PREDNISONE 5 MG: 1 TABLET ORAL at 08:06

## 2022-06-14 RX ADMIN — ATORVASTATIN CALCIUM 40 MG: 40 TABLET, FILM COATED ORAL at 08:06

## 2022-06-14 RX ADMIN — HEPARIN SODIUM 5000 UNITS: 5000 INJECTION, SOLUTION INTRAVENOUS; SUBCUTANEOUS at 09:06

## 2022-06-14 RX ADMIN — METOPROLOL SUCCINATE 50 MG: 25 TABLET, EXTENDED RELEASE ORAL at 08:06

## 2022-06-14 RX ADMIN — TUBERCULIN PURIFIED PROTEIN DERIVATIVE 5 UNITS: 5 INJECTION, SOLUTION INTRADERMAL at 05:06

## 2022-06-14 RX ADMIN — HYDRALAZINE HYDROCHLORIDE 25 MG: 25 TABLET, FILM COATED ORAL at 12:06

## 2022-06-14 RX ADMIN — TAMSULOSIN HYDROCHLORIDE 0.4 MG: 0.4 CAPSULE ORAL at 08:06

## 2022-06-14 RX ADMIN — HEPARIN SODIUM 5000 UNITS: 5000 INJECTION, SOLUTION INTRAVENOUS; SUBCUTANEOUS at 05:06

## 2022-06-14 RX ADMIN — ALLOPURINOL 100 MG: 100 TABLET ORAL at 08:06

## 2022-06-14 NOTE — PROGRESS NOTES
"CARDIOLOGY PROGRESS NOTE    Patient Name:  Lucho Delong    :  1940    Medical Record:  [id number]    DATE OF SERVICE  2022        SUBJECTIVE  The patient is being seen for follow-up no chest pain or shortness of breath no further pauses increased blood pressure 160/76 recent renal insufficiency improving       REVIEW OF SYSTEMS  General: No chills, No fever, No fatigue  Eyes: No vision changes, No drainage from eyes  ENT: No nasal congestion, no sore throat  Respiratory: No shortness of breath, No cough  Cardiac: No chest pain, palpitations, dyspnea, dizziness, claudication, or syncopal episodes  GI: No abdominal pain, no nausea, no vomiting  : No dysuria  Musculoskeletal: No joint pain  Neuro: L sided  weakness, dizziness, vision changes       OBJECTIVE          PHYSICAL EXAM  Vital Signs:  BP (!) 168/76 (BP Location: Right arm, Patient Position: Lying)   Pulse 88   Temp 97.7 °F (36.5 °C) (Oral)   Resp 18   Ht 5' 7" (1.702 m)   Wt 72.1 kg (159 lb)   SpO2 (!) 92%   BMI 24.90 kg/m²   Temp:  [97.2 °F (36.2 °C)-98.7 °F (37.1 °C)] 97.7 °F (36.5 °C)  Pulse:  [] 88  Resp:  [16-18] 18  SpO2:  [92 %-97 %] 92 %  BP: (116-168)/(57-76) 168/76      General:   Eyes: Anicteric sclera. Moist conjunctiva. Vision grossly intact.  HENMT: Normocephalic.  Moist mucous membranes. No obvious ulcerations.   Neck: Trachea midline.   Cardiovascular:  Irregularly iregular rate and rhythm. S1, S2, 1 to 2/6 systolic ejection murmur left sternal border  Peripheral pulses palpable. No peripheral edema.   Respiratory: Lungs clear to auscultation bilaterally. No increased work of breathing.  Gastrointestinal: Bowel sounds active in all 4 quadrants. Soft, nontender, nondistended.   Genitourinary: Urine clear yellow  Musculoskeletal:  Motor left upper extremity 4/5 lower extremity 3/5  Skin: Color normal for ethnicity. Skin warm and dry with good turgor. Capillary refill < 3 seconds.   Neurologic: Oriented x 3.  " Speech fluent, follows commands.  Psychiatric:  Awake and alert, normal affect mood good.      LABS    CBC  Recent Labs   Lab 06/11/22  1747 06/12/22  0635 06/14/22  0647   WBC 9.85 8.21 9.42   RBC 3.29* 2.97* 2.97*   HGB 10.6* 9.6* 9.6*   HCT 31.7* 28.8* 29.0*   MCV 96 97 98   MCH 32.2* 32.3* 32.3*   MCHC 33.4 33.3 33.1   RDW 12.7 12.8 12.6   MPV 10.3 10.2 10.5    191 196       Chemistry  Recent Labs   Lab 06/11/22  1747 06/12/22  0635 06/14/22  0647    141 141   K 5.2* 4.5 4.3   * 115* 117*   CO2 15* 17* 15*   BUN 49* 43* 33*   CREATININE 2.6* 2.1* 1.7*   * 89 86   PROT 6.6 5.8*  --    CALCIUM 8.7 8.5* 8.1*   BILITOT 0.4 0.5  --    AST 19 19  --    ALT 33 28  --        ABG  No results for input(s): PHART, PQJ2XFI, PO2ART, DXH3SPL, E6EHGGKX, BEART, K2DJWYXM in the last 168 hours.      MICROBIOLOGY  Reviewed    IMAGING & OTHER STUDIES  Reviewed      Intake/Output last 3 shifts:  I/O last 3 completed shifts:  In: 4993.2 [P.O.:780; I.V.:4213.2]  Out: 227 [Urine:225; Stool:2]    Scheduled meds:   allopurinoL  100 mg Oral Daily    aspirin  81 mg Oral Daily    atorvastatin  40 mg Oral Daily    heparin (porcine)  5,000 Units Subcutaneous Q8H    metoprolol succinate  50 mg Oral Daily    NIFEdipine  60 mg Oral Daily    predniSONE  5 mg Oral Daily    senna-docusate 8.6-50 mg  1 tablet Oral BID    sertraline  100 mg Oral Daily    sodium polystyrene  15 g Oral Once    tamsulosin  0.4 mg Oral Daily       PRN Meds:  acetaminophen, albuterol-ipratropium, dextrose 10%, dextrose 10%, glucagon (human recombinant), glucose, glucose, hydrOXYzine pamoate, naloxone, ondansetron, sodium chloride 0.9%    Continuous Infusions:   sodium chloride 0.9% 100 mL/hr at 06/14/22 0331       Dietary Orders:  [unfilled]     Admit weight: Weight: 69.9 kg (154 lb)   Today weight: Weight: 72.1 kg (159 lb)      Length of stay in days: 0      ASSESSMENT    Active Hospital Problems    Diagnosis  POA    *Abdominal pain  [R10.9]  Yes    Cardiac arrhythmia [I49.9]  Unknown    SELVIN (acute kidney injury) [N17.9]  Yes    CKD (chronic kidney disease) stage 3, GFR 30-59 ml/min [N18.30]  Yes    Constipation [K59.00]  Yes    AMS (altered mental status) [R41.82]  Yes    Current chronic use of systemic steroids [Z79.52]  Not Applicable    B12 deficiency [E53.8]  Yes    Gout [M10.9]  Yes    HLD (hyperlipidemia) [E78.5]  Yes    HTN (hypertension) [I10]  Yes    Persistent atrial fibrillation, onset 2018 [I48.19]  Yes      Resolved Hospital Problems   No resolved problems to display.          PLAN    Chronic atrial fibrillation  Since 2018  No further pauses  Pause 6/13 2.2 seconds  Metoprolol 50 mg daily  Watchman risk of falls  TSH 1.02     Coronary artery disease  No chest pain  Occasional shortness of breath  Stent 2020  EF 35%HX   EF 55  6/22    Aortic valve disease  TAVR 2020  Echo Normal aortic valve replacement 6/22     Occasional shortness of breath   Improved  Venous no DVT 6/22     History CVA 2018  Left-sided weakness unable walk     Hypertension     Anemia    Hemoglobin 9.6 6/12  Treated medicine     Acute renal failure  BUN 33 creatinine 1.7  6/14 improving     Hypo magnesium  Magnesium 1.4 6/14     Plan  Mag sulfate 2 g IV x1 today  BMP Magnesium level in a.m.  Hydralazine 25 mg daily                Electronically signed by: Nick Avilez, 6/14/2022 10:14 AM

## 2022-06-14 NOTE — PROGRESS NOTES
[facility] Medicine  Progress Note    Patient Name: Lucho Delong  MRN: [id number]  Patient Class: OP- Observation   Admission Date: 6/11/2022  Length of Stay: 0 days  Attending Physician: Audelia Mabry MD  Primary Care Provider: Jose Nolan MD        Subjective:     Principal Problem:Abdominal pain        HPI:  The patient is an 80 y/o male with PMH of atrial fibrillation, CKD 3, HTN, gout, HLP, prostate ca, and stroke. History is provided from patient's wife and ER reports. He resides in a nursing home in which he was placed a week ago for frequent falls. He was feeling agitated and having frequent urination and reporting abdominal pain over the past day. He has been having bowel movements but no blood noted. Wife reports no fevers, chills, nausea, vomiting, or other symptoms. When a powers was placed and he started to void, wife reported that his agitation improved. She reported a history of prostate surgery about 25 years ago. No UTI noted on UA.       Overview/Hospital Course:  No notes on file    Interval History:  Patient does not have any complaints but his wife states that she thinks he has more weakness on the left side and can not get out of bed.  Denies chest    Review of Systems   Constitutional: Negative.    HENT: Negative.     Eyes: Negative.    Respiratory: Negative.     Cardiovascular: Negative.    Gastrointestinal: Negative.    Endocrine: Negative.    Genitourinary: Negative.    Musculoskeletal:  Positive for arthralgias and gait problem.   Allergic/Immunologic: Negative.    Neurological:  Positive for weakness.   Hematological: Negative.    Psychiatric/Behavioral: Negative.     Objective:     Vital Signs (Most Recent):  Temp: 97.7 °F (36.5 °C) (06/14/22 0718)  Pulse: 88 (06/14/22 0718)  Resp: 18 (06/14/22 0718)  BP: (!) 168/76 (06/14/22 0718)  SpO2: (!) 92 % (06/14/22 0718)   Vital Signs (24h Range):  Temp:  [97.2 °F (36.2 °C)-98.7 °F (37.1 °C)] 97.7 °F (36.5  °C)  Pulse:  [] 88  Resp:  [16-18] 18  SpO2:  [92 %-97 %] 92 %  BP: (116-168)/(57-76) 168/76     Weight: 72.1 kg (159 lb)  Body mass index is 24.9 kg/m².    Intake/Output Summary (Last 24 hours) at 6/14/2022 1502  Last data filed at 6/14/2022 0331  Gross per 24 hour   Intake 4633.2 ml   Output --   Net 4633.2 ml      Physical Exam  Vitals and nursing note reviewed.   Constitutional:       Appearance: Normal appearance.   HENT:      Head: Normocephalic and atraumatic.      Right Ear: External ear normal.      Left Ear: External ear normal.      Nose: Nose normal.      Mouth/Throat:      Mouth: Mucous membranes are moist.   Eyes:      Extraocular Movements: Extraocular movements intact.   Cardiovascular:      Rate and Rhythm: Normal rate. Rhythm irregular.      Pulses: Normal pulses.      Heart sounds: Normal heart sounds.   Pulmonary:      Effort: Pulmonary effort is normal.   Abdominal:      General: Abdomen is flat. Bowel sounds are normal.      Palpations: Abdomen is soft.   Musculoskeletal:         General: Normal range of motion.      Cervical back: Normal range of motion and neck supple.   Skin:     General: Skin is warm.      Capillary Refill: Capillary refill takes 2 to 3 seconds.   Neurological:      General: No focal deficit present.      Mental Status: He is alert.   Psychiatric:         Mood and Affect: Mood normal.       Significant Labs: All pertinent labs within the past 24 hours have been reviewed.    Significant Imaging: I have reviewed all pertinent imaging results/findings within the past 24 hours.      Assessment/Plan:      * Abdominal pain  CT without acute findings  Stool noted; will treat as per below  Wife reported improvement in symptoms and agitation after powers placement; possible urinary retention?  Will d/c powers once having bowel movements and monitor for retention. Check PVR  UA negative for infection       Cardiac arrhythmia  Cardiology will adjust metoprolol dose      Current  chronic use of systemic steroids  Unclear indication for chronic prednisone therapy       AMS (altered mental status)  Possibly due to urinary retention, improving now  Continue to monitor  Delirium and fall precautions   Will do an MRI of the brain to rule out a recent CVA      Constipation  Noted on CT  Given kayexalate for hyperkalemia and then started senna-doc  Continue IV hydration   K+ improved to normal, continue to monitor      CKD (chronic kidney disease) stage 3, GFR 30-59 ml/min  Per selvin        SELVIN (acute kidney injury)  Lab Results   Component Value Date    CREATININE 1.7 (H) 06/14/2022     Sr Cr slightly elevated from baseline on admission, improving with IVFs  Continue IVF  Cont to monitor with daily labs   Avoid nephrotoxins.   Renally dose all medications   Monitor events that may lead to decreased renal perfusion (hypovolemia, hypotension, sepsis).   Monitor urine output (goal 0.5 mL/kg/hr) to assure that no obstruction precipitates worsening in GFR.  Serum bicarb level 15. Chronically low; most likely due to CKD. Not far from baseline. Anticipate improvement with improvement in renal function.   Patient has chronic kidney disease            B12 deficiency  Receives monthly injections       Gout  Continue allopurinol       HLD (hyperlipidemia)  Atorvastatin       HTN (hypertension)  Continue metoprolol and nifedipine       Persistent atrial fibrillation, onset 2018  Telemetry  Continue metoprolol   Has watchman device in place  Cardiology consult      VTE Risk Mitigation (From admission, onward)         Ordered     heparin (porcine) injection 5,000 Units  Every 8 hours         06/11/22 2142                Discharge Planning   GRICEL:      Code Status: Full Code   Is the patient medically ready for discharge?:     Reason for patient still in hospital (select all that apply): Treatment  Discharge Plan A: Home Health                  Reinaldo Kyle MD  Department of Hospital Medicine   [facility]  Surg

## 2022-06-14 NOTE — PLAN OF CARE
Problem: Adult Inpatient Plan of Care  Goal: Plan of Care Review  Outcome: Ongoing, Progressing  Goal: Patient-Specific Goal (Individualized)  Outcome: Ongoing, Progressing  Goal: Absence of Hospital-Acquired Illness or Injury  Outcome: Ongoing, Progressing  Goal: Optimal Comfort and Wellbeing  Outcome: Ongoing, Progressing  Goal: Readiness for Transition of Care  Outcome: Ongoing, Progressing     Problem: Renal Function Impairment (Acute Kidney Injury/Impairment)  Goal: Effective Renal Function  Outcome: Ongoing, Progressing     Problem: Fall Injury Risk  Goal: Absence of Fall and Fall-Related Injury  Outcome: Ongoing, Progressing

## 2022-06-14 NOTE — PT/OT/SLP EVAL
Physical Therapy Evaluation    Patient Name:  Lucho Delong   MRN:  [id number]    Recommendations:     Discharge Recommendations:  nursing facility, skilled   Discharge Equipment Recommendations:  (TBD at next level of care)   Barriers to discharge: None    Assessment:     Lucho Delong is a 81 y.o. male admitted with a medical diagnosis of Abdominal pain.  He presents with the following impairments/functional limitations:  weakness, impaired endurance, impaired self care skills, impaired functional mobilty, gait instability, impaired balance, impaired cognition, decreased upper extremity function, decreased lower extremity function, impaired coordination. Patient presentation today appearing to be different than what was noted yesterday with increased left sided weakness and functional mobility. Dr sullivan notified to assess.    Rehab Prognosis: Good; patient would benefit from acute skilled PT services to address these deficits and reach maximum level of function.    Recent Surgery: * No surgery found *      Plan:     During this hospitalization, patient to be seen  (3-5 x/wk) to address the identified rehab impairments via gait training, therapeutic activities, therapeutic exercises and progress toward the following goals:    · Plan of Care Expires:   (upon discharge from facility)    Subjective     Chief Complaint: weakness  Patient/Family Comments/goals: transfer to skilled facility at discharge  Pain/Comfort:  · Pain Rating 1: 0/10    Patients cultural, spiritual, Anabaptist conflicts given the current situation: no    Living Environment:  Patient was living with his wife at home in a SS home with no steps to enter but had recently moved into a personal care home. He was only there for a few days prior to admit onto hospital.  Prior to admission, patients level of function was patient required assistance for ADL's, limited ambulation with history of falls.  Equipment used at home: bedside commode,  rollator (transfer chair).  DME owned (not currently used): none.  Upon discharge, patient will have assistance from personal care home staff.    Objective:     Communicated with RN prior to session.  Patient found HOB elevated with peripheral IV, telemetry, Other (comments) (avasys in use)  upon PT entry to room.    General Precautions: Standard, fall   Orthopedic Precautions:N/A   Braces: N/A  Respiratory Status: Room air    Exams:  · Cognitive Exam:  Patient is oriented to Person, he responds appropriately to commands, speech slurred  · RLE ROM: WFL's for patient age and level of mobility  · RLE Strength: 3/5 to 3+/5  · LLE ROM: decreased AROM secondary to weakness, PROM WFL's for patient age and level of mobility  · LLE Strength: 2+/5 to 3-/5    Functional Mobility:  · Bed Mobility:  Supine to Sit: moderate assistance  · Sit to Supine: moderate assistance  · Transfers: unable to assess secondary to weakness and inability to sit on side of bed unsupported    Therapeutic Activities and Exercises:  PT eval completed. Patient required mod assist for bed mobility. In sitting he required CGA to mod assist to maintain secondary to weakness and listing to left side. He sat with assist for 8 min with tactile and verbal cueing for trunk extension and midline. He performed bilateral heel-toe raises and alt knee ext x 10 ea requiring PT assist for sitting balance.     Patient Education Provided:              -PT roles, expectations, and POC               -Safety with mobility              -Benefits of OOB activities to increase strength and functional mobility               -Performing ther ex for increasing LE ROM and strength              -Discharge recommendations     AM-PAC 6 CLICK MOBILITY  Total Score:      Patient left HOB elevated with all lines intact, call button in reach, RN notified and spouse present.    GOALS:   1. Patient to require min assist for bed mobility  2. Patient to require min assist for transfer to  chair at bedside  3. Patient to tolerate unsupported sitting on side of bed with CGA for 6 min    History:     Past Medical History:   Diagnosis Date    Anticoagulant long-term use     Atrial fibrillation     Bursitis of hip     Chronic back pain     Chronic kidney disease, stage 3     Gastroesophageal reflux disease     Gout     Hyperlipemia     Hypertension     Prostate cancer 1997    Stroke 04/2019       Past Surgical History:   Procedure Laterality Date    CARDIAC VALVE REPLACEMENT  Feb 18, 2020    CARDIAC VALVE SURGERY      CATHETERIZATION OF BOTH LEFT AND RIGHT HEART N/A 12/2/2019    Procedure: CATHETERIZATION, HEART, BOTH LEFT AND RIGHT;  Surgeon: Everett Sosa MD;  Location: [facility] CATH/EP LAB;  Service: Cardiology;  Laterality: N/A;    CLOSURE OF LEFT ATRIAL APPENDAGE USING DEVICE N/A 3/3/2021    Procedure: Left atrial appendage closure device;  Surgeon: Bryan Zhang MD;  Location: [facility] CATH LAB;  Service: Cardiology;  Laterality: N/A;    COLONOSCOPY  1997    CORONARY ANGIOGRAPHY N/A 1/8/2020    Procedure: ANGIOGRAM, CORONARY ARTERY;  Surgeon: Bryan Zhang MD;  Location: [facility] CATH LAB;  Service: Cardiology;  Laterality: N/A;    CORONARY STENT PLACEMENT N/A 1/8/2020    Procedure: INSERTION, STENT, CORONARY ARTERY;  Surgeon: Bryan Zhang MD;  Location: [facility] CATH LAB;  Service: Cardiology;  Laterality: N/A;    PERCUTANEOUS TRANSLUMINAL ANGIOPLASTY (PTA) OF PERIPHERAL VESSEL N/A 1/23/2020    Procedure: PTA, PERIPHERAL VESSEL;  Surgeon: Bryan Zhang MD;  Location: [facility] CATH LAB;  Service: Cardiology;  Laterality: N/A;    PROSTATE SURGERY  1997    TONSILLECTOMY      TRANSCATHETER AORTIC VALVE REPLACEMENT (TAVR)  2/18/2020    Procedure: REPLACEMENT, AORTIC VALVE, TRANSCATHETER (TAVR);  Surgeon: Bryan Zhang MD;  Location: [facility] CATH LAB;  Service: Cardiology;;    TRANSESOPHAGEAL ECHOCARDIOGRAPHY N/A 1/23/2020    Procedure: ECHOCARDIOGRAM, TRANSESOPHAGEAL;  Surgeon:  [facility] Diagnostic Provider;  Location: [facility] EP LAB;  Service: Anesthesiology;  Laterality: N/A;    TRANSESOPHAGEAL ECHOCARDIOGRAPHY N/A 4/20/2021    Procedure: ECHOCARDIOGRAM, TRANSESOPHAGEAL;  Surgeon: [facility] Diagnostic Provider;  Location: [facility] EP LAB;  Service: Anesthesiology;  Laterality: N/A;       Time Tracking:     PT Received On: 06/14/22  PT Start Time: 1010     PT Stop Time: 1042  PT Total Time (min): 32 min     Billable Minutes: Evaluation 16 min and Therapeutic Activity 16 min      06/14/2022

## 2022-06-14 NOTE — PT/OT/SLP PROGRESS
Occupational Therapy   Treatment    Name: Lucho Delong  MRN: [id number]  Admitting Diagnosis:  Abdominal pain       Recommendations:     Discharge Recommendations:  DC TO SKILLED NURSING  Discharge Equipment Recommendations:   NONE IF DC TO SKILLED  Barriers to discharge:   NONE IF DC TO SKILLED    Assessment:     Lucho Delong is a 81 y.o. male with a medical diagnosis of Abdominal pain.  He presents with decreased ind w/ all adls.  Performance deficits affecting function are   decreased strength , decreased static/dynamic stand balance, decreased sitting balance, decreased functional activity tolerance.      Rehab Prognosis:  Good; patient would benefit from acute skilled OT services to address these deficits and reach maximum level of function.       Plan:     Patient to be seen   to address the above listed problems via    · Plan of Care Expires:    · Plan of Care Reviewed with:      Subjective     Pain/Comfort:  ·      Objective:     Communicated with: nursing prior to session.  Patient found supine upon OT entry to room.    General Precautions: Standard,     Orthopedic Precautions:  none  Braces:  none  Respiratory Status: Room air     Occupational Performance:     Bed Mobility:    · Patient completed Rolling/Turning to Left with  contact guard assistance  · Patient completed Rolling/Turning to Right with contact guard assistance  · Patient completed Scooting/Bridging with contact guard assistance  · Patient completed Supine to Sit with minimum assistance  · Patient completed Sit to Supine with minimum assistance     Functional Mobility/Transfers:  · Patient completed Sit <> Stand Transfer with minimum assistance  with  hand-held assist   · Patient completed Bed <> Chair Transfer using Stand Pivot technique with minimum assistance with hand-held assist  ·         Treatment & Education:  Patient performed all bed mobility w/ cga to sba.  Patient performed supine to sit w/ min a . Patient  demonstrated fair/fair- static/dynamic sitting balance on eob. Patient performed sit to stand this date w/ OT  X 3 trials requiring Min to Mod A for task.  Patient performed bue arom therex for shoulder flexion, abd, and bicep curls 2 sets 10 reps.      Patient left supine with all lines intact, bed alarm on, nursing notified and wife presentEducation:      GOALS:   1. Patient will perform lb dressing w/ sba   2. Patient will perform toileting w/ sba  3. Patient will perform ub dressing w/ sba      Time Tracking:     OT Date of Treatment:  06/14/2022  OT Start Time:  14:55  OT Stop Time:  15:25  OT Total Time (min):  30    Billable Minutes:Therapeutic Activity 15  Therapeutic Exercise 15               6/14/2022

## 2022-06-14 NOTE — ASSESSMENT & PLAN NOTE
Lab Results   Component Value Date    CREATININE 1.7 (H) 06/14/2022     Sr Cr slightly elevated from baseline on admission, improving with IVFs  Continue IVF  Cont to monitor with daily labs   Avoid nephrotoxins.   Renally dose all medications   Monitor events that may lead to decreased renal perfusion (hypovolemia, hypotension, sepsis).   Monitor urine output (goal 0.5 mL/kg/hr) to assure that no obstruction precipitates worsening in GFR.  Serum bicarb level 15. Chronically low; most likely due to CKD. Not far from baseline. Anticipate improvement with improvement in renal function.   Patient has chronic kidney disease

## 2022-06-14 NOTE — PLAN OF CARE
Problem: Adult Inpatient Plan of Care  Goal: Plan of Care Review  6/14/2022 1749 by Matthew Delgado RN  Outcome: Ongoing, Progressing  6/14/2022 1748 by Matthew Delgado RN  Reactivated  6/14/2022 1747 by Matthew Delgado RN  Outcome: Met  Goal: Patient-Specific Goal (Individualized)  6/14/2022 1749 by Matthew Delgado RN  Outcome: Ongoing, Progressing    Goal: Absence of Hospital-Acquired Illness or Injury  6/14/2022 1749 by Matthew Delgado RN  Outcome: Ongoing, Progressing    Goal: Optimal Comfort and Wellbeing  6/14/2022 1749 by Matthew Delgado RN  Outcome: Ongoing, Progressing    Goal: Readiness for Transition of Care  6/14/2022 1749 by Matthew Delgado RN  Outcome: Ongoing, Progressing       Problem: Infection  Goal: Absence of Infection Signs and Symptoms  6/14/2022 1749 by Matthew Delgdao RN  Outcome: Ongoing, Progressing       Problem: Fluid and Electrolyte Imbalance (Acute Kidney Injury/Impairment)  Goal: Fluid and Electrolyte Balance  6/14/2022 1749 by Matthew Delgado RN  Outcome: Ongoing, Progressing       Problem: Oral Intake Inadequate (Acute Kidney Injury/Impairment)  Goal: Optimal Nutrition Intake  6/14/2022 1749 by Matthew Delgado RN       Problem: Renal Function Impairment (Acute Kidney Injury/Impairment)  Goal: Effective Renal Function  6/14/2022 1749 by Matthew Delgado RN  Outcome: Ongoing, Progressing       Problem: Skin Injury Risk Increased  Goal: Skin Health and Integrity  6/14/2022 1749 by Matthew Delgado RN  Outcome: Ongoing, Progressing       Problem: Constipation  Goal: Effective Bowel Elimination  6/14/2022 1749 by Matthew Delgado RN       Problem: Fall Injury Risk  Goal: Absence of Fall and Fall-Related Injury  6/14/2022 1749 by Matthew Delgado RN  Outcome: Ongoing, Progressing       Problem: Dysrhythmia  Goal: Normalized Cardiac Rhythm  6/14/2022 1749 by Matthew Delgado RN  Outcome: Ongoing, Progressing

## 2022-06-14 NOTE — ASSESSMENT & PLAN NOTE
Possibly due to urinary retention, improving now  Continue to monitor  Delirium and fall precautions   Will do an MRI of the brain to rule out a recent CVA

## 2022-06-15 VITALS
SYSTOLIC BLOOD PRESSURE: 113 MMHG | BODY MASS INDEX: 24.96 KG/M2 | TEMPERATURE: 97 F | RESPIRATION RATE: 16 BRPM | HEART RATE: 65 BPM | DIASTOLIC BLOOD PRESSURE: 59 MMHG | WEIGHT: 159 LBS | HEIGHT: 67 IN | OXYGEN SATURATION: 94 %

## 2022-06-15 LAB
ANION GAP SERPL CALC-SCNC: 10 MMOL/L (ref 8–16)
BUN SERPL-MCNC: 34 MG/DL (ref 8–23)
CALCIUM SERPL-MCNC: 8 MG/DL (ref 8.7–10.5)
CHLORIDE SERPL-SCNC: 118 MMOL/L (ref 95–110)
CO2 SERPL-SCNC: 12 MMOL/L (ref 23–29)
CREAT SERPL-MCNC: 1.8 MG/DL (ref 0.5–1.4)
EST. GFR  (AFRICAN AMERICAN): 39.6 ML/MIN/1.73 M^2
EST. GFR  (NON AFRICAN AMERICAN): 34.3 ML/MIN/1.73 M^2
GLUCOSE SERPL-MCNC: 99 MG/DL (ref 70–110)
MAGNESIUM SERPL-MCNC: 1.8 MG/DL (ref 1.6–2.6)
POTASSIUM SERPL-SCNC: 4.4 MMOL/L (ref 3.5–5.1)
SARS-COV-2 RDRP RESP QL NAA+PROBE: NEGATIVE
SODIUM SERPL-SCNC: 140 MMOL/L (ref 136–145)

## 2022-06-15 PROCEDURE — 96361 HYDRATE IV INFUSION ADD-ON: CPT | Performed by: EMERGENCY MEDICINE

## 2022-06-15 PROCEDURE — 97530 THERAPEUTIC ACTIVITIES: CPT

## 2022-06-15 PROCEDURE — 63600175 PHARM REV CODE 636 W HCPCS: Performed by: HOSPITALIST

## 2022-06-15 PROCEDURE — 97110 THERAPEUTIC EXERCISES: CPT

## 2022-06-15 PROCEDURE — U0002 COVID-19 LAB TEST NON-CDC: HCPCS | Performed by: FAMILY MEDICINE

## 2022-06-15 PROCEDURE — 25000003 PHARM REV CODE 250: Performed by: HOSPITALIST

## 2022-06-15 PROCEDURE — 80048 BASIC METABOLIC PNL TOTAL CA: CPT | Performed by: INTERNAL MEDICINE

## 2022-06-15 PROCEDURE — 96372 THER/PROPH/DIAG INJ SC/IM: CPT | Performed by: HOSPITALIST

## 2022-06-15 PROCEDURE — G0378 HOSPITAL OBSERVATION PER HR: HCPCS

## 2022-06-15 PROCEDURE — 25000003 PHARM REV CODE 250: Performed by: INTERNAL MEDICINE

## 2022-06-15 PROCEDURE — 99217 PR OBSERVATION CARE DISCHARGE: CPT | Mod: ,,, | Performed by: FAMILY MEDICINE

## 2022-06-15 PROCEDURE — 36415 COLL VENOUS BLD VENIPUNCTURE: CPT | Performed by: INTERNAL MEDICINE

## 2022-06-15 PROCEDURE — 99217 PR OBSERVATION CARE DISCHARGE: ICD-10-PCS | Mod: ,,, | Performed by: FAMILY MEDICINE

## 2022-06-15 PROCEDURE — 83735 ASSAY OF MAGNESIUM: CPT | Performed by: INTERNAL MEDICINE

## 2022-06-15 RX ORDER — METOPROLOL SUCCINATE 50 MG/1
50 TABLET, EXTENDED RELEASE ORAL DAILY
Qty: 30 TABLET | Refills: 2 | Status: SHIPPED | OUTPATIENT
Start: 2022-06-15 | End: 2022-09-13

## 2022-06-15 RX ORDER — LISINOPRIL 10 MG/1
10 TABLET ORAL DAILY
Qty: 90 TABLET | Refills: 0 | Status: SHIPPED | OUTPATIENT
Start: 2022-06-15 | End: 2022-09-13

## 2022-06-15 RX ORDER — AMOXICILLIN 250 MG
1 CAPSULE ORAL 2 TIMES DAILY
Qty: 60 TABLET | Refills: 11 | Status: SHIPPED | OUTPATIENT
Start: 2022-06-15

## 2022-06-15 RX ORDER — TAMSULOSIN HYDROCHLORIDE 0.4 MG/1
0.4 CAPSULE ORAL DAILY
Qty: 30 CAPSULE | Refills: 11 | Status: SHIPPED | OUTPATIENT
Start: 2022-06-16 | End: 2023-06-16

## 2022-06-15 RX ADMIN — HEPARIN SODIUM 5000 UNITS: 5000 INJECTION, SOLUTION INTRAVENOUS; SUBCUTANEOUS at 03:06

## 2022-06-15 RX ADMIN — TAMSULOSIN HYDROCHLORIDE 0.4 MG: 0.4 CAPSULE ORAL at 08:06

## 2022-06-15 RX ADMIN — HYDRALAZINE HYDROCHLORIDE 25 MG: 25 TABLET, FILM COATED ORAL at 08:06

## 2022-06-15 RX ADMIN — ALLOPURINOL 100 MG: 100 TABLET ORAL at 08:06

## 2022-06-15 RX ADMIN — ACETAMINOPHEN 650 MG: 325 TABLET ORAL at 08:06

## 2022-06-15 RX ADMIN — ATORVASTATIN CALCIUM 40 MG: 40 TABLET, FILM COATED ORAL at 08:06

## 2022-06-15 RX ADMIN — ASPIRIN 81 MG: 81 TABLET, COATED ORAL at 08:06

## 2022-06-15 RX ADMIN — NIFEDIPINE 60 MG: 30 TABLET, FILM COATED, EXTENDED RELEASE ORAL at 08:06

## 2022-06-15 RX ADMIN — SERTRALINE HYDROCHLORIDE 100 MG: 25 TABLET ORAL at 08:06

## 2022-06-15 RX ADMIN — METOPROLOL SUCCINATE 50 MG: 25 TABLET, EXTENDED RELEASE ORAL at 08:06

## 2022-06-15 RX ADMIN — PREDNISONE 5 MG: 1 TABLET ORAL at 08:06

## 2022-06-15 RX ADMIN — HEPARIN SODIUM 5000 UNITS: 5000 INJECTION, SOLUTION INTRAVENOUS; SUBCUTANEOUS at 06:06

## 2022-06-15 RX ADMIN — SODIUM CHLORIDE: 0.9 INJECTION, SOLUTION INTRAVENOUS at 12:06

## 2022-06-15 NOTE — DISCHARGE SUMMARY
[facility]  Discharge Summary      Patient Name: Lucho Delong  MRN: [id number]  Patient Class: OP- Observation  Admission Date: 6/11/2022  Hospital Length of Stay: 0 days  Discharge Date and Time:  06/15/2022 3:54 PM  Attending Physician: Audelia Mabry MD   Discharging Provider: Audelia Mabry MD  Primary Care Provider: Jose Nolan MD      HPI:   The patient is an 80 y/o male with PMH of atrial fibrillation, CKD 3, HTN, gout, HLP, prostate ca, and stroke. History is provided from patient's wife and ER reports. He resides in a nursing home in which he was placed a week ago for frequent falls. He was feeling agitated and having frequent urination and reporting abdominal pain over the past day. He has been having bowel movements but no blood noted. Wife reports no fevers, chills, nausea, vomiting, or other symptoms. When a powers was placed and he started to void, wife reported that his agitation improved. She reported a history of prostate surgery about 25 years ago. No UTI noted on UA.       * No surgery found *            Goals of Care Treatment Preferences:  Code Status: Full Code      Consults:   Consults (From admission, onward)        Status Ordering Provider     Inpatient consult to Cardiology  Once        Provider:  Nick Avilez MD    Completed ANURAG LAWLER        Hospital Course:   * Abdominal pain  CT without acute findings  Stool noted; will treat as per below  Wife reported improvement in symptoms and agitation after powers placement; possible urinary retention?  Powers d/c'd once having bowel movements  UA negative for infection       Cardiac arrhythmia  Cardiology consulted and adjusted metoprolol dosing    Current chronic use of systemic steroids  Unclear indication for chronic prednisone therapy but continued    AMS (altered mental status)  Possibly due to urinary retention, improved with placement of powers. D/c'd powers and started on Flomax for  urinary retention which resolved symptoms  Pain resolved  Continue to monitor  Delirium and fall precautions   MRI brain showed no acute stroke      Constipation  Noted on CT  Given kayexalate for hyperkalemia and then started senna-doc  Continue IV hydration   K+ improved to normal, continue to monitor      CKD (chronic kidney disease) stage 3, GFR 30-59 ml/min  Per selvin        SELVIN (acute kidney injury)  Lab Results   Component Value Date    CREATININE 1.8 (H) 06/15/2022     Sr Cr slightly elevated from baseline on admission, resolved with IVFs  Continue IVF  Cont to monitor with daily labs   Avoid nephrotoxins.   Renally dose all medications   Monitor events that may lead to decreased renal perfusion (hypovolemia, hypotension, sepsis).   Monitor urine output (goal 0.5 mL/kg/hr) to assure that no obstruction precipitates worsening in GFR.  Serum bicarb level 15. Chronically low; most likely due to CKD. Not far from baseline. Anticipate improvement with improvement in renal function.   Patient has chronic kidney disease            B12 deficiency  Receives monthly injections       Gout  Continue allopurinol       HLD (hyperlipidemia)  Atorvastatin       HTN (hypertension)  Continue metoprolol and nifedipine       Persistent atrial fibrillation, onset 2018  Telemetry  Continue metoprolol   Has watchman device in place  Cardiology consulted    Final Active Diagnoses:    Diagnosis Date Noted POA    PRINCIPAL PROBLEM:  Abdominal pain [R10.9] 06/11/2022 Yes    Cardiac arrhythmia [I49.9] 06/13/2022 Yes    SELVIN (acute kidney injury) [N17.9] 06/11/2022 Yes    CKD (chronic kidney disease) stage 3, GFR 30-59 ml/min [N18.30] 06/11/2022 Yes    Constipation [K59.00] 06/11/2022 Yes    AMS (altered mental status) [R41.82] 06/11/2022 Yes    Current chronic use of systemic steroids [Z79.52] 06/11/2022 Not Applicable    B12 deficiency [E53.8] 08/02/2019 Yes    Gout [M10.9] 12/31/2018 Yes    HLD (hyperlipidemia) [E78.5]  09/01/2017 Yes    HTN (hypertension) [I10] 09/01/2017 Yes    Persistent atrial fibrillation, onset 2018 [I48.19] 09/01/2017 Yes      Problems Resolved During this Admission:       Discharged Condition: good    Disposition:     Follow Up:   Follow-up Information     Jose Nolan MD Follow up in 1 week(s).    Specialty: Family Medicine  Contact information:  [address]  [place] [address]  [phone]             Raghav Follow up in 2 week(s).                     Patient Instructions:      Diet Adult Regular     Notify your health care provider if you experience any of the following:  severe uncontrolled pain     Notify your health care provider if you experience any of the following:  temperature >100.4     Activity as tolerated       Significant Diagnostic Studies: Labs:   CMP   Recent Labs   Lab 06/14/22  0647 06/15/22  0602    140   K 4.3 4.4   * 118*   CO2 15* 12*   GLU 86 99   BUN 33* 34*   CREATININE 1.7* 1.8*   CALCIUM 8.1* 8.0*   ANIONGAP 9 10   ESTGFRAFRICA 42.5* 39.6*   EGFRNONAA 36.7* 34.3*   , CBC   Recent Labs   Lab 06/14/22  0647   WBC 9.42   HGB 9.6*   HCT 29.0*       and All labs within the past 24 hours have been reviewed    Pending Diagnostic Studies:     None         Medications:  Reconciled Home Medications:      Medication List      START taking these medications    senna-docusate 8.6-50 mg 8.6-50 mg per tablet  Commonly known as: PERICOLACE  Take 1 tablet by mouth 2 (two) times daily.     tamsulosin 0.4 mg Cap  Commonly known as: FLOMAX  Take 1 capsule (0.4 mg total) by mouth once daily.  Start taking on: June 16, 2022        CHANGE how you take these medications    lisinopriL 10 MG tablet  Take 1 tablet (10 mg total) by mouth once daily.  What changed:   · medication strength  · how much to take     metoprolol succinate 50 MG 24 hr tablet  Commonly known as: TOPROL-XL  Take 1 tablet (50 mg total) by mouth once daily.  What changed:   · medication  strength  · how much to take        CONTINUE taking these medications    allopurinoL 100 MG tablet  Commonly known as: ZYLOPRIM  Take 1 tablet by mouth once daily     aspirin 81 MG EC tablet  Commonly known as: ECOTRIN  Take 81 mg by mouth once daily.     atorvastatin 40 MG tablet  Commonly known as: LIPITOR  Take 1 tablet by mouth once daily     cholecalciferol (vitamin D3) 50 mcg (2,000 unit) Cap capsule  Commonly known as: VITAMIN D3  Take 1 capsule by mouth once daily.     NIFEdipine 60 MG (OSM) 24 hr tablet  Commonly known as: PROCARDIA-XL  Take 1 tablet by mouth once daily     predniSONE 5 MG tablet  Commonly known as: DELTASONE  Take 1 tablet by mouth once daily     SARNA ORIGINAL TOP  Apply topically as needed.     sertraline 100 MG tablet  Commonly known as: ZOLOFT  Take 1 tablet (100 mg total) by mouth once daily.     ULTRA-LIGHT ROLLATOR Misc  Generic drug: walker  Use as directed     VITAMIN B COMP AND C NO.3 ORAL  Take 1 tablet by mouth Daily.        STOP taking these medications    cyanocobalamin 1,000 mcg/mL injection     potassium chloride SA 20 MEQ tablet  Commonly known as: K-DUR,KLOR-CON            Indwelling Lines/Drains at time of discharge:   Lines/Drains/Airways     None                 Time spent on the discharge of patient: 55 minutes         Audelia Mabry MD  Department of Hospital Medicine  [facility] Surg

## 2022-06-15 NOTE — NURSING
Attempted to call [facility] at [phone] to call report for patient x3 with no answer.  Patient given paperwork to bring to [facility] for admission. Wife present for patient discharge/transportation to [facility].

## 2022-06-15 NOTE — PLAN OF CARE
[facility]    FACILITY TRANSFER ORDERS      Patient Name: Lucho Delong  YOB: 1940    PCP: Jose Nolan MD   PCP Address: [address] [place] [address] [place] [address]  PCP Phone Number: [phone]  PCP Fax: [phone]    Encounter Date: 06/15/2022    Admit to: SNF    Vital Signs:  Routine    Diagnoses:   Active Hospital Problems    Diagnosis  POA    *Abdominal pain [R10.9]  Yes    Cardiac arrhythmia [I49.9]  Yes    SELVIN (acute kidney injury) [N17.9]  Yes    CKD (chronic kidney disease) stage 3, GFR 30-59 ml/min [N18.30]  Yes    Constipation [K59.00]  Yes    AMS (altered mental status) [R41.82]  Yes    Current chronic use of systemic steroids [Z79.52]  Not Applicable    B12 deficiency [E53.8]  Yes    Gout [M10.9]  Yes    HLD (hyperlipidemia) [E78.5]  Yes    HTN (hypertension) [I10]  Yes    Persistent atrial fibrillation, onset 2018 [I48.19]  Yes      Resolved Hospital Problems   No resolved problems to display.       Allergies:  Review of patient's allergies indicates:   Allergen Reactions    Amiodarone analogues Other (See Comments)     Dizziness    Tramadol Itching and Swelling     Began medication, two doses into this treatment patient experiences tongue swelling and severe itching. Patient MD advised to not take this medication anymore    Eliquis [apixaban] Other (See Comments)     Dizziness, ankle swelling, lips swelling       Diet: cardiac diet    Activities: Activity as tolerated    Goals of Care Treatment Preferences:  Code Status: Full Code      Nursing: monitor need for feeding assistance     Labs: CBC and BMP q48h for 2 weeks     CONSULTS:    Physical Therapy to evaluate and treat.  and Occupational Therapy to evaluate and treat.      WOUND CARE ORDERS  None    Medications: Review discharge medications with patient and family and provide education.      Current Discharge Medication List      START taking these medications    Details    senna-docusate 8.6-50 mg (PERICOLACE) 8.6-50 mg per tablet Take 1 tablet by mouth 2 (two) times daily.  Qty: 60 tablet, Refills: 11      tamsulosin (FLOMAX) 0.4 mg Cap Take 1 capsule (0.4 mg total) by mouth once daily.  Qty: 30 capsule, Refills: 11         CONTINUE these medications which have CHANGED    Details   lisinopriL 10 MG tablet Take 1 tablet (10 mg total) by mouth once daily.  Qty: 90 tablet, Refills: 0    Comments: .      metoprolol succinate (TOPROL-XL) 50 MG 24 hr tablet Take 1 tablet (50 mg total) by mouth once daily.  Qty: 30 tablet, Refills: 2    Comments: .  Associated Diagnoses: Essential hypertension         CONTINUE these medications which have NOT CHANGED    Details   allopurinoL (ZYLOPRIM) 100 MG tablet Take 1 tablet by mouth once daily  Qty: 90 tablet, Refills: 3    Associated Diagnoses: Gout, unspecified cause, unspecified chronicity, unspecified site      aspirin (ECOTRIN) 81 MG EC tablet Take 81 mg by mouth once daily.      atorvastatin (LIPITOR) 40 MG tablet Take 1 tablet by mouth once daily  Qty: 90 tablet, Refills: 3      cholecalciferol, vitamin D3, (VITAMIN D3) 50 mcg (2,000 unit) Cap capsule Take 1 capsule by mouth once daily.      NIFEdipine (PROCARDIA-XL) 60 MG (OSM) 24 hr tablet Take 1 tablet by mouth once daily  Qty: 90 tablet, Refills: 3    Associated Diagnoses: Essential hypertension      predniSONE (DELTASONE) 5 MG tablet Take 1 tablet by mouth once daily  Qty: 90 tablet, Refills: 0      sertraline (ZOLOFT) 100 MG tablet Take 1 tablet (100 mg total) by mouth once daily.  Qty: 90 tablet, Refills: 3      vitamin B complex vit C no.3 (VITAMIN B COMP AND C NO.3 ORAL) Take 1 tablet by mouth Daily.      menthol/camphor (SARNA ORIGINAL TOP) Apply topically as needed.      walker (ULTRA-LIGHT ROLLATOR) Misc Use as directed  Qty: 1 each, Refills: 0    Associated Diagnoses: Unsteady gait         STOP taking these medications       cyanocobalamin 1,000 mcg/mL injection Comments:    Reason for Stopping:         potassium chloride SA (K-DUR,KLOR-CON) 20 MEQ tablet Comments:   Reason for Stopping:                  Immunizations Administered as of 6/15/2022     Name Date Dose VIS Date Route Exp Date    COVID-19, MRNA, LN-S, PF (Moderna) 10/25/2021 -- -- -- --    Comment: DATE FOUND IN MIIX     COVID-19, MRNA, LN-S, PF (Moderna) 2/4/2021 -- -- -- --    Comment: DATE FOUND IN MIIX     COVID-19, MRNA, LN-S, PF (Moderna) 1/7/2021 -- -- -- --    External: Patient reported           This patient has had both covid vaccinations and booster    Some patients may experience side effects after vaccination.  These may include fever, headache, muscle or joint aches.  Most symptoms resolve with 24-48 hours and do not require urgent medical evaluation unless they persist for more than 72 hours or symptoms are concerning for an unrelated medical condition.          _________________________________  Audelia Mabry MD  06/15/2022

## 2022-06-15 NOTE — ASSESSMENT & PLAN NOTE
Lab Results   Component Value Date    CREATININE 1.8 (H) 06/15/2022     Sr Cr slightly elevated from baseline on admission, resolved with IVFs  Continue IVF  Cont to monitor with daily labs   Avoid nephrotoxins.   Renally dose all medications   Monitor events that may lead to decreased renal perfusion (hypovolemia, hypotension, sepsis).   Monitor urine output (goal 0.5 mL/kg/hr) to assure that no obstruction precipitates worsening in GFR.  Serum bicarb level 15. Chronically low; most likely due to CKD. Not far from baseline. Anticipate improvement with improvement in renal function.   Patient has chronic kidney disease

## 2022-06-15 NOTE — PLAN OF CARE
Problem: Adult Inpatient Plan of Care  Goal: Plan of Care Review  Outcome: Ongoing, Progressing  Goal: Patient-Specific Goal (Individualized)  Outcome: Ongoing, Progressing  Goal: Absence of Hospital-Acquired Illness or Injury  Outcome: Ongoing, Progressing  Goal: Optimal Comfort and Wellbeing  Outcome: Ongoing, Progressing  Goal: Readiness for Transition of Care  Outcome: Ongoing, Progressing     Problem: Infection  Goal: Absence of Infection Signs and Symptoms  Outcome: Ongoing, Progressing     Problem: Fluid and Electrolyte Imbalance (Acute Kidney Injury/Impairment)  Goal: Fluid and Electrolyte Balance  Outcome: Ongoing, Progressing

## 2022-06-15 NOTE — ASSESSMENT & PLAN NOTE
CT without acute findings  Stool noted; will treat as per below  Wife reported improvement in symptoms and agitation after powers placement; possible urinary retention?  Powers d/c'd once having bowel movements  UA negative for infection

## 2022-06-15 NOTE — PROGRESS NOTES
"CARDIOLOGY PROGRESS NOTE    Patient Name:  Lucho Delong    :  1940    Medical Record:  [id number]    DATE OF SERVICE  6/15/2022        SUBJECTIVE  The patient is being seen for follow-up continued weakness no chest pain or shortness of breath no further pauses      REVIEW OF SYSTEMS  General: No chills, No fever, No fatigue  Eyes: No vision changes, No drainage from eyes  ENT: No nasal congestion, no sore throat  Respiratory: No shortness of breath, No cough  Cardiac: No chest pain, palpitations, dyspnea, dizziness, claudication, or syncopal episodes  GI: No abdominal pain, no nausea, no vomiting  : No dysuria  Musculoskeletal: No joint pain  Neuro: +weakness, dizziness, vision changes left-sided weakness       OBJECTIVE          PHYSICAL EXAM  Vital Signs:  BP (!) 143/71   Pulse 89   Temp 98.2 °F (36.8 °C)   Resp 16   Ht 5' 7" (1.702 m)   Wt 72.1 kg (159 lb)   SpO2 95%   BMI 24.90 kg/m²   Temp:  [97.5 °F (36.4 °C)-100.5 °F (38.1 °C)] 98.2 °F (36.8 °C)  Pulse:  [70-91] 89  Resp:  [16-19] 16  SpO2:  [92 %-95 %] 95 %  BP: (113-158)/(56-71) 143/71      General:   Eyes: Anicteric sclera. Moist conjunctiva. Vision grossly intact.  HENMT: Normocephalic.  Moist mucous membranes. No obvious ulcerations.   Neck: Trachea midline.   Cardiovascular: Heart irregularly iregular rate and rhythm. S1, S2, 1 to 2/6 systolic ejection murmur left sternal border.  Peripheral pulses palpable. No peripheral edema.   Respiratory: Lungs clear to auscultation bilaterally. No increased work of breathing.  Gastrointestinal: Bowel sounds active in all 4 quadrants. Soft, nontender, nondistended.   Genitourinary: Urine clear yellow  Musculoskeletal:  Motor left upper extremity 4/5 left lower extremity 3/5  Skin: Color normal for ethnicity. Skin warm and dry with good turgor. Capillary refill < 3 seconds.   Neurologic: Oriented x 3.  Speech fluent, follows commands.  Psychiatric:  Awake and alert, normal affect mood " good.      LABS    CBC  Recent Labs   Lab 06/11/22  1747 06/12/22  0635 06/14/22  0647   WBC 9.85 8.21 9.42   RBC 3.29* 2.97* 2.97*   HGB 10.6* 9.6* 9.6*   HCT 31.7* 28.8* 29.0*   MCV 96 97 98   MCH 32.2* 32.3* 32.3*   MCHC 33.4 33.3 33.1   RDW 12.7 12.8 12.6   MPV 10.3 10.2 10.5    191 196       Chemistry  Recent Labs   Lab 06/11/22  1747 06/12/22  0635 06/14/22  0647 06/15/22  0602    141 141 140   K 5.2* 4.5 4.3 4.4   * 115* 117* 118*   CO2 15* 17* 15* 12*   BUN 49* 43* 33* 34*   CREATININE 2.6* 2.1* 1.7* 1.8*   * 89 86 99   PROT 6.6 5.8*  --   --    CALCIUM 8.7 8.5* 8.1* 8.0*   BILITOT 0.4 0.5  --   --    AST 19 19  --   --    ALT 33 28  --   --        ABG  No results for input(s): PHART, XMX1OUN, PO2ART, IGE8OXB, L7DZFDIA, BEART, O6GORNAC in the last 168 hours.      MICROBIOLOGY  Reviewed    IMAGING & OTHER STUDIES  Reviewed      Intake/Output last 3 shifts:  I/O last 3 completed shifts:  In: 4573.2 [P.O.:360; I.V.:4213.2]  Out: -     Scheduled meds:   allopurinoL  100 mg Oral Daily    aspirin  81 mg Oral Daily    atorvastatin  40 mg Oral Daily    heparin (porcine)  5,000 Units Subcutaneous Q8H    hydrALAZINE  25 mg Oral Daily    metoprolol succinate  50 mg Oral Daily    NIFEdipine  60 mg Oral Daily    predniSONE  5 mg Oral Daily    senna-docusate 8.6-50 mg  1 tablet Oral BID    sertraline  100 mg Oral Daily    sodium polystyrene  15 g Oral Once    tamsulosin  0.4 mg Oral Daily       PRN Meds:  acetaminophen, albuterol-ipratropium, dextrose 10%, dextrose 10%, glucagon (human recombinant), glucose, glucose, hydrOXYzine pamoate, naloxone, ondansetron, sodium chloride 0.9%    Continuous Infusions:   sodium chloride 0.9% 100 mL/hr at 06/15/22 0028       Dietary Orders:  [unfilled]     Admit weight: Weight: 69.9 kg (154 lb)   Today weight: Weight: 72.1 kg (159 lb)      Length of stay in days: 0      ASSESSMENT    Active Hospital Problems    Diagnosis  POA    *Abdominal pain  [R10.9]  Yes    Cardiac arrhythmia [I49.9]  Unknown    SELVIN (acute kidney injury) [N17.9]  Yes    CKD (chronic kidney disease) stage 3, GFR 30-59 ml/min [N18.30]  Yes    Constipation [K59.00]  Yes    AMS (altered mental status) [R41.82]  Yes    Current chronic use of systemic steroids [Z79.52]  Not Applicable    B12 deficiency [E53.8]  Yes    Gout [M10.9]  Yes    HLD (hyperlipidemia) [E78.5]  Yes    HTN (hypertension) [I10]  Yes    Persistent atrial fibrillation, onset 2018 [I48.19]  Yes      Resolved Hospital Problems   No resolved problems to display.          PLAN    Chronic atrial fibrillation  Since 2018  No further pauses  Pause 6/13 2.2 seconds  Metoprolol 50 mg daily  Watchman risk of falls and anemia  TSH 1.02     Coronary artery disease  No chest pain  Occasional shortness of breath  Stent 2020  EF 35%HX   EF 55  6/22     Aortic valve disease  TAVR 2020  Echo Normal aortic valve replacement 6/22     Occasional shortness of breath   Improved  Venous no DVT 6/22     History CVA 2018  Left-sided weakness unable walk     Hypertension  Improved 143/71 6/15  Hydralazine 25 mg daily     Anemia    Hemoglobin 9.6 6/12  Treated medicine     Acute renal failure  BUN 34 creatinine 1.8  6/15   mildly increased     Hypo magnesium  Magnesium 1.8  6/15 repleted    Plan  Discussed with Dr. Mabry possible transfer to rehab soon              Electronically signed by: Nick Avilez, 6/15/2022 10:21 AM

## 2022-06-15 NOTE — PT/OT/SLP PROGRESS
Occupational Therapy   Treatment    Name: Lucho Delong  MRN: [id number]  Admitting Diagnosis:  Abdominal pain       Recommendations:     Discharge Recommendations:  DC TO SKILLED NURSING  Discharge Equipment Recommendations:   NONE IF DC TO SKILLED  Barriers to discharge:   NONE IF DC TO SKILLED    Assessment:     Lucho Delong is a 81 y.o. male with a medical diagnosis of Abdominal pain.  He presents with decreased ind w/ all adls.  Performance deficits affecting function are   decreased strength , decreased static/dynamic stand balance, decreased sitting balance, decreased functional activity tolerance.      Rehab Prognosis:  Good; patient would benefit from acute skilled OT services to address these deficits and reach maximum level of function.       Plan:     Patient to be seen  3-5 x's  to address the above listed problems via  ot intervention  · Plan of Care Expires:  upon dc   · Plan of Care Reviewed with:  patient/wife/ case management.     Subjective     Pain/Comfort:   patient states not in any pain this date.     Objective:     Communicated with: nursing prior to session.  Patient found supine upon OT entry to room.    General Precautions: Standard,     Orthopedic Precautions:  none  Braces:  none  Respiratory Status: Room air     Occupational Performance:     Bed Mobility:    · Patient completed Rolling/Turning to Left with  contact guard assistance  · Patient completed Rolling/Turning to Right with contact guard assistance  · Patient completed Scooting/Bridging with contact guard assistance  · Patient completed Supine to Sit with minimum assistance  · Patient completed Sit to Supine with minimum assistance     Functional Mobility/Transfers:  · Patient completed Sit <> Stand Transfer with minimum assistance  with  hand-held assist   · Patient completed Bed <> Chair Transfer using Stand Pivot technique with minimum assistance with hand-held assist  ·         Treatment & Education:  Patient  performed all bed mobility w/ cga to sba.  Patient performed supine to sit w/ min a . Patient demonstrated fair/fair- static/dynamic sitting balance on eob. Patient able to sit on eob unsupported demonstrating an overall increase in ability to maintain upright sit for 15 min this date.  Patient performed bue arom therex for shoulder flexion, abd, and bicep curls 2 sets 10 reps.      Patient left supine with all lines intact, bed alarm on, nursing notified and wife presentEducation:      GOALS:   1. Patient will perform lb dressing w/ sba   2. Patient will perform toileting w/ sba  3. Patient will perform ub dressing w/ sba      Time Tracking:     OT Date of Treatment:  06/15/2022  OT Start Time:  10:20  OT Stop Time:  10:50  OT Total Time (min):  30    Billable Minutes:Therapeutic Activity 15  Therapeutic Exercise 15               6/15/2022

## 2022-06-18 ENCOUNTER — PATIENT MESSAGE (OUTPATIENT)
Dept: FAMILY MEDICINE | Facility: CLINIC | Age: 82
End: 2022-06-18
Payer: MEDICARE

## 2022-06-30 ENCOUNTER — DOCUMENT SCAN (OUTPATIENT)
Dept: HOME HEALTH SERVICES | Facility: HOSPITAL | Age: 82
End: 2022-06-30
Payer: MEDICARE

## 2022-07-01 PROCEDURE — G0180 PR HOME HEALTH MD CERTIFICATION: ICD-10-PCS | Mod: ,,, | Performed by: FAMILY MEDICINE

## 2022-07-01 PROCEDURE — G0180 MD CERTIFICATION HHA PATIENT: HCPCS | Mod: ,,, | Performed by: FAMILY MEDICINE

## 2022-07-11 ENCOUNTER — PATIENT MESSAGE (OUTPATIENT)
Dept: CARDIOLOGY | Facility: CLINIC | Age: 82
End: 2022-07-11
Payer: MEDICARE

## 2022-07-13 ENCOUNTER — TELEPHONE (OUTPATIENT)
Dept: FAMILY MEDICINE | Facility: CLINIC | Age: 82
End: 2022-07-13
Payer: MEDICARE

## 2022-07-13 ENCOUNTER — HOSPITAL ENCOUNTER (OUTPATIENT)
Facility: HOSPITAL | Age: 82
Discharge: HOME HEALTH | End: 2022-07-14
Attending: EMERGENCY MEDICINE | Admitting: HOSPITALIST
Payer: MEDICARE

## 2022-07-13 DIAGNOSIS — E86.1 HYPOVOLEMIA DEHYDRATION: Primary | ICD-10-CM

## 2022-07-13 DIAGNOSIS — E86.0 DEHYDRATION: ICD-10-CM

## 2022-07-13 DIAGNOSIS — R79.89 AZOTEMIA: ICD-10-CM

## 2022-07-13 DIAGNOSIS — I95.9 HYPOTENSION: ICD-10-CM

## 2022-07-13 DIAGNOSIS — R41.82 ALTERED MENTAL STATUS, UNSPECIFIED ALTERED MENTAL STATUS TYPE: ICD-10-CM

## 2022-07-13 DIAGNOSIS — R07.9 CHEST PAIN: ICD-10-CM

## 2022-07-13 LAB
ABO + RH BLD: NORMAL
ALBUMIN SERPL BCP-MCNC: 3.1 G/DL (ref 3.5–5.2)
ALP SERPL-CCNC: 109 U/L (ref 55–135)
ALT SERPL W/O P-5'-P-CCNC: 13 U/L (ref 10–44)
ANION GAP SERPL CALC-SCNC: 13 MMOL/L (ref 8–16)
AST SERPL-CCNC: 15 U/L (ref 10–40)
BACTERIA #/AREA URNS HPF: ABNORMAL /HPF
BASOPHILS # BLD AUTO: 0.06 K/UL (ref 0–0.2)
BASOPHILS NFR BLD: 0.7 % (ref 0–1.9)
BILIRUB SERPL-MCNC: 0.5 MG/DL (ref 0.1–1)
BILIRUB UR QL STRIP: NEGATIVE
BILIRUB UR QL STRIP: NEGATIVE
BLD GP AB SCN CELLS X3 SERPL QL: NORMAL
BUN SERPL-MCNC: 53 MG/DL (ref 8–23)
CALCIUM SERPL-MCNC: 9.2 MG/DL (ref 8.7–10.5)
CHLORIDE SERPL-SCNC: 111 MMOL/L (ref 95–110)
CLARITY UR: CLEAR
CLARITY UR: CLEAR
CO2 SERPL-SCNC: 18 MMOL/L (ref 23–29)
COLOR UR: YELLOW
COLOR UR: YELLOW
CREAT SERPL-MCNC: 2.4 MG/DL (ref 0.5–1.4)
DIFFERENTIAL METHOD: ABNORMAL
EOSINOPHIL # BLD AUTO: 0.5 K/UL (ref 0–0.5)
EOSINOPHIL NFR BLD: 6.4 % (ref 0–8)
ERYTHROCYTE [DISTWIDTH] IN BLOOD BY AUTOMATED COUNT: 12.6 % (ref 11.5–14.5)
EST. GFR  (AFRICAN AMERICAN): 28 ML/MIN/1.73 M^2
EST. GFR  (NON AFRICAN AMERICAN): 24.2 ML/MIN/1.73 M^2
GLUCOSE SERPL-MCNC: 159 MG/DL (ref 70–110)
GLUCOSE UR QL STRIP: ABNORMAL
GLUCOSE UR QL STRIP: NEGATIVE
HCT VFR BLD AUTO: 31.5 % (ref 40–54)
HGB BLD-MCNC: 10.5 G/DL (ref 14–18)
HGB UR QL STRIP: NEGATIVE
HGB UR QL STRIP: NEGATIVE
IMM GRANULOCYTES # BLD AUTO: 0.02 K/UL (ref 0–0.04)
IMM GRANULOCYTES NFR BLD AUTO: 0.2 % (ref 0–0.5)
INR PPP: 1.1 (ref 0.8–1.2)
KETONES UR QL STRIP: NEGATIVE
KETONES UR QL STRIP: NEGATIVE
LEUKOCYTE ESTERASE UR QL STRIP: ABNORMAL
LEUKOCYTE ESTERASE UR QL STRIP: NEGATIVE
LYMPHOCYTES # BLD AUTO: 1.9 K/UL (ref 1–4.8)
LYMPHOCYTES NFR BLD: 23.1 % (ref 18–48)
MCH RBC QN AUTO: 31.7 PG (ref 27–31)
MCHC RBC AUTO-ENTMCNC: 33.3 G/DL (ref 32–36)
MCV RBC AUTO: 95 FL (ref 82–98)
MICROSCOPIC COMMENT: ABNORMAL
MONOCYTES # BLD AUTO: 0.7 K/UL (ref 0.3–1)
MONOCYTES NFR BLD: 8.9 % (ref 4–15)
NEUTROPHILS # BLD AUTO: 5.1 K/UL (ref 1.8–7.7)
NEUTROPHILS NFR BLD: 60.7 % (ref 38–73)
NITRITE UR QL STRIP: NEGATIVE
NITRITE UR QL STRIP: NEGATIVE
NRBC BLD-RTO: 0 /100 WBC
OB PNL STL: NEGATIVE
PH UR STRIP: 6 [PH] (ref 5–8)
PH UR STRIP: 6 [PH] (ref 5–8)
PLATELET # BLD AUTO: 209 K/UL (ref 150–450)
PMV BLD AUTO: 10.4 FL (ref 9.2–12.9)
POTASSIUM SERPL-SCNC: 4.2 MMOL/L (ref 3.5–5.1)
PROT SERPL-MCNC: 6.5 G/DL (ref 6–8.4)
PROT UR QL STRIP: NEGATIVE
PROT UR QL STRIP: NEGATIVE
PROTHROMBIN TIME: 11.2 SEC (ref 9–12.5)
RBC # BLD AUTO: 3.31 M/UL (ref 4.6–6.2)
RBC #/AREA URNS HPF: 2 /HPF (ref 0–4)
SARS-COV-2 RDRP RESP QL NAA+PROBE: NEGATIVE
SODIUM SERPL-SCNC: 142 MMOL/L (ref 136–145)
SP GR UR STRIP: 1.01 (ref 1–1.03)
SP GR UR STRIP: 1.02 (ref 1–1.03)
URN SPEC COLLECT METH UR: ABNORMAL
URN SPEC COLLECT METH UR: NORMAL
UROBILINOGEN UR STRIP-ACNC: NEGATIVE EU/DL
UROBILINOGEN UR STRIP-ACNC: NEGATIVE EU/DL
WBC # BLD AUTO: 8.34 K/UL (ref 3.9–12.7)
WBC #/AREA URNS HPF: 9 /HPF (ref 0–5)

## 2022-07-13 PROCEDURE — 25000003 PHARM REV CODE 250: Performed by: HOSPITALIST

## 2022-07-13 PROCEDURE — G0378 HOSPITAL OBSERVATION PER HR: HCPCS | Mod: CS

## 2022-07-13 PROCEDURE — 81000 URINALYSIS NONAUTO W/SCOPE: CPT | Mod: 91 | Performed by: HOSPITALIST

## 2022-07-13 PROCEDURE — 99220 PR INITIAL OBSERVATION CARE,LEVL III: ICD-10-PCS | Mod: ,,, | Performed by: HOSPITALIST

## 2022-07-13 PROCEDURE — 80053 COMPREHEN METABOLIC PANEL: CPT | Performed by: EMERGENCY MEDICINE

## 2022-07-13 PROCEDURE — 36415 COLL VENOUS BLD VENIPUNCTURE: CPT | Performed by: EMERGENCY MEDICINE

## 2022-07-13 PROCEDURE — 93010 ELECTROCARDIOGRAM REPORT: CPT | Mod: ,,, | Performed by: INTERNAL MEDICINE

## 2022-07-13 PROCEDURE — 85610 PROTHROMBIN TIME: CPT | Performed by: EMERGENCY MEDICINE

## 2022-07-13 PROCEDURE — 25000003 PHARM REV CODE 250: Performed by: EMERGENCY MEDICINE

## 2022-07-13 PROCEDURE — 99220 PR INITIAL OBSERVATION CARE,LEVL III: CPT | Mod: ,,, | Performed by: HOSPITALIST

## 2022-07-13 PROCEDURE — 93005 ELECTROCARDIOGRAM TRACING: CPT

## 2022-07-13 PROCEDURE — 82272 OCCULT BLD FECES 1-3 TESTS: CPT | Performed by: EMERGENCY MEDICINE

## 2022-07-13 PROCEDURE — 96360 HYDRATION IV INFUSION INIT: CPT

## 2022-07-13 PROCEDURE — 81003 URINALYSIS AUTO W/O SCOPE: CPT | Performed by: EMERGENCY MEDICINE

## 2022-07-13 PROCEDURE — 96361 HYDRATE IV INFUSION ADD-ON: CPT

## 2022-07-13 PROCEDURE — G0378 HOSPITAL OBSERVATION PER HR: HCPCS

## 2022-07-13 PROCEDURE — 86901 BLOOD TYPING SEROLOGIC RH(D): CPT | Performed by: EMERGENCY MEDICINE

## 2022-07-13 PROCEDURE — 85025 COMPLETE CBC W/AUTO DIFF WBC: CPT | Performed by: EMERGENCY MEDICINE

## 2022-07-13 PROCEDURE — 99285 EMERGENCY DEPT VISIT HI MDM: CPT | Mod: 25,CS

## 2022-07-13 PROCEDURE — U0002 COVID-19 LAB TEST NON-CDC: HCPCS | Performed by: EMERGENCY MEDICINE

## 2022-07-13 PROCEDURE — 93010 EKG 12-LEAD: ICD-10-PCS | Mod: ,,, | Performed by: INTERNAL MEDICINE

## 2022-07-13 RX ORDER — CHOLECALCIFEROL (VITAMIN D3) 25 MCG
2000 TABLET ORAL DAILY
Status: DISCONTINUED | OUTPATIENT
Start: 2022-07-14 | End: 2022-07-14 | Stop reason: HOSPADM

## 2022-07-13 RX ORDER — PREDNISONE 1 MG/1
5 TABLET ORAL DAILY
Status: DISCONTINUED | OUTPATIENT
Start: 2022-07-14 | End: 2022-07-14 | Stop reason: HOSPADM

## 2022-07-13 RX ORDER — ALLOPURINOL 100 MG/1
100 TABLET ORAL DAILY
Status: DISCONTINUED | OUTPATIENT
Start: 2022-07-14 | End: 2022-07-14 | Stop reason: HOSPADM

## 2022-07-13 RX ORDER — DEXTROSE MONOHYDRATE 50 MG/ML
1000 INJECTION, SOLUTION INTRAVENOUS
Status: DISCONTINUED | OUTPATIENT
Start: 2022-07-13 | End: 2022-07-13

## 2022-07-13 RX ORDER — SODIUM CHLORIDE 450 MG/100ML
INJECTION, SOLUTION INTRAVENOUS CONTINUOUS
Status: DISCONTINUED | OUTPATIENT
Start: 2022-07-13 | End: 2022-07-14 | Stop reason: HOSPADM

## 2022-07-13 RX ORDER — AMOXICILLIN 250 MG
1 CAPSULE ORAL 2 TIMES DAILY
Status: DISCONTINUED | OUTPATIENT
Start: 2022-07-13 | End: 2022-07-14 | Stop reason: HOSPADM

## 2022-07-13 RX ORDER — SODIUM CHLORIDE 0.9 % (FLUSH) 0.9 %
10 SYRINGE (ML) INJECTION EVERY 12 HOURS PRN
Status: DISCONTINUED | OUTPATIENT
Start: 2022-07-13 | End: 2022-07-14 | Stop reason: HOSPADM

## 2022-07-13 RX ORDER — ATORVASTATIN CALCIUM 40 MG/1
40 TABLET, FILM COATED ORAL DAILY
Status: DISCONTINUED | OUTPATIENT
Start: 2022-07-14 | End: 2022-07-14 | Stop reason: HOSPADM

## 2022-07-13 RX ORDER — TAMSULOSIN HYDROCHLORIDE 0.4 MG/1
0.4 CAPSULE ORAL DAILY
Status: DISCONTINUED | OUTPATIENT
Start: 2022-07-14 | End: 2022-07-14 | Stop reason: HOSPADM

## 2022-07-13 RX ORDER — NIFEDIPINE 30 MG/1
60 TABLET, EXTENDED RELEASE ORAL DAILY
Status: DISCONTINUED | OUTPATIENT
Start: 2022-07-14 | End: 2022-07-14 | Stop reason: HOSPADM

## 2022-07-13 RX ORDER — ASPIRIN 81 MG/1
81 TABLET ORAL DAILY
Status: DISCONTINUED | OUTPATIENT
Start: 2022-07-14 | End: 2022-07-14 | Stop reason: HOSPADM

## 2022-07-13 RX ORDER — LISINOPRIL 10 MG/1
10 TABLET ORAL DAILY
Status: DISCONTINUED | OUTPATIENT
Start: 2022-07-14 | End: 2022-07-14 | Stop reason: HOSPADM

## 2022-07-13 RX ORDER — NALOXONE HCL 0.4 MG/ML
0.02 VIAL (ML) INJECTION
Status: DISCONTINUED | OUTPATIENT
Start: 2022-07-13 | End: 2022-07-14 | Stop reason: HOSPADM

## 2022-07-13 RX ORDER — GLUCAGON 1 MG
1 KIT INJECTION
Status: DISCONTINUED | OUTPATIENT
Start: 2022-07-13 | End: 2022-07-14 | Stop reason: HOSPADM

## 2022-07-13 RX ORDER — METOPROLOL SUCCINATE 25 MG/1
50 TABLET, EXTENDED RELEASE ORAL DAILY
Status: DISCONTINUED | OUTPATIENT
Start: 2022-07-14 | End: 2022-07-14 | Stop reason: HOSPADM

## 2022-07-13 RX ORDER — SERTRALINE HYDROCHLORIDE 25 MG/1
100 TABLET, FILM COATED ORAL DAILY
Status: DISCONTINUED | OUTPATIENT
Start: 2022-07-14 | End: 2022-07-14 | Stop reason: HOSPADM

## 2022-07-13 RX ORDER — HEPARIN SODIUM 5000 [USP'U]/ML
5000 INJECTION, SOLUTION INTRAVENOUS; SUBCUTANEOUS EVERY 8 HOURS
Status: DISCONTINUED | OUTPATIENT
Start: 2022-07-13 | End: 2022-07-14 | Stop reason: HOSPADM

## 2022-07-13 RX ORDER — ACETAMINOPHEN 325 MG/1
650 TABLET ORAL EVERY 8 HOURS PRN
Status: DISCONTINUED | OUTPATIENT
Start: 2022-07-13 | End: 2022-07-14 | Stop reason: HOSPADM

## 2022-07-13 RX ORDER — DEXTROSE MONOHYDRATE 50 MG/ML
1000 INJECTION, SOLUTION INTRAVENOUS
Status: COMPLETED | OUTPATIENT
Start: 2022-07-13 | End: 2022-07-13

## 2022-07-13 RX ADMIN — DEXTROSE 1000 ML: 5 SOLUTION INTRAVENOUS at 01:07

## 2022-07-13 RX ADMIN — SODIUM CHLORIDE: 0.45 INJECTION, SOLUTION INTRAVENOUS at 05:07

## 2022-07-13 NOTE — TELEPHONE ENCOUNTER
----- Message from Mariia Zaldivar sent at 7/13/2022 10:45 AM CDT -----  Contact: Wife Asia  She called to let the office know that they are on the way to [facility] for his Low Blood Pressure, and expecting them to admit him. She can be reached at [phone] if you need to speak to her. Thank you.

## 2022-07-13 NOTE — HPI
This 81-year-old male with history of chronic renal disease, atrial fibrillation, chronic back pain, prostate cancer and hypertension lives in an assisted living facility had his blood pressure checked by the physical therapist today and was recorded to be low.  He drinks little or no water.  He was sent to the emergency room for evaluation and had a systolic pressure about 80. He appeared a little confused.  He was given IV fluids of normal saline and his systolic blood pressure went up to about 100. He became more alert.  His BUN was 53 and creatinine 2.4.  Denies nausea, vomiting, diarrhea, fever or chills.  Hospitalist service was asked to admit and treat for dehydration.

## 2022-07-13 NOTE — TELEPHONE ENCOUNTER
Spoke with Jovana with [facility].  Jovana states BP is 90/42 with automatic. Jovana states she is unable to hear with manual, pt is not in distress but tired. Advised for patient to report to local ER for evaluation and a message would be placed to Dr. Nolan.

## 2022-07-13 NOTE — ED PROVIDER NOTES
Encounter Date: 7/13/2022       History     Chief Complaint   Patient presents with    Hypotension     Patient with  reported hypotension. Instructed to come to ER asap      Well-developed 81-year-old female is brought into the emergency room for evaluation of hypotension.  He was at the assisted living care when a physical therapist took his blood pressure found to be extremely low.  The patient was then brought by [facility] to the hospital.  Patient arrives awake alert oriented.  He does have some mild short-term memory loss.  He is somewhat confused but still very oriented to person place and time.  He does have some skin tears on the left lower extremity on the lateral aspect.  There is a Band-Aid over these making me suspicious that your pre-existing.  They do not appear to be recent.  He has multiple wounds over the bilateral shins and lower extremity secondary to multiple falls and in mild injuries.  He denies any abnormal bowel movements.  He is urinary incontinence.  He was in the hospital recently with urinary retention which he spent 2 or 3 days in the hospital for.  This has since resolved.        Review of patient's allergies indicates:   Allergen Reactions    Amiodarone analogues Other (See Comments)     Dizziness    Tramadol Itching and Swelling     Began medication, two doses into this treatment patient experiences tongue swelling and severe itching. Patient MD advised to not take this medication anymore    Eliquis [apixaban] Other (See Comments)     Dizziness, ankle swelling, lips swelling     Past Medical History:   Diagnosis Date    Anticoagulant long-term use     Atrial fibrillation     Bursitis of hip     Chronic back pain     Chronic kidney disease, stage 3     Gastroesophageal reflux disease     Gout     Hyperlipemia     Hypertension     Prostate cancer 1997    Stroke 04/2019     Past Surgical History:   Procedure Laterality Date    CARDIAC VALVE REPLACEMENT  Feb 18, 2020    CARDIAC  VALVE SURGERY      CATHETERIZATION OF BOTH LEFT AND RIGHT HEART N/A 12/2/2019    Procedure: CATHETERIZATION, HEART, BOTH LEFT AND RIGHT;  Surgeon: Everett Sosa MD;  Location[facility] CATH[facility]EP LAB;  Service: Cardiology;  Laterality: N/A;    CLOSURE OF LEFT ATRIAL APPENDAGE USING DEVICE N/A 3/3/2021    Procedure: Left atrial appendage closure device;  Surgeon: Bryan Zhang MD;  Location: [facility] CATH LAB;  Service: Cardiology;  Laterality: N/A;    COLONOSCOPY  1997    CORONARY ANGIOGRAPHY N/A 1/8/2020    Procedure: ANGIOGRAM, CORONARY ARTERY;  Surgeon: Bryan Zhang MD;  Location: [facility] CATH LAB;  Service: Cardiology;  Laterality: N/A;    CORONARY STENT PLACEMENT N/A 1/8/2020    Procedure: INSERTION, STENT, CORONARY ARTERY;  Surgeon: Bryan Zhang MD;  Location: [facility] CATH LAB;  Service: Cardiology;  Laterality: N/A;    PERCUTANEOUS TRANSLUMINAL ANGIOPLASTY (PTA) OF PERIPHERAL VESSEL N/A 1/23/2020    Procedure: PTA, PERIPHERAL VESSEL;  Surgeon: Bryan Zhang MD;  Location: [facility] CATH LAB;  Service: Cardiology;  Laterality: N/A;    PROSTATE SURGERY  1997    TONSILLECTOMY      TRANSCATHETER AORTIC VALVE REPLACEMENT (TAVR)  2/18/2020    Procedure: REPLACEMENT, AORTIC VALVE, TRANSCATHETER (TAVR);  Surgeon: Bryan Zhang MD;  Location: [facility] CATH LAB;  Service: Cardiology;;    TRANSESOPHAGEAL ECHOCARDIOGRAPHY N/A 1/23/2020    Procedure: ECHOCARDIOGRAM, TRANSESOPHAGEAL;  Surgeon: [facility] Diagnostic Provider;  Location: [facility] EP LAB;  Service: Anesthesiology;  Laterality: N/A;    TRANSESOPHAGEAL ECHOCARDIOGRAPHY N/A 4/20/2021    Procedure: ECHOCARDIOGRAM, TRANSESOPHAGEAL;  Surgeon: [facility] Diagnostic Provider;  Location: [facility] EP LAB;  Service: Anesthesiology;  Laterality: N/A;     Family History   Problem Relation Age of Onset    Cancer Father 86        colon    Alzheimer's disease Mother 86    Alzheimer's disease Sister         has not known fm for 15 yrs    Melanoma Neg Hx     Psoriasis Neg Hx      Lupus Neg Hx     Eczema Neg Hx      Social History     Tobacco Use    Smoking status: Never Smoker    Smokeless tobacco: Never Used   Substance Use Topics    Alcohol use: Not Currently     Alcohol/week: 3.3 standard drinks     Types: 4 Standard drinks or equivalent per week     Comment: occasionally bourbon or beer    Drug use: No     Review of Systems   Constitutional: Negative.    HENT: Negative.    Eyes: Negative.    Respiratory: Negative.    Cardiovascular: Negative.    Gastrointestinal: Negative.    Musculoskeletal: Negative.    All other systems reviewed and are negative.      Physical Exam     Initial Vitals   BP Pulse Resp Temp SpO2   07/13/22 1128 07/13/22 1128 07/13/22 1128 07/13/22 1520 07/13/22 1128   (!) 103/53 68 20 97.5 °F (36.4 °C) 98 %      MAP       --                Physical Exam    Nursing note and vitals reviewed.  Constitutional: He appears well-developed and well-nourished.   HENT:   Head: Normocephalic and atraumatic.   Eyes: Pupils are equal, round, and reactive to light.   Neck: Neck supple.   Normal range of motion.  Cardiovascular: Normal rate, regular rhythm and normal heart sounds.   Pulmonary/Chest: Breath sounds normal.   Abdominal: Abdomen is soft.   Musculoskeletal:         General: Normal range of motion.      Cervical back: Normal range of motion and neck supple.     Neurological: He is alert and oriented to person, place, and time. He has normal strength. GCS score is 15. GCS eye subscore is 4. GCS verbal subscore is 5. GCS motor subscore is 6.   Skin:   Multiple small skin tears noted on patient's lateral leg.  There is multiple different ages of skin tears on bilateral anterior shin regions.         ED Course   Procedures  Labs Reviewed   CBC W/ AUTO DIFFERENTIAL - Abnormal; Notable for the following components:       Result Value    RBC 3.31 (*)     Hemoglobin 10.5 (*)     Hematocrit 31.5 (*)     MCH 31.7 (*)     All other components within normal limits   COMPREHENSIVE  METABOLIC PANEL - Abnormal; Notable for the following components:    Chloride 111 (*)     CO2 18 (*)     Glucose 159 (*)     BUN 53 (*)     Creatinine 2.4 (*)     Albumin 3.1 (*)     eGFR if  28.0 (*)     eGFR if non  24.2 (*)     All other components within normal limits   PROTIME-INR   URINALYSIS, REFLEX TO URINE CULTURE    Narrative:     Preferred Collection Type->Urine, Clean Catch  Specimen Source->Urine   OCCULT BLOOD X 1, STOOL   TYPE & SCREEN          Imaging Results    None          Medications   dextrose 5 % infusion 1,000 mL (1,000 mLs Intravenous New Bag 7/13/22 1351)     Medical Decision Making:   Differential Diagnosis:   Anemia, electrolyte abnormality, infection, sepsis, dehydration, urinate check infection, cancerous process, viral infection, hereditary hypokalemia syndrome.  CVA, TIA, hemorrhagic stroke, embolic stroke.    ED Management:  The patient is stable this time.  He is awake alert mentating as appropriately as he can.  Does have baseline dementia.  I suspect the patient is dehydrated.  I will give the patient IV fluids.  I will evaluate the patient's laboratory values.  Try to include urinalysis.  Routine workup for dehydration.    The patient is stable this time.  Feeling better after the IV fluid rehydration.  The patient will be admitted for observation.  This is based on diagnosis of azotemia, dehydration, mild altered mental status.                      Clinical Impression:   Final diagnoses:  [I95.9] Hypotension  [R79.89] Azotemia (Primary)  [R41.82] Altered mental status, unspecified altered mental status type  [E86.0] Dehydration          ED Disposition Condition    Observation               Ulises Slulivan MD  07/13/22 2326

## 2022-07-13 NOTE — TELEPHONE ENCOUNTER
----- Message from Bernabe Nixon sent at 7/13/2022  9:48 AM CDT -----  Contact: Jovana  Type: Needs Medical Advice  Who Called: Jovana with [facility]  Symptoms (please be specific):   How long has patient had these symptoms:    Pharmacy name and phone #:    Best Call Back Number: [phone]  Additional Information: Jovana requesting a call back concerning the pt b/p of 90/42.

## 2022-07-13 NOTE — SUBJECTIVE & OBJECTIVE
Past Medical History:   Diagnosis Date    Anticoagulant long-term use     Atrial fibrillation     Bursitis of hip     Chronic back pain     Chronic kidney disease, stage 3     Gastroesophageal reflux disease     Gout     Hyperlipemia     Hypertension     Prostate cancer 1997    Stroke 04/2019       Past Surgical History:   Procedure Laterality Date    CARDIAC VALVE REPLACEMENT  Feb 18, 2020    CARDIAC VALVE SURGERY      CATHETERIZATION OF BOTH LEFT AND RIGHT HEART N/A 12/2/2019    Procedure: CATHETERIZATION, HEART, BOTH LEFT AND RIGHT;  Surgeon: Everett Sosa MD;  Location: [facility] CATH/EP LAB;  Service: Cardiology;  Laterality: N/A;    CLOSURE OF LEFT ATRIAL APPENDAGE USING DEVICE N/A 3/3/2021    Procedure: Left atrial appendage closure device;  Surgeon: Bryan Zhang MD;  Location: [facility] CATH LAB;  Service: Cardiology;  Laterality: N/A;    COLONOSCOPY  1997    CORONARY ANGIOGRAPHY N/A 1/8/2020    Procedure: ANGIOGRAM, CORONARY ARTERY;  Surgeon: Bryan Zhang MD;  Location: [facility] CATH LAB;  Service: Cardiology;  Laterality: N/A;    CORONARY STENT PLACEMENT N/A 1/8/2020    Procedure: INSERTION, STENT, CORONARY ARTERY;  Surgeon: Bryan Zhang MD;  Location: [facility] CATH LAB;  Service: Cardiology;  Laterality: N/A;    PERCUTANEOUS TRANSLUMINAL ANGIOPLASTY (PTA) OF PERIPHERAL VESSEL N/A 1/23/2020    Procedure: PTA, PERIPHERAL VESSEL;  Surgeon: Bryan Zhang MD;  Location: [facility] CATH LAB;  Service: Cardiology;  Laterality: N/A;    PROSTATE SURGERY  1997    TONSILLECTOMY      TRANSCATHETER AORTIC VALVE REPLACEMENT (TAVR)  2/18/2020    Procedure: REPLACEMENT, AORTIC VALVE, TRANSCATHETER (TAVR);  Surgeon: Bryan Zhang MD;  Location: [facility] CATH LAB;  Service: Cardiology;;    TRANSESOPHAGEAL ECHOCARDIOGRAPHY N/A 1/23/2020    Procedure: ECHOCARDIOGRAM, TRANSESOPHAGEAL;  Surgeon: Km Diagnostic Provider;  Location: [facility] EP LAB;  Service: Anesthesiology;  Laterality: N/A;    TRANSESOPHAGEAL ECHOCARDIOGRAPHY N/A  4/20/2021    Procedure: ECHOCARDIOGRAM, TRANSESOPHAGEAL;  Surgeon: Bhavani Diagnostic Provider;  Location: [facility] LAB;  Service: Anesthesiology;  Laterality: N/A;       Review of patient's allergies indicates:   Allergen Reactions    Amiodarone analogues Other (See Comments)     Dizziness    Tramadol Itching and Swelling     Began medication, two doses into this treatment patient experiences tongue swelling and severe itching. Patient MD advised to not take this medication anymore    Eliquis [apixaban] Other (See Comments)     Dizziness, ankle swelling, lips swelling       No current facility-administered medications on file prior to encounter.     Current Outpatient Medications on File Prior to Encounter   Medication Sig    allopurinoL (ZYLOPRIM) 100 MG tablet Take 1 tablet by mouth once daily    aspirin (ECOTRIN) 81 MG EC tablet Take 81 mg by mouth once daily.    atorvastatin (LIPITOR) 40 MG tablet Take 1 tablet by mouth once daily    cholecalciferol, vitamin D3, (VITAMIN D3) 50 mcg (2,000 unit) Cap capsule Take 1 capsule by mouth once daily.    lisinopriL 10 MG tablet Take 1 tablet (10 mg total) by mouth once daily.    menthol/camphor (SARNA ORIGINAL TOP) Apply topically as needed.    metoprolol succinate (TOPROL-XL) 50 MG 24 hr tablet Take 1 tablet (50 mg total) by mouth once daily.    NIFEdipine (PROCARDIA-XL) 60 MG (OSM) 24 hr tablet Take 1 tablet by mouth once daily    predniSONE (DELTASONE) 5 MG tablet Take 1 tablet by mouth once daily    senna-docusate 8.6-50 mg (PERICOLACE) 8.6-50 mg per tablet Take 1 tablet by mouth 2 (two) times daily.    sertraline (ZOLOFT) 100 MG tablet Take 1 tablet (100 mg total) by mouth once daily.    tamsulosin (FLOMAX) 0.4 mg Cap Take 1 capsule (0.4 mg total) by mouth once daily.    vitamin B complex vit C no.3 (VITAMIN B COMP AND C NO.3 ORAL) Take 1 tablet by mouth Daily.    walker (ULTRA-LIGHT ROLLATOR) Misc Use as directed     Family History       Problem Relation (Age of Onset)     Alzheimer's disease Mother (86), Sister    Cancer Father (86)          Tobacco Use    Smoking status: Never Smoker    Smokeless tobacco: Never Used   Substance and Sexual Activity    Alcohol use: Not Currently     Alcohol/week: 3.3 standard drinks     Types: 4 Standard drinks or equivalent per week     Comment: occasionally bourbon or beer    Drug use: No    Sexual activity: Not Currently     Review of Systems   Constitutional: Negative.    HENT: Negative.     Eyes: Negative.    Respiratory: Negative.     Cardiovascular: Negative.    Endocrine: Negative.    Genitourinary: Negative.    Musculoskeletal: Negative.    Skin:  Positive for wound.   Allergic/Immunologic: Negative.    Neurological: Negative.    Hematological: Negative.    Psychiatric/Behavioral:  Positive for decreased concentration.         Has a problem remembering things   Objective:     Vital Signs (Most Recent):  Temp: 98.4 °F (36.9 °C) (07/13/22 1613)  Pulse: 70 (07/13/22 1613)  Resp: (!) 24 (07/13/22 1613)  BP: 108/62 (07/13/22 1613)  SpO2: 96 % (07/13/22 1613)   Vital Signs (24h Range):  Temp:  [97.5 °F (36.4 °C)-98.4 °F (36.9 °C)] 98.4 °F (36.9 °C)  Pulse:  [62-70] 70  Resp:  [9-24] 24  SpO2:  [96 %-100 %] 96 %  BP: ()/(48-65) 108/62     Weight: 64 kg (141 lb 1.5 oz)  Body mass index is 20.24 kg/m².    Physical Exam  Vitals and nursing note reviewed.   Constitutional:       Appearance: Normal appearance.   HENT:      Head: Normocephalic.      Nose: Nose normal.      Mouth/Throat:      Mouth: Mucous membranes are dry.   Eyes:      Extraocular Movements: Extraocular movements intact.   Cardiovascular:      Rate and Rhythm: Normal rate and regular rhythm.      Pulses: Normal pulses.      Heart sounds: Normal heart sounds.   Pulmonary:      Effort: Pulmonary effort is normal.      Breath sounds: Normal breath sounds.   Abdominal:      General: Abdomen is flat. Bowel sounds are normal.      Palpations: Abdomen is soft.   Musculoskeletal:          General: Normal range of motion.      Cervical back: Normal range of motion and neck supple.   Skin:     General: Skin is dry.      Capillary Refill: Capillary refill takes less than 2 seconds.      Findings: Lesion (old wounds on both lower legs) present.   Neurological:      General: No focal deficit present.      Mental Status: He is alert.   Psychiatric:         Mood and Affect: Mood normal.         Behavior: Behavior normal.         Thought Content: Thought content normal.           Significant Labs: All pertinent labs within the past 24 hours have been reviewed.    Significant Imaging: I have reviewed all pertinent imaging results/findings within the past 24 hours.

## 2022-07-13 NOTE — H&P
[facility] Medicine  History & Physical    Patient Name: Luhco Delong  MRN: [id number]  Patient Class: OP- Observation  Admission Date: 7/13/2022  Attending Physician: Reinaldo Kyle MD  Primary Care Provider: Jose Nolan MD         Patient information was obtained from patient and ER records.     Subjective:     Principal Problem:Hypovolemia dehydration    Chief Complaint:   Chief Complaint   Patient presents with    Hypotension     Patient with  reported hypotension. Instructed to come to ER asap         HPI: This 81-year-old male with history of chronic renal disease, atrial fibrillation, chronic back pain, prostate cancer and hypertension lives in an assisted living facility had his blood pressure checked by the physical therapist today and was recorded to be low.  He drinks little or no water.  He was sent to the emergency room for evaluation and had a systolic pressure about 80. He appeared a little confused.  He was given IV fluids of normal saline and his systolic blood pressure went up to about 100. He became more alert.  His BUN was 53 and creatinine 2.4.  Denies nausea, vomiting, diarrhea, fever or chills.  Hospitalist service was asked to admit and treat for dehydration.        Past Medical History:   Diagnosis Date    Anticoagulant long-term use     Atrial fibrillation     Bursitis of hip     Chronic back pain     Chronic kidney disease, stage 3     Gastroesophageal reflux disease     Gout     Hyperlipemia     Hypertension     Prostate cancer 1997    Stroke 04/2019       Past Surgical History:   Procedure Laterality Date    CARDIAC VALVE REPLACEMENT  Feb 18, 2020    CARDIAC VALVE SURGERY      CATHETERIZATION OF BOTH LEFT AND RIGHT HEART N/A 12/2/2019    Procedure: CATHETERIZATION, HEART, BOTH LEFT AND RIGHT;  Surgeon: Everett Sosa MD;  Location: [facility] CATH/EP LAB;  Service: Cardiology;  Laterality: N/A;    CLOSURE OF LEFT ATRIAL APPENDAGE USING DEVICE N/A  3/3/2021    Procedure: Left atrial appendage closure device;  Surgeon: Bryan Zhang MD;  Location: [facility] CATH LAB;  Service: Cardiology;  Laterality: N/A;    COLONOSCOPY  1997    CORONARY ANGIOGRAPHY N/A 1/8/2020    Procedure: ANGIOGRAM, CORONARY ARTERY;  Surgeon: Bryan Zhang MD;  Location: [facility] CATH LAB;  Service: Cardiology;  Laterality: N/A;    CORONARY STENT PLACEMENT N/A 1/8/2020    Procedure: INSERTION, STENT, CORONARY ARTERY;  Surgeon: Bryan Zhang MD;  Location: [facility] CATH LAB;  Service: Cardiology;  Laterality: N/A;    PERCUTANEOUS TRANSLUMINAL ANGIOPLASTY (PTA) OF PERIPHERAL VESSEL N/A 1/23/2020    Procedure: PTA, PERIPHERAL VESSEL;  Surgeon: Bryan Zhang MD;  Location: [facility] CATH LAB;  Service: Cardiology;  Laterality: N/A;    PROSTATE SURGERY  1997    TONSILLECTOMY      TRANSCATHETER AORTIC VALVE REPLACEMENT (TAVR)  2/18/2020    Procedure: REPLACEMENT, AORTIC VALVE, TRANSCATHETER (TAVR);  Surgeon: Bryan Zhang MD;  Location: [facility] CATH LAB;  Service: Cardiology;;    TRANSESOPHAGEAL ECHOCARDIOGRAPHY N/A 1/23/2020    Procedure: ECHOCARDIOGRAM, TRANSESOPHAGEAL;  Surgeon: [facility] Diagnostic Provider;  Location: [facility] EP LAB;  Service: Anesthesiology;  Laterality: N/A;    TRANSESOPHAGEAL ECHOCARDIOGRAPHY N/A 4/20/2021    Procedure: ECHOCARDIOGRAM, TRANSESOPHAGEAL;  Surgeon: [facility] Diagnostic Provider;  Location: [facility] EP LAB;  Service: Anesthesiology;  Laterality: N/A;       Review of patient's allergies indicates:   Allergen Reactions    Amiodarone analogues Other (See Comments)     Dizziness    Tramadol Itching and Swelling     Began medication, two doses into this treatment patient experiences tongue swelling and severe itching. Patient MD advised to not take this medication anymore    Eliquis [apixaban] Other (See Comments)     Dizziness, ankle swelling, lips swelling       No current facility-administered medications on file prior to encounter.     Current Outpatient  Medications on File Prior to Encounter   Medication Sig    allopurinoL (ZYLOPRIM) 100 MG tablet Take 1 tablet by mouth once daily    aspirin (ECOTRIN) 81 MG EC tablet Take 81 mg by mouth once daily.    atorvastatin (LIPITOR) 40 MG tablet Take 1 tablet by mouth once daily    cholecalciferol, vitamin D3, (VITAMIN D3) 50 mcg (2,000 unit) Cap capsule Take 1 capsule by mouth once daily.    lisinopriL 10 MG tablet Take 1 tablet (10 mg total) by mouth once daily.    menthol/camphor (SARNA ORIGINAL TOP) Apply topically as needed.    metoprolol succinate (TOPROL-XL) 50 MG 24 hr tablet Take 1 tablet (50 mg total) by mouth once daily.    NIFEdipine (PROCARDIA-XL) 60 MG (OSM) 24 hr tablet Take 1 tablet by mouth once daily    predniSONE (DELTASONE) 5 MG tablet Take 1 tablet by mouth once daily    senna-docusate 8.6-50 mg (PERICOLACE) 8.6-50 mg per tablet Take 1 tablet by mouth 2 (two) times daily.    sertraline (ZOLOFT) 100 MG tablet Take 1 tablet (100 mg total) by mouth once daily.    tamsulosin (FLOMAX) 0.4 mg Cap Take 1 capsule (0.4 mg total) by mouth once daily.    vitamin B complex vit C no.3 (VITAMIN B COMP AND C NO.3 ORAL) Take 1 tablet by mouth Daily.    walker (ULTRA-LIGHT ROLLATOR) Misc Use as directed     Family History       Problem Relation (Age of Onset)    Alzheimer's disease Mother (86), Sister    Cancer Father (86)          Tobacco Use    Smoking status: Never Smoker    Smokeless tobacco: Never Used   Substance and Sexual Activity    Alcohol use: Not Currently     Alcohol/week: 3.3 standard drinks     Types: 4 Standard drinks or equivalent per week     Comment: occasionally bourbon or beer    Drug use: No    Sexual activity: Not Currently     Review of Systems   Constitutional: Negative.    HENT: Negative.     Eyes: Negative.    Respiratory: Negative.     Cardiovascular: Negative.    Endocrine: Negative.    Genitourinary: Negative.    Musculoskeletal: Negative.    Skin:  Positive for wound.    Allergic/Immunologic: Negative.    Neurological: Negative.    Hematological: Negative.    Psychiatric/Behavioral:  Positive for decreased concentration.         Has a problem remembering things   Objective:     Vital Signs (Most Recent):  Temp: 98.4 °F (36.9 °C) (07/13/22 1613)  Pulse: 70 (07/13/22 1613)  Resp: (!) 24 (07/13/22 1613)  BP: 108/62 (07/13/22 1613)  SpO2: 96 % (07/13/22 1613)   Vital Signs (24h Range):  Temp:  [97.5 °F (36.4 °C)-98.4 °F (36.9 °C)] 98.4 °F (36.9 °C)  Pulse:  [62-70] 70  Resp:  [9-24] 24  SpO2:  [96 %-100 %] 96 %  BP: ()/(48-65) 108/62     Weight: 64 kg (141 lb 1.5 oz)  Body mass index is 20.24 kg/m².    Physical Exam  Vitals and nursing note reviewed.   Constitutional:       Appearance: Normal appearance.   HENT:      Head: Normocephalic.      Nose: Nose normal.      Mouth/Throat:      Mouth: Mucous membranes are dry.   Eyes:      Extraocular Movements: Extraocular movements intact.   Cardiovascular:      Rate and Rhythm: Normal rate and regular rhythm.      Pulses: Normal pulses.      Heart sounds: Normal heart sounds.   Pulmonary:      Effort: Pulmonary effort is normal.      Breath sounds: Normal breath sounds.   Abdominal:      General: Abdomen is flat. Bowel sounds are normal.      Palpations: Abdomen is soft.   Musculoskeletal:         General: Normal range of motion.      Cervical back: Normal range of motion and neck supple.   Skin:     General: Skin is dry.      Capillary Refill: Capillary refill takes less than 2 seconds.      Findings: Lesion (old wounds on both lower legs) present.   Neurological:      General: No focal deficit present.      Mental Status: He is alert.   Psychiatric:         Mood and Affect: Mood normal.         Behavior: Behavior normal.         Thought Content: Thought content normal.           Significant Labs: All pertinent labs within the past 24 hours have been reviewed.    Significant Imaging: I have reviewed all pertinent imaging  results/findings within the past 24 hours.    Assessment/Plan:     * Hypovolemia dehydration  IV fluids 1/2 normal saline  CMP  Cbc  Encourage p.o. intake of fluid      CRD (chronic renal disease), stage III  CMP  Cbc  Observe for hyperkalemia        VTE Risk Mitigation (From admission, onward)         Ordered     heparin (porcine) injection 5,000 Units  Every 8 hours         07/13/22 1711     IP VTE HIGH RISK PATIENT  Once         07/13/22 1711     Place sequential compression device  Until discontinued         07/13/22 1711                   Reinaldo Kyle MD  Department of Hospital Medicine   [facility]

## 2022-07-14 VITALS
HEART RATE: 58 BPM | DIASTOLIC BLOOD PRESSURE: 68 MMHG | OXYGEN SATURATION: 98 % | SYSTOLIC BLOOD PRESSURE: 138 MMHG | BODY MASS INDEX: 20.21 KG/M2 | RESPIRATION RATE: 16 BRPM | HEIGHT: 70 IN | TEMPERATURE: 97 F | WEIGHT: 141.13 LBS

## 2022-07-14 PROBLEM — E86.1 HYPOVOLEMIA DEHYDRATION: Status: RESOLVED | Noted: 2022-07-13 | Resolved: 2022-07-14

## 2022-07-14 LAB
ALBUMIN SERPL BCP-MCNC: 2.7 G/DL (ref 3.5–5.2)
ALP SERPL-CCNC: 94 U/L (ref 55–135)
ALT SERPL W/O P-5'-P-CCNC: 17 U/L (ref 10–44)
ANION GAP SERPL CALC-SCNC: 11 MMOL/L (ref 8–16)
AST SERPL-CCNC: 25 U/L (ref 10–40)
BASOPHILS # BLD AUTO: 0.04 K/UL (ref 0–0.2)
BASOPHILS NFR BLD: 0.6 % (ref 0–1.9)
BILIRUB SERPL-MCNC: 0.4 MG/DL (ref 0.1–1)
BUN SERPL-MCNC: 46 MG/DL (ref 8–23)
CALCIUM SERPL-MCNC: 8.2 MG/DL (ref 8.7–10.5)
CHLORIDE SERPL-SCNC: 110 MMOL/L (ref 95–110)
CO2 SERPL-SCNC: 17 MMOL/L (ref 23–29)
CREAT SERPL-MCNC: 2.1 MG/DL (ref 0.5–1.4)
DIFFERENTIAL METHOD: ABNORMAL
EOSINOPHIL # BLD AUTO: 0.6 K/UL (ref 0–0.5)
EOSINOPHIL NFR BLD: 8.5 % (ref 0–8)
ERYTHROCYTE [DISTWIDTH] IN BLOOD BY AUTOMATED COUNT: 12.2 % (ref 11.5–14.5)
EST. GFR  (AFRICAN AMERICAN): 32.9 ML/MIN/1.73 M^2
EST. GFR  (NON AFRICAN AMERICAN): 28.5 ML/MIN/1.73 M^2
GLUCOSE SERPL-MCNC: 83 MG/DL (ref 70–110)
HCT VFR BLD AUTO: 26.3 % (ref 40–54)
HGB BLD-MCNC: 8.9 G/DL (ref 14–18)
IMM GRANULOCYTES # BLD AUTO: 0.02 K/UL (ref 0–0.04)
IMM GRANULOCYTES NFR BLD AUTO: 0.3 % (ref 0–0.5)
LYMPHOCYTES # BLD AUTO: 1.6 K/UL (ref 1–4.8)
LYMPHOCYTES NFR BLD: 23.5 % (ref 18–48)
MCH RBC QN AUTO: 31.9 PG (ref 27–31)
MCHC RBC AUTO-ENTMCNC: 33.8 G/DL (ref 32–36)
MCV RBC AUTO: 94 FL (ref 82–98)
MONOCYTES # BLD AUTO: 0.9 K/UL (ref 0.3–1)
MONOCYTES NFR BLD: 13.5 % (ref 4–15)
NEUTROPHILS # BLD AUTO: 3.7 K/UL (ref 1.8–7.7)
NEUTROPHILS NFR BLD: 53.6 % (ref 38–73)
NRBC BLD-RTO: 0 /100 WBC
PLATELET # BLD AUTO: 171 K/UL (ref 150–450)
PMV BLD AUTO: 11.1 FL (ref 9.2–12.9)
POTASSIUM SERPL-SCNC: 4 MMOL/L (ref 3.5–5.1)
PROT SERPL-MCNC: 5.7 G/DL (ref 6–8.4)
RBC # BLD AUTO: 2.79 M/UL (ref 4.6–6.2)
SODIUM SERPL-SCNC: 138 MMOL/L (ref 136–145)
WBC # BLD AUTO: 6.81 K/UL (ref 3.9–12.7)

## 2022-07-14 PROCEDURE — 96372 THER/PROPH/DIAG INJ SC/IM: CPT | Performed by: HOSPITALIST

## 2022-07-14 PROCEDURE — 85025 COMPLETE CBC W/AUTO DIFF WBC: CPT | Performed by: HOSPITALIST

## 2022-07-14 PROCEDURE — 25000003 PHARM REV CODE 250: Performed by: HOSPITALIST

## 2022-07-14 PROCEDURE — 99217 PR OBSERVATION CARE DISCHARGE: CPT | Mod: ,,, | Performed by: HOSPITALIST

## 2022-07-14 PROCEDURE — 36415 COLL VENOUS BLD VENIPUNCTURE: CPT | Performed by: HOSPITALIST

## 2022-07-14 PROCEDURE — 63600175 PHARM REV CODE 636 W HCPCS: Performed by: HOSPITALIST

## 2022-07-14 PROCEDURE — 99217 PR OBSERVATION CARE DISCHARGE: ICD-10-PCS | Mod: ,,, | Performed by: HOSPITALIST

## 2022-07-14 PROCEDURE — 80053 COMPREHEN METABOLIC PANEL: CPT | Performed by: HOSPITALIST

## 2022-07-14 PROCEDURE — G0378 HOSPITAL OBSERVATION PER HR: HCPCS | Mod: CS

## 2022-07-14 RX ADMIN — ATORVASTATIN CALCIUM 40 MG: 40 TABLET, FILM COATED ORAL at 08:07

## 2022-07-14 RX ADMIN — NIFEDIPINE 60 MG: 30 TABLET, FILM COATED, EXTENDED RELEASE ORAL at 08:07

## 2022-07-14 RX ADMIN — ALLOPURINOL 100 MG: 100 TABLET ORAL at 08:07

## 2022-07-14 RX ADMIN — TAMSULOSIN HYDROCHLORIDE 0.4 MG: 0.4 CAPSULE ORAL at 08:07

## 2022-07-14 RX ADMIN — Medication 2000 UNITS: at 08:07

## 2022-07-14 RX ADMIN — LISINOPRIL 10 MG: 10 TABLET ORAL at 08:07

## 2022-07-14 RX ADMIN — ASPIRIN 81 MG: 81 TABLET, COATED ORAL at 08:07

## 2022-07-14 RX ADMIN — HEPARIN SODIUM 5000 UNITS: 5000 INJECTION, SOLUTION INTRAVENOUS; SUBCUTANEOUS at 08:07

## 2022-07-14 RX ADMIN — SERTRALINE HYDROCHLORIDE 100 MG: 25 TABLET ORAL at 08:07

## 2022-07-14 RX ADMIN — PREDNISONE 5 MG: 1 TABLET ORAL at 09:07

## 2022-07-14 NOTE — PLAN OF CARE
Problem: Adult Inpatient Plan of Care  Goal: Plan of Care Review  Outcome: Ongoing, Progressing     Problem: Adult Inpatient Plan of Care  Goal: Patient-Specific Goal (Individualized)  Outcome: Ongoing, Progressing     Problem: Adult Inpatient Plan of Care  Goal: Absence of Hospital-Acquired Illness or Injury  Outcome: Ongoing, Progressing     Problem: Fluid and Electrolyte Imbalance (Acute Kidney Injury/Impairment)  Goal: Fluid and Electrolyte Balance  Outcome: Ongoing, Not Progressing     Problem: Oral Intake Inadequate (Acute Kidney Injury/Impairment)  Goal: Optimal Nutrition Intake  Outcome: Ongoing, Not Progressing     Problem: Renal Function Impairment (Acute Kidney Injury/Impairment)  Goal: Effective Renal Function  Outcome: Ongoing, Not Progressing     Problem: Skin Injury Risk Increased  Goal: Skin Health and Integrity  Outcome: Ongoing, Not Progressing

## 2022-07-14 NOTE — PLAN OF CARE
07/14/22 1155   Discharge Assessment   Assessment Type Discharge Planning Assessment   Confirmed/corrected address, phone number and insurance Yes   Confirmed Demographics Correct on Facesheet   Source of Information family   When was your last doctors appointment?   (A month ago in the hospital for face to face, however, communicates with the physician via portal)   Does patient/caregiver understand observation status Yes   Communicated GRICEL with patient/caregiver Date not available/Unable to determine   Reason For Admission Low blood pressure   Lives With facility resident   Facility Arrived From: [facility]   Do you expect to return to your current living situation? Yes   Do you have help at home or someone to help you manage your care at home? Yes   Who are your caregiver(s) and their phone number(s)? Staff at [facility]   Prior to hospitilization cognitive status: Unable to Assess  (Patient resting, unable to assess at this time, information via spouse at bedside)   Current cognitive status: Unable to Assess  (Patient resting, unable to assess at this time, information via spouse at bedside)   Walking or Climbing Stairs Difficulty other (see comments)  (Per spouse, patient is non abulatory; confined to wc, yossi chair or bed)   Dressing/Bathing Difficulty bathing difficulty, dependent   Dressing/Bathing Management Done per staff at Assisted Living   Home Accessibility wheelchair accessible   Home Layout Able to live on 1st floor   Equipment Currently Used at Home wheelchair   Readmission within 30 days? Yes   Patient currently being followed by outpatient case management? No   Do you currently have service(s) that help you manage your care at home? Yes   Name and Contact number of agency [facility]   Do you take prescription medications? Yes   Do you have prescription coverage? Yes   Coverage [employer]   Do you have any problems affording any of your prescribed medications? No   Is  the patient taking medications as prescribed? yes   Who is going to help you get home at discharge? Spouse -  Jerilyn Gan [phone]   How do you get to doctors appointments? family or friend will provide   Are you on dialysis? No   Do you take coumadin? No   Discharge Plan A Assisted Living   Discharge Plan B Assisted Living   DME Needed Upon Discharge  none   Discharge Plan discussed with: Spouse/sig other   Name(s) and Number(s) Lesli Gan [phone]   Discharge Barriers Identified None   Relationship/Environment   Name(s) of Who Lives With Patient Spouse Zachariah Gan [phone] is available for support and assistance, however, patient is a resident of [facility]     Assessment completed with Spouse -  Jerilyn Gan [phone] who is at bedside and patient is sleeping and opens his eye and says a few words then back asleep. Patient is a resident at [facility]. He has a Wc and is confined to the , Ana Laura Chair or bed most of the time. She states he is non ambulatory. Patient does have services with [facility] currently at [facility]. No other needs voiced at this time. Case Management will continue to follow for further needs.

## 2022-07-14 NOTE — PLAN OF CARE
07/14/22 1154   LALA Message   Medicare Outpatient and Observation Notification regarding financial responsibility Given to patient/caregiver;Explained to patient/caregiver;Signed/date by patient/caregiver   Date LALA was signed 07/14/22   Time LALA was signed 0427

## 2022-07-14 NOTE — DISCHARGE SUMMARY
[facility] Medicine  Discharge Summary      Patient Name: Lucho Delong  MRN: [id number]  Admission Date: 7/13/2022  Hospital Length of Stay: 0 days  Discharge Date and Time:  07/14/2022 12:07 PM  Attending Physician: Reinaldo Kyle MD   Discharging Provider: Reinaldo Kyle MD  Primary Care Provider: Jose Nolan MD    Admitting diagnosis:    1. Hypovolemic dehydration    2. Chronic renal disease stage III    Final diagnosis:    1. Hypovolemic dehydration    2. Chronic renal disease stage III.    Reason for hospitalization:  To treat hypovolemic dehydration        HPI:  This 81-year-old male with history of chronic renal disease, atrial fibrillation, chronic back pain, prostate cancer and hypertension lives in assisted living facility and has blood pressure checked by physical therapist today.  His blood pressure was described to be low and he was sent to the emergency room for evaluation and treatment.  He does not like drinking water.  Also he has to be reminded to drink water.  When seen in emergency room he was little confused.  His systolic pressure was about 80. He was given IV fluids and his blood pressure returned to a systolic of about 100.  His BUN was 53 and creatinine 2.4.  He did not have any nausea vomiting, diarrhea, fever or chills.  Hospitalist service was asked to admit and treat for hypovolemic dehydration    * No surgery found *      Hospital Course:  He was started on 1/2 normal saline at 125 cc/hour a responded well to IV fluids.  His systolic blood pressure remained above 120. He was encouraged to drink water.  His creatinine decreased to 2.1 which is baseline for him.  His felt that he was medically stable for discharge with follow-up by home health agency.      Consults:     Final Active Diagnoses:    Diagnosis Date Noted POA    CRD (chronic renal disease), stage III [N18.30] 06/11/2022 Yes      Problems Resolved During this Admission:    Diagnosis Date  Noted Date Resolved POA    PRINCIPAL PROBLEM:  Hypovolemia dehydration [E86.1] 07/13/2022 07/14/2022 Yes      Discharged Condition: stable    Disposition: Home-Health Care [facility] [place], [place]    Follow Up:    Patient Instructions:      SUBSEQUENT HOME HEALTH ORDERS   Order Comments: Subsequent Home Health Orders    Current Medications:  Current Facility-Administered Medications:  0.45% NaCl infusion, , Intravenous, Continuous, Reinaldo Kyle MD, Last Rate: 125 mL/hr at 07/13/22 1755, New Bag at 07/13/22 1755  acetaminophen tablet 650 mg, 650 mg, Oral, Q8H PRN, Reinaldo Kyle MD  allopurinoL tablet 100 mg, 100 mg, Oral, Daily, Reinaldo Kyle MD, 100 mg at 07/14/22 0840  aspirin EC tablet 81 mg, 81 mg, Oral, Daily, Reinaldo Kyle MD, 81 mg at 07/14/22 0840  atorvastatin tablet 40 mg, 40 mg, Oral, Daily, Reinaldo Kyle MD, 40 mg at 07/14/22 0840  dextrose 10% bolus 125 mL, 12.5 g, Intravenous, PRN, Reinaldo Kyle MD  dextrose 10% bolus 250 mL, 25 g, Intravenous, PRN, Reinaldo Kyle MD  glucagon (human recombinant) injection 1 mg, 1 mg, Intramuscular, PRN, Reinaldo Kyle MD  heparin (porcine) injection 5,000 Units, 5,000 Units, Subcutaneous, Q8H, Reinaldo Kyle MD, 5,000 Units at 07/14/22 0840  lisinopriL tablet 10 mg, 10 mg, Oral, Daily, Reinaldo Kyle MD, 10 mg at 07/14/22 0840  metoprolol succinate (TOPROL-XL) 24 hr tablet 50 mg, 50 mg, Oral, Daily, Reinaldo Kyle MD  naloxone 0.4 mg/mL injection 0.02 mg, 0.02 mg, Intravenous, PRN, Reinaldo Kyle MD  NIFEdipine 24 hr tablet 60 mg, 60 mg, Oral, Daily, Reinaldo Kyle MD, 60 mg at 07/14/22 0839  predniSONE tablet 5 mg, 5 mg, Oral, Daily, Reinaldo Kyle MD, 5 mg at 07/14/22 0935  senna-docusate 8.6-50 mg per tablet 1 tablet, 1 tablet, Oral, BID, Reinaldo Kyle MD  sertraline tablet 100 mg, 100 mg, Oral, Daily, Reinaldo Kyle MD, 100 mg at 07/14/22 0839  sodium chloride 0.9% flush 10 mL, 10 mL, Intravenous,  Q12H PRN, Reinaldo Kyle MD  tamsulosin 24 hr capsule 0.4 mg, 0.4 mg, Oral, Daily, Reinaldo Kyle MD, 0.4 mg at 07/14/22 0839  trazodone split tablet 25 mg, 25 mg, Oral, Nightly PRN, Reinaldo Kyle MD  vitamin D 1000 units tablet 2,000 Units, 2,000 Units, Oral, Daily, Reinaldo Kyle MD, 2,000 Units at 07/14/22 0839        Nursing:   Routine Skin for Bedridden Patients: Instruct patient/caregiver to apply moisture barrier cream to all skin folds and wet areas in perineal area daily and after baths and all bowel movements.    Diet:   regular diet .encouage liquid magdy. water    Activities:   activity as tolerated    Labs:  SN to perform labs:  CBC: Biweekly; 3 week(s) and BMP: Biweekly; 3week(s) and Report Lab results to PCP.    Referrals/ Consults  Physical Therapy to evaluate and treat. Evaluate for home safety and equipment needs; Establish/upgrade home exercise program. Perform / instruct on therapeutic exercises, gait training, transfer training, and Range of Motion.  Occupational Therapy to evaluate and treat. Evaluate home environment for safety and equipment needs. Perform/Instruct on transfers, ADL training, ROM, and therapeutic exercises.  Aide to provide assistance with personal care, ADLs, and vital signs.    Home Health Aide:  Nursing Three times weekly, Physical Therapy Three times weekly, Occupational Therapy Three times weekly, and Home Health Aide Three times weekly     Order Specific Question Answer Comments   What Home Health Agency is the patient currently using? Other/External      Medications:  Reconciled Home Medications:      Medication List      CONTINUE taking these medications    allopurinoL 100 MG tablet  Commonly known as: ZYLOPRIM  Take 1 tablet by mouth once daily     aspirin 81 MG EC tablet  Commonly known as: ECOTRIN  Take 81 mg by mouth once daily.     atorvastatin 40 MG tablet  Commonly known as: LIPITOR  Take 1 tablet by mouth once daily     cholecalciferol  (vitamin D3) 50 mcg (2,000 unit) Cap capsule  Commonly known as: VITAMIN D3  Take 1 capsule by mouth once daily.     lisinopriL 10 MG tablet  Take 1 tablet (10 mg total) by mouth once daily.     metoprolol succinate 50 MG 24 hr tablet  Commonly known as: TOPROL-XL  Take 1 tablet (50 mg total) by mouth once daily.     NIFEdipine 60 MG (OSM) 24 hr tablet  Commonly known as: PROCARDIA-XL  Take 1 tablet by mouth once daily     predniSONE 5 MG tablet  Commonly known as: DELTASONE  Take 1 tablet by mouth once daily     SARNA ORIGINAL TOP  Apply topically as needed.     senna-docusate 8.6-50 mg 8.6-50 mg per tablet  Commonly known as: PERICOLACE  Take 1 tablet by mouth 2 (two) times daily.     sertraline 100 MG tablet  Commonly known as: ZOLOFT  Take 1 tablet (100 mg total) by mouth once daily.     tamsulosin 0.4 mg Cap  Commonly known as: FLOMAX  Take 1 capsule (0.4 mg total) by mouth once daily.     ULTRA-LIGHT ROLLATOR Misc  Generic drug: walker  Use as directed     VITAMIN B COMP AND C NO.3 ORAL  Take 1 tablet by mouth Daily.            Significant Diagnostic Studies: Labs:   CMP   Recent Labs   Lab 07/13/22  1140 07/14/22  0610    138   K 4.2 4.0   * 110   CO2 18* 17*   * 83   BUN 53* 46*   CREATININE 2.4* 2.1*   CALCIUM 9.2 8.2*   PROT 6.5 5.7*   ALBUMIN 3.1* 2.7*   BILITOT 0.5 0.4   ALKPHOS 109 94   AST 15 25   ALT 13 17   ANIONGAP 13 11   ESTGFRAFRICA 28.0* 32.9*   EGFRNONAA 24.2* 28.5*    and CBC   Recent Labs   Lab 07/13/22  1140 07/14/22  0610   WBC 8.34 6.81   HGB 10.5* 8.9*   HCT 31.5* 26.3*    171       Pending Diagnostic Studies:     None        Indwelling Lines/Drains at time of discharge:   Lines/Drains/Airways     None                 Time spent on the discharge of patient: 18 minutes         Reinaldo Kyle MD  Department of Hospital Medicine  [facility]

## 2022-07-14 NOTE — PLAN OF CARE
07/14/22 1427   Final Note   Assessment Type Final Discharge Note   Anticipated Discharge Disposition Home-Health   What phone number can be called within the next 1-3 days to see how you are doing after discharge? [phone]   Hospital Resources/Appts/Education Provided Appointments scheduled and added to AVS   Post-Acute Status   Post-Acute Authorization Home Health   Home Health Status Set-up Complete/Auth obtained   Discharge Delays None known at this time   Verbal & written follow up appointments with Dr Hunt & Dr Arana provided to patient's wife. Demonstrated understanding by verbal feedback. Also faxed clinicals to [facility] to continue home health. [facility] with [facility] notified of going home today. Denies any other needs at this time.

## 2022-07-18 ENCOUNTER — PATIENT MESSAGE (OUTPATIENT)
Dept: FAMILY MEDICINE | Facility: CLINIC | Age: 82
End: 2022-07-18
Payer: MEDICARE

## 2022-07-18 ENCOUNTER — TELEPHONE (OUTPATIENT)
Dept: FAMILY MEDICINE | Facility: CLINIC | Age: 82
End: 2022-07-18
Payer: MEDICARE

## 2022-07-18 NOTE — TELEPHONE ENCOUNTER
----- Message from Mariia Zaldivar sent at 7/13/2022 10:45 AM CDT -----  Contact: Wife Asia  She called to let the office know that they are on the way to [facility] for his Low Blood Pressure, and expecting them to admit him. She can be reached at [phone] if you need to speak to her. Thank you.

## 2022-07-19 ENCOUNTER — TELEPHONE (OUTPATIENT)
Dept: FAMILY MEDICINE | Facility: CLINIC | Age: 82
End: 2022-07-19
Payer: MEDICARE

## 2022-07-19 DIAGNOSIS — Z95.2 S/P TAVR (TRANSCATHETER AORTIC VALVE REPLACEMENT): Primary | ICD-10-CM

## 2022-07-19 NOTE — TELEPHONE ENCOUNTER
----- Message from Stanley Cook sent at 7/19/2022 12:41 PM CDT -----  Contact: Nurse  Type:  Patient Returning Call    Who Called: [facility]  Who Left Message for Patient:  Shani  Does the patient know what this is regarding?:  Yes  Best Call Back Number:  [phone]  Additional Information:  [facility] was returning call they missed stated to please call back when available Thank you

## 2022-07-19 NOTE — TELEPHONE ENCOUNTER
----- Message from Vladimir Moon sent at 7/19/2022 11:56 AM CDT -----  Type: Patient Call Back         Who called: [facility]          What is the request in detail: Regarding Hospice placement and blood Pressure is  80 over 40          Can the clinic reply by [facility]?no          Would the patient rather a call back or a response via [facility]?call back          Best call back number: [email or web address]          Additional Information:           Thank You

## 2022-07-19 NOTE — TELEPHONE ENCOUNTER
Spoke with Evelyne. Informed referral placed. Evelyne advised to fax to     Referral and needed information sent

## 2022-07-20 NOTE — TELEPHONE ENCOUNTER
----- Message from Mariia Zaldivar sent at 7/20/2022 11:48 AM CDT -----  Contact: Wife  Patients wife is calling to inform doctor that she had to cancel since patient was admitted to Hospice, since his body is shutting done. Thank You.

## 2022-07-27 ENCOUNTER — TELEPHONE (OUTPATIENT)
Dept: CARDIOLOGY | Facility: CLINIC | Age: 82
End: 2022-07-27
Payer: MEDICARE

## 2022-07-27 ENCOUNTER — EXTERNAL HOME HEALTH (OUTPATIENT)
Dept: HOME HEALTH SERVICES | Facility: HOSPITAL | Age: 82
End: 2022-07-27
Payer: MEDICARE

## 2022-07-27 NOTE — TELEPHONE ENCOUNTER
Patients wife called to inform Dr Zhang that he is in a care home and not doing well.  Let her a message for us to call back if we can help!

## 2022-10-02 NOTE — ASSESSMENT & PLAN NOTE
CT without acute findings  Stool noted; will treat as per below  Wife reported improvement in symptoms and agitation after powers placement; possible urinary retention?  Will keep powers for now   UA negative for infection      Yes...

## 2022-10-11 ENCOUNTER — DOCUMENTATION ONLY (OUTPATIENT)
Dept: REHABILITATION | Facility: HOSPITAL | Age: 82
End: 2022-10-11

## 2022-10-11 DIAGNOSIS — R26.89 BALANCE PROBLEM: Primary | ICD-10-CM

## 2022-10-11 DIAGNOSIS — Z74.09 IMPAIRED FUNCTIONAL MOBILITY AND ACTIVITY TOLERANCE: ICD-10-CM

## 2022-10-11 NOTE — PROGRESS NOTES
[facility]  PT Discharge Note    Name: Lucho Delong  [facility] Number: [id number]    Therapy Diagnosis:   Encounter Diagnoses   Name Primary?    Balance problem Yes    Impaired functional mobility and activity tolerance      Physician: Jose Nolan MD     Physician Orders: PT Eval and Treat   Medical Diagnosis from Referral: Balance problem  Evaluation Date: 2/17/2022      Date of Last visit: 3/4/2022  Total Visits Received: 4    ASSESSMENT      Patient was unable to attend PT sufficiently to complete POC.  See daily treatment notes for progression    Discharge reason: Patient is medically unstable    Discharge FOTO Score: N/A    Goals:  as documented in final treatment note  Short Term Goals:                1) Facilitate improved LE flexibility  Progressing              2) Facilitate improved posture in sitting Ongoing              3) Patient will consistently utilize appropriate assistive device for community ambulation Minimal progress              4) Patient will consistently demonstrate correct technique for sit <> stand transfers when utilizing an assistive device Ongoing     Long Term Goals:                 1) Patient will consistently ambulate using appropriate assistive device over level surfaces demonstrating safe technique when turning corners and navigating obstacles  Ongoing              2) Patient will consistently perform toilet transfers with safe technique Ongoing              3) Improve timed 5x sit <> stand to < 22 sec with slight use of UE Minimal progress              4) Improve Louis Balance score to > 35 to improve his safety during standing/gait activities Ongoing              5) Patient/family will be independent in complete HEP.  Minimal progress       PLAN   This patient is discharged from Physical Therapy      Abena Kang, PT

## 2022-11-23 NOTE — SUBJECTIVE & OBJECTIVE
Interval History:  Patient does not have any complaints but his wife states that she thinks he has more weakness on the left side and can not get out of bed.  Denies chest    Review of Systems   Constitutional: Negative.    HENT: Negative.     Eyes: Negative.    Respiratory: Negative.     Cardiovascular: Negative.    Gastrointestinal: Negative.    Endocrine: Negative.    Genitourinary: Negative.    Musculoskeletal:  Positive for arthralgias and gait problem.   Allergic/Immunologic: Negative.    Neurological:  Positive for weakness.   Hematological: Negative.    Psychiatric/Behavioral: Negative.     Objective:     Vital Signs (Most Recent):  Temp: 97.7 °F (36.5 °C) (06/14/22 0718)  Pulse: 88 (06/14/22 0718)  Resp: 18 (06/14/22 0718)  BP: (!) 168/76 (06/14/22 0718)  SpO2: (!) 92 % (06/14/22 0718)   Vital Signs (24h Range):  Temp:  [97.2 °F (36.2 °C)-98.7 °F (37.1 °C)] 97.7 °F (36.5 °C)  Pulse:  [] 88  Resp:  [16-18] 18  SpO2:  [92 %-97 %] 92 %  BP: (116-168)/(57-76) 168/76     Weight: 72.1 kg (159 lb)  Body mass index is 24.9 kg/m².    Intake/Output Summary (Last 24 hours) at 6/14/2022 1502  Last data filed at 6/14/2022 0331  Gross per 24 hour   Intake 4633.2 ml   Output --   Net 4633.2 ml      Physical Exam  Vitals and nursing note reviewed.   Constitutional:       Appearance: Normal appearance.   HENT:      Head: Normocephalic and atraumatic.      Right Ear: External ear normal.      Left Ear: External ear normal.      Nose: Nose normal.      Mouth/Throat:      Mouth: Mucous membranes are moist.   Eyes:      Extraocular Movements: Extraocular movements intact.   Cardiovascular:      Rate and Rhythm: Normal rate. Rhythm irregular.      Pulses: Normal pulses.      Heart sounds: Normal heart sounds.   Pulmonary:      Effort: Pulmonary effort is normal.   Abdominal:      General: Abdomen is flat. Bowel sounds are normal.      Palpations: Abdomen is soft.   Musculoskeletal:         General: Normal range of motion.  Resume general activity as tolerated        Cervical back: Normal range of motion and neck supple.   Skin:     General: Skin is warm.      Capillary Refill: Capillary refill takes 2 to 3 seconds.   Neurological:      General: No focal deficit present.      Mental Status: He is alert.   Psychiatric:         Mood and Affect: Mood normal.       Significant Labs: All pertinent labs within the past 24 hours have been reviewed.    Significant Imaging: I have reviewed all pertinent imaging results/findings within the past 24 hours.

## 2023-01-15 NOTE — TELEPHONE ENCOUNTER
I would continue the Pradaxa until he sees the cardiologist, and I will place a referral.   [FreeTextEntry1] : I, Fernando Manuel, acted solely as a scribe for Dr. Myron I. Kleiner, MD. on 01/06/2023 .

## 2023-03-13 ENCOUNTER — TELEPHONE (OUTPATIENT)
Dept: CARDIOLOGY | Facility: CLINIC | Age: 83
End: 2023-03-13

## 2023-11-02 NOTE — SUBJECTIVE & OBJECTIVE
"Past Medical History:   Diagnosis Date    Anticoagulant long-term use     Atrial fibrillation     Bursitis of hip     Chronic back pain     Chronic kidney disease, stage 3     Gastroesophageal reflux disease     Gout     Hyperlipemia     Hypertension     Prostate cancer 1997    Stroke 04/2019       Past Surgical History:   Procedure Laterality Date    CATHETERIZATION OF BOTH LEFT AND RIGHT HEART N/A 12/2/2019    Procedure: CATHETERIZATION, HEART, BOTH LEFT AND RIGHT;  Surgeon: Everett Sosa MD;  Location: [facility] CATH/EP LAB;  Service: Cardiology;  Laterality: N/A;    COLONOSCOPY  1997    CORONARY ANGIOGRAPHY N/A 1/8/2020    Procedure: ANGIOGRAM, CORONARY ARTERY;  Surgeon: Bryan Zhang MD;  Location: [facility] CATH LAB;  Service: Cardiology;  Laterality: N/A;    CORONARY STENT PLACEMENT N/A 1/8/2020    Procedure: INSERTION, STENT, CORONARY ARTERY;  Surgeon: Bryan Zhang MD;  Location: [facility] CATH LAB;  Service: Cardiology;  Laterality: N/A;    PROSTATE SURGERY  1997    TONSILLECTOMY         Review of patient's allergies indicates:   Allergen Reactions    Tramadol Itching and Swelling     Began medication, two doses into this treatment patient experiences tongue swelling and severe itching. Patient MD advised to not take this medication anymore       PTA Medications   Medication Sig    allopurinol (ZYLOPRIM) 100 MG tablet Take 1 tablet (100 mg total) by mouth once daily.    aspirin (ECOTRIN) 81 MG EC tablet Take 1 tablet (81 mg total) by mouth once daily.    atorvastatin (LIPITOR) 40 MG tablet Take 1 tablet by mouth Daily.    cyanocobalamin 1,000 mcg/mL injection INJECT 1 ML INTRAMUSCULARLY ONCE EVERY 30 DAYS    dabigatran etexilate (PRADAXA) 75 mg Cap Take 75 mg by mouth once daily. "Do NOT break, chew, or open capsules."    lactase (LACTASE FAST ACTING) 9,000 unit Tab tablet Take 1 tablet (9,000 Units total) by mouth 3 (three) times daily with meals.    losartan (COZAAR) 50 MG tablet Take " 50 mg by mouth 2 (two) times daily.    metoprolol succinate (TOPROL-XL) 100 MG 24 hr tablet Take 1 tablet (100 mg total) by mouth once daily.    NIFEdipine (PROCARDIA-XL) 60 MG (OSM) 24 hr tablet Take 60 mg by mouth once daily.    pantoprazole (PROTONIX) 40 MG tablet Take 1 tablet (40 mg total) by mouth once daily.    prasugrel (EFFIENT) 10 mg Tab Take 1 tablet (10 mg total) by mouth once daily.    predniSONE (DELTASONE) 5 MG tablet Take 1 tablet (5 mg total) by mouth once daily.     Family History     Problem Relation (Age of Onset)    Alzheimer's disease Mother (86), Sister    Cancer Father (86)        Tobacco Use    Smoking status: Never Smoker    Smokeless tobacco: Never Used   Substance and Sexual Activity    Alcohol use: Yes     Alcohol/week: 3.3 standard drinks     Types: 4 Standard drinks or equivalent per week     Frequency: 2-4 times a month     Comment: bourbon or beer    Drug use: No    Sexual activity: Not on file     Review of Systems   Constitution: Negative. Negative for chills and fever.   HENT: Negative.  Negative for sore throat.    Eyes: Negative.  Negative for blurred vision.   Cardiovascular: Positive for dyspnea on exertion. Negative for chest pain, claudication, irregular heartbeat, leg swelling, orthopnea, palpitations and syncope.   Respiratory: Negative.  Negative for cough and sputum production.    Endocrine: Negative.    Hematologic/Lymphatic: Does not bruise/bleed easily.   Skin: Negative.  Negative for itching, rash and suspicious lesions.   Musculoskeletal: Negative.  Negative for falls.   Gastrointestinal: Negative.  Negative for abdominal pain.   Genitourinary: Negative.  Negative for dysuria.   Neurological: Negative.  Negative for disturbances in coordination, dizziness and loss of balance.   Psychiatric/Behavioral: Negative for altered mental status.     Objective:     Vital Signs (Most Recent):  Temp: 97.1 °F (36.2 °C) (01/23/20 0850)  Pulse: 66 (01/23/20 0850)  Resp:  18 (01/23/20 0850)  BP: 116/63 (01/23/20 0900)  SpO2: 97 % (01/23/20 0850) Vital Signs (24h Range):  Temp:  [97.1 °F (36.2 °C)] 97.1 °F (36.2 °C)  Pulse:  [66] 66  Resp:  [18] 18  SpO2:  [97 %] 97 %  BP: (116-121)/(58-63) 117/58     Weight: 77.1 kg (170 lb)  Body mass index is 26.63 kg/m².    SpO2: 97 %       No intake or output data in the 24 hours ending 01/23/20 0935    Lines/Drains/Airways     Peripheral Intravenous Line                 Peripheral IV - Single Lumen 01/14/20 1325 22 G Right Forearm 8 days         Peripheral IV - Single Lumen 01/23/20 0914 18 G Right Antecubital less than 1 day                Physical Exam   Constitutional: He is oriented to person, place, and time. He appears well-developed and well-nourished.   HENT:   Head: Normocephalic and atraumatic.   Eyes: Pupils are equal, round, and reactive to light. Conjunctivae and EOM are normal.   Neck: Normal range of motion. Neck supple. No JVD present.   Cardiovascular: Normal rate and regular rhythm. Exam reveals no gallop and no friction rub.   Murmur heard.  Normal pulses throughout   Pulmonary/Chest: Effort normal and breath sounds normal. No respiratory distress. He has no wheezes. He has no rales. He exhibits no tenderness.   Abdominal: Soft. Bowel sounds are normal. He exhibits no distension. There is no tenderness.   Musculoskeletal: Normal range of motion. He exhibits no edema or tenderness.   Neurological: He is alert and oriented to person, place, and time.   Skin: Skin is warm and dry. No erythema. No pallor.       Significant Labs:   Recent Lab Results       01/23/20  0841   01/23/20  0737        DLCOc SBVA LLN   2.25[P]     DLCOc SBVA Pre Ref   90.8[P]     DLCOc SBVA Ref   3.48[P]     DLCOc Single Breath LLN   15.70[P]     DLCOc Single Breath Pre Ref   85.1[P]     DLCOc Single Breath Ref   22.62[P]     DLCO Single Breath LLN   15.70[P]     DLCO Single Breath Pre Ref   76.3[P]     DLCO Single Breath Ref   22.62[P]     DLCOVA LLN    2.25[P]     DLCOVA PRE   2.83[P]     DLCOVA Pre Ref   81.4[P]     DLCOVA Ref   3.48[P]     DLVAAdj PRE   3.16[P]     FEF 25 75 LLN   0.71[P]     FEF 25 75 Pre Ref   112.5[P]     FEF 25 75 Ref   1.88[P]     FEV1 FVC LLN   60[P]     FEV1 FVC Pre Ref   99.0[P]     FEV1 FVC Ref   75[P]     FEV1 LLN   1.80[P]     FEV1 Pre Ref   104.5[P]     FEV1 Ref   2.60[P]     FVC LLN   2.52[P]     FVC Pre Ref   104.7[P]     FVC Ref   3.49[P]     IVC PRE   3.65[P]     IVC Single Breath LLN   2.52[P]     IVC Single Breath Pre Ref   104.4[P]     IVC Single Breath Ref   3.49[P]     MVV LLN   85[P]     MVV Pre Ref   67.0[P]     MVV Ref   100[P]     PEF LLN   4.44[P]     PEF Pre Ref   108.6[P]     PEF Ref   6.56[P]     VA PRE   6.09[P]     VA Single Breath LLN   6.35[P]     VA Single Breath Pre Ref   95.9[P]     VA Single Breath Ref   6.35[P]     DLCO ADJ PRE   19.25[P]     DLCOCSBVA ULN   4.71[P]     DLCOC SINGLEBREATH ULN   29.55[P]     DLCO SINGLEBREATH ULN   29.55[P]     DLCOVA ULN   4.71[P]     FEV05 LLN   1.05[P]     FEV05 REF   2.18[P]     Hematocrit 32.7       Hemoglobin 10.6       IVC SINGLEBREATH ULN   4.46[P]     MCH 31.9       MCHC 32.4       MCV 99       MPV 11.4       Platelets 207       Pre DLCO   17.26[P]     Pre FEF 25 75   2.11[P]     Pre    6.90[P]     PRE FEV05 REF   99.5[P]     Pre FEV1   2.72[P]     Pre FEV1 FVC   74.45[P]     PRE FEV5   2.17[P]     Pre FVC   3.65[P]     Pre MVV   67.00[P]     Pre PEF   7.12[P]     RBC 3.32       RDW 12.9       VA SINGLEBREATH ULN   6.35[P]     WBC 8.32             Significant Imaging: Echocardiogram:   Transthoracic echo (TTE) complete (Cupid Only):   Results for orders placed or performed during the hospital encounter of 01/08/20   Echo Color Flow Doppler? Yes   Result Value Ref Range    BSA 1.91 m2    LA WIDTH 3.29 cm    LVIDD 4.75 3.5 - 6.0 cm    IVS 1.00 0.6 - 1.1 cm    PW 1.03 0.6 - 1.1 cm    LVIDS 3.65 2.1 - 4.0 cm    FS 23 28 - 44 %    LA volume 78.99 cm3    Sinus 3.25  cm    STJ 2.60 cm    Ascending aorta 3.15 cm    LV mass 170.74 g    LA size 4.41 cm    RVDD 2.93 cm    TAPSE 2.03 cm    Left Ventricle Relative Wall Thickness 0.43 cm    AV mean gradient 29 mmHg    AV valve area 0.86 cm2    AV Velocity Ratio 0.23     AV index (prosthetic) 0.23     E/A ratio 2.51     E wave decelartion time 210.68 msec    LVOT diameter 2.17 cm    LVOT area 3.7 cm2    LVOT peak eben 0.81 m/s    LVOT peak VTI 17.59 cm    Ao peak eben 3.56 m/s    Ao VTI 76.04 cm    LVOT stroke volume 65.02 cm3    AV peak gradient 51 mmHg    MV Peak E Eben 1.13 m/s    TR Max Eben 2.75 m/s    MV Peak A Eben 0.45 m/s    LV Systolic Volume 56.24 mL    LV Systolic Volume Index 29.8 mL/m2    LV Diastolic Volume 104.94 mL    LV Diastolic Volume Index 55.61 mL/m2    LA Volume Index 41.9 mL/m2    LV Mass Index 90 g/m2    RA Major Axis 5.19 cm    Left Atrium Minor Axis 6.38 cm    Left Atrium Major Axis 6.43 cm    Triscuspid Valve Regurgitation Peak Gradient 30 mmHg    RA Width 3.48 cm    Narrative    · Concentric left ventricular remodeling. Mildly decreased left   ventricular systolic function. The estimated ejection fraction is 40%.   Wall motion abnoramlities  · Local segmental wall motion abnormalities.  · Moderate left atrial enlargement.  · Mild tricuspid regurgitation.  · Normal right ventricular systolic function.  · Moderate-to-severe aortic valve stenosis. Aortic valve area is 0.86 cm2;   peak velocity is 3.56 m/s; mean gradient is 29 mmHg. Di of 0.23. SVI of   34cc/m2  · Atrial fibrillation observed.         Billing Type: Third-Party Bill

## 2024-03-05 NOTE — TELEPHONE ENCOUNTER
Pt c/o pain of a 10 pain scale 0-10; traMADol 25mg PRN given for pt's pain;   pt requested a stronger medication for her pain; notified MD via perfect serve.    Spoke with [facility] with [facility]. Evelyne states patient BP is 80/40, wife is refusing to go to the ER. Requesting hospice. Please advise.

## (undated) DEVICE — PROTECTION STATION PLUS

## (undated) DEVICE — GUIDE WIRE BMW 014 X190

## (undated) DEVICE — GUIDEWIRE SUPRA CORE 035 190CM

## (undated) DEVICE — KIT MICROINTRO 4F .018X40X7CM

## (undated) DEVICE — BLLN LOMA VISTA TRUE 24MM

## (undated) DEVICE — GUIDEWIRE EMERALD 150CM PTFE

## (undated) DEVICE — INFLATOR ENCORE 26 BLLN INFL

## (undated) DEVICE — VISIPAQUE 320 200ML +PK

## (undated) DEVICE — SEE MEDLINE ITEM 156894

## (undated) DEVICE — SHEATH INTRODUCER PRECISION ACCESS 6FX10

## (undated) DEVICE — SHEATH INTRODUCER 6FR 11CM

## (undated) DEVICE — CABLE PACER

## (undated) DEVICE — GUIDEWIRE X SPORT .014IN 190CM

## (undated) DEVICE — KIT CUSTOM MANIFOLD

## (undated) DEVICE — GUIDEWIRE STF .035X260CM STR

## (undated) DEVICE — SYS EVOLUT PRO+ LOADING 34MM
Type: IMPLANTABLE DEVICE | Site: OTHER (ADD COMMENT) | Status: NON-FUNCTIONAL
Removed: 2020-02-18

## (undated) DEVICE — DEVICE PERCLOSE SUT CLSR 6FR

## (undated) DEVICE — SHEATH INTRODUCER 8FR 11CM

## (undated) DEVICE — GUIDEWIRE AMPLATZ .035X260

## (undated) DEVICE — TUBING HPCIL ROT M/F ADPT 10IN

## (undated) DEVICE — CATH BLLN FG APEX MR 2.50X20MM

## (undated) DEVICE — LINE 60IN PRESSURE MON.

## (undated) DEVICE — CATH PV .018

## (undated) DEVICE — OMNIPAQUE 350 200ML

## (undated) DEVICE — CATH MPA2 INFINITI 4FR 100CM

## (undated) DEVICE — SHEATH DRYSEAL 18FR 33CM

## (undated) DEVICE — CATHETER 2LUMEN PIGTAIL 6FR 5540

## (undated) DEVICE — GUIDEWIRE CONFIDA BECKER CURVE

## (undated) DEVICE — NDL TRANSEPTAL ADULT 71.0

## (undated) DEVICE — Device

## (undated) DEVICE — CATH DXTERITY PG145 110CM 6FR

## (undated) DEVICE — GUIDEWIRE AMPLATZ STF .032X260

## (undated) DEVICE — STOPCOCK 3-WAY

## (undated) DEVICE — CATH 5FR PACER STRAIGHT

## (undated) DEVICE — GUIDEWIRE TORAY INOUE

## (undated) DEVICE — GUIDEWIRE FIXED CORE 3MM JTIP 25X260

## (undated) DEVICE — CATHETER DIAGNOSTIC DXTERITY 6FR AL 1

## (undated) DEVICE — GUIDEWIRE WHOLEY 260CMX0.035IN

## (undated) DEVICE — GUIDE VISTA XB 3.5

## (undated) DEVICE — DRAPE PED/NEO-NATAL STR 75X125

## (undated) DEVICE — SEE MEDLINE ITEM 107746

## (undated) DEVICE — SPIKE CONTRAST CONTROLLER

## (undated) DEVICE — SHEATH PINNACLE 6FRX10CM W/GUIDEWIRE

## (undated) DEVICE — SYS EVOLUT PRO+ DELIVERY 34MM
Type: IMPLANTABLE DEVICE | Site: OTHER (ADD COMMENT) | Status: NON-FUNCTIONAL
Removed: 2020-02-18

## (undated) DEVICE — GUIDE VISTA 55CM IM

## (undated) DEVICE — CATH BLLN FG APEX MR 3.50X8MM

## (undated) DEVICE — KIT PROBE COVER WITH GEL

## (undated) DEVICE — GUIDE VISTA 6FR JR 4

## (undated) DEVICE — CATHETER EXPO MLTPK 6FR 100X110CM

## (undated) DEVICE — CATH SUPER TORQUE MP 4FRX80CM

## (undated) DEVICE — COVER BAND BAG 40 X 40

## (undated) DEVICE — CATH AL III 4FR

## (undated) DEVICE — KIT PERCUTANEOUS SHEATH

## (undated) DEVICE — CATH AL 3.0 6FR

## (undated) DEVICE — GUIDEWIRE PLATINUM PL .014X300

## (undated) DEVICE — KIT CO-PILOT

## (undated) DEVICE — GUIDEWIRE CHOICE PT  XS 182CM

## (undated) DEVICE — CATH IMA INFINITI 4FRX100CM

## (undated) DEVICE — SHEATH 8FR MULLINS TRANS

## (undated) DEVICE — SYS WATCHMAN FXD CURVE DBL US